# Patient Record
Sex: MALE | Race: WHITE | Employment: OTHER | URBAN - METROPOLITAN AREA
[De-identification: names, ages, dates, MRNs, and addresses within clinical notes are randomized per-mention and may not be internally consistent; named-entity substitution may affect disease eponyms.]

---

## 2020-04-05 ENCOUNTER — HOSPITAL ENCOUNTER (INPATIENT)
Age: 80
LOS: 6 days | Discharge: NURSING, REHAB OR LONG-TERM CARE | DRG: 377 | End: 2020-04-11
Attending: HOSPITALIST | Admitting: HOSPITALIST
Payer: MEDICARE

## 2020-04-05 ENCOUNTER — ANESTHESIA EVENT (OUTPATIENT)
Dept: ENDOSCOPY | Age: 80
DRG: 377 | End: 2020-04-05
Payer: MEDICARE

## 2020-04-05 ENCOUNTER — ANESTHESIA (OUTPATIENT)
Dept: ENDOSCOPY | Age: 80
DRG: 377 | End: 2020-04-05
Payer: MEDICARE

## 2020-04-05 PROBLEM — A41.9 SEPSIS (HCC): Status: ACTIVE | Noted: 2020-04-05

## 2020-04-05 PROBLEM — A41.9 SEPTIC SHOCK (HCC): Status: ACTIVE | Noted: 2020-04-05

## 2020-04-05 PROBLEM — R65.21 SEPTIC SHOCK (HCC): Status: ACTIVE | Noted: 2020-04-05

## 2020-04-05 PROBLEM — D62 ACUTE BLOOD LOSS ANEMIA: Status: ACTIVE | Noted: 2020-04-05

## 2020-04-05 PROBLEM — N39.0 UTI (URINARY TRACT INFECTION): Status: ACTIVE | Noted: 2020-04-05

## 2020-04-05 PROBLEM — K92.2 GI BLEEDING: Status: ACTIVE | Noted: 2020-04-05

## 2020-04-05 LAB
ANION GAP SERPL CALC-SCNC: 6 MMOL/L (ref 3–18)
APPEARANCE UR: ABNORMAL
APTT PPP: 25.8 SEC (ref 23–36.4)
BACTERIA URNS QL MICRO: ABNORMAL /HPF
BILIRUB UR QL: NEGATIVE
BUN SERPL-MCNC: 78 MG/DL (ref 7–18)
BUN/CREAT SERPL: 43 (ref 12–20)
C DIFF GDH STL QL: NEGATIVE
C DIFF TOX A+B STL QL IA: NEGATIVE
CALCIUM SERPL-MCNC: 7 MG/DL (ref 8.5–10.1)
CHLORIDE SERPL-SCNC: 125 MMOL/L (ref 100–111)
CK MB CFR SERPL CALC: 9.7 % (ref 0–4)
CK MB SERPL-MCNC: 6.2 NG/ML (ref 5–25)
CK SERPL-CCNC: 64 U/L (ref 39–308)
CO2 SERPL-SCNC: 20 MMOL/L (ref 21–32)
COLOR UR: ABNORMAL
CREAT SERPL-MCNC: 1.82 MG/DL (ref 0.6–1.3)
EPITH CASTS URNS QL MICRO: ABNORMAL /LPF (ref 0–5)
ERYTHROCYTE [DISTWIDTH] IN BLOOD BY AUTOMATED COUNT: 16 % (ref 11.6–14.5)
GLUCOSE BLD STRIP.AUTO-MCNC: 323 MG/DL (ref 70–110)
GLUCOSE BLD STRIP.AUTO-MCNC: 94 MG/DL (ref 70–110)
GLUCOSE SERPL-MCNC: 311 MG/DL (ref 74–99)
GLUCOSE UR STRIP.AUTO-MCNC: NEGATIVE MG/DL
HCT VFR BLD AUTO: 24.1 % (ref 36–48)
HCT VFR BLD AUTO: 24.5 % (ref 36–48)
HGB BLD-MCNC: 7.7 G/DL (ref 13–16)
HGB BLD-MCNC: 7.9 G/DL (ref 13–16)
HGB UR QL STRIP: NEGATIVE
INR PPP: 1.4 (ref 0.8–1.2)
INTERPRETATION: NORMAL
KETONES UR QL STRIP.AUTO: NEGATIVE MG/DL
LACTATE SERPL-SCNC: 1.3 MMOL/L (ref 0.4–2)
LEUKOCYTE ESTERASE UR QL STRIP.AUTO: ABNORMAL
MCH RBC QN AUTO: 28.1 PG (ref 24–34)
MCHC RBC AUTO-ENTMCNC: 32 G/DL (ref 31–37)
MCV RBC AUTO: 88 FL (ref 74–97)
NITRITE UR QL STRIP.AUTO: NEGATIVE
PH UR STRIP: 5 [PH] (ref 5–8)
PLATELET # BLD AUTO: 355 K/UL (ref 135–420)
PMV BLD AUTO: 11.7 FL (ref 9.2–11.8)
POTASSIUM SERPL-SCNC: 4.8 MMOL/L (ref 3.5–5.5)
PROT UR STRIP-MCNC: ABNORMAL MG/DL
PROTHROMBIN TIME: 16.5 SEC (ref 11.5–15.2)
RBC # BLD AUTO: 2.74 M/UL (ref 4.7–5.5)
RBC #/AREA URNS HPF: ABNORMAL /HPF (ref 0–5)
SODIUM SERPL-SCNC: 151 MMOL/L (ref 136–145)
SP GR UR REFRACTOMETRY: 1.02 (ref 1–1.03)
TROPONIN I SERPL-MCNC: 1 NG/ML (ref 0–0.04)
UROBILINOGEN UR QL STRIP.AUTO: 1 EU/DL (ref 0.2–1)
WBC # BLD AUTO: 13.2 K/UL (ref 4.6–13.2)
WBC URNS QL MICRO: ABNORMAL /HPF (ref 0–4)

## 2020-04-05 PROCEDURE — 74011636637 HC RX REV CODE- 636/637: Performed by: INTERNAL MEDICINE

## 2020-04-05 PROCEDURE — 85018 HEMOGLOBIN: CPT

## 2020-04-05 PROCEDURE — P9045 ALBUMIN (HUMAN), 5%, 250 ML: HCPCS | Performed by: INTERNAL MEDICINE

## 2020-04-05 PROCEDURE — 74011000250 HC RX REV CODE- 250: Performed by: NURSE ANESTHETIST, CERTIFIED REGISTERED

## 2020-04-05 PROCEDURE — 74011250636 HC RX REV CODE- 250/636: Performed by: HOSPITALIST

## 2020-04-05 PROCEDURE — 86900 BLOOD TYPING SEROLOGIC ABO: CPT

## 2020-04-05 PROCEDURE — 0107U C DIFF TOX AG DETCJ IA STOOL: CPT

## 2020-04-05 PROCEDURE — 74011250636 HC RX REV CODE- 250/636: Performed by: INTERNAL MEDICINE

## 2020-04-05 PROCEDURE — 77010033678 HC OXYGEN DAILY

## 2020-04-05 PROCEDURE — 77030037186 HC VLV ENDOSC STRL DEFENDO DISP MVAT -A: Performed by: INTERNAL MEDICINE

## 2020-04-05 PROCEDURE — 83605 ASSAY OF LACTIC ACID: CPT

## 2020-04-05 PROCEDURE — 76060000031 HC ANESTHESIA FIRST 0.5 HR: Performed by: INTERNAL MEDICINE

## 2020-04-05 PROCEDURE — 82550 ASSAY OF CK (CPK): CPT

## 2020-04-05 PROCEDURE — 85730 THROMBOPLASTIN TIME PARTIAL: CPT

## 2020-04-05 PROCEDURE — 82962 GLUCOSE BLOOD TEST: CPT

## 2020-04-05 PROCEDURE — 86920 COMPATIBILITY TEST SPIN: CPT

## 2020-04-05 PROCEDURE — 83036 HEMOGLOBIN GLYCOSYLATED A1C: CPT

## 2020-04-05 PROCEDURE — 36430 TRANSFUSION BLD/BLD COMPNT: CPT

## 2020-04-05 PROCEDURE — 74011000250 HC RX REV CODE- 250: Performed by: INTERNAL MEDICINE

## 2020-04-05 PROCEDURE — 76040000019: Performed by: INTERNAL MEDICINE

## 2020-04-05 PROCEDURE — 77030037878 HC DRSG MEPILEX >48IN BORD MOLN -B

## 2020-04-05 PROCEDURE — 74011000250 HC RX REV CODE- 250: Performed by: HOSPITALIST

## 2020-04-05 PROCEDURE — 30233N1 TRANSFUSION OF NONAUTOLOGOUS RED BLOOD CELLS INTO PERIPHERAL VEIN, PERCUTANEOUS APPROACH: ICD-10-PCS | Performed by: INTERNAL MEDICINE

## 2020-04-05 PROCEDURE — 80048 BASIC METABOLIC PNL TOTAL CA: CPT

## 2020-04-05 PROCEDURE — 74011000258 HC RX REV CODE- 258: Performed by: INTERNAL MEDICINE

## 2020-04-05 PROCEDURE — 88342 IMHCHEM/IMCYTCHM 1ST ANTB: CPT

## 2020-04-05 PROCEDURE — 88305 TISSUE EXAM BY PATHOLOGIST: CPT

## 2020-04-05 PROCEDURE — 85027 COMPLETE CBC AUTOMATED: CPT

## 2020-04-05 PROCEDURE — 0DB68ZX EXCISION OF STOMACH, VIA NATURAL OR ARTIFICIAL OPENING ENDOSCOPIC, DIAGNOSTIC: ICD-10-PCS | Performed by: INTERNAL MEDICINE

## 2020-04-05 PROCEDURE — 81001 URINALYSIS AUTO W/SCOPE: CPT

## 2020-04-05 PROCEDURE — 65660000001 HC RM ICU INTERMED STEPDOWN

## 2020-04-05 PROCEDURE — P9016 RBC LEUKOCYTES REDUCED: HCPCS

## 2020-04-05 PROCEDURE — C9113 INJ PANTOPRAZOLE SODIUM, VIA: HCPCS | Performed by: HOSPITALIST

## 2020-04-05 PROCEDURE — 85610 PROTHROMBIN TIME: CPT

## 2020-04-05 RX ORDER — NOREPINEPHRINE BIT/0.9 % NACL 8 MG/250ML
.5-3 INFUSION BOTTLE (ML) INTRAVENOUS
Status: DISCONTINUED | OUTPATIENT
Start: 2020-04-05 | End: 2020-04-05

## 2020-04-05 RX ORDER — CHLORHEXIDINE GLUCONATE 1.2 MG/ML
15 RINSE ORAL ONCE
Status: COMPLETED | OUTPATIENT
Start: 2020-04-05 | End: 2020-04-05

## 2020-04-05 RX ORDER — ALBUMIN HUMAN 50 G/1000ML
25 SOLUTION INTRAVENOUS ONCE
Status: COMPLETED | OUTPATIENT
Start: 2020-04-05 | End: 2020-04-05

## 2020-04-05 RX ORDER — SODIUM CHLORIDE 0.9 % (FLUSH) 0.9 %
5-40 SYRINGE (ML) INJECTION AS NEEDED
Status: DISCONTINUED | OUTPATIENT
Start: 2020-04-05 | End: 2020-04-11 | Stop reason: HOSPADM

## 2020-04-05 RX ORDER — SODIUM CHLORIDE 9 MG/ML
250 INJECTION, SOLUTION INTRAVENOUS AS NEEDED
Status: DISCONTINUED | OUTPATIENT
Start: 2020-04-05 | End: 2020-04-07 | Stop reason: SDUPTHER

## 2020-04-05 RX ORDER — MAGNESIUM SULFATE 100 %
4 CRYSTALS MISCELLANEOUS AS NEEDED
Status: DISCONTINUED | OUTPATIENT
Start: 2020-04-05 | End: 2020-04-11 | Stop reason: HOSPADM

## 2020-04-05 RX ORDER — DEXTROSE, SODIUM CHLORIDE, AND POTASSIUM CHLORIDE 5; .45; .15 G/100ML; G/100ML; G/100ML
100 INJECTION INTRAVENOUS CONTINUOUS
Status: DISCONTINUED | OUTPATIENT
Start: 2020-04-05 | End: 2020-04-05

## 2020-04-05 RX ORDER — NOREPINEPHRINE BIT/0.9 % NACL 8 MG/250ML
.5-3 INFUSION BOTTLE (ML) INTRAVENOUS
Status: DISCONTINUED | OUTPATIENT
Start: 2020-04-05 | End: 2020-04-06

## 2020-04-05 RX ORDER — INSULIN LISPRO 100 [IU]/ML
INJECTION, SOLUTION INTRAVENOUS; SUBCUTANEOUS EVERY 6 HOURS
Status: DISCONTINUED | OUTPATIENT
Start: 2020-04-05 | End: 2020-04-06

## 2020-04-05 RX ORDER — SODIUM CHLORIDE 9 MG/ML
250 INJECTION, SOLUTION INTRAVENOUS AS NEEDED
Status: DISCONTINUED | OUTPATIENT
Start: 2020-04-05 | End: 2020-04-11 | Stop reason: HOSPADM

## 2020-04-05 RX ORDER — SODIUM CHLORIDE 450 MG/100ML
100 INJECTION, SOLUTION INTRAVENOUS CONTINUOUS
Status: DISCONTINUED | OUTPATIENT
Start: 2020-04-05 | End: 2020-04-06

## 2020-04-05 RX ORDER — SODIUM CHLORIDE 0.9 % (FLUSH) 0.9 %
5-40 SYRINGE (ML) INJECTION EVERY 8 HOURS
Status: DISCONTINUED | OUTPATIENT
Start: 2020-04-05 | End: 2020-04-11 | Stop reason: HOSPADM

## 2020-04-05 RX ORDER — DEXTROSE MONOHYDRATE 100 MG/ML
125-250 INJECTION, SOLUTION INTRAVENOUS AS NEEDED
Status: DISCONTINUED | OUTPATIENT
Start: 2020-04-05 | End: 2020-04-11 | Stop reason: HOSPADM

## 2020-04-05 RX ADMIN — SODIUM CHLORIDE 100 ML/HR: 450 INJECTION, SOLUTION INTRAVENOUS at 17:37

## 2020-04-05 RX ADMIN — SODIUM CHLORIDE 40 MG: 9 INJECTION, SOLUTION INTRAMUSCULAR; INTRAVENOUS; SUBCUTANEOUS at 14:21

## 2020-04-05 RX ADMIN — DEXTROSE MONOHYDRATE, SODIUM CHLORIDE, AND POTASSIUM CHLORIDE 100 ML/HR: 50; 4.5; 1.49 INJECTION, SOLUTION INTRAVENOUS at 13:49

## 2020-04-05 RX ADMIN — ALBUMIN (HUMAN) 25 G: 12.5 INJECTION, SOLUTION INTRAVENOUS at 15:24

## 2020-04-05 RX ADMIN — SODIUM CHLORIDE 40 MG: 9 INJECTION, SOLUTION INTRAMUSCULAR; INTRAVENOUS; SUBCUTANEOUS at 21:00

## 2020-04-05 RX ADMIN — CHLORHEXIDINE GLUCONATE 15 ML: 1.2 RINSE ORAL at 17:52

## 2020-04-05 RX ADMIN — Medication 10 ML: at 15:28

## 2020-04-05 RX ADMIN — INSULIN LISPRO 8 UNITS: 100 INJECTION, SOLUTION INTRAVENOUS; SUBCUTANEOUS at 17:42

## 2020-04-05 RX ADMIN — Medication 10 ML: at 21:42

## 2020-04-05 RX ADMIN — NOREPINEPHRINE BITARTRATE 20 MCG/MIN: 1 INJECTION INTRAVENOUS at 15:00

## 2020-04-05 NOTE — PROGRESS NOTES
spoke with Pt\"spouse to provide pastoral care and inquire about Code Status. See  Advance Care Plan note below for Pt's Medical POA. Pt's ACP original  is on file at THE [facility], [place], [place]. (now owned by [facility]). Pt is on [employer]'s prayer chain and a former [employer]. Spouse deeply appreciated the call.   M

## 2020-04-05 NOTE — PROGRESS NOTES
1240: Pt arrived to room 2604. Pt on 6 L NC,  On 18 of levophed. Pt is AOX4. Pt following commands. Pt is incontinent of copious amount of stool, loose liquid tarry in coloration. Pt denies any pain at this time. 1345: pt oxygen titrated down to 3 L NC. Levophed at 16. Pt incontinent of dark tarry loose stool. Incontinent care performed. 1815:  EGD being done at the bedside. 1845: Pt had 300ml nayeli urine output from dill since arrival to unit at 1245.     1900: Bedside and Verbal shift change report given to Madonna Bolivar RN (oncoming nurse) by Jarett Dean   (offgoing nurse). Report included the following information SBAR, Kardex, Intake/Output, MAR and Cardiac Rhythm NSR.

## 2020-04-05 NOTE — ACP (ADVANCE CARE PLANNING)
Spoke with Patient's spouse this afternoon. Pt's AMD is on file at THE [facility] (now owned by [address] [place] [address] [place]   Agent is [id number] [address], . ([phone]), who is the Pt's Brother in law and who resides in [place], [place].

## 2020-04-05 NOTE — ANESTHESIA PREPROCEDURE EVALUATION
Relevant Problems   No relevant active problems       Anesthetic History   No history of anesthetic complications            Review of Systems / Medical History  Patient summary reviewed and pertinent labs reviewed    Pulmonary  Within defined limits                 Neuro/Psych         Dementia     Cardiovascular    Hypertension                   GI/Hepatic/Renal         Renal disease: CRI      Comments: UGI bleed Endo/Other    Diabetes: type 2    Anemia     Other Findings              Physical Exam    Airway  Mallampati: III  TM Distance: 4 - 6 cm  Neck ROM: decreased range of motion   Mouth opening: Diminished (comment)     Cardiovascular    Rhythm: regular  Rate: normal         Dental    Dentition: Poor dentition     Pulmonary  Breath sounds clear to auscultation               Abdominal  GI exam deferred       Other Findings            Anesthetic Plan    ASA: 3  Anesthesia type: MAC            Anesthetic plan and risks discussed with: Patient

## 2020-04-05 NOTE — CONSULTS
[facility]  Pulmonary, Critical Care, and Sleep Medicine    Name: Ashton Essex MRN: [id number]  : 1940 Hospital: [address] [place] [address]  Date: 2020       [facility] Initial Patient Consult                                              Consult requesting physician: Gordon Mendez MD    Reason for Consult: GI bleed    I have reviewed the flowsheet and notes. Events, vitals, medications and notes from last 24 hours reviewed. 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    Post-void dribbling N39.43    Nocturia R35.1    Benign prostatic hyperplasia with urinary obstruction N40.1, N13.8    Urge incontinence N39.41    Atopic rhinitis J30.9    Hearing loss H91.90    Hypertension I10    Hyperlipidemia E78.5    Pseudophakia Z96.1    Urinary tract infection N39.0    Diabetes ([facility] [address] E11.9    Testicular cancer ([facility]) C62.90    Hypersomnia G47.10    Dementia ([facility]) F03.90    OAB (overactive bladder) N32.81    Urinary incontinence, urge N39.41    Acute blood loss anemia D62    GI bleeding K92.2    UTI (urinary tract infection) N39.0    Sepsis ([facility]) A41.9     Acute anemia secondary to blood loss -per report patient's hemoglobin at the other hospital was 5.5 and patient was transfused packed RBC. Patient's repeat hemoglobin here is 7.7. I have ordered further PRBC transfusion as the patient is a still in shock. Shock-patient has received IV fluid resuscitation at the outside hospital.  He is currently on Levophed drip. Titrate to keep systolic blood pressure more than 90 mmHg. I will also bolus him with 1 dose of albumin. PRBC transfusion should also help with his blood pressure  Acute upper GI bleed -GI has been consulted and I have talked to them. They are planning to do an endoscopy today.   Patient has been started on Protonix twice daily. Patient is also on aspirin and Plavix as an outpatient which has been held off at this time. Hyponatremia-this is likely secondary to dehydration. Hydrate with IV fluids and monitor  Acute kidney injury-this is again secondary to dehydration. Hydrate with IV fluids and monitor  History of diabetes mellitus with hyperglycemia I will start the patient on insulin sliding scale  History of hypertension-patient's home medications are on hold secondary to his hypotension  DVT and GI prophylaxis-patient is on SCD and Protonix    Code status: Patient is full code per wife    OTHER:  Glycemic Control. Glucose stabilizer per ICU protocol when on insulin drip. Maintain blood glucose 140-180. Replace electrolytes per ICU electrolyte replacement protocol  Vasopressor when appropriate with MAP goal >65 mmHg. Central Line bundle followed, remove when not needed. Large bore IV line or CVP when appropriate. Skin & Wound care. PT/OT eval and treat. OOB/IS when appropriate. Further recommendations will be based on the patient's response to recommended treatment and results of the investigation ordered. Quality Care: Stress ulcer prophylaxis, DVT prophylaxis, HOB elevated, Infection control all reviewed and addressed. Events and notes from last 24 hours reviewed. Care plan discussed with nursing. D/w patient and family: above medical problems and answered all questions to their satisfaction. See my orders for detail. CC TIME: >45 min   Further management depending on test results and work up as outlined above. History of Present Illness:    Meghann Harry has been seen and evaluated in ICU/ER. Patient is a 78 y.o. male with PMHx significant for diabetes mellitus, hypertension, early dementia who has been transferred to our hospital from [facility] due to shock and GI bleed.   On my assessment patient reported he has been having dark-colored stools for the past week to week and a half. He does not complain of any chest pains or dyspnea. Does not complain of any headaches dizziness or lightheadedness at this time. Does not report any history of fever. He does take a daily aspirin and Plavix and is also takes Motrin for pain. Review of Systems:   HEENT: No epistaxis, no nasal drainage  Respiratory: as above  Cardiovascular: no chest pain, no palpitations  Gastrointestinal: as above  Genitourinary: No urinary symptoms  Musculoskeletal: Joint pain  Neurological: No focal weakness, no seizures, no headaches  Constitutional: No fever, no chills  Skin: no rash      Medication Reviewed      Allergies   Allergen Reactions    Iodinated Contrast Media Other (comments)     Hot flushing       Past Medical History:   Diagnosis Date    Benign prostatic hyperplasia with urinary obstruction     Dementia     early onset    Diabetes ([address]     Hypersomnia     Hypertension     Nocturia     OAB (overactive bladder)     Testicular cancer (HCC)     Urge incontinence     Urinary incontinence, urge       Past Surgical History:   Procedure Laterality Date    HX CYST REMOVAL  -    cyst removed from right breast    HX OTHER SURGICAL      reemoval of testcle    HX OTHER SURGICAL  2018    Surgery: [facility]age Lower bowels, [facility]    HX OTHER SURGICAL  2019    Heart Blockage: 30% [facility]: Dr. Camila Adamson      Social History     Tobacco Use    Smoking status: Former Smoker     Types: Cigarettes     Last attempt to quit: 1976     Years since quittin.2    Smokeless tobacco: Never Used   Substance Use Topics    Alcohol use: No     Alcohol/week: 0.0 standard drinks      No family history on file. Prior to Admission medications    Medication Sig Start Date End Date Taking? Authorizing Provider   mirabegron ER (MYRBETRIQ) 25 mg ER tablet Take 1 Tab by mouth daily.  18   Shaw Matute MD   clopidogrel (PLAVIX) 75 mg tab  12/15/17 Provider, Historical   glimepiride (AMARYL) 1 mg tablet  12/15/17   Provider, Historical   aspirin-calcium carbonate 81 mg-300 mg calcium(777 mg) tab 81 mg.    Provider, Historical   trospium (SANCTURA XL) 60 mg capsule Take 1 Cap by mouth Daily (before breakfast). 1/17/18   Matthew Thomas MD   sildenafil, antihypertensive, (REVATIO) 20 mg tablet Take up to 5 tablets one hour prior to intercourse on an empty stomach. 1/17/18   Matthew Thomas MD   cyanocobalamin 1,000 mcg tablet 1,000 mcg. 5/21/17   Provider, Historical   omeprazole (PRILOSEC) 20 mg capsule 20 mg.    Provider, Historical   cinnamon bark (CINNAMON) 500 mg cap Take 1,000 mg by mouth. Provider, Historical   testosterone cypionate (DEPOTESTOTERONE CYPIONATE) 200 mg/mL injection 200 mg by IntraMUSCular route. 3/15/15   Provider, Historical   metoprolol (LOPRESSOR) 25 mg tablet  7/31/15   Provider, Historical   benazepril (LOTENSIN) 10 mg tablet  7/31/15   Provider, Historical   tamsulosin (FLOMAX) 0.4 mg capsule  7/18/15   Provider, Historical   pravastatin (PRAVACHOL) 20 mg tablet Take 20 mg by mouth nightly.     Provider, Historical     Current Facility-Administered Medications   Medication Dose Route Frequency    dextrose 5% - 0.45% NaCl with KCl 20 mEq/L infusion  100 mL/hr IntraVENous CONTINUOUS    pantoprazole (PROTONIX) 40 mg in 0.9% sodium chloride 10 mL injection  40 mg IntraVENous Q12H    sodium chloride (NS) flush 5-40 mL  5-40 mL IntraVENous Q8H    vasopressin (VASOSTRICT) 20 Units in 0.9% sodium chloride 100 mL infusion  0-0.03 Units/min IntraVENous TITRATE    NOREPINephrine (LEVOPHED) 8 mg in 0.9% NS 250ml infusion  0.5-30 mcg/min IntraVENous TITRATE        Central Venous Catheter:  Triple Lumen CVL right subclavian  04/05/20 Right Subclavian (Active)     Objective:  Vital Signs:    Visit Vitals  /45   Pulse 98   Temp 97.4 °F (36.3 °C)   Resp 24   Ht 6' (1.829 m)   Wt 79.4 kg (175 lb)   SpO2 98%   BMI 23.73 kg/m²      O2 Device: Nasal cannula  O2 Flow Rate (L/min): 4 l/min  Temp (24hrs), Av.9 °F (36.1 °C), Min:96.4 °F (35.8 °C), Max:97.4 °F (36.3 °C)    Physical Exam:   General/Neurology: Alert, Awake,   Head:   Normocephalic, without obvious abnormality  Eye:   PERRL, EOM intact, no scleral icterus, no pallor  Oral:   Mucus membranes moist  Neck:   Supple  Lung:   B/l air entry is fair  Heart:   Regular rate & rhythm. S1 S2 present. Abdomen: Soft, non tender, BS+nt  Extremities:  No pedal edema  Skin:   Dry, intact    Data:    High complexity decision making was performed during the evaluation of this patient at high risk for decompensation with multiple organ involvement     Above mentioned total time spent on reviewing the case/medical record/data/notes/EMR/patient examination/documentation/coordinating care with nurse/consultants, exclusive of procedures with complex decision making performed and > 50% time spent in face to face evaluation.     This note has been dictated using the dragon dictation system    Remi Azul MD  2020

## 2020-04-05 NOTE — ANESTHESIA POSTPROCEDURE EVALUATION
Post-Anesthesia Evaluation & Assessment    Visit Vitals  BP (!) 115/39   Pulse (!) 105   Temp 36.3 °C (97.3 °F)   Resp 21   Ht 6' (1.829 m)   Wt 79.4 kg (175 lb)   SpO2 99%   BMI 23.73 kg/m²       Nausea/Vomiting: no nausea    Pain score (VAS): 0    Post-operative hydration adequate. Mental status & Level of consciousness: orientation per pre-anesthetic level    Neurological status: moves all extremities, sensation grossly intact    Pulmonary status: airway patent, supplemental oxygen required via NC and humidifier    Complications related to anesthesia: none    Additional comments:        Gabriela Cooper CRNA  April 5, 2020  Procedure(s):  ESOPHAGOGASTRODUODENOSCOPY (EGD). MAC    <BSHSIANPOST>    Vitals Value Taken Time   /39 4/5/2020  6:45 PM   Temp     Pulse 103 4/5/2020  6:54 PM   Resp 26 4/5/2020  6:54 PM   SpO2 100 % 4/5/2020  6:54 PM   Vitals shown include unvalidated device data.

## 2020-04-05 NOTE — CONSULTS
Gastroenterology Consult    Patient: Jody Martin MRN: [id number]  SSN: [id number]    YOB: 1940  Age: 78 y.o. Sex: male        Assessment:   1) Hypotension, likely secondary to hypovolemia and anemia related to GI bleeding  2) Melena; suspected upper GI bleeding with DDx including PUD, AVM, neoplasia, esophagitis/gastritis. Lower GI source is a consideration  3) Anemia; secondary to GI bleeding; Hb 5.9->7.7 without recent baseline  4) Chronic dual antiplatelet therapy (?indication) and NSAID use  5) Colonic thickening on CT; possibly related to colitis (?ischemic) or infectious process although also potentially an incidental or clinically insignificant finding  6) KARLI, creat 1.8    Mr Lexie Espino has anemia and hypotension with apparent melena suspicious for UGI bleeding. Limited notes from OSH are available and unclear whether there was a specific concern for infection or whether ABX were started for broad treatment of hypotension but seems that GI bleeding is at least a major contributor to the hypotension. Significant concern for PUD in the setting of chronic aspirin and motrin use. No evidence of liver disease. Plan for ongoing resuscitation (would given him at least 1 more unit of blood right now) and plan for EGD once he is a bit more stabilized, likely later this afternoon. Continue protonix 40mg bid. I discussed EGD with its risks and potential benefits with the patient and his wife Adal Abler) in detail and both consent to the procedure and all questions were answered    Plan:   1) NPO for EGD over the next 12-24 hours  2) 1 unit PRBC transfusion  3) Check coags  4) Hold aspirin and plavix  5) Check CDiff  6) Further considerations after EGD. Subjective:      Jody Martin is a 78 y.o. male who is being seen for concern for GI bleeding.  He has a history notable for DM, HTN, early dementia, remote testicular cancer, and recurrent small bowel obstructions s/p laparoscopy with small laparotomy with YISSEL in Aug2018. It appears he had SBO in Dec2019 managed conservatively and limited notes indicate admission 25March for diarrhea/vomiting and acute renal failure. He was taken to [facility] last night for unresponsiveness. He was hypotensive and was given IVF and empiric antibiotics. Labwork there showed WBC 9.7, Hb 5.9, normal platelets, creat 1.8, Troponin 0.02, normal liver enzymes, lactate 2.8. CT Abd/Pelvis showed possible colonic wall thickening of the descending colon near the splenic flexure as well as small bilateral pleural effusion. He was started on levophed. Some notes indicate concern for septic shock although I don't see indication that specific infection was suspected or found on the limited available records I have. He was treated with ABX and given protonix IV and given 1 unit of PRBC's and he was transferred to [facility] this afternoon for ongoing care. Here, his BP and HR are normalized on levophed drip. He is afebrile. He has had a couple loose black colored stools per nursing staff. His mental status has improved and he reports no complaints to me other than dry mouth. He has no abd pain I spoke with his wife as well to confirm history. They report he has had on and off loose stools ever since his laparoscopy/laparotomy in Aug2018, sometimes black in color like now. No red blood in the stool. He believes he had a prior EGD some time ago but details are unclear. He has no known history of GI bleeding or ulcers. He does take daily aspirin and is on plavix for unclear reasons (he states it is for a murmur). He admits to daily motrin use for chronic arthritis. He has had no vomiting. He has had no fever. He has no dyspnea. Limited labs area available here at [facility] with WBC 13.2, Hb 7.7, plt 355.    His last colonoscopy was Sept2017 (Dr Savannah Paula) with internal hemorrhoids and sigmoid diverticulosis noted    Past Medical History  Past Medical History:   Diagnosis Date    Benign prostatic hyperplasia with urinary obstruction     Dementia     early onset    Diabetes ([facility] [address]     Hypersomnia     Hypertension     Nocturia     OAB (overactive bladder)     Testicular cancer (HCC)     Urge incontinence     Urinary incontinence, urge        Past Surgical History  Past Surgical History:   Procedure Laterality Date    HX CYST REMOVAL  -    cyst removed from right breast    HX OTHER SURGICAL      reemoval of testcle    HX OTHER SURGICAL  2018    [facility]: [facility] bowels[facility]    HX OTHER SURGICAL  2019    Heart Blockage: 30% [facility]: Dr. Rosa Berry       Family History  No family history on file.       Social History  Social History     Socioeconomic History    Marital status:      Spouse name: Not on file    Number of children: Not on file    Years of education: Not on file    Highest education level: Not on file   Occupational History    Not on file   Social Needs    Financial resource strain: Not on file    Food insecurity     Worry: Not on file     Inability: Not on file    Transportation needs     Medical: Not on file     Non-medical: Not on file   Tobacco Use    Smoking status: Former Smoker     Types: Cigarettes     Last attempt to quit: 1976     Years since quittin.2    Smokeless tobacco: Never Used   Substance and Sexual Activity    Alcohol use: No     Alcohol/week: 0.0 standard drinks    Drug use: No    Sexual activity: Never     Partners: Female   Lifestyle    Physical activity     Days per week: Not on file     Minutes per session: Not on file    Stress: Not on file   Relationships    Social connections     Talks on phone: Not on file     Gets together: Not on file     Attends Christianity service: Not on file     Active member of club or organization: Not on file     Attends meetings of clubs or organizations: Not on file     Relationship status: Not on file    Intimate partner violence     Fear of current or ex partner: Not on file     Emotionally abused: Not on file     Physically abused: Not on file     Forced sexual activity: Not on file   Other Topics Concern    Not on file   Social History Narrative    Not on file         Medications  Current Facility-Administered Medications   Medication Dose Route Frequency    dextrose 5% - 0.45% NaCl with KCl 20 mEq/L infusion  100 mL/hr IntraVENous CONTINUOUS    pantoprazole (PROTONIX) 40 mg in 0.9% sodium chloride 10 mL injection  40 mg IntraVENous Q12H    0.9% sodium chloride infusion 250 mL  250 mL IntraVENous PRN    sodium chloride (NS) flush 5-40 mL  5-40 mL IntraVENous Q8H    sodium chloride (NS) flush 5-40 mL  5-40 mL IntraVENous PRN    0.9% sodium chloride infusion 250 mL  250 mL IntraVENous PRN    vasopressin (VASOSTRICT) 20 Units in 0.9% sodium chloride 100 mL infusion  0-0.03 Units/min IntraVENous TITRATE    NOREPINephrine (LEVOPHED) 8 mg in 0.9% NS 250ml infusion  0.5-30 mcg/min IntraVENous TITRATE    albumin human 5% (BUMINATE) solution 25 g  25 g IntraVENous ONCE        Hospital Problems  Date Reviewed: 2/5/2019          Codes Class Noted POA    Acute blood loss anemia ICD-10-CM: D62  ICD-9-CM: 285.1  4/5/2020 Unknown        GI bleeding ICD-10-CM: K92.2  ICD-9-CM: 578.9  4/5/2020 Unknown        UTI (urinary tract infection) ICD-10-CM: N39.0  ICD-9-CM: 599.0  4/5/2020 Unknown        Sepsis ([address] ICD-10-CM: A41.9  ICD-9-CM: 038.9, 995.91  4/5/2020 Unknown            Allergies   Allergen Reactions    Iodinated Contrast Media Other (comments)     Hot flushing        Review of Systems:  Pertinent items are noted in the History of Present Illness.     Objective:     Physical Exam:  Visit Vitals  BP (!) 123/34   Pulse 84   Temp 97.4 °F (36.3 °C)   Resp 20   Ht 6' (1.829 m)   Wt 79.4 kg (175 lb)   SpO2 99%   BMI 23.73 kg/m²     General appearance: alert, cooperative, elderly M in no distress  Head: Normocephalic, without obvious abnormality, atraumatic  Eyes: negative for pallor/icterus  Throat: dry lips and oral mucosa; multiple missing teeth with dental caries noted; no loose teeth  Neck: supple, symmetrical, trachea midline, no adenopathy, thyroid: not enlarged, symmetric, no tenderness/mass/nodules   Lungs: clear to auscultation bilaterally  Heart: regular rate and rhythm, S1, S2 normal, 3/6 systolic murmur at the base  Abdomen: soft, non-tender. Bowel sounds normal. No masses,  no organomegaly  Extremities: extremities normal, atraumatic, no significant edema  Skin: Skin color, texture, turgor normal. No rashes or lesions  Neurologic: Oriented to person, place, year, president.  Doesn't know date    Recent Results (from the past 24 hour(s))   CBC W/O DIFF    Collection Time: 04/05/20  2:37 PM   Result Value Ref Range    WBC 13.2 4.6 - 13.2 K/uL    RBC 2.74 (L) 4.70 - 5.50 M/uL    HGB 7.7 (L) 13.0 - 16.0 g/dL    HCT 24.1 (L) 36.0 - 48.0 %    MCV 88.0 74.0 - 97.0 FL    MCH 28.1 24.0 - 34.0 PG    MCHC 32.0 31.0 - 37.0 g/dL    RDW 16.0 (H) 11.6 - 14.5 %    PLATELET 005 550 - 889 K/uL    MPV 11.7 9.2 - 11.8 FL   TYPE + CROSSMATCH    Collection Time: 04/05/20  2:45 PM   Result Value Ref Range    Crossmatch Expiration 04/08/2020     ABO/Rh(D) PENDING     Antibody screen PENDING          Signed By: Andreina Morgan MD     April 5, 2020 Skin normal color for race, warm, dry and intact. No evidence of rash.

## 2020-04-05 NOTE — PROCEDURES
Endoscopy Procedure Note    Patient: Jess Geller MRN: [id number]  SSN: [id number]    YOB: 1940  Age: 78 y.o. Sex: male      Date/Time:  4/5/2020 6:36 PM    Esophagogastroduodenoscopy (EGD) Procedure Note    Procedure: Esophagogastroduodenoscopy with biopsy    IMPRESSION:   1. Gastritis and duodenitis with areas of focal oozing  2. Multiple gastric and duodenal ulcers without bleeding or high risk features  3. Gastric biopsies taken for Hpylori    RECOMMENDATIONS:  1. NPO for now, but has levophed requirement decreases then OK for clears from GI standpoint   2. Continue protonix 40mg twice daily  3. Monitor H/H and transfuse to goal Hb 7-8  4. Await biopsies  5. Hold aspirin and plavix for now    Indication: melena and anemia with hypotension  :  Syl Magana MD  Assistants: * No surgical staff found *    Referring Provider:   Donna Weinberg MD  History: The history and physical exam were reviewed and updated. Endoscope: Olympus GIF-190 diagnostic endoscope  Extent of Exam: third portion of the duodenum  ASA: Per Anesthesia  Anethesia/Sedation:  MAC anesthesia    Description of the procedure: The procedure was discussed with the patient including risks, benefits, alternatives including risks of iv sedation, bleeding, perforation and aspiration. A safety timeout was performed. The patient was placed in the left lateral decubitus position. A bite block was placed. The patient was given incremental doses of intravenous sedation until moderate sedation was achieved. The patients vital signs were monitored at all times including heart rate/rhythm, blood pressure and oxygen saturation. The endoscope was then passed under direct visualization to the third portion of the duodenum. The endoscope was then slowly withdrawn while visualizing the mucosa. In the stomach a retroflexion was performed and gastric fundus and cardia visualized.   The endoscope was then slowly withdrawn. The patient was then transferred to recovery in stable condition. Findings:    Esophagus: The esophageal mucosa was normal with no ulceration, mass or stricture. There was no evidence of Chavez's esophagus or reflux esophagitis. Stomach: There is yellow fluid in the stomach without blood or bleeding. There are multiple 2-4mm ulcers in the antrum and body with pigmented eschar. There is a larger 8-9mm ulcer along the proximal gastric body along the posterior wall with adherent exudate. None of these had visible vessel or bleeding. There is patchy erythema and mucosal edema consistent with gastritis. Biopsies were taken from the antrum and body for Hpylori. Duodenum: There is diffuse duodenitis in the bulb and at the sweep with mucosal edema, erythema, and friability. There are areas of focal oozing from the duodenitis without underlying treatable lesion. There are several 5-8mm ulcers in the bulb and sweep with adherent exudate and flat pigmented material without bleeding or visible vessel. The more distal duodenal mucosa was normal.     Therapies:  None    Specimens: * No specimens in log *            Complications:   None; patient tolerated the procedure well.     EBL:Minimal    Discharge disposition:  Remain in the ICU    Telma Min MD  April 5, 2020  6:36 PM

## 2020-04-06 ENCOUNTER — APPOINTMENT (OUTPATIENT)
Dept: GENERAL RADIOLOGY | Age: 80
DRG: 377 | End: 2020-04-06
Attending: INTERNAL MEDICINE
Payer: MEDICARE

## 2020-04-06 PROBLEM — K50.90 CROHN'S DISEASE (HCC): Status: ACTIVE | Noted: 2020-04-06

## 2020-04-06 LAB
ALBUMIN SERPL-MCNC: 1.9 G/DL (ref 3.4–5)
ALBUMIN/GLOB SERPL: 0.9 {RATIO} (ref 0.8–1.7)
ALP SERPL-CCNC: 58 U/L (ref 45–117)
ALT SERPL-CCNC: 13 U/L (ref 16–61)
ANION GAP SERPL CALC-SCNC: 6 MMOL/L (ref 3–18)
AST SERPL-CCNC: 13 U/L (ref 10–38)
BASOPHILS # BLD: 0 K/UL (ref 0–0.1)
BASOPHILS NFR BLD: 0 % (ref 0–2)
BILIRUB SERPL-MCNC: 0.8 MG/DL (ref 0.2–1)
BUN SERPL-MCNC: 64 MG/DL (ref 7–18)
BUN/CREAT SERPL: 39 (ref 12–20)
CALCIUM SERPL-MCNC: 6.9 MG/DL (ref 8.5–10.1)
CHLORIDE SERPL-SCNC: 127 MMOL/L (ref 100–111)
CK MB CFR SERPL CALC: 9.6 % (ref 0–4)
CK MB SERPL-MCNC: 5 NG/ML (ref 5–25)
CK SERPL-CCNC: 52 U/L (ref 39–308)
CO2 SERPL-SCNC: 21 MMOL/L (ref 21–32)
CREAT SERPL-MCNC: 1.64 MG/DL (ref 0.6–1.3)
DIFFERENTIAL METHOD BLD: ABNORMAL
EOSINOPHIL # BLD: 0.1 K/UL (ref 0–0.4)
EOSINOPHIL NFR BLD: 2 % (ref 0–5)
ERYTHROCYTE [DISTWIDTH] IN BLOOD BY AUTOMATED COUNT: 15.9 % (ref 11.6–14.5)
GLOBULIN SER CALC-MCNC: 2.1 G/DL (ref 2–4)
GLUCOSE BLD STRIP.AUTO-MCNC: 110 MG/DL (ref 70–110)
GLUCOSE BLD STRIP.AUTO-MCNC: 112 MG/DL (ref 70–110)
GLUCOSE BLD STRIP.AUTO-MCNC: 63 MG/DL (ref 70–110)
GLUCOSE BLD STRIP.AUTO-MCNC: 66 MG/DL (ref 70–110)
GLUCOSE BLD STRIP.AUTO-MCNC: 71 MG/DL (ref 70–110)
GLUCOSE BLD STRIP.AUTO-MCNC: 71 MG/DL (ref 70–110)
GLUCOSE BLD STRIP.AUTO-MCNC: 75 MG/DL (ref 70–110)
GLUCOSE SERPL-MCNC: 57 MG/DL (ref 74–99)
HBA1C MFR BLD: 6.1 % (ref 4.2–5.6)
HCT VFR BLD AUTO: 23.3 % (ref 36–48)
HCT VFR BLD AUTO: 23.7 % (ref 36–48)
HGB BLD-MCNC: 7.4 G/DL (ref 13–16)
HGB BLD-MCNC: 7.5 G/DL (ref 13–16)
LYMPHOCYTES # BLD: 1 K/UL (ref 0.9–3.6)
LYMPHOCYTES NFR BLD: 13 % (ref 21–52)
MCH RBC QN AUTO: 28 PG (ref 24–34)
MCHC RBC AUTO-ENTMCNC: 31.8 G/DL (ref 31–37)
MCV RBC AUTO: 88.3 FL (ref 74–97)
MONOCYTES # BLD: 0.7 K/UL (ref 0.05–1.2)
MONOCYTES NFR BLD: 9 % (ref 3–10)
NEUTS SEG # BLD: 5.5 K/UL (ref 1.8–8)
NEUTS SEG NFR BLD: 76 % (ref 40–73)
PLATELET # BLD AUTO: 270 K/UL (ref 135–420)
PMV BLD AUTO: 11.5 FL (ref 9.2–11.8)
POTASSIUM SERPL-SCNC: 4 MMOL/L (ref 3.5–5.5)
PROT SERPL-MCNC: 4 G/DL (ref 6.4–8.2)
RBC # BLD AUTO: 2.64 M/UL (ref 4.7–5.5)
SODIUM SERPL-SCNC: 154 MMOL/L (ref 136–145)
TROPONIN I SERPL-MCNC: 1.06 NG/ML (ref 0–0.04)
WBC # BLD AUTO: 7.3 K/UL (ref 4.6–13.2)

## 2020-04-06 PROCEDURE — 82962 GLUCOSE BLOOD TEST: CPT

## 2020-04-06 PROCEDURE — 77010033678 HC OXYGEN DAILY

## 2020-04-06 PROCEDURE — 74011250636 HC RX REV CODE- 250/636: Performed by: HOSPITALIST

## 2020-04-06 PROCEDURE — 74011000258 HC RX REV CODE- 258: Performed by: INTERNAL MEDICINE

## 2020-04-06 PROCEDURE — 80053 COMPREHEN METABOLIC PANEL: CPT

## 2020-04-06 PROCEDURE — 82550 ASSAY OF CK (CPK): CPT

## 2020-04-06 PROCEDURE — 65660000001 HC RM ICU INTERMED STEPDOWN

## 2020-04-06 PROCEDURE — 74011250637 HC RX REV CODE- 250/637: Performed by: INTERNAL MEDICINE

## 2020-04-06 PROCEDURE — 74011000250 HC RX REV CODE- 250: Performed by: HOSPITALIST

## 2020-04-06 PROCEDURE — 85018 HEMOGLOBIN: CPT

## 2020-04-06 PROCEDURE — 74011000250 HC RX REV CODE- 250

## 2020-04-06 PROCEDURE — 85025 COMPLETE CBC W/AUTO DIFF WBC: CPT

## 2020-04-06 PROCEDURE — 71045 X-RAY EXAM CHEST 1 VIEW: CPT

## 2020-04-06 PROCEDURE — C9113 INJ PANTOPRAZOLE SODIUM, VIA: HCPCS | Performed by: HOSPITALIST

## 2020-04-06 RX ORDER — PRAVASTATIN SODIUM 20 MG/1
20 TABLET ORAL
Status: DISCONTINUED | OUTPATIENT
Start: 2020-04-06 | End: 2020-04-11 | Stop reason: HOSPADM

## 2020-04-06 RX ORDER — DEXTROSE 50 % IN WATER (D50W) INTRAVENOUS SYRINGE
Status: COMPLETED
Start: 2020-04-06 | End: 2020-04-06

## 2020-04-06 RX ORDER — DEXTROSE MONOHYDRATE 100 MG/ML
125-250 INJECTION, SOLUTION INTRAVENOUS AS NEEDED
Status: DISCONTINUED | OUTPATIENT
Start: 2020-04-06 | End: 2020-04-07 | Stop reason: SDUPTHER

## 2020-04-06 RX ORDER — DEXTROSE MONOHYDRATE AND SODIUM CHLORIDE 5; .45 G/100ML; G/100ML
75 INJECTION, SOLUTION INTRAVENOUS CONTINUOUS
Status: DISCONTINUED | OUTPATIENT
Start: 2020-04-06 | End: 2020-04-08

## 2020-04-06 RX ORDER — DEXTROSE 50 % IN WATER (D50W) INTRAVENOUS SYRINGE
25 ONCE
Status: COMPLETED | OUTPATIENT
Start: 2020-04-06 | End: 2020-04-06

## 2020-04-06 RX ORDER — OXYBUTYNIN CHLORIDE 5 MG/1
TABLET, EXTENDED RELEASE ORAL
Status: DISCONTINUED | OUTPATIENT
Start: 2020-04-07 | End: 2020-04-06

## 2020-04-06 RX ORDER — OXYBUTYNIN CHLORIDE 10 MG/1
10 TABLET, EXTENDED RELEASE ORAL DAILY
Status: DISCONTINUED | OUTPATIENT
Start: 2020-04-07 | End: 2020-04-11 | Stop reason: HOSPADM

## 2020-04-06 RX ORDER — INSULIN LISPRO 100 [IU]/ML
INJECTION, SOLUTION INTRAVENOUS; SUBCUTANEOUS
Status: DISCONTINUED | OUTPATIENT
Start: 2020-04-06 | End: 2020-04-10

## 2020-04-06 RX ORDER — MAGNESIUM SULFATE 100 %
4 CRYSTALS MISCELLANEOUS AS NEEDED
Status: DISCONTINUED | OUTPATIENT
Start: 2020-04-06 | End: 2020-04-07 | Stop reason: SDUPTHER

## 2020-04-06 RX ADMIN — DEXTROSE MONOHYDRATE AND SODIUM CHLORIDE 75 ML/HR: 5; .45 INJECTION, SOLUTION INTRAVENOUS at 14:44

## 2020-04-06 RX ADMIN — SODIUM CHLORIDE 100 ML/HR: 450 INJECTION, SOLUTION INTRAVENOUS at 13:39

## 2020-04-06 RX ADMIN — DEXTROSE MONOHYDRATE 12.5 G: 25 INJECTION, SOLUTION INTRAVENOUS at 06:00

## 2020-04-06 RX ADMIN — Medication 10 ML: at 13:09

## 2020-04-06 RX ADMIN — DEXTROSE 50 % IN WATER (D50W) INTRAVENOUS SYRINGE 12.5 G: at 06:00

## 2020-04-06 RX ADMIN — SODIUM CHLORIDE 40 MG: 9 INJECTION, SOLUTION INTRAMUSCULAR; INTRAVENOUS; SUBCUTANEOUS at 09:28

## 2020-04-06 RX ADMIN — Medication 10 ML: at 22:00

## 2020-04-06 RX ADMIN — Medication 10 ML: at 06:14

## 2020-04-06 RX ADMIN — SODIUM CHLORIDE 40 MG: 9 INJECTION, SOLUTION INTRAMUSCULAR; INTRAVENOUS; SUBCUTANEOUS at 21:57

## 2020-04-06 RX ADMIN — PRAVASTATIN SODIUM 20 MG: 20 TABLET ORAL at 21:57

## 2020-04-06 RX ADMIN — SODIUM CHLORIDE 100 ML/HR: 450 INJECTION, SOLUTION INTRAVENOUS at 03:18

## 2020-04-06 NOTE — PROGRESS NOTES
Problem: Falls - Risk of  Goal: *Absence of Falls  Description: Document Starleen Freeze Fall Risk and appropriate interventions in the flowsheet. Outcome: Progressing Towards Goal  Note: Fall Risk Interventions:                 Elimination Interventions: Bed/chair exit alarm, Call light in reach              Problem: Patient Education: Go to Patient Education Activity  Goal: Patient/Family Education  Outcome: Progressing Towards Goal     Problem: Pressure Injury - Risk of  Goal: *Prevention of pressure injury  Description: Document Rustam Scale and appropriate interventions in the flowsheet. Outcome: Progressing Towards Goal  Note: Pressure Injury Interventions:  Sensory Interventions: Float heels, Keep linens dry and wrinkle-free, Turn and reposition approx. every two hours (pillows and wedges if needed)    Moisture Interventions: Apply protective barrier, creams and emollients, Internal/External urinary devices    Activity Interventions: Assess need for specialty bed    Mobility Interventions: HOB 30 degrees or less, Pressure redistribution bed/mattress (bed type), Turn and reposition approx. every two hours(pillow and wedges)    Nutrition Interventions: Document food/fluid/supplement intake    Friction and Shear Interventions: Apply protective barrier, creams and emollients, Foam dressings/transparent film/skin sealants                Problem: Patient Education: Go to Patient Education Activity  Goal: Patient/Family Education  Outcome: Progressing Towards Goal     Problem: Risk for Spread of Infection  Goal: Prevent transmission of infectious organism to others  Description: Prevent the transmission of infectious organisms to other patients, staff members, and visitors.   Outcome: Progressing Towards Goal     Problem: Patient Education:  Go to Education Activity  Goal: Patient/Family Education  Outcome: Progressing Towards Goal

## 2020-04-06 NOTE — PROGRESS NOTES
Problem: Discharge Planning  Goal: *Discharge to safe environment  Outcome: Progressing Towards Goal  Plan is tbd

## 2020-04-06 NOTE — PROGRESS NOTES
Gastrointestinal Progress Note    Patient Name: Andrei WOODS Date: 4/6/2020    Admit Date: 4/5/2020      Assessment:   1. Acute UGIB due to gastroduodenal ulcer and gastroduodenitis probably ASA induced. 2.   Acute hypovolemic shock -- improved. 3.   Acute blood loss anemia due to GI bleeding. 4.   KARLI, improving. 5.   Descending colon wall thickening per CT. Hx of sigmoid diverticulosis per colonoscopy in 2017 by Dr. Karena Acevedo. Nontender abdomen on exam.  Either artifactual or subclinical colitis. Recommendation:   1. Can advance diet. 2.   Can switch to oral PPI tomorrow. 3.   Need clarify his need for Plavix and/or ASA and permanently discontinue if truly not indicated. Otherwise, resume in 6 days. 4.   Await results of gastric biopsies. Subjective:   Patient is not having any abdominal or chest pain. No nausea or vomiting. Hgb 7.5 and stable.     Current Facility-Administered Medications   Medication Dose Route Frequency    influenza vaccine 2019-20 (6 mos+)(PF) (FLUARIX/FLULAVAL/FLUZONE QUAD) injection 0.5 mL  0.5 mL IntraMUSCular PRIOR TO DISCHARGE    pravastatin (PRAVACHOL) tablet 20 mg  20 mg Oral QHS    insulin lispro (HUMALOG) injection   SubCUTAneous AC&HS    glucose chewable tablet 16 g  4 Tab Oral PRN    glucagon (GLUCAGEN) injection 1 mg  1 mg IntraMUSCular PRN    dextrose 10% infusion 125-250 mL  125-250 mL IntraVENous PRN    dextrose 5 % - 0.45% NaCl infusion  75 mL/hr IntraVENous CONTINUOUS    [START ON 4/7/2020] oxybutynin chloride XL (DITROPAN XL) tablet 10 mg  10 mg Oral DAILY    pantoprazole (PROTONIX) 40 mg in 0.9% sodium chloride 10 mL injection  40 mg IntraVENous Q12H    0.9% sodium chloride infusion 250 mL  250 mL IntraVENous PRN    sodium chloride (NS) flush 5-40 mL  5-40 mL IntraVENous Q8H    sodium chloride (NS) flush 5-40 mL  5-40 mL IntraVENous PRN    0.9% sodium chloride infusion 250 mL  250 mL IntraVENous PRN    insulin lispro (HUMALOG) injection   SubCUTAneous Q6H    glucose chewable tablet 16 g  4 Tab Oral PRN    glucagon (GLUCAGEN) injection 1 mg  1 mg IntraMUSCular PRN    dextrose 10% infusion 125-250 mL  125-250 mL IntraVENous PRN          Objective:     Visit Vitals  /59   Pulse 99   Temp 98.1 °F (36.7 °C)   Resp 23   Ht 6' (1.829 m)   Wt 79.4 kg (175 lb 0.7 oz)   SpO2 100%   BMI 23.74 kg/m²           Intake/Output Summary (Last 24 hours) at 4/6/2020 1559  Last data filed at 4/6/2020 1500  Gross per 24 hour   Intake 3444.59 ml   Output 1490 ml   Net 1954.59 ml         Examination:  Awake, oriented, coherent. Abdomen soft, nontender, no mass or ascites, no organomegaly. Warm extremities,  2+ pulses, no edema. Data Review:    Labs: Results:   Chemistry Recent Labs     04/06/20 0343 04/05/20  1437   GLU 57* 311*   * 151*   K 4.0 4.8   * 125*   CO2 21 20*   BUN 64* 78*   CREA 1.64* 1.82*   CA 6.9* 7.0*   AGAP 6 6   BUCR 39* 43*   AP 58  --    TP 4.0*  --    ALB 1.9*  --    GLOB 2.1  --    AGRAT 0.9  --     Estimated Creatinine Clearance: 38.1 mL/min (A) (based on SCr of 1.64 mg/dL (H)). CBC w/Diff Recent Labs     04/06/20  1324 04/06/20  0343 04/05/20  1940 04/05/20  1437   WBC  --  7.3  --  13.2   RBC  --  2.64*  --  2.74*   HGB 7.5* 7.4* 7.9* 7.7*   HCT 23.7* 23.3* 24.5* 24.1*   PLT  --  270  --  355   GRANS  --  76*  --   --    LYMPH  --  13*  --   --    EOS  --  2  --   --       Coagulation Recent Labs     04/05/20  1500   PTP 16.5*   INR 1.4*   APTT 25.8       Hepatitis Panel No results found for: HAMAT, HAAB, HABT, HAAT, HBSAG, HBSB, HBSAT, HBABN, HBCM, HBCAB, HBCAT, XBCABS, HBEAB, HBEAG, XHEPCS, [id number], HBEGLT, HBCMLT, HBCLT, HBEBLT, [id number], [id number], HAVMLT, [id number], HBCMLT, [id number], HCGAT   Amylase Lipase . Liver Enzymes Recent Labs     04/06/20  0343   TP 4.0*   ALB 1.9*   AP 58   SGOT 13   ALT 13*      Thyroid Studies No results for input(s): T4, T3U, TSH, TSHEXT in the last 72 hours.     No lab exists for component: T3     Pathology pathology       Nate Brandon MD, [place]  Pager: [phone]  April 6, 2020

## 2020-04-06 NOTE — PROGRESS NOTES
1930- Bedside shift change report given to Susy Stout RN (oncoming nurse) by Aurelio Lazaro RN (offgoing nurse). Report included the following information SBAR, Kardex, Intake/Output, MAR, Accordion, Recent Results and Cardiac Rhythm NSR. Received patient resting in bed. Patient denies pain at this time. VSS on monitor. All drips and orders verified. RN redraw h&h sent to lab. Will continue to monitor. 2130- Dr. Janette Arellano at bedside. 0000- Reassessment completed. No changes noted. Patient had episode of black melena loose stool. Patient tolerated cleaning very well. VSS. Patient denies pain at this time. Patient BP tolerating titration down on pressors. Will continue to monitor. 0230- Patient resting in bed. VSS at this time. Will continue to monitor. Incontinence care completed. Patient tolerated well. Patient denies pain at this time. Will continue to monitor. 0330- Radiology at bedside. 0400- Reassessment completed. No changes noted. VSS on monitor. Patient resting in bed with eyes closed. Will continue to monitor. 0451- Levophed stopped. Patient SBP maintains >90.     0545- Patient POC reading 66.   12.5mg  D50 to be administered per hypoglycemic protocol. 0700- Bedside shift change report given to Shoshana Silva RN (oncoming nurse) by Susy Stout RN (offgoing nurse). Report included the following information SBAR, Kardex, ED Summary, Intake/Output, MAR, Accordion, Recent Results and Cardiac Rhythm NSR.     6271- Attempted to call patients wife, Tashi Otero, no answer, unable to leave voicemail.

## 2020-04-06 NOTE — DIABETES MGMT
NUTRITIONAL ASSESSMENT GLYCEMIC CONTROL/ PLAN OF CARE     Madison Hays           78 y.o.           4/5/2020               No diagnosis found. INTERVENTIONS/PLAN:   Monitor diet advancement and tolerance, labs and weights. Could consider IV fluid with  D5 should hypoglycemia continue. ASSESSMENT:   Nutritional Status: Pt is 98% ideal weight;   BMI (calculated): 23.7 kg/m2 (normal weight classification). Per chart review, pt with reddness and excoriation on sacral areas. Noted GFR - 40;  Pt with frequent hypoglycemia today. Nutrition Diagnoses: Altered nutrition related labs due to T2DM as evidenced by A1C of 6.1%; use of glimepiride at home; recurrent hypoglycemic episodes (blood glucose 4/6:  63, 66, 110 mg/dL). Inadequate oral food and beverage intake due to GIB as evidenced by NPO orders. Diabetes Management:   Pt with hypoglycemia this AM, may be related to NPO status. Recent blood glucose:     4/6:  63, 66, 110 (low BG treated)  4/5:  323, 94  Within target range (non-ICU: <140; ICU<180): [] Yes   [x]  No    Current Insulin regimen: corrective lispro, normal insulin sensitivity q 6 hours  Home medication/insulin regimen: per chart - glimepiride, 1 mg/d;  cinnamon 1000 mg/d  HbA1c: 6.1% - ave BG ~ 123 mg/dL over past 3 months  Adequate glycemic control PTA:  [x] Yes  [] No     SUBJECTIVE/OBJECTIVE:   Information obtained from: chart review, RN (pt was sleepy soundly at time of rounds - did not disturb)  Pt with history of T2DM, early onset dementia, HTN, testicular CA admitted with acute upper GI bleed s/p endoscopy 4/5/20., sever sepsis from UTI. Diet: NPO    No data found.     Medications: [x]                Reviewed   IVF:  1/2 NS at 100 ml/hr    Most Recent POC Glucose:   Recent Labs     04/06/20  0343 04/05/20  1437   GLU 57* 311*         Labs:   Lab Results   Component Value Date/Time    Hemoglobin A1c 6.1 (H) 04/05/2020 07:40 PM     Lab Results   Component Value Date/Time    Sodium 154 (H) 04/06/2020 03:43 AM    Potassium 4.0 04/06/2020 03:43 AM    Chloride 127 (H) 04/06/2020 03:43 AM    CO2 21 04/06/2020 03:43 AM    Anion gap 6 04/06/2020 03:43 AM    Glucose 57 (L) 04/06/2020 03:43 AM    BUN 64 (H) 04/06/2020 03:43 AM    Creatinine 1.64 (H) 04/06/2020 03:43 AM    Calcium 6.9 (L) 04/06/2020 03:43 AM    Albumin 1.9 (L) 04/06/2020 03:43 AM       Anthropometrics: IBW : 80.7 kg (178 lb), % IBW (Calculated): 98.31 %, BMI (calculated): 23.7  Wt Readings from Last 1 Encounters:   04/06/20 79.4 kg (175 lb 0.7 oz)      Ht Readings from Last 1 Encounters:   04/05/20 6' (1.829 m)     Estimated Nutrition Needs:  1993 Kcals/day, Protein (g): 95 g Fluid (ml): 2000 ml  Based on:   [x]          Actual BW    []          ABW   []            Adjusted BW         Nutrition Interventions:  None at this time - NPO  Goal:   Blood glucose will be within target range of  mg/dL by 4/9/20. Pt will consume > 75% meals by 4/11/20.      Nutrition Monitoring and Evaluation      []     Monitor po intake on meal rounds  [x]     Continue inpatient monitoring and intervention  []     Other:      Nutrition Risk:  [x]   High     []  Moderate    []  Minimal/Uncompromised    Boston Mckeon RD, CDE   Office:  [address] [place] [address] Pager:  [phone]

## 2020-04-06 NOTE — PROGRESS NOTES
Problem: Discharge Planning  Goal: *Discharge to safe environment  Outcome: Progressing Towards Goal  Plan is tbd    Reason for Admission: Shock, GI Bleed                    RUR Score: 17%                 PCP: First and Last name:  Dr Champ Segovia   Name of Practice:    Are you a current patient: Yes/No:    Approximate date of last visit: recently- he is seen every  4 months but has seen him recently    Do you (patient/family) have any concerns for transition/discharge?  worried about Covid 19. Plan for utilizing home health:       Current Advanced Directive/Advance Care Plan:  There is one at [facility]  ( use to be [facility]) or at [facility] [place]. Vaughn Marx is Brother in law Acacia Geoff Sr [phone]  [facility]. Mr Milton Lopez says his sister and pt can make their own dicisions, he is only the back up if his sister in unavailable and pt is unable to make decisions. I can not get copy of AD right now. It may be located in [facility]. There is a message that if we want info about AD, to please call [phone]- which is [address] . I did not leave message. Transition of Care Plan: Chart reviewed and I spoke to Pt's wife Mayra Hansen  [phone] and Brother in law  Acacia Tellez  [phone]. Patient lives with his wife and had been independent with ADL. He uses no DME. Pt has been in and out of hospital per wife. They had used Home health in the past after being in hospital, but wife does not want to use it now due to Covid 19 pan endemic. Wife states pt does not want to go to  Rehab due to the same reasons, that when \"this is over\", [facility]. Plan is probably home.      Wife Mayra Hansen - [phone]  Brother in law Benedicto Sommer --[phone] who states he is just back up for sister Cricket Dalton, that pt or his sister will make decisions first.     Care Management Interventions  PCP Verified by CM: Yes(seen recently)  Palliative Care Criteria Met (RRAT>21 & CHF Dx)?: No  Mode of Transport at Discharge: Other (see comment)(wife to drive him home)  Transition of Care Consult (CM Consult): Discharge Planning  Current Support Network: Lives with Spouse  Confirm Follow Up Transport: Family  The Plan for Transition of Care is Related to the Following Treatment Goals : Patient's is  hemodynamically stable  [employer] Information Provided?: No     LUCÍA Shields  [facility]  [phone], Pager [phone]  [email or web address]

## 2020-04-06 NOTE — PROGRESS NOTES
Internal Medicine Progress Note        NAME: Leonidas Alfonso   :  1940  MRM:  [id number]    Date/Time: 2020        ASSESSMENT/PLAN:      #  Hypotension , Shock likely hypovolemic. Iv pressors as needed. Maintain h/h. mantain BP.   -  Hypotension, likely secondary to hypovolemia and anemia related to GI bleeding.  - hold home BP medication    #  Melena; suspected upper GI bleeding with DDx including PUD, AVM, neoplasia, esophagitis/gastritis. Lower GI source is a consideration. GI consulted. Monitor h/h. PPI  Patient is also on aspirin and Plavix as an outpatient which has been held off at this time. Endoscopy with GI demonstrate multiple areas of oozing bleeding. # Acute blood loss  Anemia. secondary to GI bleeding. As above. Blood Tx as needed. - ho  Chronic dual antiplatelet therapy (?indication) and NSAID use    #  Colonic thickening on CT; possibly related to colitis (?ischemic) or infectious process although also potentially an incidental or clinically insignificant finding. C diff negative. Dc contact isolation     # KARLI. Improving s.cr. IVF   Monitor Renal function and other labs as indicated. Avoid nephrotoxins , iv Contrast, NSAID. Renally dosing medications. Monitor urine out put. # Hypernatremia. Change IVF. Avoid NS in diluent. Serial level monitoring. # uncontrolled DM with hyper and hypoglycemia. Start DM protocol. Provide SSI, hypoglycemia protocol and frequent Accu checks. Provide SSI, hypoglycemia protocol and frequent Accu checks. Feeding when ok with GI  Monitor BSLclosely        DVT and GI prophylaxis-patient is on SCD and Protonix     Code status: Patient is full code per wife      PT/OT eval and treat. OOB/IS when appropriate. For CDMP . Patient admitted with  Sepsis, UTI. As DW PCCM this is unlikely , reject this diagnosis. Observe off Abx for now.      Lab Review:     Recent Labs     20  0343 20  1940 20  1437   WBC 7.3  -- 13. 2   HGB 7.4* 7.9* 7.7*   HCT 23.3* 24.5* 24.1*     --  355     Recent Labs     04/06/20  0343 04/05/20  1500 04/05/20  1437   *  --  151*   K 4.0  --  4.8   *  --  125*   CO2 21  --  20*   GLU 57*  --  311*   BUN 64*  --  78*   CREA 1.64*  --  1.82*   CA 6.9*  --  7.0*   ALB 1.9*  --   --    TBILI 0.8  --   --    SGOT 13  --   --    ALT 13*  --   --    INR  --  1.4*  --      Lab Results   Component Value Date/Time    Glucose (POC) 71 04/06/2020 01:06 PM    Glucose (POC) 110 04/06/2020 06:12 AM    Glucose (POC) 66 (L) 04/06/2020 05:31 AM    Glucose (POC) 63 (L) 04/06/2020 05:28 AM    Glucose (POC) 94 04/05/2020 11:33 PM     No results for input(s): PH, PCO2, PO2, HCO3, FIO2 in the last 72 hours. Recent Labs     04/05/20  1500   INR 1.4*       No results found for: SDES  No results found for: CULT    All Cardiac Markers in the last 24 hours:   Lab Results   Component Value Date/Time    CPK 64 04/05/2020 02:37 PM    CKMB 6.2 (H) 04/05/2020 02:37 PM    CKND1 9.7 (H) 04/05/2020 02:37 PM    TROIQ 1.00 (H) 04/05/2020 02:37 PM           Intervals noted      Subjective:     Chief Complaint:      Feel better, want to eat     ROS:  (bold if positive,otherwise negative)    Fever/chills ,  Dysuria   Cough , Sputum , SOB/BLUM , Chest Pain     Diarrhea ,Nausea/Vomit , Abd Pain , Constipation           Objective:     Vitals:  Last 24hrs VS reviewed since prior progress note.  Most recent are:    Visit Vitals  /57   Pulse 88   Temp 97.9 °F (36.6 °C)   Resp 22   Ht 6' (1.829 m)   Wt 79.4 kg (175 lb 0.7 oz)   SpO2 100%   BMI 23.74 kg/m²     SpO2 Readings from Last 6 Encounters:   04/06/20 100%    O2 Flow Rate (L/min): 3 l/min       Intake/Output Summary (Last 24 hours) at 4/6/2020 1327  Last data filed at 4/6/2020 1100  Gross per 24 hour   Intake 3554.59 ml   Output 1090 ml   Net 2464.59 ml          Physical Exam:   Gen: Well-developed,  in no acute distress  HEENT: Head atraumatic, normocephalic , PALE conjunctivae,  hearing intact to voice   Neck:  No apparent JVD, Supple  Resp: No accessory muscle use, Bilateral BS present,clear breath sounds without wheezes rales or rhonchi  Card:  POSITIVE RANDAL  murmur, normal S1, S2 without Gallop . MILD  lower leg peripheral edema. Abd:  Soft, non-tender, non-distended, bowel sounds are present .   Musc: No cyanosis or clubbing  Skin: No rashes or ulcers, skin turgor is good   Neuro:   no clear area of focal motor weakness, follows commands appropriately   alert and coherent        Telemetry reviewed:   normal sinus rhythm    Medications Reviewed: (see below)    Lab Data Reviewed: (see below)    ______________________________________________________________________    Medications:     Current Facility-Administered Medications   Medication Dose Route Frequency    influenza vaccine 2019-20 (6 mos+)(PF) (FLUARIX/FLULAVAL/FLUZONE QUAD) injection 0.5 mL  0.5 mL IntraMUSCular PRIOR TO DISCHARGE    pantoprazole (PROTONIX) 40 mg in 0.9% sodium chloride 10 mL injection  40 mg IntraVENous Q12H    0.9% sodium chloride infusion 250 mL  250 mL IntraVENous PRN    sodium chloride (NS) flush 5-40 mL  5-40 mL IntraVENous Q8H    sodium chloride (NS) flush 5-40 mL  5-40 mL IntraVENous PRN    0.9% sodium chloride infusion 250 mL  250 mL IntraVENous PRN    0.45% sodium chloride infusion  100 mL/hr IntraVENous CONTINUOUS    insulin lispro (HUMALOG) injection   SubCUTAneous Q6H    glucose chewable tablet 16 g  4 Tab Oral PRN    glucagon (GLUCAGEN) injection 1 mg  1 mg IntraMUSCular PRN    dextrose 10% infusion 125-250 mL  125-250 mL IntraVENous PRN          Total time spent with patient: 35 minutes                  Care Plan discussed with: Patient, Nursing Staff, Consultant/Specialist and >50% of time spent in counseling and coordination of care  DW PCCM    Discussed:  Care Plan    Prophylaxis:  SCD's and H2B/PPI    Disposition:  Home w/Family           This document in whole or part of it has been produced using voice recognition software. Unrecognized errors in transcription may be present.     Attending Physician: Radha Crane MD

## 2020-04-06 NOTE — PROGRESS NOTES
Pt admitted with altered mental status so unable to interview. Attempted to reach pt wife Archie Lennox ([phone]) for initial interview. Left message asking for return call. 1500 - Again attempted to reach pt spouse.   Left message

## 2020-04-06 NOTE — PROGRESS NOTES
conducted an initial consultation and Spiritual Assessment for [facility], who is a 78 y. o.,male. Patients Primary Language is: [place]. According to the patients EMR Confucianism Affiliation is: Gee Biggs. The reason the Patient came to the hospital is:   Patient Active Problem List    Diagnosis Date Noted    Benign prostatic hyperplasia with urinary obstruction 07/20/2015     Priority: 1 - One    Septic shock ([address] 04/05/2020     Priority: 2 - Two    Post-void dribbling 07/20/2015     Priority: 2 - Two    Nocturia 07/20/2015     Priority: 2 - Two    Urge incontinence 07/20/2015     Priority: 2 - Two    Malignant neoplasm of testis ([address] 07/20/2015     Priority: 3 - Three    Acquired absence of genital organ 07/20/2015     Priority: 3 - Three    Crohn's disease ([address] 04/06/2020    Acute blood loss anemia 04/05/2020    GI bleeding 04/05/2020    UTI (urinary tract infection) 04/05/2020    Sepsis ([address] 04/05/2020    OAB (overactive bladder)     Urinary incontinence, urge     Diabetes ([address]     Testicular cancer ([address]     Hypersomnia     Dementia ([address]     Hearing loss 10/27/2016    Pseudophakia 10/14/2013    Urinary tract infection 09/26/2013    Atopic rhinitis 07/22/2013    Hypertension 07/22/2013    Hyperlipidemia 07/22/2013        The  provided the following Interventions:   attempted to Initiate a relationship of care and support with patient in room 2604 today but found the patient resting peacefully at this time. Patient is under contact isolation [facility] due to urineary infection related to prostate issues. Provided information about Spiritual Care Services. Offered prayer and assurance of continued prayers on patients behalf. Assessment:  Patient does not have any Roman Catholic/cultural needs that will affect patients preferences in health care.   There are no further spiritual or Roman Catholic issues which require Spiritual Care Services interventions at this time. Plan:  Chaplains will continue to follow and will provide pastoral care on an as needed/requested basis    . Matteo Mueller   Spiritual Care   [phone]

## 2020-04-06 NOTE — PROGRESS NOTES
Problem: Falls - Risk of  Goal: *Absence of Falls  Description: Document Luann Poole Fall Risk and appropriate interventions in the flowsheet. Outcome: Progressing Towards Goal  Note: Fall Risk Interventions:  Mobility Interventions: Bed/chair exit alarm, Strengthening exercises (ROM-active/passive)              Elimination Interventions: Bed/chair exit alarm, Call light in reach, Toileting schedule/hourly rounds    History of Falls Interventions: Bed/chair exit alarm, Door open when patient unattended, Room close to nurse's station         Problem: Patient Education: Go to Patient Education Activity  Goal: Patient/Family Education  Outcome: Progressing Towards Goal     Problem: Pressure Injury - Risk of  Goal: *Prevention of pressure injury  Description: Document Rustam Scale and appropriate interventions in the flowsheet. Outcome: Progressing Towards Goal  Note: Pressure Injury Interventions:  Sensory Interventions: Assess changes in LOC, Float heels, Keep linens dry and wrinkle-free, Minimize linen layers, Turn and reposition approx. every two hours (pillows and wedges if needed), Pressure redistribution bed/mattress (bed type)    Moisture Interventions: Absorbent underpads, Apply protective barrier, creams and emollients, Check for incontinence Q2 hours and as needed, Internal/External urinary devices, Minimize layers    Activity Interventions: Pressure redistribution bed/mattress(bed type)    Mobility Interventions: Pressure redistribution bed/mattress (bed type), Turn and reposition approx.  every two hours(pillow and wedges)    Nutrition Interventions: Document food/fluid/supplement intake    Friction and Shear Interventions: Apply protective barrier, creams and emollients, Foam dressings/transparent film/skin sealants, Lift sheet, Minimize layers                Problem: Patient Education: Go to Patient Education Activity  Goal: Patient/Family Education  Outcome: Progressing Towards Goal     Problem: Risk for Spread of Infection  Goal: Prevent transmission of infectious organism to others  Description: Prevent the transmission of infectious organisms to other patients, staff members, and visitors.   Outcome: Progressing Towards Goal     Problem: Patient Education:  Go to Education Activity  Goal: Patient/Family Education  Outcome: Progressing Towards Goal     Problem: Infection - Risk of, Urinary Catheter-Associated Urinary Tract Infection  Goal: *Absence of infection signs and symptoms  Outcome: Progressing Towards Goal     Problem: Patient Education: Go to Patient Education Activity  Goal: Patient/Family Education  Outcome: Progressing Towards Goal     Problem: Infection - Risk of, Central Venous Catheter-Associated Bloodstream Infection  Goal: *Absence of infection signs and symptoms  Outcome: Progressing Towards Goal     Problem: Patient Education: Go to Patient Education Activity  Goal: Patient/Family Education  Outcome: Progressing Towards Goal     Problem: Pain  Goal: *Control of Pain  Outcome: Progressing Towards Goal     Problem: Patient Education: Go to Patient Education Activity  Goal: Patient/Family Education  Outcome: Progressing Towards Goal

## 2020-04-06 NOTE — PROGRESS NOTES
[facility]                                              I have reviewed the flowsheet and previous days notes. Events, vitals, medications and notes from last 24 hours reviewed. Care plan discussed with staff and on multidisciplinary rounds. Subjective:  4/6/2020   Patient reports he is doing okay. Does not complain of any abdominal pain or nausea. No complaints of chest pain or dyspnea. RN reports he still had dark-colored stools overnight    Impression and Plan  Patient Active Problem List   Diagnosis Code    Malignant neoplasm of testis ([facility] C62.90    Acquired absence of genital organ Z90.79    Post-void dribbling N39.43    Nocturia R35.1    Benign prostatic hyperplasia with urinary obstruction N40.1, N13.8    Urge incontinence N39.41    Atopic rhinitis J30.9    Hearing loss H91.90    Hypertension I10    Hyperlipidemia E78.5    Pseudophakia Z96.1    Urinary tract infection N39.0    Diabetes ([facility] E11.9    Testicular cancer ([facility] C62.90    Hypersomnia G47.10    Dementia ([facility] F03.90    OAB (overactive bladder) N32.81    Urinary incontinence, urge N39.41    Acute blood loss anemia D62    GI bleeding K92.2    UTI (urinary tract infection) N39.0    Sepsis (HCC) A41.9    Septic shock (HCC) A41.9, R65.21    Crohn's disease ([facility] K50.90     Acute upper GI bleed-patient is a status post endoscopy on 4/5/2020. Endoscopy showed multiple gastric and duodenal ulcers without bleeding, gastritis and duodenitis. Patient is currently on Protonix twice daily. Continue with it and defer management to GI  Acute anemia secondary to blood loss-patient's hemoglobin has improved after his PRBC transfusion, however it is slowly drifting down. Most recent hemoglobin is 7.4. Continue to monitor H&H  Shock-this is likely secondary to the GI bleed and hypovolemia. This has improved and patient is off pressors at this time.   Acute kidney injury -patient's BUN and creatinine are slightly lower today than yesterday. Continue with hydration and monitor  Hypernatremia -this is secondary to dehydration. Hydrate with IV fluids and monitor  Mildly elevated troponin I -this was likely secondary demand ischemia secondary to the GI bleed and significant anemia. I will repeat troponin I today  Diabetes mellitus -patient is currently on insulin sliding scale  Hypertension -patient's home medications had been held off secondary to his shock yesterday. We will resume them slowly  DVT and GI prophylaxis-patient is on SCD and Protonix    OTHER:  Glycemic Control. Glucose stabilizer per ICU protocol when on insulin drip. Maintain blood glucose 140-180. Replace electrolytes per ICU electrolyte replacement protocol    Quality Care: Stress ulcer prophylaxis, DVT prophylaxis, HOB elevated, Infection control all reviewed and addressed. Events and notes from last 24 hours reviewed. Care plan discussed with nursing. D/w patient above medical problems and answered all questions to his satisfaction. CC TIME: >35 min     Medication Reviewed:     Allergies   Allergen Reactions    Iodinated Contrast Media Other (comments)     Hot flushing       Past Medical History:   Diagnosis Date    Benign prostatic hyperplasia with urinary obstruction     Dementia ([facility]     early onset    Diabetes ([facility]     Hypersomnia     Hypertension     Nocturia     OAB (overactive bladder)     Testicular cancer (HCC)     Urge incontinence     Urinary incontinence, urge       Past Surgical History:   Procedure Laterality Date    HX CYST REMOVAL  1960-1970s    cyst removed from right breast    HX OTHER SURGICAL      reemoval of testcle    HX OTHER SURGICAL  08/2018    Surgery: Blockage Lower bowels, [facility]    HX OTHER SURGICAL  01/2019    Heart Blockage: 30% [facility]: Dr. Tray Curtis      Social History     Tobacco Use    Smoking status: Former Smoker     Types: Cigarettes     Last attempt to quit: 1/1/1976 Years since quittin.2    Smokeless tobacco: Never Used   Substance Use Topics    Alcohol use: No     Alcohol/week: 0.0 standard drinks      History reviewed. No pertinent family history. Prior to Admission medications    Medication Sig Start Date End Date Taking? Authorizing Provider   mirabegron ER (MYRBETRIQ) 25 mg ER tablet Take 1 Tab by mouth daily. 18  Yes Keila Ramirez MD   clopidogrel (PLAVIX) 75 mg tab  12/15/17  Yes Provider, Historical   glimepiride (AMARYL) 1 mg tablet  12/15/17  Yes Provider, Historical   aspirin-calcium carbonate 81 mg-300 mg calcium(777 mg) tab 81 mg. Yes Provider, Historical   trospium (SANCTURA XL) 60 mg capsule Take 1 Cap by mouth Daily (before breakfast). 18  Yes Keila Ramirez MD   sildenafil, antihypertensive, (REVATIO) 20 mg tablet Take up to 5 tablets one hour prior to intercourse on an empty stomach. 18  Yes Keila Ramirez MD   cyanocobalamin 1,000 mcg tablet 1,000 mcg. 17  Yes Provider, Historical   omeprazole (PRILOSEC) 20 mg capsule 20 mg. Yes Provider, Historical   cinnamon bark (CINNAMON) 500 mg cap Take 1,000 mg by mouth. Yes Provider, Historical   testosterone cypionate (DEPOTESTOTERONE CYPIONATE) 200 mg/mL injection 200 mg by IntraMUSCular route. 3/15/15  Yes Provider, Historical   metoprolol (LOPRESSOR) 25 mg tablet  7/31/15  Yes Provider, Historical   benazepril (LOTENSIN) 10 mg tablet  7/31/15  Yes Provider, Historical   tamsulosin (FLOMAX) 0.4 mg capsule  7/18/15  Yes Provider, Historical   pravastatin (PRAVACHOL) 20 mg tablet Take 20 mg by mouth nightly.    Yes Provider, Historical     Current Facility-Administered Medications   Medication Dose Route Frequency    influenza vaccine - (6 mos+)(PF) (FLUARIX/FLULAVAL/FLUZONE QUAD) injection 0.5 mL  0.5 mL IntraMUSCular PRIOR TO DISCHARGE    pantoprazole (PROTONIX) 40 mg in 0.9% sodium chloride 10 mL injection  40 mg IntraVENous Q12H    sodium chloride (NS) flush 5-40 mL  5-40 mL IntraVENous Q8H    vasopressin (VASOSTRICT) 20 Units in 0.9% sodium chloride 100 mL infusion  0-0.03 Units/min IntraVENous TITRATE    NOREPINephrine (LEVOPHED) 8 mg in 0.9% NS 250ml infusion  0.5-30 mcg/min IntraVENous TITRATE    0.45% sodium chloride infusion  100 mL/hr IntraVENous CONTINUOUS    insulin lispro (HUMALOG) injection   SubCUTAneous Q6H       Lines: All central lines examined by me. No signs of erythema, induration, discharge. Central Venous Catheter:  Triple Lumen CVL right subclavian  20 Right Subclavian (Active)   Central Line Being Utilized Yes 2020  8:00 AM   Criteria for Appropriate Use Hemodynamically unstable, requiring monitoring lines, vasopressors, or volume resuscitation 2020  8:00 AM   Site Assessment Clean, dry, & intact 2020  8:00 AM   Infiltration Assessment 0 2020  8:00 AM   Affected Extremity/Extremities Color distal to insertion site pink (or appropriate for race) 2020  8:00 AM   Date of Last Dressing Change 20  8:00 AM   Dressing Status Clean, dry, & intact 2020  8:00 AM   Dressing Type Transparent;Disk with Chlorhexadine gluconate (CHG) 2020  8:00 AM   Action Taken Open ports on tubing capped 2020  8:00 AM   Proximal Hub Color/Line Status Brown;Flushed;Cap end changed 2020  8:00 AM   Positive Blood Return (Medial Site) Yes 2020  8:00 AM   Medial Hub Color/Line Status White; Infusing 2020  8:00 AM   Positive Blood Return (Lateral Site) Yes 2020  8:00 AM   Distal Hub Color/Line Status Blue;Flushed;Patent;Cap end changed 2020  8:00 AM   Positive Blood Return (Site #3) Yes 2020  8:00 AM   Alcohol Cap Used Yes 2020  8:00 AM     Objective:  Vital Signs:    Visit Vitals  /71   Pulse 93   Temp 97.9 °F (36.6 °C)   Resp 24   Ht 6' (1.829 m)   Wt 79.4 kg (175 lb 0.7 oz)   SpO2 100%   BMI 23.74 kg/m²      O2 Device: Nasal cannula  O2 Flow Rate (L/min): 3 l/min  Temp (24hrs), Av.3 °F (36.3 °C), Min:96.4 °F (35.8 °C), Max:97.9 °F (36.6 °C)      Intake/Output:   Last shift:      04/06 0701 - 04/06 1900  In: 100 [I.V.:100]  Out: 50 [Urine:50]    Last 3 shifts: 04/04 1901 - 04/06 0700  In: 3154.6 [I.V.:2793.8]  Out: 7662 [Urine:1040]      Intake/Output Summary (Last 24 hours) at 4/6/2020 1039  Last data filed at 4/6/2020 0800  Gross per 24 hour   Intake 3254.59 ml   Output 1090 ml   Net 2164.59 ml       Last 3 Recorded Weights in this Encounter    04/05/20 1246 04/06/20 0452   Weight: 79.4 kg (175 lb) 79.4 kg (175 lb 0.7 oz)     Physical Exam:     General/Neurology: Alert, Awake,   Head:   Normocephalic, without obvious abnormality  Eye:   PERRL, EOM intact, no scleral icterus, no pallor  Oral:   Mucus membranes moist  Neck:   Supple  Lung:   B/l air entry is fair  Heart:   Regular rate & rhythm. S1 S2 present.    Abdomen/: Soft, non tender, BS +nt  Extremities:  No pedal edema  Skin:   Dry, intact    Data:      Recent Results (from the past 24 hour(s))   CBC W/O DIFF    Collection Time: 04/05/20  2:37 PM   Result Value Ref Range    WBC 13.2 4.6 - 13.2 K/uL    RBC 2.74 (L) 4.70 - 5.50 M/uL    HGB 7.7 (L) 13.0 - 16.0 g/dL    HCT 24.1 (L) 36.0 - 48.0 %    MCV 88.0 74.0 - 97.0 FL    MCH 28.1 24.0 - 34.0 PG    MCHC 32.0 31.0 - 37.0 g/dL    RDW 16.0 (H) 11.6 - 14.5 %    PLATELET 265 430 - 621 K/uL    MPV 11.7 9.2 - 11.8 FL   LACTIC ACID    Collection Time: 04/05/20  2:37 PM   Result Value Ref Range    Lactic acid 1.3 0.4 - 2.0 MMOL/L   CARDIAC PANEL,(CK, CKMB & TROPONIN)    Collection Time: 04/05/20  2:37 PM   Result Value Ref Range    CK 64 39 - 308 U/L    CK - MB 6.2 (H) <3.6 ng/ml    CK-MB Index 9.7 (H) 0.0 - 4.0 %    Troponin-I, QT 1.00 (H) 0.0 - 9.167 NG/ML   METABOLIC PANEL, BASIC    Collection Time: 04/05/20  2:37 PM   Result Value Ref Range    Sodium 151 (H) 136 - 145 mmol/L    Potassium 4.8 3.5 - 5.5 mmol/L    Chloride 125 (H) 100 - 111 mmol/L    CO2 20 (L) 21 - 32 mmol/L    Anion gap 6 3.0 - 18 mmol/L    Glucose 311 (H) 74 - 99 mg/dL    BUN 78 (H) 7.0 - 18 MG/DL    Creatinine 1.82 (H) 0.6 - 1.3 MG/DL    BUN/Creatinine ratio 43 (H) 12 - 20      GFR est AA 43 (L) >60 ml/min/1.73m2    GFR est non-AA 36 (L) >60 ml/min/1.73m2    Calcium 7.0 (L) 8.5 - 10.1 MG/DL   TYPE + CROSSMATCH    Collection Time: 04/05/20  2:47 PM   Result Value Ref Range    Crossmatch Expiration 04/08/2020     ABO/Rh(D) B POSITIVE     Antibody screen NEG     CALLED TO:       [address], [address], AT 1539 ON 04/05/2020 BY [facility].     Unit number [id number]     Blood component type RC LR     Unit division 00     Status of unit TRANSFUSED     Crossmatch result Compatible    PROTHROMBIN TIME + INR    Collection Time: 04/05/20  3:00 PM   Result Value Ref Range    Prothrombin time 16.5 (H) 11.5 - 15.2 sec    INR 1.4 (H) 0.8 - 1.2     PTT    Collection Time: 04/05/20  3:00 PM   Result Value Ref Range    aPTT 25.8 23.0 - 36.4 SEC   URINALYSIS W/ RFLX MICROSCOPIC    Collection Time: 04/05/20  4:30 PM   Result Value Ref Range    Color DARK YELLOW      Appearance CLOUDY      Specific gravity 1.022 1.005 - 1.030      pH (UA) 5.0 5.0 - 8.0      Protein TRACE (A) NEG mg/dL    Glucose NEGATIVE  NEG mg/dL    Ketone NEGATIVE  NEG mg/dL    Bilirubin NEGATIVE  NEG      Blood NEGATIVE  NEG      Urobilinogen 1.0 0.2 - 1.0 EU/dL    Nitrites NEGATIVE  NEG      Leukocyte Esterase MODERATE (A) NEG     URINE MICROSCOPIC ONLY    Collection Time: 04/05/20  4:30 PM   Result Value Ref Range    WBC 4 to 10 0 - 4 /hpf    RBC 0 to 1 0 - 5 /hpf    Epithelial cells 2+ 0 - 5 /lpf    Bacteria 2+ (A) NEG /hpf   C. DIFFICILE AG & TOXIN A/B    Collection Time: 04/05/20  5:00 PM   Result Value Ref Range    GDH ANTIGEN NEGATIVE  NEG      C. difficile toxin NEGATIVE  NEG      INTERPRETATION NEGATIVE FOR TOXIGENIC C. DIFFICILE NTXCD     GLUCOSE, POC    Collection Time: 04/05/20  5:39 PM   Result Value Ref Range    Glucose (POC) 323 (H) 70 - 110 mg/dL   HGB & HCT    Collection Time: 04/05/20  7:40 PM   Result Value Ref Range    HGB 7.9 (L) 13.0 - 16.0 g/dL    HCT 24.5 (L) 36.0 - 48.0 %   GLUCOSE, POC    Collection Time: 04/05/20 11:33 PM   Result Value Ref Range    Glucose (POC) 94 70 - 110 mg/dL   CBC WITH AUTOMATED DIFF    Collection Time: 04/06/20  3:43 AM   Result Value Ref Range    WBC 7.3 4.6 - 13.2 K/uL    RBC 2.64 (L) 4.70 - 5.50 M/uL    HGB 7.4 (L) 13.0 - 16.0 g/dL    HCT 23.3 (L) 36.0 - 48.0 %    MCV 88.3 74.0 - 97.0 FL    MCH 28.0 24.0 - 34.0 PG    MCHC 31.8 31.0 - 37.0 g/dL    RDW 15.9 (H) 11.6 - 14.5 %    PLATELET 425 257 - 436 K/uL    MPV 11.5 9.2 - 11.8 FL    NEUTROPHILS 76 (H) 40 - 73 %    LYMPHOCYTES 13 (L) 21 - 52 %    MONOCYTES 9 3 - 10 %    EOSINOPHILS 2 0 - 5 %    BASOPHILS 0 0 - 2 %    ABS. NEUTROPHILS 5.5 1.8 - 8.0 K/UL    ABS. LYMPHOCYTES 1.0 0.9 - 3.6 K/UL    ABS. MONOCYTES 0.7 0.05 - 1.2 K/UL    ABS. EOSINOPHILS 0.1 0.0 - 0.4 K/UL    ABS. BASOPHILS 0.0 0.0 - 0.1 K/UL    DF AUTOMATED     METABOLIC PANEL, COMPREHENSIVE    Collection Time: 04/06/20  3:43 AM   Result Value Ref Range    Sodium 154 (H) 136 - 145 mmol/L    Potassium 4.0 3.5 - 5.5 mmol/L    Chloride 127 (H) 100 - 111 mmol/L    CO2 21 21 - 32 mmol/L    Anion gap 6 3.0 - 18 mmol/L    Glucose 57 (L) 74 - 99 mg/dL    BUN 64 (H) 7.0 - 18 MG/DL    Creatinine 1.64 (H) 0.6 - 1.3 MG/DL    BUN/Creatinine ratio 39 (H) 12 - 20      GFR est AA 49 (L) >60 ml/min/1.73m2    GFR est non-AA 40 (L) >60 ml/min/1.73m2    Calcium 6.9 (L) 8.5 - 10.1 MG/DL    Bilirubin, total 0.8 0.2 - 1.0 MG/DL    ALT (SGPT) 13 (L) 16 - 61 U/L    AST (SGOT) 13 10 - 38 U/L    Alk.  phosphatase 58 45 - 117 U/L    Protein, total 4.0 (L) 6.4 - 8.2 g/dL    Albumin 1.9 (L) 3.4 - 5.0 g/dL    Globulin 2.1 2.0 - 4.0 g/dL    A-G Ratio 0.9 0.8 - 1.7     GLUCOSE, POC    Collection Time: 04/06/20  5:28 AM   Result Value Ref Range    Glucose (POC) 63 (L) 70 - 110 mg/dL   GLUCOSE, POC    Collection Time: 04/06/20  5:31 AM   Result Value Ref Range    Glucose (POC) 66 (L) 70 - 110 mg/dL   GLUCOSE, POC    Collection Time: 04/06/20  6:12 AM   Result Value Ref Range    Glucose (POC) 110 70 - 110 mg/dL         Chemistry   Recent Labs     04/06/20  0343 04/05/20  1437   GLU 57* 311*   * 151*   K 4.0 4.8   * 125*   CO2 21 20*   BUN 64* 78*   CREA 1.64* 1.82*   CA 6.9* 7.0*   AGAP 6 6   BUCR 39* 43*   AP 58  --    TP 4.0*  --    ALB 1.9*  --    GLOB 2.1  --    AGRAT 0.9  --        CBC w/Diff   Recent Labs     04/06/20  0343 04/05/20  1940 04/05/20  1437   WBC 7.3  --  13.2   RBC 2.64*  --  2.74*   HGB 7.4* 7.9* 7.7*   HCT 23.3* 24.5* 24.1*     --  355   GRANS 76*  --   --    LYMPH 13*  --   --    EOS 2  --   --      XR (Most Recent). CXR reviewed by me and compared with previous CXR   Results from Hospital Encounter encounter on 04/05/20   XR CHEST PORT    Narrative EXAM: XR CHEST PORT    CLINICAL INDICATION/HISTORY: R/o pneumonia  -Additional: None    COMPARISON: None    TECHNIQUE: Portable frontal view of the chest    _______________    FINDINGS:    SUPPORT DEVICES: Left subclavian central venous catheter tip at the cavoatrial  junction. Em  HEART AND MEDIASTINUM: Cardiomediastinal silhouette within normal limits. LUNGS AND PLEURAL SPACES: Patient rotated to the left. Left basilar atelectasis. No dense consolidation, large effusion or pneumothorax.    _______________      Impression IMPRESSION:    No acute cardiopulmonary abnormality. Patient rotated to the left. Left basilar  atelectasis. High complexity decision making was performed during the evaluation of this patient at high risk for decompensation with multiple organ involvement     Above mentioned total time spent on reviewing the case/medical record/data/notes/EMR/patient examination/documentation/coordinating care with nurse/consultants, exclusive of procedures with complex decision making performed and > 50% time spent in face to face evaluation.     This note has been dictated using the dragon dictation system    Ariel White MD  4/6/2020

## 2020-04-06 NOTE — PROGRESS NOTES
0715 received bedside verbal report from Elissa Pichardo, [facility]. Pt resting quietly in bed; VSS on 3 L/min NC, hemodynamically stable without pressors. 0800 pt had small bowel movement, loose and black in color; changed pt gown and absorbant pad; pt tolerated well; VSS  0945 pt had small bowel movement, loose and black in color; changed pt gown, linens, and absorbant pad; pt tolerated well; VSS  1045 RN updated Dr. Lam George of pt status; pt still passing bloody, loose stool; hemodynamically stable off pressors  1114 spoke to pt wife on the phone; updated family on pt status  1325 Dr. Marisa Tee at pt bedside; RN updated provider on pt status; new orders received  1515 pt had medium bowel movement, loose and black in color; changed pt and absorbant pad; pt tolerated well; VSS  1720 pt sitting up in bed eating dinner, tolerating well; VSS, no signs of distress  1756 called and spoke to Robby Saunders, pt's spouse; provided update on pt status  1945 Bedside and Verbal shift change report given to Cassie Serra RN (oncoming nurse) by Neil Chong RN and Stephania Granger RN (offgoing nurse). Report included the following information SBAR, Kardex, Intake/Output, MAR, Recent Results and Cardiac Rhythm NSR/PVCs.

## 2020-04-06 NOTE — H&P
History and Physical    Patient: Hannah Holder               Sex: male          DOA: 4/5/2020       YOB: 1940      Age:  78 y.o.        LOS:  LOS: 1 day        HPI:     Hannah Holder is a 78 y.o. male who presented to the ER at [facility] with changed mental status. In the ER he was found to have Severe sepsis from UTI. He was also found to have Melanotic stool with acute blood loss anemia. He was begun on Vasopressor to maintain his BP. He was transfused with PRBC's. And he was begun on antibiotics. His mental status change appears to have begun a day ago. He does not have chest pain or SOB. He was transferred to [facility] ICU for ongoing care. He is not aware of dysuria or hematuria. He does not have fever or cough    Past Medical History:   Diagnosis Date    Benign prostatic hyperplasia with urinary obstruction     Dementia (HCC)     early onset    Diabetes ([address]     Hypersomnia     Hypertension     Nocturia     OAB (overactive bladder)     [facility]     Urge incontinence     Urinary incontinence, urge        Social History:   Tobacco use:  Patient does not smoke   Alcohol use:  Patient does not use alcohol   Patient lives with his wife. Family History: Mother had a stroke   Father had CAD    Review of Systems    Constitutional:  No fever or weight loss, altered mental status as above  HEENT:  No headache or visual changes  Cardiovascular:  No chest pain or diaphoresis  Respiratory:  No coughing, wheezing, or shortness of breath. GI:  No nausea or vomitting.   No diarrhea  :  No hematuria or dysuria  Skin:  No rashes or moles  Neuro:  Confusion as above, no seizures or syncope  Hematological:  No bruising or bleeding  Endocrine:  Patient has known diabetes, no thyroid disease    Physical Exam:      Visit Vitals  /55   Pulse 97   Temp 97.4 °F (36.3 °C)   Resp 23   Ht 6' (1.829 m)   Wt 79.4 kg (175 lb)   SpO2 100%   BMI 23.73 kg/m² Physical Exam:    Gen:  No distress, alert  HEENT:  Normal cephalic atraumatic, extra-occular movements are intact. Neck:  Supple, No JVD  Lungs:  Grade 2 RANDAL, Regular rate and rhythm  Heart:  Regular Rate and Rhythm, normal S1 and S2, no edema  Abdomen:  Soft, non tender, normal bowel sounds, no guarding. Extremities:  Well perfused, no cyanosis or edema  Neurological:  Awake and alert, CN's are intact, normal strength throughout extremities  Skin:  No rashes or moles  Mental Status:  Normal thought process, does not appear anxious    Laboratory Studies:    BMP:   Lab Results   Component Value Date/Time     (H) 04/05/2020 02:37 PM    K 4.8 04/05/2020 02:37 PM     (H) 04/05/2020 02:37 PM    CO2 20 (L) 04/05/2020 02:37 PM    AGAP 6 04/05/2020 02:37 PM     (H) 04/05/2020 02:37 PM    BUN 78 (H) 04/05/2020 02:37 PM    CREA 1.82 (H) 04/05/2020 02:37 PM    GFRAA 43 (L) 04/05/2020 02:37 PM    GFRNA 36 (L) 04/05/2020 02:37 PM     CBC:   Lab Results   Component Value Date/Time    WBC 13.2 04/05/2020 02:37 PM    HGB 7.9 (L) 04/05/2020 07:40 PM    HCT 24.5 (L) 04/05/2020 07:40 PM     04/05/2020 02:37 PM       Assessment/Plan     Principal Problem:    Sepsis ([address] (4/5/2020)    Active Problems:    Benign prostatic hyperplasia with urinary obstruction (7/20/2015)      UTI (urinary tract infection) (4/5/2020)      Septic shock ([address] (4/5/2020)      GI bleeding (4/5/2020)      Hypertension (7/22/2013)      Acute blood loss anemia (4/5/2020)      Hyperlipidemia (7/22/2013)      Diabetes ([address] ()      Testicular cancer ([address] ()      Crohn's disease ([address] (4/6/2020)      Overview: S/P partial colon resection. Dementia ([address] ()      Overview: early onset        PLAN:    Continue with broad spectrum antibiotics  Vasopressor support for BP  Endoscopy with GI demonstrate multiple areas of oozing bleeding.   Treated  Continue PPI  BS control  Follow renal function  Follow mental status  No blood thinner medication.

## 2020-04-07 LAB
ANION GAP SERPL CALC-SCNC: 4 MMOL/L (ref 3–18)
BASOPHILS # BLD: 0 K/UL (ref 0–0.1)
BASOPHILS NFR BLD: 0 % (ref 0–2)
BUN SERPL-MCNC: 40 MG/DL (ref 7–18)
BUN/CREAT SERPL: 29 (ref 12–20)
CALCIUM SERPL-MCNC: 7 MG/DL (ref 8.5–10.1)
CHLORIDE SERPL-SCNC: 125 MMOL/L (ref 100–111)
CO2 SERPL-SCNC: 23 MMOL/L (ref 21–32)
CREAT SERPL-MCNC: 1.37 MG/DL (ref 0.6–1.3)
DIFFERENTIAL METHOD BLD: ABNORMAL
EOSINOPHIL # BLD: 0.2 K/UL (ref 0–0.4)
EOSINOPHIL NFR BLD: 3 % (ref 0–5)
ERYTHROCYTE [DISTWIDTH] IN BLOOD BY AUTOMATED COUNT: 15.9 % (ref 11.6–14.5)
GLUCOSE BLD STRIP.AUTO-MCNC: 151 MG/DL (ref 70–110)
GLUCOSE BLD STRIP.AUTO-MCNC: 163 MG/DL (ref 70–110)
GLUCOSE BLD STRIP.AUTO-MCNC: 177 MG/DL (ref 70–110)
GLUCOSE BLD STRIP.AUTO-MCNC: 307 MG/DL (ref 70–110)
GLUCOSE SERPL-MCNC: 110 MG/DL (ref 74–99)
HCT VFR BLD AUTO: 23.7 % (ref 36–48)
HCT VFR BLD AUTO: 28.6 % (ref 36–48)
HGB BLD-MCNC: 7.5 G/DL (ref 13–16)
HGB BLD-MCNC: 9.1 G/DL (ref 13–16)
LYMPHOCYTES # BLD: 0.6 K/UL (ref 0.9–3.6)
LYMPHOCYTES NFR BLD: 11 % (ref 21–52)
MCH RBC QN AUTO: 28.4 PG (ref 24–34)
MCHC RBC AUTO-ENTMCNC: 31.6 G/DL (ref 31–37)
MCV RBC AUTO: 89.8 FL (ref 74–97)
MONOCYTES # BLD: 1 K/UL (ref 0.05–1.2)
MONOCYTES NFR BLD: 18 % (ref 3–10)
NEUTS SEG # BLD: 3.5 K/UL (ref 1.8–8)
NEUTS SEG NFR BLD: 68 % (ref 40–73)
PLATELET # BLD AUTO: 289 K/UL (ref 135–420)
PMV BLD AUTO: 11.2 FL (ref 9.2–11.8)
POTASSIUM SERPL-SCNC: 4 MMOL/L (ref 3.5–5.5)
RBC # BLD AUTO: 2.64 M/UL (ref 4.7–5.5)
SODIUM SERPL-SCNC: 152 MMOL/L (ref 136–145)
WBC # BLD AUTO: 5.3 K/UL (ref 4.6–13.2)

## 2020-04-07 PROCEDURE — 36415 COLL VENOUS BLD VENIPUNCTURE: CPT

## 2020-04-07 PROCEDURE — 77030021352 HC CBL LD SYS DISP COVD -B

## 2020-04-07 PROCEDURE — 82962 GLUCOSE BLOOD TEST: CPT

## 2020-04-07 PROCEDURE — 74011636637 HC RX REV CODE- 636/637: Performed by: INTERNAL MEDICINE

## 2020-04-07 PROCEDURE — 80048 BASIC METABOLIC PNL TOTAL CA: CPT

## 2020-04-07 PROCEDURE — C9113 INJ PANTOPRAZOLE SODIUM, VIA: HCPCS | Performed by: HOSPITALIST

## 2020-04-07 PROCEDURE — 74011000250 HC RX REV CODE- 250: Performed by: HOSPITALIST

## 2020-04-07 PROCEDURE — 74011250637 HC RX REV CODE- 250/637: Performed by: INTERNAL MEDICINE

## 2020-04-07 PROCEDURE — 77010033678 HC OXYGEN DAILY

## 2020-04-07 PROCEDURE — 74011000258 HC RX REV CODE- 258: Performed by: INTERNAL MEDICINE

## 2020-04-07 PROCEDURE — 65660000000 HC RM CCU STEPDOWN

## 2020-04-07 PROCEDURE — 85025 COMPLETE CBC W/AUTO DIFF WBC: CPT

## 2020-04-07 PROCEDURE — 85018 HEMOGLOBIN: CPT

## 2020-04-07 PROCEDURE — 77030037878 HC DRSG MEPILEX >48IN BORD MOLN -B

## 2020-04-07 PROCEDURE — 74011250636 HC RX REV CODE- 250/636: Performed by: HOSPITALIST

## 2020-04-07 RX ORDER — PANTOPRAZOLE SODIUM 40 MG/1
40 TABLET, DELAYED RELEASE ORAL
Status: DISCONTINUED | OUTPATIENT
Start: 2020-04-07 | End: 2020-04-11 | Stop reason: HOSPADM

## 2020-04-07 RX ADMIN — DEXTROSE MONOHYDRATE AND SODIUM CHLORIDE 75 ML/HR: 5; .45 INJECTION, SOLUTION INTRAVENOUS at 06:27

## 2020-04-07 RX ADMIN — Medication 10 ML: at 09:11

## 2020-04-07 RX ADMIN — INSULIN LISPRO 2 UNITS: 100 INJECTION, SOLUTION INTRAVENOUS; SUBCUTANEOUS at 17:25

## 2020-04-07 RX ADMIN — Medication 10 ML: at 13:11

## 2020-04-07 RX ADMIN — Medication 10 ML: at 22:42

## 2020-04-07 RX ADMIN — PRAVASTATIN SODIUM 20 MG: 20 TABLET ORAL at 22:40

## 2020-04-07 RX ADMIN — PANTOPRAZOLE SODIUM 40 MG: 40 TABLET, DELAYED RELEASE ORAL at 17:25

## 2020-04-07 RX ADMIN — SODIUM CHLORIDE 40 MG: 9 INJECTION, SOLUTION INTRAMUSCULAR; INTRAVENOUS; SUBCUTANEOUS at 09:10

## 2020-04-07 RX ADMIN — INSULIN LISPRO 2 UNITS: 100 INJECTION, SOLUTION INTRAVENOUS; SUBCUTANEOUS at 09:10

## 2020-04-07 RX ADMIN — OXYBUTYNIN CHLORIDE 10 MG: 10 TABLET, EXTENDED RELEASE ORAL at 09:10

## 2020-04-07 RX ADMIN — INSULIN LISPRO 2 UNITS: 100 INJECTION, SOLUTION INTRAVENOUS; SUBCUTANEOUS at 22:39

## 2020-04-07 NOTE — PROGRESS NOTES
Problem: Falls - Risk of  Goal: *Absence of Falls  Description: Document Jane George Fall Risk and appropriate interventions in the flowsheet. Outcome: Progressing Towards Goal  Note: Fall Risk Interventions:  Mobility Interventions: Bed/chair exit alarm         Medication Interventions: Evaluate medications/consider consulting pharmacy    Elimination Interventions: Call light in reach    History of Falls Interventions: Evaluate medications/consider consulting pharmacy         Problem: Patient Education: Go to Patient Education Activity  Goal: Patient/Family Education  Outcome: Progressing Towards Goal     Problem: Pressure Injury - Risk of  Goal: *Prevention of pressure injury  Description: Document Rustam Scale and appropriate interventions in the flowsheet. Outcome: Progressing Towards Goal  Note: Pressure Injury Interventions:  Sensory Interventions: Assess changes in LOC    Moisture Interventions: Absorbent underpads    Activity Interventions: Pressure redistribution bed/mattress(bed type)    Mobility Interventions: HOB 30 degrees or less    Nutrition Interventions: Document food/fluid/supplement intake    Friction and Shear Interventions: HOB 30 degrees or less                Problem: Patient Education: Go to Patient Education Activity  Goal: Patient/Family Education  Outcome: Progressing Towards Goal     Problem: Risk for Spread of Infection  Goal: Prevent transmission of infectious organism to others  Description: Prevent the transmission of infectious organisms to other patients, staff members, and visitors.   Outcome: Progressing Towards Goal     Problem: Patient Education:  Go to Education Activity  Goal: Patient/Family Education  Outcome: Progressing Towards Goal     Problem: Infection - Risk of, Urinary Catheter-Associated Urinary Tract Infection  Goal: *Absence of infection signs and symptoms  Outcome: Progressing Towards Goal     Problem: Patient Education: Go to Patient Education Activity  Goal: Patient/Family Education  Outcome: Progressing Towards Goal     Problem: Infection - Risk of, Central Venous Catheter-Associated Bloodstream Infection  Goal: *Absence of infection signs and symptoms  Outcome: Progressing Towards Goal     Problem: Patient Education: Go to Patient Education Activity  Goal: Patient/Family Education  Outcome: Progressing Towards Goal     Problem: Pain  Goal: *Control of Pain  Outcome: Progressing Towards Goal     Problem: Patient Education: Go to Patient Education Activity  Goal: Patient/Family Education  Outcome: Progressing Towards Goal     Problem: Diabetes Maintenance:Admission  Goal: *Blood glucose 80 to 180 mg/dl  Outcome: Progressing Towards Goal  Goal: *Adequate nutrition  Outcome: Progressing Towards Goal     Problem: Discharge Planning  Goal: *Discharge to safe environment  Outcome: Progressing Towards Goal

## 2020-04-07 NOTE — PROGRESS NOTES
Physical Exam  Skin:     General: Skin is warm and dry. Capillary Refill: Capillary refill takes less than 2 seconds. Primary Nurse Bradley Araujo RN and PAT Goodman performed a dual skin assessment on this patient Impairment noted- see wound doc flow sheet Rustam score is 17.

## 2020-04-07 NOTE — ROUTINE PROCESS
1303 -- Transfer report received from [facility], ICU RN given to Zana Kamara RN.    1340 -- Patient on unit, vitals taken, no pain noted, call bell and personal belongings in reach. 1725 -- Meds given, well tolerated. Bedside shift change report given to Dalton Rios RN (oncoming nurse) by Zana Kamara RN (offgoing nurse). Report included the following information SBAR, Kardex, Intake/Output, MAR and Recent Results.

## 2020-04-07 NOTE — CONSULTS
[facility] - Consult Note    Consultation request by Dr. Shalini Munoz for advice/opinion related to evaluating elevated troponin    Date of  Admission: 4/5/2020 12:30 PM   Primary Care Physician:  Favian Mascorro MD  Patient seen and examined independently. Reviewed past pertinent records and present laboratory data. As noted below, it appears that the aspirin and Plavix were most likely started  after the patient had a TIA. In light of his present situation, would think it was prudent to discontinue DAPT and NSAIDs. Plan to order echocardiogram to update left ventricular function. Agree with assessment and plan as noted below. Bethany Gross MD   Assessment:     -Anemia, Hgb 7.7 on presentation, associated with melena  -S/p EGD 4/5/2020 with findings as follows:   · Gastritis and duodenitis with areas of focal oozing  · Multiple gastric and duodenal ulcers without bleeding or high risk features  -Elevated troponin, mild, flat, 1.00 --> 1.06 (approx. 24 hours later)  -Cardiac catheterization 11/2018 with no significant disease in large epicardial coronary arteries, findings as follows:  · Left main: Free of significant atherosclerotic occlusion  · LAD: Large vessel, serial luminal irregularities, with proximal 30% occlusion. There is a small D1 branch with an ostial 70% occlusion, less than 1 mm vessel, too small for intervention. There is a large second diagonal branch  · LCx: Large vessel, with serial luminal irregularities, but no focal occlusions. Large OM1 branch. Small OM2 branch. This vessel is diffusely diseased with multiple occlusions but the vessel is too small for intervention at < 1 mm in size. · RCA: Dominant vessel. Large vessel, with serial luminal irregularities, but no significant occlusion noted. Posterior descending and posterior-lateral branch systems are present and come off distally.   The vessel and its branches demonstrate serial luminal irregularities but no focal occlusions. · LVgram: EF 70%, normal myocardial thickening and no wall motion abnormalities. No mitral regurgitation noted. -Echo 07/2017: EF 76%, mild diastolic dysfunction, mild concentric LVH, normal PAP of 16 mmHg  -Mild aortic stenosis with calculated NICOLAS 1.2 cm2 and peak velocity 266 cm/s, mean gradient 1 mmHg with mild aortic regurgitation by Echo 07/2017  -KARLI, improving, Cr 1.82 on presentation  -Hypernatremia, up to 154 on 4/6/2020  -Hx HTN  -Hx DM, A1c 6.1 this admission  -Hx early dementia    Primary cardiologist is Dr. Heaton Porter:     -Agree with holding ASA and Plavix. Pt with cardiac catheterization in 11/2018 with no significant disease to large epicardial coronary arteries. Suspect he is on ASA/Plavix due to history of TIA/cerebrovascular disease, has seen Dr. Walter Richey in the past.  -Avoid use of NSAIDS (pt admits to taking Aleve). -Will check Echocardiogram for completeness. No ischemia evaluation planned. History of Present Illness: This is a 78 y.o. male admitted for GI bleeding [K92.2]  Acute blood loss anemia [D62]  Sepsis ([facility] [A41.9]  UTI (urinary tract infection) [N39.0]. Patient complains of:  Diarrhea, black stools    Daryl Phipps is a 78 y.o. male with PMHx as described above, who presented to the hospital due to diarrhea that started last month. He also endorses black stools. Pt states that his wife was unable to wake him up and thus presented to the hospital for evaluation. Pt was found to be anemic and septic from UTI. He has been given PRBC transfusion. No chest pain/shortness of breath. Pt reports his primary cardiologist is Dr. Troy Nath. He denies any recent c/o chest pain, shortness of breath.     Cardiac risk factors: diabetes mellitus, male gender, hypertension      Review of Symptoms:  Except as stated above include:  Constitutional:  negative  Respiratory:  negative  Cardiovascular:  negative  Gastrointestinal: As per HPI  Genitourinary: negative  Musculoskeletal:  Negative  Neurological:  Negative  Dermatological:  Negative  Endocrinological: Negative  Psychological:  Negative       Past Medical History:     Past Medical History:   Diagnosis Date    Benign prostatic hyperplasia with urinary obstruction     D[facility] ([facility]     early onset    Diabetes ([facility]     Hypersomnia     Hypertension     Nocturia     OAB (overactive bladder)     Testicular cancer (HCC)     Urge incontinence     Urinary incontinence, urge          Social History:     Social History     Socioeconomic History    Marital status:      Spouse name: Not on file    Number of children: Not on file    Years of education: Not on file    Highest education level: Not on file   Tobacco Use    Smoking status: Former Smoker     Types: Cigarettes     Last attempt to quit: 1976     Years since quittin.2    Smokeless tobacco: Never Used   Substance and Sexual Activity    Alcohol use: No     Alcohol/week: 0.0 standard drinks    Drug use: No    Sexual activity: Never     Partners: Female        Family History:   History reviewed. No pertinent family history. Medications:      Allergies   Allergen Reactions    Iodinated Contrast Media Other (comments)     Hot flushing         Current Facility-Administered Medications   Medication Dose Route Frequency    pantoprazole (PROTONIX) tablet 40 mg  40 mg Oral ACB&D    influenza vaccine - (6 mos+)(PF) (FLUARIX/FLULAVAL/FLUZONE QUAD) injection 0.5 mL  0.5 mL IntraMUSCular PRIOR TO DISCHARGE    pravastatin (PRAVACHOL) tablet 20 mg  20 mg Oral QHS    insulin lispro (HUMALOG) injection   SubCUTAneous AC&HS    dextrose 5 % - 0.45% NaCl infusion  75 mL/hr IntraVENous CONTINUOUS    oxybutynin chloride XL (DITROPAN XL) tablet 10 mg  10 mg Oral DAILY    sodium chloride (NS) flush 5-40 mL  5-40 mL IntraVENous Q8H    sodium chloride (NS) flush 5-40 mL  5-40 mL IntraVENous PRN    0.9% sodium chloride infusion 250 mL 250 mL IntraVENous PRN    glucose chewable tablet 16 g  4 Tab Oral PRN    glucagon (GLUCAGEN) injection 1 mg  1 mg IntraMUSCular PRN    dextrose 10% infusion 125-250 mL  125-250 mL IntraVENous PRN         Physical Exam:     Visit Vitals  /72 (BP 1 Location: Left arm, BP Patient Position: At rest)   Pulse 83   Temp 97.2 °F (36.2 °C)   Resp 20   Ht 6' (1.829 m)   Wt 175 lb 0.7 oz (79.4 kg)   SpO2 100%   BMI 23.74 kg/m²     BP Readings from Last 3 Encounters:   04/07/20 151/72   02/05/19 122/70   06/21/17 120/60     Pulse Readings from Last 3 Encounters:   04/07/20 83     Wt Readings from Last 3 Encounters:   04/06/20 175 lb 0.7 oz (79.4 kg)   02/05/19 155 lb (70.3 kg)   01/17/18 165 lb (74.8 kg)       General:  alert, cooperative, no distress, appears stated age  Neck:  supple  Lungs:  clear to auscultation bilaterally  Heart:  Regular rate and rhythm, II-III/VI systolic murmur  Abdomen:  abdomen is soft without significant tenderness, masses, organomegaly or guarding  Extremities:  Atraumatic, no edema  Skin: Warm and dry. Neuro: alert, answering questions appropriately, no involuntary movements  Psych: non focal     Data Review:     Recent Labs     04/07/20  0226 04/06/20  1324 04/06/20  0343  04/05/20  1437   WBC 5.3  --  7.3  --  13.2   HGB 7.5* 7.5* 7.4*   < > 7.7*   HCT 23.7* 23.7* 23.3*   < > 24.1*     --  270  --  355    < > = values in this interval not displayed.      Recent Labs     04/07/20  0226 04/06/20  0343 04/05/20  1500 04/05/20  1437   * 154*  --  151*   K 4.0 4.0  --  4.8   * 127*  --  125*   CO2 23 21  --  20*   * 57*  --  311*   BUN 40* 64*  --  78*   CREA 1.37* 1.64*  --  1.82*   CA 7.0* 6.9*  --  7.0*   ALB  --  1.9*  --   --    SGOT  --  13  --   --    ALT  --  13*  --   --    INR  --   --  1.4*  --            Signed By: Alexandr Moralez PA-C     April 7, 2020

## 2020-04-07 NOTE — PROGRESS NOTES
[facility] Progress Note                                              I have reviewed the flowsheet and previous days notes. Events, vitals, medications and notes from last 24 hours reviewed. Care plan discussed with staff and on multidisciplinary rounds. Subjective:  4/7/2020   Patient reports he is feeling okay. Does not complain of any abdominal pain, chest pain or dyspnea    Impression and Plan  Patient Active Problem List   Diagnosis Code    Malignant neoplasm of testis (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 [address] E11.9    Testicular cancer ([facility] [address] C62.90    Hypersomnia G47.10    Dementia ([facility]) F03.90    OAB (overactive bladder) N32.81    Urinary incontinence, urge N39.41    Acute blood loss anemia D62    GI bleeding K92.2    UTI (urinary tract infection) N39.0    Sepsis ([facility]) A41.9    Septic shock ([facility]) A41.9, R65.21    Crohn's disease ([facility] [address] K50.90     Acute upper GI bleed-patient is a status post endoscopy on 4/5/2020. Endoscopy showed multiple gastric and duodenal ulcers without bleeding, gastritis and duodenitis. Patient is currently on Protonix twice daily. Continue with it and defer management to GI  Acute anemia secondary to blood loss- patient received PRBC transfusion at the time of his admission. His hemoglobin is a stable and the same as yesterday  Acute kidney injury -patient's BUN and creatinine are improving with hydration. Continue to monitor  Hypernatremia -this is secondary to dehydration. Hydrate with IV fluids and monitor  Mildly elevated troponin I -this was likely secondary demand ischemia secondary to the GI bleed and significant anemia. Diabetes mellitus -patient is currently on insulin sliding scale  Hypertension -patient's home medications had been held off secondary to his shock yesterday. We will resume them slowly  DVT and GI prophylaxis-patient is on SCD and Protonix    OTHER:  Glycemic Control. Glucose stabilizer per ICU protocol when on insulin drip. Maintain blood glucose 140-180. Replace electrolytes per ICU electrolyte replacement protocol    Quality Care: Stress ulcer prophylaxis, DVT prophylaxis, HOB elevated, Infection control all reviewed and addressed. Events and notes from last 24 hours reviewed. Care plan discussed with nursing. D/w patient above medical problems and answered all questions to his satisfaction. CC TIME: >35 min     Medication Reviewed: Allergies   Allergen Reactions    Iodinated Contrast Media Other (comments)     Hot flushing       Past Medical History:   Diagnosis Date    Benign prostatic hyperplasia with urinary obstruction     Dementia ([address]     early onset    Diabetes ([address]     Hypersomnia     Hypertension     Nocturia     OAB (overactive bladder)     Testicular cancer (HCC)     Urge incontinence     Urinary incontinence, urge       Past Surgical History:   Procedure Laterality Date    HX CYST REMOVAL  -    cyst removed from right breast    HX OTHER SURGICAL      reemoval of testcle    HX OTHER SURGICAL  2018    Surgery: [facility]age Lower bowels, [facility]    HX OTHER SURGICAL  2019    Heart Blockage: 30% [facility]: Dr. Stephanie Mohamud      Social History     Tobacco Use    Smoking status: Former Smoker     Types: Cigarettes     Last attempt to quit: 1976     Years since quittin.2    Smokeless tobacco: Never Used   Substance Use Topics    Alcohol use: No     Alcohol/week: 0.0 standard drinks      History reviewed. No pertinent family history. Prior to Admission medications    Medication Sig Start Date End Date Taking?  Authorizing Provider mirabegron ER (MYRBETRIQ) 25 mg ER tablet Take 1 Tab by mouth daily. 7/6/18  Yes Kareem Joe MD   clopidogrel (PLAVIX) 75 mg tab  12/15/17  Yes Provider, Historical   glimepiride (AMARYL) 1 mg tablet  12/15/17  Yes Provider, Historical   aspirin-calcium carbonate 81 mg-300 mg calcium(777 mg) tab 81 mg. Yes Provider, Historical   trospium (SANCTURA XL) 60 mg capsule Take 1 Cap by mouth Daily (before breakfast). 1/17/18  Yes Kareem Joe MD   sildenafil, antihypertensive, (REVATIO) 20 mg tablet Take up to 5 tablets one hour prior to intercourse on an empty stomach. 1/17/18  Yes Kareem Joe MD   cyanocobalamin 1,000 mcg tablet 1,000 mcg. 5/21/17  Yes Provider, Historical   omeprazole (PRILOSEC) 20 mg capsule 20 mg. Yes Provider, Historical   cinnamon bark (CINNAMON) 500 mg cap Take 1,000 mg by mouth. Yes Provider, Historical   testosterone cypionate (DEPOTESTOTERONE CYPIONATE) 200 mg/mL injection 200 mg by IntraMUSCular route. 3/15/15  Yes Provider, Historical   metoprolol (LOPRESSOR) 25 mg tablet  7/31/15  Yes Provider, Historical   benazepril (LOTENSIN) 10 mg tablet  7/31/15  Yes Provider, Historical   tamsulosin (FLOMAX) 0.4 mg capsule  7/18/15  Yes Provider, Historical   pravastatin (PRAVACHOL) 20 mg tablet Take 20 mg by mouth nightly.    Yes Provider, Historical     Current Facility-Administered Medications   Medication Dose Route Frequency    influenza vaccine 2019-20 (6 mos+)(PF) (FLUARIX/FLULAVAL/FLUZONE QUAD) injection 0.5 mL  0.5 mL IntraMUSCular PRIOR TO DISCHARGE    pravastatin (PRAVACHOL) tablet 20 mg  20 mg Oral QHS    insulin lispro (HUMALOG) injection   SubCUTAneous AC&HS    dextrose 5 % - 0.45% NaCl infusion  75 mL/hr IntraVENous CONTINUOUS    oxybutynin chloride XL (DITROPAN XL) tablet 10 mg  10 mg Oral DAILY    pantoprazole (PROTONIX) 40 mg in 0.9% sodium chloride 10 mL injection  40 mg IntraVENous Q12H    sodium chloride (NS) flush 5-40 mL  5-40 mL IntraVENous Q8H Lines: All central lines examined by me. No signs of erythema, induration, discharge. Central Venous Catheter:  Triple Lumen CVL right subclavian  20 Right Subclavian (Active)   Central Line Being Utilized Yes 2020  8:00 AM   Criteria for Appropriate Use Hemodynamically unstable, requiring monitoring lines, vasopressors, or volume resuscitation 2020  8:00 AM   Site Assessment Clean, dry, & intact 2020  8:00 AM   Infiltration Assessment 0 2020  8:00 AM   Affected Extremity/Extremities Color distal to insertion site pink (or appropriate for race) 2020  8:00 AM   Date of Last Dressing Change 20  8:00 AM   Dressing Status Clean, dry, & intact 2020  8:00 AM   Dressing Type Transparent;Disk with Chlorhexadine gluconate (CHG) 2020  8:00 AM   Action Taken Open ports on tubing capped 2020  8:00 AM   Proximal Hub Color/Line Status Brown;Flushed;Cap end changed 2020  8:00 AM   Positive Blood Return (Medial Site) Yes 2020  8:00 AM   Medial Hub Color/Line Status White; Infusing 2020  8:00 AM   Positive Blood Return (Lateral Site) Yes 2020  8:00 AM   Distal Hub Color/Line Status Blue;Flushed;Patent;Cap end changed 2020  8:00 AM   Positive Blood Return (Site #3) Yes 2020  8:00 AM   Alcohol Cap Used Yes 2020  8:00 AM     Objective:  Vital Signs:    Visit Vitals  /58   Pulse 95   Temp 98.1 °F (36.7 °C)   Resp 27   Ht 6' (1.829 m)   Wt 79.4 kg (175 lb 0.7 oz)   SpO2 98%   BMI 23.74 kg/m²      O2 Device: Nasal cannula  O2 Flow Rate (L/min): 3 l/min  Temp (24hrs), Av.1 °F (36.7 °C), Min:97.5 °F (36.4 °C), Max:98.4 °F (36.9 °C)      Intake/Output:   Last shift:       07 -  1900  In: 225 [I.V.:225]  Out: 500 [Urine:500]    Last 3 shifts:  1901 -  0700  In: 4147.6 [I.V.:3786.8]  Out: 7121 [Urine:2515]      Intake/Output Summary (Last 24 hours) at 2020 1143  Last data filed at 2020 1000  Gross per 24 hour   Intake 1818.23 ml   Output 2225 ml   Net -406.77 ml       Last 3 Recorded Weights in this Encounter    04/05/20 1246 04/06/20 0452   Weight: 79.4 kg (175 lb) 79.4 kg (175 lb 0.7 oz)     Physical Exam:     General/Neurology: Alert, Awake,   Head:   Normocephalic, without obvious abnormality  Eye:   PERRL, EOM intact, no scleral icterus, no pallor  Oral:   Mucus membranes moist  Neck:   Supple  Lung:   B/l air entry is fair  Heart:   S1 S2 present. Abdomen/: Soft, non tender, BS +nt  Extremities:  No pedal edema  Skin:   Dry, intact    Data:      Recent Results (from the past 24 hour(s))   GLUCOSE, POC    Collection Time: 04/06/20  1:06 PM   Result Value Ref Range    Glucose (POC) 71 70 - 110 mg/dL   CARDIAC PANEL,(CK, CKMB & TROPONIN)    Collection Time: 04/06/20  1:24 PM   Result Value Ref Range    CK 52 39 - 308 U/L    CK - MB 5.0 (H) <3.6 ng/ml    CK-MB Index 9.6 (H) 0.0 - 4.0 %    Troponin-I, QT 1.06 (H) 0.0 - 0.045 NG/ML   HGB & HCT    Collection Time: 04/06/20  1:24 PM   Result Value Ref Range    HGB 7.5 (L) 13.0 - 16.0 g/dL    HCT 23.7 (L) 36.0 - 48.0 %   GLUCOSE, POC    Collection Time: 04/06/20  1:43 PM   Result Value Ref Range    Glucose (POC) 71 70 - 110 mg/dL   GLUCOSE, POC    Collection Time: 04/06/20  5:18 PM   Result Value Ref Range    Glucose (POC) 75 70 - 110 mg/dL   GLUCOSE, POC    Collection Time: 04/06/20 10:08 PM   Result Value Ref Range    Glucose (POC) 112 (H) 70 - 110 mg/dL   CBC WITH AUTOMATED DIFF    Collection Time: 04/07/20  2:26 AM   Result Value Ref Range    WBC 5.3 4.6 - 13.2 K/uL    RBC 2.64 (L) 4.70 - 5.50 M/uL    HGB 7.5 (L) 13.0 - 16.0 g/dL    HCT 23.7 (L) 36.0 - 48.0 %    MCV 89.8 74.0 - 97.0 FL    MCH 28.4 24.0 - 34.0 PG    MCHC 31.6 31.0 - 37.0 g/dL    RDW 15.9 (H) 11.6 - 14.5 %    PLATELET 652 815 - 715 K/uL    MPV 11.2 9.2 - 11.8 FL    NEUTROPHILS 68 40 - 73 %    LYMPHOCYTES 11 (L) 21 - 52 %    MONOCYTES 18 (H) 3 - 10 %    EOSINOPHILS 3 0 - 5 %    BASOPHILS 0 0 - 2 %    ABS. NEUTROPHILS 3.5 1.8 - 8.0 K/UL    ABS. LYMPHOCYTES 0.6 (L) 0.9 - 3.6 K/UL    ABS. MONOCYTES 1.0 0.05 - 1.2 K/UL    ABS. EOSINOPHILS 0.2 0.0 - 0.4 K/UL    ABS. BASOPHILS 0.0 0.0 - 0.1 K/UL    DF AUTOMATED     METABOLIC PANEL, BASIC    Collection Time: 04/07/20  2:26 AM   Result Value Ref Range    Sodium 152 (H) 136 - 145 mmol/L    Potassium 4.0 3.5 - 5.5 mmol/L    Chloride 125 (H) 100 - 111 mmol/L    CO2 23 21 - 32 mmol/L    Anion gap 4 3.0 - 18 mmol/L    Glucose 110 (H) 74 - 99 mg/dL    BUN 40 (H) 7.0 - 18 MG/DL    Creatinine 1.37 (H) 0.6 - 1.3 MG/DL    BUN/Creatinine ratio 29 (H) 12 - 20      GFR est AA >60 >60 ml/min/1.73m2    GFR est non-AA 50 (L) >60 ml/min/1.73m2    Calcium 7.0 (L) 8.5 - 10.1 MG/DL   GLUCOSE, POC    Collection Time: 04/07/20  9:10 AM   Result Value Ref Range    Glucose (POC) 151 (H) 70 - 110 mg/dL         Chemistry   Recent Labs     04/07/20 0226 04/06/20 0343 04/05/20  1437   * 57* 311*   * 154* 151*   K 4.0 4.0 4.8   * 127* 125*   CO2 23 21 20*   BUN 40* 64* 78*   CREA 1.37* 1.64* 1.82*   CA 7.0* 6.9* 7.0*   AGAP 4 6 6   BUCR 29* 39* 43*   AP  --  58  --    TP  --  4.0*  --    ALB  --  1.9*  --    GLOB  --  2.1  --    AGRAT  --  0.9  --        CBC w/Diff   Recent Labs     04/07/20 0226 04/06/20  1324 04/06/20  0343  04/05/20  1437   WBC 5.3  --  7.3  --  13.2   RBC 2.64*  --  2.64*  --  2.74*   HGB 7.5* 7.5* 7.4*   < > 7.7*   HCT 23.7* 23.7* 23.3*   < > 24.1*     --  270  --  355   GRANS 68  --  76*  --   --    LYMPH 11*  --  13*  --   --    EOS 3  --  2  --   --     < > = values in this interval not displayed. XR (Most Recent).  CXR reviewed by me and compared with previous CXR   Results from Hospital Encounter encounter on 04/05/20   XR CHEST PORT    Narrative EXAM: XR CHEST PORT    CLINICAL INDICATION/HISTORY: R/o pneumonia  -Additional: None    COMPARISON: None    TECHNIQUE: Portable frontal view of the chest    _______________    FINDINGS:    SUPPORT DEVICES: Left subclavian central venous catheter tip at the cavoatrial  junction. Rajeev Hilt [facility]: Cardiomediastinal silhouette within normal limits. LUNGS AND PLEURAL SPACES: Patient rotated to the left. Left basilar atelectasis. No dense consolidation, large effusion or pneumothorax.    _______________      Impression IMPRESSION:    No acute cardiopulmonary abnormality. Patient rotated to the left. Left basilar  atelectasis. High complexity decision making was performed during the evaluation of this patient at high risk for decompensation with multiple organ involvement     Above mentioned total time spent on reviewing the case/medical record/data/notes/EMR/patient examination/documentation/coordinating care with nurse/consultants, exclusive of procedures with complex decision making performed and > 50% time spent in face to face evaluation.     This note has been dictated using the dragon dictation system    Segundo Hoskins MD  4/7/2020

## 2020-04-07 NOTE — PROGRESS NOTES
Internal Medicine Progress Note        NAME: Hannah Holder   :  1940  MRM:  [id number]    Date/Time: 2020        ASSESSMENT/PLAN:      #  Hypotension , Shock likely hypovolemic. Iv pressors as needed. Maintain h/h. mantain BP. Currently SVS and off pressors. -  Hypotension, likely secondary to hypovolemia and anemia related to GI bleeding.  - hold home BP medication    #  Melena; suspected upper GI bleeding with DDx including PUD, AVM, neoplasia, esophagitis/gastritis. Lower GI source is a consideration. GI consulted. Monitor h/h. PPI  Patient is also on aspirin and Plavix as an outpatient which has been held off at this time. Endoscopy with GI demonstrate multiple areas of oozing bleeding. PPI changed to PO    # Acute blood loss  Anemia. secondary to GI bleeding. As above. Blood Tx as needed. - ho  Chronic dual antiplatelet therapy (?indication) and NSAID use    # positive trop I level 1.02. Possible demand miss match. Cardiology consulted. Need clarify his need for Plavix and/or ASA     #  Colonic thickening on CT; possibly related to colitis (?ischemic) or infectious process although also potentially an incidental or clinically insignificant finding. C diff negative. Dc contact isolation     # KARLI. Improving s.cr. IVF   Monitor Renal function and other labs as indicated. Avoid nephrotoxins , iv Contrast, NSAID. Renally dosing medications. Monitor urine out put. # Hypernatremia. Change IVF. Avoid NS in diluent. Serial level monitoring. # uncontrolled DM with hyper and hypoglycemia. Start DM protocol. Provide SSI, hypoglycemia protocol and frequent Accu checks. Provide SSI, hypoglycemia protocol and frequent Accu checks. Feeding when ok with GI  Monitor BSLclosely        DVT and GI prophylaxis-patient is on SCD and Protonix     Code status: Patient is full code per wife      PT/OT eval and treat. OOB/IS when appropriate. For CDMP .  Patient admitted with  Sepsis, UTI.  As DW PCCM this is unlikely , reject this diagnosis. Observe off Abx for now. Lab Review:     Recent Labs     04/07/20  0226 04/06/20  1324 04/06/20  0343  04/05/20  1437   WBC 5.3  --  7.3  --  13.2   HGB 7.5* 7.5* 7.4*   < > 7.7*   HCT 23.7* 23.7* 23.3*   < > 24.1*     --  270  --  355    < > = values in this interval not displayed. Recent Labs     04/07/20  0226 04/06/20  0343 04/05/20  1500 04/05/20  1437   * 154*  --  151*   K 4.0 4.0  --  4.8   * 127*  --  125*   CO2 23 21  --  20*   * 57*  --  311*   BUN 40* 64*  --  78*   CREA 1.37* 1.64*  --  1.82*   CA 7.0* 6.9*  --  7.0*   ALB  --  1.9*  --   --    TBILI  --  0.8  --   --    SGOT  --  13  --   --    ALT  --  13*  --   --    INR  --   --  1.4*  --      Lab Results   Component Value Date/Time    Glucose (POC) 151 (H) 04/07/2020 09:10 AM    Glucose (POC) 112 (H) 04/06/2020 10:08 PM    Glucose (POC) 75 04/06/2020 05:18 PM    Glucose (POC) 71 04/06/2020 01:43 PM    Glucose (POC) 71 04/06/2020 01:06 PM     No results for input(s): PH, PCO2, PO2, HCO3, FIO2 in the last 72 hours. Recent Labs     04/05/20  1500   INR 1.4*       No results found for: SDES  No results found for: CULT    All Cardiac Markers in the last 24 hours:   Lab Results   Component Value Date/Time    CPK 52 04/06/2020 01:24 PM    CKMB 5.0 (H) 04/06/2020 01:24 PM    CKND1 9.6 (H) 04/06/2020 01:24 PM    TROIQ 1.06 (H) 04/06/2020 01:24 PM           Intervals noted      Subjective:     Chief Complaint:      Passing liquid black stool  hh stable. ROS:  (bold if positive,otherwise negative)    Fever/chills ,  Dysuria   Cough , Sputum , SOB/BLUM , Chest Pain     Diarrhea ,Nausea/Vomit , Abd Pain , Constipation           Objective:     Vitals:  Last 24hrs VS reviewed since prior progress note.  Most recent are:    Visit Vitals  /58   Pulse 95   Temp 98.1 °F (36.7 °C)   Resp 27   Ht 6' (1.829 m)   Wt 79.4 kg (175 lb 0.7 oz)   SpO2 98%   BMI 23.74 kg/m² SpO2 Readings from Last 6 Encounters:   04/07/20 98%    O2 Flow Rate (L/min): 3 l/min       Intake/Output Summary (Last 24 hours) at 4/7/2020 1244  Last data filed at 4/7/2020 1000  Gross per 24 hour   Intake 1718.23 ml   Output 1825 ml   Net -106.77 ml          Physical Exam:   Gen: Well-developed,  in no acute distress  HEENT: Head atraumatic, normocephalic , PALE conjunctivae,  hearing intact to voice   Neck:  No apparent JVD, Supple  Resp: No accessory muscle use, Bilateral BS present,clear breath sounds without wheezes rales or rhonchi  Card:  POSITIVE RANDAL  murmur, normal S1, S2 without Gallop . MILD  lower leg peripheral edema. Abd:  Soft, non-tender, non-distended, bowel sounds are present .   Musc: No cyanosis or clubbing  Skin: No rashes or ulcers, skin turgor is good   Neuro:   no clear area of focal motor weakness, follows commands appropriately   alert and coherent        Telemetry reviewed:   normal sinus rhythm    Medications Reviewed: (see below)    Lab Data Reviewed: (see below)    ______________________________________________________________________    Medications:     Current Facility-Administered Medications   Medication Dose Route Frequency    influenza vaccine 2019-20 (6 mos+)(PF) (FLUARIX/FLULAVAL/FLUZONE QUAD) injection 0.5 mL  0.5 mL IntraMUSCular PRIOR TO DISCHARGE    pravastatin (PRAVACHOL) tablet 20 mg  20 mg Oral QHS    insulin lispro (HUMALOG) injection   SubCUTAneous AC&HS    dextrose 5 % - 0.45% NaCl infusion  75 mL/hr IntraVENous CONTINUOUS    oxybutynin chloride XL (DITROPAN XL) tablet 10 mg  10 mg Oral DAILY    pantoprazole (PROTONIX) 40 mg in 0.9% sodium chloride 10 mL injection  40 mg IntraVENous Q12H    sodium chloride (NS) flush 5-40 mL  5-40 mL IntraVENous Q8H    sodium chloride (NS) flush 5-40 mL  5-40 mL IntraVENous PRN    0.9% sodium chloride infusion 250 mL  250 mL IntraVENous PRN    glucose chewable tablet 16 g  4 Tab Oral PRN    glucagon (GLUCAGEN) injection 1 mg  1 mg IntraMUSCular PRN    dextrose 10% infusion 125-250 mL  125-250 mL IntraVENous PRN          Total time spent with patient: 35 minutes                  Care Plan discussed with: Patient, Nursing Staff, Consultant/Specialist and >50% of time spent in counseling and coordination of care  DW PCCM    Discussed:  Care Plan    Prophylaxis:  SCD's and H2B/PPI    Disposition:  Home w/Family           This document in whole or part of it has been produced using voice recognition software. Unrecognized errors in transcription may be present.     Attending Physician: Jesus Puckett MD

## 2020-04-07 NOTE — PROGRESS NOTES
Problem: Falls - Risk of  Goal: *Absence of Falls  Description: Document Humble Sofia Fall Risk and appropriate interventions in the flowsheet. Outcome: Progressing Towards Goal  Note: Fall Risk Interventions:  Mobility Interventions: Bed/chair exit alarm         Medication Interventions: Bed/chair exit alarm, Evaluate medications/consider consulting pharmacy    Elimination Interventions: Bed/chair exit alarm, Call light in reach    History of Falls Interventions: Bed/chair exit alarm, Door open when patient unattended         Problem: Patient Education: Go to Patient Education Activity  Goal: Patient/Family Education  Outcome: Progressing Towards Goal     Problem: Pressure Injury - Risk of  Goal: *Prevention of pressure injury  Description: Document Rustam Scale and appropriate interventions in the flowsheet. Outcome: Progressing Towards Goal  Note: Pressure Injury Interventions:  Sensory Interventions: Assess changes in LOC    Moisture Interventions: Absorbent underpads, Apply protective barrier, creams and emollients    Activity Interventions: Pressure redistribution bed/mattress(bed type)    Mobility Interventions: HOB 30 degrees or less, Pressure redistribution bed/mattress (bed type)    Nutrition Interventions: Document food/fluid/supplement intake    Friction and Shear Interventions: HOB 30 degrees or less                Problem: Patient Education: Go to Patient Education Activity  Goal: Patient/Family Education  Outcome: Progressing Towards Goal     Problem: Risk for Spread of Infection  Goal: Prevent transmission of infectious organism to others  Description: Prevent the transmission of infectious organisms to other patients, staff members, and visitors.   Outcome: Progressing Towards Goal     Problem: Patient Education:  Go to Education Activity  Goal: Patient/Family Education  Outcome: Progressing Towards Goal     Problem: Infection - Risk of, Urinary Catheter-Associated Urinary Tract Infection  Goal: *Absence of infection signs and symptoms  Outcome: Progressing Towards Goal     Problem: Patient Education: Go to Patient Education Activity  Goal: Patient/Family Education  Outcome: Progressing Towards Goal     Problem: Infection - Risk of, Central Venous Catheter-Associated Bloodstream Infection  Goal: *Absence of infection signs and symptoms  Outcome: Progressing Towards Goal     Problem: Patient Education: Go to Patient Education Activity  Goal: Patient/Family Education  Outcome: Progressing Towards Goal     Problem: Pain  Goal: *Control of Pain  Outcome: Progressing Towards Goal     Problem: Patient Education: Go to Patient Education Activity  Goal: Patient/Family Education  Outcome: Progressing Towards Goal     Problem: Diabetes Maintenance:Admission  Goal: *Blood glucose 80 to 180 mg/dl  Outcome: Progressing Towards Goal  Goal: *Adequate nutrition  Outcome: Progressing Towards Goal     Problem: Discharge Planning  Goal: *Discharge to safe environment  Outcome: Progressing Towards Goal

## 2020-04-07 NOTE — PROGRESS NOTES
Gastrointestinal Progress Note    Patient Name: Hannah Holder    XOCTV'E Date: 4/7/2020    Admit Date: 4/5/2020      Assessment:   1. Acute UGIB due to gastroduodenal ulcer and gastroduodenitis probably ASA induced. 2.   Acute hypovolemic shock , resolved. 3.   Acute blood loss anemia due to GI bleeding. Stable. Hgb 7.5.  4.   KARLI, improving. 5.   Descending colon wall thickening per CT. Hx of sigmoid diverticulosis per colonoscopy in 2017 by Dr. Ingrid Myers. Nontender abdomen on exam.  Either artifactual or subclinical colitis. Recommendation:   1. Advance diet as tolerated. 2.   Continue oral PPI indefinitely. 3.   Await gastric biopsy results. 4.   Hold Plavix and ASA and resume in 5 days if Cardiology deems this necessary. 5.   Recommend surveillance EGD in 8-12 weeks as outpatient c/o Dr. Sondra Wesley. Subjective:   Patient is feeling well and tolerating soft diet. Had a dark BM today. BP stable. Hgb 7.5. Protonix switched to oral formulation today. No chest pain or SOB.     Current Facility-Administered Medications   Medication Dose Route Frequency    pantoprazole (PROTONIX) tablet 40 mg  40 mg Oral ACB&D    influenza vaccine 2019-20 (6 mos+)(PF) (FLUARIX/FLULAVAL/FLUZONE QUAD) injection 0.5 mL  0.5 mL IntraMUSCular PRIOR TO DISCHARGE    pravastatin (PRAVACHOL) tablet 20 mg  20 mg Oral QHS    insulin lispro (HUMALOG) injection   SubCUTAneous AC&HS    dextrose 5 % - 0.45% NaCl infusion  75 mL/hr IntraVENous CONTINUOUS    oxybutynin chloride XL (DITROPAN XL) tablet 10 mg  10 mg Oral DAILY    sodium chloride (NS) flush 5-40 mL  5-40 mL IntraVENous Q8H    sodium chloride (NS) flush 5-40 mL  5-40 mL IntraVENous PRN    0.9% sodium chloride infusion 250 mL  250 mL IntraVENous PRN    glucose chewable tablet 16 g  4 Tab Oral PRN    glucagon (GLUCAGEN) injection 1 mg  1 mg IntraMUSCular PRN    dextrose 10% infusion 125-250 mL  125-250 mL IntraVENous PRN          Objective: Visit Vitals  /72 (BP 1 Location: Left arm, BP Patient Position: At rest)   Pulse 83   Temp 97.2 °F (36.2 °C)   Resp 20   Ht 6' (1.829 m)   Wt 79.4 kg (175 lb 0.7 oz)   SpO2 100%   BMI 23.74 kg/m²           Intake/Output Summary (Last 24 hours) at 4/7/2020 1507  Last data filed at 4/7/2020 1300  Gross per 24 hour   Intake 1653.23 ml   Output 2075 ml   Net -421.77 ml       Examination:  Awake, alert, oriented. Abdomen soft, nontender, no rebound or guarding. Warm extremities,  2+ pulses, no edema. Data Review:    Labs: Results:   Chemistry Recent Labs     04/07/20 0226 04/06/20 0343 04/05/20  1437   * 57* 311*   * 154* 151*   K 4.0 4.0 4.8   * 127* 125*   CO2 23 21 20*   BUN 40* 64* 78*   CREA 1.37* 1.64* 1.82*   CA 7.0* 6.9* 7.0*   AGAP 4 6 6   BUCR 29* 39* 43*   AP  --  58  --    TP  --  4.0*  --    ALB  --  1.9*  --    GLOB  --  2.1  --    AGRAT  --  0.9  --     Estimated Creatinine Clearance: 45.6 mL/min (A) (based on SCr of 1.37 mg/dL (H)). CBC w/Diff Recent Labs     04/07/20 0226 04/06/20  1324 04/06/20  0343  04/05/20  1437   WBC 5.3  --  7.3  --  13.2   RBC 2.64*  --  2.64*  --  2.74*   HGB 7.5* 7.5* 7.4*   < > 7.7*   HCT 23.7* 23.7* 23.3*   < > 24.1*     --  270  --  355   GRANS 68  --  76*  --   --    LYMPH 11*  --  13*  --   --    EOS 3  --  2  --   --     < > = values in this interval not displayed. Coagulation Recent Labs     04/05/20  1500   PTP 16.5*   INR 1.4*   APTT 25.8       Hepatitis Panel No results found for: HAMAT, HAAB, HABT, HAAT, HBSAG, HBSB, HBSAT, HBABN, HBCM, HBCAB, HBCAT, XBCABS, HBEAB, HBEAG, XHEPCS, [id number], HBEGLT, HBCMLT, HBCLT, HBEBLT, [id number], [id number], HAVMLT, [id number], HBCMLT, [id number], HCGAT   Amylase Lipase . Liver Enzymes Recent Labs     04/06/20  0343   TP 4.0*   ALB 1.9*   AP 58   SGOT 13   ALT 13*      Thyroid Studies No results for input(s): T4, T3U, TSH, TSHEXT in the last 72 hours.     No lab exists for component: T3RU Pathology pathology       [facility].  Renee Crouch MD, [address]  Pager: [phone]  April 7, 2020

## 2020-04-07 NOTE — PROGRESS NOTES
(1774) [facility] Assumed care of patient. Whiteboard updated. VSS. Will continue to monitor.     (8737) TRANSFER - OUT REPORT:    Verbal report given to PAT March (name) on [facility]  being transferred to  (unit) for routine progression of care       Report consisted of patients Situation, Background, Assessment and   Recommendations(SBAR). Information from the following report(s) SBAR, Intake/Output, MAR, Recent Results, Cardiac Rhythm NSR with PVCs and Alarm Parameters  was reviewed with the receiving nurse. Lines:   Triple Lumen CVL right subclavian  04/05/20 Right Subclavian (Active)   Central Line Being Utilized Yes 4/7/2020 12:00 PM   Criteria for Appropriate Use Limited/no vessel suitable for conventional peripheral access 4/7/2020 12:00 PM   Site Assessment Clean, dry, & intact 4/7/2020  1:00 PM   Infiltration Assessment 0 4/7/2020  1:00 PM   Affected Extremity/Extremities Color distal to insertion site pink (or appropriate for race); Pulses palpable;Range of motion performed 4/7/2020 12:00 PM   Date of Last Dressing Change 04/05/20 4/7/2020 12:00 PM   Dressing Status Clean, dry, & intact 4/7/2020 12:00 PM   Dressing Type Disk with Chlorhexadine gluconate (CHG) 4/7/2020 12:00 PM   Action Taken Open ports on tubing capped 4/7/2020 12:00 PM   Proximal Hub Color/Line Status Red;Flushed;Patent;Capped 4/7/2020 12:00 PM   Positive Blood Return (Medial Site) Yes 4/7/2020 12:00 PM   Medial Hub Color/Line Status White;Flushed;Patent;Capped 4/7/2020 12:00 PM   Positive Blood Return (Lateral Site) Yes 4/7/2020 12:00 PM   Distal Hub Color/Line Status Blue; Infusing;Patent 4/7/2020 12:00 PM   Positive Blood Return (Site #3) Yes 4/7/2020 12:00 PM   External Catheter Length (cm) 0 centimeters 4/7/2020 12:00 PM   Alcohol Cap Used Yes 4/7/2020 12:00 PM        Opportunity for questions and clarification was provided.       Patient transported with:   Registered Nurse    Voicemail left for wife.

## 2020-04-07 NOTE — PROGRESS NOTES
Received patient from [facility]. Pt awake and in bed watching television. Denies pain. No distress noted. Bed locked in lowest position. Frequently used items and call light within reach. 2240: night meds adminsitered. Pt resting quietly in bed.    2330: incontinent care performed. Pt has black tarry moderate bowel movement. Cleaned up and resting quietly in bed.    8337: pt had another incidence of bowel incontinence. Moderate loose and dark. incotinence care performed. Uneventful night for pt. Pt rested and was treated per eMAR. No further complaints and no distress noted. 2312: Bedside shift change report given to Anca RN (oncoming nurse) by Zbigniew Dela Cruz RN (offgoing nurse). Report included the following information SBAR, Intake/Output, MAR and Quality Measures. Opportunity for questions and clarification provided.

## 2020-04-08 ENCOUNTER — APPOINTMENT (OUTPATIENT)
Dept: NON INVASIVE DIAGNOSTICS | Age: 80
DRG: 377 | End: 2020-04-08
Attending: PHYSICIAN ASSISTANT
Payer: MEDICARE

## 2020-04-08 LAB
ABO + RH BLD: NORMAL
ANION GAP SERPL CALC-SCNC: 4 MMOL/L (ref 3–18)
AV PEAK GRADIENT: 80.86 MMHG
AV VELOCITY RATIO: 0.28
AV VTI RATIO: 0.3
BLD PROD TYP BPU: NORMAL
BLD PROD TYP BPU: NORMAL
BLOOD GROUP ANTIBODIES SERPL: NORMAL
BPU ID: NORMAL
BPU ID: NORMAL
BUN SERPL-MCNC: 23 MG/DL (ref 7–18)
BUN/CREAT SERPL: 19 (ref 12–20)
CALCIUM SERPL-MCNC: 7.2 MG/DL (ref 8.5–10.1)
CALLED TO:,BCALL1: NORMAL
CHLORIDE SERPL-SCNC: 120 MMOL/L (ref 100–111)
CO2 SERPL-SCNC: 23 MMOL/L (ref 21–32)
CREAT SERPL-MCNC: 1.18 MG/DL (ref 0.6–1.3)
CROSSMATCH RESULT,%XM: NORMAL
CROSSMATCH RESULT,%XM: NORMAL
ECHO AO ASC DIAM: 3.48 CM
ECHO AV AREA PEAK VELOCITY: 0 CM2
ECHO AV AREA VTI: 0 CM2
ECHO AV AREA/BSA PEAK VELOCITY: 0 CM2/M2
ECHO AV AREA/BSA VTI: 0 CM2/M2
ECHO AV MEAN GRADIENT: 37.8 MMHG
ECHO AV MEAN VELOCITY: 2.92 M/S
ECHO AV PEAK GRADIENT: 56.9 MMHG
ECHO AV PEAK VELOCITY: 377.3 CM/S
ECHO AV REGURGITANT PHT: 354.8 CM
ECHO AV VTI: 79.38 CM
ECHO IVC SNIFF: 1.5 CM
ECHO LV EDV A2C: 110.1 ML
ECHO LV EDV A4C: 121.8 ML
ECHO LV EDV BP: 115.2 ML (ref 67–155)
ECHO LV EDV INDEX A4C: 60.5 ML/M2
ECHO LV EDV INDEX BP: 57.2 ML/M2
ECHO LV EDV NDEX A2C: 54.7 ML/M2
ECHO LV EJECTION FRACTION A2C: 57 %
ECHO LV EJECTION FRACTION A4C: 60 %
ECHO LV EJECTION FRACTION BIPLANE: 56.2 % (ref 55–100)
ECHO LV ESV A2C: 47.5 ML
ECHO LV ESV A4C: 49.3 ML
ECHO LV ESV BP: 50.4 ML (ref 22–58)
ECHO LV ESV INDEX A2C: 23.6 ML/M2
ECHO LV ESV INDEX A4C: 24.5 ML/M2
ECHO LV ESV INDEX BP: 25 ML/M2
ECHO LVOT CARDIAC OUTPUT: 0 L/MIN
ECHO LVOT DIAM: 0 CM
ECHO LVOT PEAK GRADIENT: 4.6 MMHG
ECHO LVOT PEAK VELOCITY: 107.4 CM/S
ECHO LVOT SV: 0 ML
ECHO LVOT VTI: 24.08 CM
ECHO MV A VELOCITY: 113.22 CM/S
ECHO MV AREA PHT: 3.5 CM2
ECHO MV E DECELERATION TIME (DT): 228.6 MS
ECHO MV E VELOCITY: 71.73 CM/S
ECHO MV E/A RATIO: 0.63
ECHO MV PRESSURE HALF TIME (PHT): 63.4 MS
ECHO RA MINOR AXIS: 3.85 CM
GLUCOSE BLD STRIP.AUTO-MCNC: 126 MG/DL (ref 70–110)
GLUCOSE BLD STRIP.AUTO-MCNC: 139 MG/DL (ref 70–110)
GLUCOSE BLD STRIP.AUTO-MCNC: 152 MG/DL (ref 70–110)
GLUCOSE BLD STRIP.AUTO-MCNC: 184 MG/DL (ref 70–110)
GLUCOSE SERPL-MCNC: 114 MG/DL (ref 74–99)
HCT VFR BLD AUTO: 25.9 % (ref 36–48)
HCT VFR BLD AUTO: 27.7 % (ref 36–48)
HGB BLD-MCNC: 8.2 G/DL (ref 13–16)
HGB BLD-MCNC: 8.7 G/DL (ref 13–16)
LVOT MG: 3.2 MMHG
LVOT MV: 0.87 CM/S
LVSV (MOD BI): 32.23 ML
LVSV (MOD SINGLE 4C): 36.07 ML
LVSV (MOD SINGLE): 31.18 ML
MAGNESIUM SERPL-MCNC: 1.8 MG/DL (ref 1.6–2.6)
MV DEC SLOPE: 3.14
PHOSPHATE SERPL-MCNC: 2.8 MG/DL (ref 2.5–4.9)
PISA AR MAX VEL: 449.62 CM/S
POTASSIUM SERPL-SCNC: 3.8 MMOL/L (ref 3.5–5.5)
SODIUM SERPL-SCNC: 147 MMOL/L (ref 136–145)
SPECIMEN EXP DATE BLD: NORMAL
STATUS OF UNIT,%ST: NORMAL
STATUS OF UNIT,%ST: NORMAL
UNIT DIVISION, %UDIV: 0
UNIT DIVISION, %UDIV: 0

## 2020-04-08 PROCEDURE — 84100 ASSAY OF PHOSPHORUS: CPT

## 2020-04-08 PROCEDURE — 36415 COLL VENOUS BLD VENIPUNCTURE: CPT

## 2020-04-08 PROCEDURE — 74011636637 HC RX REV CODE- 636/637: Performed by: INTERNAL MEDICINE

## 2020-04-08 PROCEDURE — 65660000000 HC RM CCU STEPDOWN

## 2020-04-08 PROCEDURE — 74011250637 HC RX REV CODE- 250/637: Performed by: INTERNAL MEDICINE

## 2020-04-08 PROCEDURE — 77030037878 HC DRSG MEPILEX >48IN BORD MOLN -B

## 2020-04-08 PROCEDURE — 80048 BASIC METABOLIC PNL TOTAL CA: CPT

## 2020-04-08 PROCEDURE — 85018 HEMOGLOBIN: CPT

## 2020-04-08 PROCEDURE — 74011000258 HC RX REV CODE- 258: Performed by: INTERNAL MEDICINE

## 2020-04-08 PROCEDURE — 83735 ASSAY OF MAGNESIUM: CPT

## 2020-04-08 PROCEDURE — 77010033678 HC OXYGEN DAILY

## 2020-04-08 PROCEDURE — 93306 TTE W/DOPPLER COMPLETE: CPT

## 2020-04-08 PROCEDURE — 82962 GLUCOSE BLOOD TEST: CPT

## 2020-04-08 RX ORDER — METOPROLOL TARTRATE 25 MG/1
25 TABLET, FILM COATED ORAL 2 TIMES DAILY
Status: DISCONTINUED | OUTPATIENT
Start: 2020-04-08 | End: 2020-04-11 | Stop reason: HOSPADM

## 2020-04-08 RX ORDER — LISINOPRIL 5 MG/1
10 TABLET ORAL DAILY
Status: DISCONTINUED | OUTPATIENT
Start: 2020-04-08 | End: 2020-04-11 | Stop reason: HOSPADM

## 2020-04-08 RX ADMIN — OXYBUTYNIN CHLORIDE 10 MG: 10 TABLET, EXTENDED RELEASE ORAL at 09:27

## 2020-04-08 RX ADMIN — METOPROLOL TARTRATE 25 MG: 25 TABLET, FILM COATED ORAL at 17:36

## 2020-04-08 RX ADMIN — PRAVASTATIN SODIUM 20 MG: 20 TABLET ORAL at 22:33

## 2020-04-08 RX ADMIN — PANTOPRAZOLE SODIUM 40 MG: 40 TABLET, DELAYED RELEASE ORAL at 09:27

## 2020-04-08 RX ADMIN — Medication 10 ML: at 06:26

## 2020-04-08 RX ADMIN — PANTOPRAZOLE SODIUM 40 MG: 40 TABLET, DELAYED RELEASE ORAL at 17:36

## 2020-04-08 RX ADMIN — Medication 10 ML: at 22:33

## 2020-04-08 RX ADMIN — METOPROLOL TARTRATE 25 MG: 25 TABLET, FILM COATED ORAL at 12:19

## 2020-04-08 RX ADMIN — Medication 10 ML: at 17:37

## 2020-04-08 RX ADMIN — LISINOPRIL 10 MG: 5 TABLET ORAL at 12:19

## 2020-04-08 RX ADMIN — INSULIN LISPRO 2 UNITS: 100 INJECTION, SOLUTION INTRAVENOUS; SUBCUTANEOUS at 17:36

## 2020-04-08 RX ADMIN — DEXTROSE MONOHYDRATE AND SODIUM CHLORIDE 75 ML/HR: 5; .45 INJECTION, SOLUTION INTRAVENOUS at 01:40

## 2020-04-08 RX ADMIN — INSULIN LISPRO 2 UNITS: 100 INJECTION, SOLUTION INTRAVENOUS; SUBCUTANEOUS at 12:20

## 2020-04-08 NOTE — PROGRESS NOTES
[facility]  -  Progress Note      Patient: Dakota Omer MRN: [id number]  SSN: [id number]    YOB: 1940  Age: 78 y.o. Sex: male      Admit Date: 4/5/2020      I saw, evaluated, interviewed and examined the patient personally. I agree with the findings and plan of care as documented below with PA-C note  Patient with anemia with hemoglobin as low as 7.5  Status post EGD. Elevated troponin likely in the setting of GI bleed and physiologic stress  Echo with normal ejection fraction and moderate aortic stenosis. Images reviewed personally  From cardiac standpoint, he does not have any percutaneous coronary intervention in the past.  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 When okay and safe from GI standpoint, he should be on single antiplatelet agent either aspirin or Plavix. From cardiac standpoint, he does not need dual antiplatelet agent. Continue rest of the medication    Venkat Vo MD       Assessment:     -Anemia, Hgb 7.7 on presentation, associated with melena  -S/p EGD 4/5/2020 with findings as follows:   · Gastritis and duodenitis with areas of focal oozing  · Multiple gastric and duodenal ulcers without bleeding or high risk features  -Elevated troponin, mild, flat, 1.00 --> 1.06 (approx. 24 hours later)  -Cardiac catheterization 11/2018 with no significant disease in large epicardial coronary arteries, findings as follows:  · Left main: Free of significant atherosclerotic occlusion  · LAD: Large vessel, serial luminal irregularities, with proximal 30% occlusion. There is a small D1 branch with an ostial 70% occlusion, less than 1 mm vessel, too small for intervention. There is a large second diagonal branch  · LCx: Large vessel, with serial luminal irregularities, but no focal occlusions. Large OM1 branch. Small OM2 branch.   This vessel is diffusely diseased with multiple occlusions but the vessel is too small for intervention at < 1 mm in size.  · RCA: Dominant vessel. Large vessel, with serial luminal irregularities, but no significant occlusion noted. Posterior descending and posterior-lateral branch systems are present and come off distally. The vessel and its branches demonstrate serial luminal irregularities but no focal occlusions. · LVgram: EF 70%, normal myocardial thickening and no wall motion abnormalities. No mitral regurgitation noted. -Echo 07/2017: EF 45%, mild diastolic dysfunction, mild concentric LVH, normal PAP of 16 mmHg  -Mild aortic stenosis with calculated NICOLAS 1.2 cm2 and peak velocity 266 cm/s, mean gradient 1 mmHg with mild aortic regurgitation by Echo 07/2017  -KARLI, improving, Cr 1.82 on presentation  -Hypernatremia, up to 154 on 4/6/2020  -Hx HTN  -Hx DM, A1c 6.1 this admission  -Hx early dementia     Primary cardiologist is Dr. Arriola Stamp:     -ASA/Plavix on hold. Would resume ASA 81 mg if/when okay from GI standpoint.  -Echocardiogram pending. Subjective:     No new complaints.       Objective:      Patient Vitals for the past 8 hrs:   Temp Pulse Resp BP SpO2   04/08/20 0935 -- -- -- 149/68 --   04/08/20 0729 97.6 °F (36.4 °C) 89 17 163/70 99 %   04/08/20 0438 97.5 °F (36.4 °C) 86 17 149/68 93 %         Patient Vitals for the past 96 hrs:   Weight   04/08/20 0935 175 lb (79.4 kg)   04/06/20 0452 175 lb 0.7 oz (79.4 kg)   04/05/20 1246 175 lb (79.4 kg)         Intake/Output Summary (Last 24 hours) at 4/8/2020 1034  Last data filed at 4/8/2020 1025  Gross per 24 hour   Intake 1575 ml   Output 1650 ml   Net -75 ml       Physical Exam:  General:  alert, cooperative, no distress, appears stated age  Neck:  supple  Lungs:  clear to auscultation bilaterally  Heart:  Regular rate and rhythm, II-III/VI systolic murmur  Abdomen:  abdomen is soft without significant tenderness, masses, organomegaly or guarding  Extremities:  Atraumatic, no edema     Data Review:     Labs: Results:       Chemistry Recent Labs 04/08/20 0400 04/07/20 0226 04/06/20 0343   * 110* 57*   * 152* 154*   K 3.8 4.0 4.0   * 125* 127*   CO2 23 23 21   BUN 23* 40* 64*   CREA 1.18 1.37* 1.64*   CA 7.2* 7.0* 6.9*   MG 1.8  --   --    PHOS 2.8  --   --    AGAP 4 4 6   BUCR 19 29* 39*   AP  --   --  58   TP  --   --  4.0*   ALB  --   --  1.9*   GLOB  --   --  2.1   AGRAT  --   --  0.9      CBC w/Diff Recent Labs     04/08/20 0415 04/07/20  1655 04/07/20 0226 04/06/20 0343 04/05/20  1437   WBC  --   --  5.3  --  7.3  --  13.2   RBC  --   --  2.64*  --  2.64*  --  2.74*   HGB 8.2* 9.1* 7.5*   < > 7.4*   < > 7.7*   HCT 25.9* 28.6* 23.7*   < > 23.3*   < > 24.1*   PLT  --   --  289  --  270  --  355   GRANS  --   --  68  --  76*  --   --    LYMPH  --   --  11*  --  13*  --   --    EOS  --   --  3  --  2  --   --     < > = values in this interval not displayed.       Coagulation Recent Labs     04/05/20  1500   PTP 16.5*   INR 1.4*   APTT 25.8       Liver Enzymes Recent Labs     04/06/20 0343   TP 4.0*   ALB 1.9*   AP 58   SGOT 13

## 2020-04-08 NOTE — PROGRESS NOTES
Problem: Falls - Risk of  Goal: *Absence of Falls  Description: Document Clydene Bone Fall Risk and appropriate interventions in the flowsheet. Outcome: Progressing Towards Goal  Note: Fall Risk Interventions:  Mobility Interventions: Patient to call before getting OOB, PT Consult for mobility concerns         Medication Interventions: Evaluate medications/consider consulting pharmacy, Teach patient to arise slowly, Patient to call before getting OOB    Elimination Interventions: Call light in reach, Toileting schedule/hourly rounds    History of Falls Interventions: Room close to nurse's station, Door open when patient unattended         Problem: Pressure Injury - Risk of  Goal: *Prevention of pressure injury  Description: Document Rustam Scale and appropriate interventions in the flowsheet. Outcome: Progressing Towards Goal  Note: Pressure Injury Interventions:  Sensory Interventions: Minimize linen layers, Turn and reposition approx.  every two hours (pillows and wedges if needed), Keep linens dry and wrinkle-free, Maintain/enhance activity level, Avoid rigorous massage over bony prominences    Moisture Interventions: Check for incontinence Q2 hours and as needed, Internal/External urinary devices, Limit adult briefs, Maintain skin hydration (lotion/cream), Minimize layers, Moisture barrier, Offer toileting Q_hr, Apply protective barrier, creams and emollients, Absorbent underpads    Activity Interventions: Pressure redistribution bed/mattress(bed type), PT/OT evaluation    Mobility Interventions: Pressure redistribution bed/mattress (bed type), HOB 30 degrees or less, PT/OT evaluation    Nutrition Interventions: Offer support with meals,snacks and hydration, Document food/fluid/supplement intake    Friction and Shear Interventions: Apply protective barrier, creams and emollients, Minimize layers, Lift sheet, HOB 30 degrees or less, Foam dressings/transparent film/skin sealants                Problem: Patient Education: Go to Patient Education Activity  Goal: Patient/Family Education  Outcome: Progressing Towards Goal     Problem: Risk for Spread of Infection  Goal: Prevent transmission of infectious organism to others  Description: Prevent the transmission of infectious organisms to other patients, staff members, and visitors.   Outcome: Resolved/Met     Problem: Patient Education:  Go to Education Activity  Goal: Patient/Family Education  Outcome: Resolved/Met     Problem: Infection - Risk of, Urinary Catheter-Associated Urinary Tract Infection  Goal: *Absence of infection signs and symptoms  Outcome: Progressing Towards Goal     Problem: Infection - Risk of, Central Venous Catheter-Associated Bloodstream Infection  Goal: *Absence of infection signs and symptoms  Outcome: Progressing Towards Goal     Problem: Patient Education: Go to Patient Education Activity  Goal: Patient/Family Education  Outcome: Progressing Towards Goal     Problem: Pain  Goal: *Control of Pain  Outcome: Progressing Towards Goal     Problem: Patient Education: Go to Patient Education Activity  Goal: Patient/Family Education  Outcome: Progressing Towards Goal     Problem: Diabetes Maintenance:Admission  Goal: *Blood glucose 80 to 180 mg/dl  Outcome: Progressing Towards Goal  Goal: *Adequate nutrition  Outcome: Progressing Towards Goal

## 2020-04-08 NOTE — PROGRESS NOTES
NUTRITION FOLLOW-UP/PLAN OF CARE    RECOMMENDATIONS:   1. Full liquid advancing to Dental Soft Solids now  2. Monitor labs, weight and PO intake  3. Tray set up and encourage intake    GOALS:   1. Progressing/Ongoing: PO intake meets >75% of protein/calorie needs by 4/11      ASSESSMENT:   Wt status is classified as normal per Body mass index is 23.73 kg/m². Currently on full liquid diet and tolerating but advancing to soft solids. Labs noted. BG range (139-184) over the past 24 hours; A1c (6.1%). May need to adjust diet when PO intake increases. Nutrition recommendations listed. RD to follow. SUBJECTIVE/OBJECTIVE:   (4/8): Noted per GI on (4/7) diet can be advanced as tolerated and RN stated Pt tolerating full liquid diet today. Pt seen in room this afternoon with RN, Anca, assisting. Reports he is tolerating a full liquid diet and denies any N/V/ABD pain during visit. Noted Pt still with loose stools today. Pt agreeable to try soft solids so placed in orders. Noted large variability in weight per Hx recorded and Pt stated UBW ~160 lb. Used bed scale during visit 178.2 lb? Continue to encourage increased intake as tolerated and will monitor. Below per previous RD notes:   (4/6): Pt with history of T2DM, early onset dementia, HTN, testicular CA admitted with acute upper GI bleed s/p endoscopy 4/5/20., sever sepsis from UTI. Per chart review, pt with reddness and excoriation on sacral areas.   Noted GFR - 40;  Pt with frequent hypoglycemia today    Information Obtained From:   [x] Chart Review  [x] Patient  [] Family/Caregiver  [x] Nurse/Physician   [] Patient Rounds/Interdisciplinary Meeting    Diet: Full Liquid  Patient Vitals for the past 100 hrs:   % Diet Eaten   04/07/20 1818 100 %     Medications: [x] Reviewed   Humalog,  Protonix, Pravachol  Patient Active Problem List   Diagnosis Code    Malignant neoplasm of testis ([facility] [address] C62.90    Acquired absence of genital organ Z90.79    Post-void dribbling N39.43    Nocturia R35.1    Benign prostatic hyperplasia with urinary obstruction N40.1, N13.8    Urge incontinence N39.41    Atopic rhinitis J30.9    Hearing loss H91.90    Hypertension I10    Hyperlipidemia E78.5    Pseudophakia Z96.1    Urinary tract infection N39.0    Diabetes ([facility] [address] E11.9    Testicular cancer ([facility]) C62.90    Hypersomnia G47.10    Dementia ([facility]) F03.90    OAB (overactive bladder) N32.81    Urinary incontinence, urge N39.41    Acute blood loss anemia D62    GI bleeding K92.2    UTI (urinary tract infection) N39.0    Sepsis ([facility]) A41.9    Septic shock ([facility]) A41.9, R65.21    Crohn's disease ([facility] [address] K50.90       Past Medical History:   Diagnosis Date    Benign prostatic hyperplasia with urinary obstruction     Dementia ([facility] [address]     early onset    [facility]     Hypersomnia     Hypertension     Nocturia     OAB (overactive bladder)     Testicular cancer ([facility])     Urge incontinence     Urinary incontinence, urge      Labs:    Lab Results   Component Value Date/Time    Sodium 147 (H) 04/08/2020 04:00 AM    Potassium 3.8 04/08/2020 04:00 AM    Chloride 120 (H) 04/08/2020 04:00 AM    CO2 23 04/08/2020 04:00 AM    Anion gap 4 04/08/2020 04:00 AM    Glucose 114 (H) 04/08/2020 04:00 AM    BUN 23 (H) 04/08/2020 04:00 AM    Creatinine 1.18 04/08/2020 04:00 AM    Calcium 7.2 (L) 04/08/2020 04:00 AM    Magnesium 1.8 04/08/2020 04:00 AM    Phosphorus 2.8 04/08/2020 04:00 AM    Albumin 1.9 (L) 04/06/2020 03:43 AM     Anthropometrics: BMI (calculated): 23.7   Last 3 Recorded Weights in this Encounter    04/05/20 1246 04/06/20 0452 04/08/20 0935   Weight: 79.4 kg (175 lb) 79.4 kg (175 lb 0.7 oz) 79.4 kg (175 lb)      Ht Readings from Last 1 Encounters:   04/08/20 6' (1.829 m)       Documented Weight History:  Weight Metrics 4/8/2020 2/5/2019 1/17/2018 6/21/2017 12/20/2016 10/27/2016 8/11/2015   Weight 175 lb 155 lb 165 lb 174 lb 172 lb 172 lb 172 lb   BMI 23.73 kg/m2 23.57 kg/m2 25.09 kg/m2 23.6 kg/m2 23.33 kg/m2 23.33 kg/m2 23.32 kg/m2     Per Care Everywhere:       []  Weight Loss  [x]  Weight Gain?   []  Weight Stable   []  New wt n/a on record     Estimated Nutrition Needs:   1993 Kcals/day  Protein (g): 95 g    Nutrition Problems Identified:   [x] Suboptimal PO intake (improving)  [] Food Allergies  [x] Difficulty chewing/swallowing/poor dentition  [x] Constipation/Diarrhea (Last BM on 4/8; loose)  [] Nausea/Vomiting   [] None  [] Other:     Plan:   [x] Therapeutic Diet  []  Obtained/adjusted food preferences/tolerances and/or snacks options   []  Supplements added   [] Occupational therapy following for feeding techniques  []  HS snack added   []  Modify diet texture   []  Modify diet for food allergies   []  Educate patient   []  Assist with menu selection   [x]  Monitor PO intake on meal rounds   [x]  Continue inpatient monitoring and intervention   [x]  Participated in discharge planning/Interdisciplinary rounds/Team meetings   []  Other:     Education Needs:   [] Not appropriate for teaching at this time due to:   [x] Identified and addressed    Nutrition Monitoring and Evaluation:   [x] Continue inpatient monitoring and interventions    [] Other:     Sirena Marker

## 2020-04-08 NOTE — PROGRESS NOTES
Chart reviewed. Plan remains home @ this time. Will cont to assess for needs. Ute Denis RN,ext [phone].

## 2020-04-08 NOTE — PROGRESS NOTES
Internal Medicine Progress Note        NAME: Lanre Jacobson   :  1940  MRM:  [id number]    Date/Time: 2020        ASSESSMENT/PLAN: advance diet as tolerated. Bp going up and bp medicine resumed. #  Hypotension , Shock likely hypovolemic. Iv pressors as needed. Maintain h/h. mantain BP. Currently SVS and off pressors. -  Hypotension, likely secondary to hypovolemia and anemia related to GI bleeding. Held his BP medicine. # Melena; suspected upper GI bleeding with DDx including PUD, AVM, neoplasia, esophagitis/gastritis. Lower GI source is a consideration. GI consulted. Monitor h/h. PPI  Patient is also on aspirin and Plavix as an outpatient which has been held off at this time. Endoscopy with GI demonstrate multiple areas of oozing bleeding. PPI changed to PO    # Acute blood loss  Anemia. secondary to GI bleeding. As above. Blood Tx as needed. - ho  Chronic dual antiplatelet therapy (?indication) and NSAID use    # positive trop I level 1.02. Possible demand miss match. Cardiology consulted. Need clarify his need for Plavix and/or ASA     #  Colonic thickening on CT; possibly related to colitis (?ischemic) or infectious process although also potentially an incidental or clinically insignificant finding. C diff negative. Dc contact isolation     # KARLI. Improving s.cr. IVF. Monitor Renal function and other labs as indicated. Avoid nephrotoxins , iv Contrast, NSAID. Renally dosing medications. Monitor urine out put. # Hypernatremia. Change IVF. Avoid NS in diluent. Serial level monitoring. # uncontrolled DM with hyper and hypoglycemia. Start DM protocol. Provide SSI, hypoglycemia protocol and frequent Accu checks. Provide SSI, hypoglycemia protocol and frequent Accu checks. Feeding when ok with GI  Monitor BSLclosely        DVT and GI prophylaxis-patient is on SCD and Protonix     Code status: Patient is full code per wife      PT/OT eval and treat. For CDMP . Patient admitted with  Sepsis, UTI. As [facility] this is unlikely , reject this diagnosis. Observe off Abx for now. Lab Review:     Recent Labs     04/08/20  0415 04/07/20  1655 04/07/20 0226 04/06/20 0343 04/05/20  1437   WBC  --   --  5.3  --  7.3  --  13.2   HGB 8.2* 9.1* 7.5*   < > 7.4*   < > 7.7*   HCT 25.9* 28.6* 23.7*   < > 23.3*   < > 24.1*   PLT  --   --  289  --  270  --  355    < > = values in this interval not displayed. Recent Labs     04/08/20  0400 04/07/20 0226 04/06/20 0343 04/05/20  1500   * 152* 154*  --    K 3.8 4.0 4.0  --    * 125* 127*  --    CO2 23 23 21  --    * 110* 57*  --    BUN 23* 40* 64*  --    CREA 1.18 1.37* 1.64*  --    CA 7.2* 7.0* 6.9*  --    MG 1.8  --   --   --    PHOS 2.8  --   --   --    ALB  --   --  1.9*  --    TBILI  --   --  0.8  --    SGOT  --   --  13  --    ALT  --   --  13*  --    INR  --   --   --  1.4*     Lab Results   Component Value Date/Time    Glucose (POC) 139 (H) 04/08/2020 07:29 AM    Glucose (POC) 163 (H) 04/07/2020 09:15 PM    Glucose (POC) 177 (H) 04/07/2020 05:07 PM    Glucose (POC) 151 (H) 04/07/2020 09:10 AM    Glucose (POC) 112 (H) 04/06/2020 10:08 PM     No results for input(s): PH, PCO2, PO2, HCO3, FIO2 in the last 72 hours. Recent Labs     04/05/20  1500   INR 1.4*       No results found for: SDES  No results found for: CULT    All Cardiac Markers in the last 24 hours:   No results found for: CPK, CK, CKMMB, CKMB, RCK3, CKMBT, CKNDX, CKND1, MEHNAZ, TROPT, TROIQ, CALEB, TROPT, TNIPOC, BNP, BNPP       Intervals noted    Subjective:     Chief Complaint:      Feeling good no complain  willing to eat     ROS:  (bold if positive,otherwise negative)  Fever/chills ,  Dysuria   Cough , Sputum , SOB/BLUM , Chest Pain. Diarrhea ,Nausea/Vomit , Abd Pain , Constipation. Objective:     Vitals:  Last 24hrs VS reviewed since prior progress note.  Most recent are:    Visit Vitals  /74 (BP 1 Location: Left arm, BP Patient Position: At rest)   Pulse 83   Temp 97.5 °F (36.4 °C)   Resp 17   Ht 6' (1.829 m)   Wt 79.4 kg (175 lb)   SpO2 99%   BMI 23.73 kg/m²     SpO2 Readings from Last 6 Encounters:   04/08/20 99%    O2 Flow Rate (L/min): 3 l/min       Intake/Output Summary (Last 24 hours) at 4/8/2020 1155  Last data filed at 4/8/2020 1113  Gross per 24 hour   Intake 1500 ml   Output 2050 ml   Net -550 ml          Physical Exam:   Gen: Well-developed,  in no acute distress  HEENT: Head atraumatic, normocephalic , PALE conjunctivae,  hearing intact to voice   Neck:  No apparent JVD, Supple  Resp: No accessory muscle use, Bilateral BS present,clear breath sounds without wheezes rales or rhonchi  Card:  POSITIVE RANDAL  murmur, normal S1, S2 without Gallop . MILD  lower leg peripheral edema. Abd:  Soft, non-tender, non-distended, bowel sounds are present .   Musc: No cyanosis or clubbing  Skin: No rashes or ulcers, skin turgor is good   Neuro:   no clear area of focal motor weakness, follows commands appropriately   alert and coherent        Telemetry reviewed:   normal sinus rhythm    Medications Reviewed: (see below)    Lab Data Reviewed: (see below)    ______________________________________________________________________    Medications:     Current Facility-Administered Medications   Medication Dose Route Frequency    lisinopriL (PRINIVIL, ZESTRIL) tablet 10 mg  10 mg Oral DAILY    metoprolol tartrate (LOPRESSOR) tablet 25 mg  25 mg Oral BID    pantoprazole (PROTONIX) tablet 40 mg  40 mg Oral ACB&D    influenza vaccine 2019-20 (6 mos+)(PF) (FLUARIX/FLULAVAL/FLUZONE QUAD) injection 0.5 mL  0.5 mL IntraMUSCular PRIOR TO DISCHARGE    pravastatin (PRAVACHOL) tablet 20 mg  20 mg Oral QHS    insulin lispro (HUMALOG) injection   SubCUTAneous AC&HS    oxybutynin chloride XL (DITROPAN XL) tablet 10 mg  10 mg Oral DAILY    sodium chloride (NS) flush 5-40 mL  5-40 mL IntraVENous Q8H    sodium chloride (NS) flush 5-40 mL  5-40 mL IntraVENous PRN    0.9% sodium chloride infusion 250 mL  250 mL IntraVENous PRN    glucose chewable tablet 16 g  4 Tab Oral PRN    glucagon (GLUCAGEN) injection 1 mg  1 mg IntraMUSCular PRN    dextrose 10% infusion 125-250 mL  125-250 mL IntraVENous PRN          Total time spent with patient: 25 minutes                  Care Plan discussed with: Patient, Nursing Staff, Consultant/Specialist and >50% of time spent in counseling and coordination of care  [facility]    Discussed:  Care Plan    Prophylaxis:  SCD's and H2B/PPI    Disposition:  Home w/Family           This document in whole or part of it has been produced using voice recognition software. Unrecognized errors in transcription may be present.     Attending Physician: Zaida Mayo MD

## 2020-04-08 NOTE — PROGRESS NOTES
Gastrointestinal Progress Note    Patient Name: Jasmin CARDONA'S Date: 4/8/2020    Admit Date: 4/5/2020      Assessment:   1.   Acute UGIB due to gastroduodenal ulcer and gastroduodenitis probably ASA induced. Gastric biopsy negative for H pylori. 2.   Acute hypovolemic shock , resolved. 3.   Acute blood loss anemia due to GI bleeding. Stable. Hgb 8.2 today. 4.   KARLI, resolved. Crea 1.18 today. 5.   Descending colon wall thickening per CT.  Hx of sigmoid diverticulosis per colonoscopy in 2017 by Dr. Nathan Yepez.  Nontender abdomen on exam. Floyde Shells artifactual or subclinical colitis. Recommendation:   1. He can be discharged home today if there are no other acute medical issues. 2.   Recommend oral PPI indefinitely. 3.   Recommend resuming Plavix and ASA in 5 days if deemed necessary by his Cardiologist.  4.   Recommend outpatient surveillance EGD and colonoscopy to assess the CT anomaly in 8 weeks as outpatient c/o Dr. Kyle Garibay. 5.   I have no other inpatient recommendations. I will sign off and be available if needed. Subjective:   Patient has no new complaints. Tolerating diet. Hgb 8.2 today. Renal function normalized. No abdominal pain or diarrhea.     Current Facility-Administered Medications   Medication Dose Route Frequency    lisinopriL (PRINIVIL, ZESTRIL) tablet 10 mg  10 mg Oral DAILY    metoprolol tartrate (LOPRESSOR) tablet 25 mg  25 mg Oral BID    pantoprazole (PROTONIX) tablet 40 mg  40 mg Oral ACB&D    influenza vaccine 2019-20 (6 mos+)(PF) (FLUARIX/FLULAVAL/FLUZONE QUAD) injection 0.5 mL  0.5 mL IntraMUSCular PRIOR TO DISCHARGE    pravastatin (PRAVACHOL) tablet 20 mg  20 mg Oral QHS    insulin lispro (HUMALOG) injection   SubCUTAneous AC&HS    oxybutynin chloride XL (DITROPAN XL) tablet 10 mg  10 mg Oral DAILY    sodium chloride (NS) flush 5-40 mL  5-40 mL IntraVENous Q8H    sodium chloride (NS) flush 5-40 mL  5-40 mL IntraVENous PRN    0.9% sodium chloride infusion 250 mL  250 mL IntraVENous PRN    glucose chewable tablet 16 g  4 Tab Oral PRN    glucagon (GLUCAGEN) injection 1 mg  1 mg IntraMUSCular PRN    dextrose 10% infusion 125-250 mL  125-250 mL IntraVENous PRN          Objective:     Visit Vitals  /74 (BP 1 Location: Left arm, BP Patient Position: At rest)   Pulse 69   Temp 98.9 °F (37.2 °C)   Resp 17   Ht 6' (1.829 m)   Wt 79.4 kg (175 lb)   SpO2 98%   BMI 23.73 kg/m²           Intake/Output Summary (Last 24 hours) at 4/8/2020 1640  Last data filed at 4/8/2020 1113  Gross per 24 hour   Intake 1350 ml   Output 1800 ml   Net -450 ml       Examination:  Awake, alert, oriented. Abdomen soft, nontender, no rebound or guarding. Warm extremities,  2+ pulses, no edema.       Data Review:    Labs: Results:   Chemistry Recent Labs     04/08/20  0400 04/07/20 0226 04/06/20  0343   * 110* 57*   * 152* 154*   K 3.8 4.0 4.0   * 125* 127*   CO2 23 23 21   BUN 23* 40* 64*   CREA 1.18 1.37* 1.64*   CA 7.2* 7.0* 6.9*   AGAP 4 4 6   BUCR 19 29* 39*   AP  --   --  58   TP  --   --  4.0*   ALB  --   --  1.9*   GLOB  --   --  2.1   AGRAT  --   --  0.9    Estimated Creatinine Clearance: 53 mL/min (based on SCr of 1.18 mg/dL). CBC w/Diff Recent Labs     04/08/20  0415 04/07/20  1655 04/07/20 0226 04/06/20  0343   WBC  --   --  5.3  --  7.3   RBC  --   --  2.64*  --  2.64*   HGB 8.2* 9.1* 7.5*   < > 7.4*   HCT 25.9* 28.6* 23.7*   < > 23.3*   PLT  --   --  289  --  270   GRANS  --   --  68  --  76*   LYMPH  --   --  11*  --  13*   EOS  --   --  3  --  2    < > = values in this interval not displayed. Coagulation No results for input(s): PTP, INR, APTT, INREXT in the last 72 hours.     Hepatitis Panel No results found for: HAMAT, HAAB, HABT, HAAT, HBSAG, HBSB, HBSAT, HBABN, HBCM, HBCAB, HBCAT, XBCABS, HBEAB, HBEAG, XHEPCS, [id number], HBEGLT, HBCMLT, HBCLT, HBEBLT, [id number], [id number], HAVMLT, [id number], HBCMLT, [id number], HCGAT   Amylase Lipase .   Liver Enzymes Recent Labs     04/06/20  0343   TP 4.0*   ALB 1.9*   AP 58   SGOT 13   ALT 13*      Thyroid Studies No results for input(s): T4, T3U, TSH, TSHEXT in the last 72 hours. No lab exists for component: T3RU     Pathology pathology       Nate Conrad MD, [address]  Pager: [phone]  April 8, 2020

## 2020-04-08 NOTE — PROGRESS NOTES
0725 -- Bedside shift change report given to Michelle Madrigal RN (oncoming nurse) by Fabián Ramon RN (offgoing nurse). Report included the following information SBAR, Kardex, Intake/Output, MAR and Recent Results. 1349 -- AM meds given, well tolerated, will continue to monitor. 1005 -- IV fluids stopped. 1106 -- Roth D/C'd per order. 1107 -- Reassessment completed, no change in patient condition, will continue to monitor. 1544 --  Reassessment completed, no change in patient condition, will continue to monitor. Bedside shift change report given to Michelle Madrigal RN (oncoming nurse) by Fabián Ramon RN (offgoing nurse). Report included the following information SBAR, Kardex, Intake/Output, MAR and Recent Results.

## 2020-04-08 NOTE — PROGRESS NOTES
Problem: Falls - Risk of  Goal: *Absence of Falls  Description: Document Chrissie Miller Fall Risk and appropriate interventions in the flowsheet. Outcome: Progressing Towards Goal  Note: Fall Risk Interventions:  Mobility Interventions: Patient to call before getting OOB         Medication Interventions: Evaluate medications/consider consulting pharmacy    Elimination Interventions: Call light in reach    History of Falls Interventions: Room close to nurse's station, Door open when patient unattended         Problem: Patient Education: Go to Patient Education Activity  Goal: Patient/Family Education  Outcome: Progressing Towards Goal     Problem: Pressure Injury - Risk of  Goal: *Prevention of pressure injury  Description: Document Rustam Scale and appropriate interventions in the flowsheet.   Outcome: Progressing Towards Goal  Note: Pressure Injury Interventions:  Sensory Interventions: Minimize linen layers    Moisture Interventions: Check for incontinence Q2 hours and as needed    Activity Interventions: Pressure redistribution bed/mattress(bed type)    Mobility Interventions: Pressure redistribution bed/mattress (bed type)    Nutrition Interventions: Document food/fluid/supplement intake    Friction and Shear Interventions: Apply protective barrier, creams and emollients                Problem: Patient Education: Go to Patient Education Activity  Goal: Patient/Family Education  Outcome: Progressing Towards Goal     Problem: Infection - Risk of, Urinary Catheter-Associated Urinary Tract Infection  Goal: *Absence of infection signs and symptoms  Outcome: Progressing Towards Goal     Problem: Patient Education: Go to Patient Education Activity  Goal: Patient/Family Education  Outcome: Progressing Towards Goal     Problem: Infection - Risk of, Central Venous Catheter-Associated Bloodstream Infection  Goal: *Absence of infection signs and symptoms  Outcome: Progressing Towards Goal     Problem: Patient Education: Go to Patient Education Activity  Goal: Patient/Family Education  Outcome: Progressing Towards Goal     Problem: Pain  Goal: *Control of Pain  Outcome: Progressing Towards Goal     Problem: Patient Education: Go to Patient Education Activity  Goal: Patient/Family Education  Outcome: Progressing Towards Goal     Problem: Diabetes Maintenance:Admission  Goal: *Blood glucose 80 to 180 mg/dl  Outcome: Progressing Towards Goal  Goal: *Adequate nutrition  Outcome: Progressing Towards Goal     Problem: Discharge Planning  Goal: *Discharge to safe environment  Outcome: Progressing Towards Goal

## 2020-04-09 LAB
ANION GAP SERPL CALC-SCNC: 5 MMOL/L (ref 3–18)
BUN SERPL-MCNC: 17 MG/DL (ref 7–18)
BUN/CREAT SERPL: 14 (ref 12–20)
CALCIUM SERPL-MCNC: 7.2 MG/DL (ref 8.5–10.1)
CHLORIDE SERPL-SCNC: 117 MMOL/L (ref 100–111)
CO2 SERPL-SCNC: 24 MMOL/L (ref 21–32)
CREAT SERPL-MCNC: 1.2 MG/DL (ref 0.6–1.3)
GLUCOSE BLD STRIP.AUTO-MCNC: 128 MG/DL (ref 70–110)
GLUCOSE BLD STRIP.AUTO-MCNC: 136 MG/DL (ref 70–110)
GLUCOSE BLD STRIP.AUTO-MCNC: 160 MG/DL (ref 70–110)
GLUCOSE BLD STRIP.AUTO-MCNC: 210 MG/DL (ref 70–110)
GLUCOSE SERPL-MCNC: 114 MG/DL (ref 74–99)
HCT VFR BLD AUTO: 24.8 % (ref 36–48)
HGB BLD-MCNC: 7.9 G/DL (ref 13–16)
MAGNESIUM SERPL-MCNC: 1.8 MG/DL (ref 1.6–2.6)
PHOSPHATE SERPL-MCNC: 2.5 MG/DL (ref 2.5–4.9)
POTASSIUM SERPL-SCNC: 4 MMOL/L (ref 3.5–5.5)
SODIUM SERPL-SCNC: 146 MMOL/L (ref 136–145)

## 2020-04-09 PROCEDURE — 97166 OT EVAL MOD COMPLEX 45 MIN: CPT

## 2020-04-09 PROCEDURE — 74011250637 HC RX REV CODE- 250/637: Performed by: INTERNAL MEDICINE

## 2020-04-09 PROCEDURE — 36415 COLL VENOUS BLD VENIPUNCTURE: CPT

## 2020-04-09 PROCEDURE — 65660000000 HC RM CCU STEPDOWN

## 2020-04-09 PROCEDURE — 84100 ASSAY OF PHOSPHORUS: CPT

## 2020-04-09 PROCEDURE — 83735 ASSAY OF MAGNESIUM: CPT

## 2020-04-09 PROCEDURE — 97535 SELF CARE MNGMENT TRAINING: CPT

## 2020-04-09 PROCEDURE — 74011250636 HC RX REV CODE- 250/636: Performed by: INTERNAL MEDICINE

## 2020-04-09 PROCEDURE — 90471 IMMUNIZATION ADMIN: CPT

## 2020-04-09 PROCEDURE — 85018 HEMOGLOBIN: CPT

## 2020-04-09 PROCEDURE — 80048 BASIC METABOLIC PNL TOTAL CA: CPT

## 2020-04-09 PROCEDURE — 97530 THERAPEUTIC ACTIVITIES: CPT

## 2020-04-09 PROCEDURE — 74011636637 HC RX REV CODE- 636/637: Performed by: INTERNAL MEDICINE

## 2020-04-09 PROCEDURE — 82962 GLUCOSE BLOOD TEST: CPT

## 2020-04-09 PROCEDURE — 90686 IIV4 VACC NO PRSV 0.5 ML IM: CPT | Performed by: INTERNAL MEDICINE

## 2020-04-09 RX ORDER — LANOLIN ALCOHOL/MO/W.PET/CERES
400 CREAM (GRAM) TOPICAL 2 TIMES DAILY
Status: DISCONTINUED | OUTPATIENT
Start: 2020-04-09 | End: 2020-04-11

## 2020-04-09 RX ORDER — LANOLIN ALCOHOL/MO/W.PET/CERES
400 CREAM (GRAM) TOPICAL DAILY
Qty: 14 TAB | Refills: 0 | Status: SHIPPED | OUTPATIENT
Start: 2020-04-09 | End: 2020-04-11

## 2020-04-09 RX ORDER — PANTOPRAZOLE SODIUM 40 MG/1
40 TABLET, DELAYED RELEASE ORAL
Qty: 60 TAB | Refills: 0 | Status: SHIPPED | OUTPATIENT
Start: 2020-04-09 | End: 2020-05-09

## 2020-04-09 RX ADMIN — LISINOPRIL 10 MG: 5 TABLET ORAL at 08:57

## 2020-04-09 RX ADMIN — Medication 400 MG: at 18:01

## 2020-04-09 RX ADMIN — PRAVASTATIN SODIUM 20 MG: 20 TABLET ORAL at 22:18

## 2020-04-09 RX ADMIN — INSULIN LISPRO 2 UNITS: 100 INJECTION, SOLUTION INTRAVENOUS; SUBCUTANEOUS at 22:19

## 2020-04-09 RX ADMIN — INSULIN LISPRO 4 UNITS: 100 INJECTION, SOLUTION INTRAVENOUS; SUBCUTANEOUS at 12:47

## 2020-04-09 RX ADMIN — PANTOPRAZOLE SODIUM 40 MG: 40 TABLET, DELAYED RELEASE ORAL at 18:01

## 2020-04-09 RX ADMIN — Medication 400 MG: at 13:11

## 2020-04-09 RX ADMIN — METOPROLOL TARTRATE 25 MG: 25 TABLET, FILM COATED ORAL at 18:01

## 2020-04-09 RX ADMIN — Medication 10 ML: at 13:11

## 2020-04-09 RX ADMIN — Medication 10 ML: at 06:00

## 2020-04-09 RX ADMIN — METOPROLOL TARTRATE 25 MG: 25 TABLET, FILM COATED ORAL at 08:57

## 2020-04-09 RX ADMIN — PANTOPRAZOLE SODIUM 40 MG: 40 TABLET, DELAYED RELEASE ORAL at 08:57

## 2020-04-09 RX ADMIN — Medication 10 ML: at 22:19

## 2020-04-09 RX ADMIN — OXYBUTYNIN CHLORIDE 10 MG: 10 TABLET, EXTENDED RELEASE ORAL at 08:57

## 2020-04-09 NOTE — PROGRESS NOTES
Received patient from [facility]. Pt awake and in bed. Denies pain. Bed locked in lowest position. Frequently used items and call light within reach. 2200: Incontinent care performed. Moderate watery stool, dark in color noted. 2233: Night meds administered. Pt resting quietly in bed.    2220: Spoke to pt's wife via telephone. She expressed concern about pt being discharged tomorrow April 9th. States she needs pt to be able to get up out of bed and walk around before discharge. 0115: attempt to administer flu vaccine. Pt states he had one in October. Will D/c per protocol. 8723: informed by tele tech Donvaan Winter. Pt in sustained ventricular bigeminy since 0323. Assessed pt, pt easily aroused from, alert and oriented x4. Vitals at this time:    Visit Vitals  /62   Pulse 80   Temp 97.8 °F (36.6 °C)   Resp 18   Ht 6' (1.829 m)   Wt 79.4 kg (175 lb)   SpO2 97%   BMI 23.73 kg/m²     0400 pt awake. Rhythm is NSR  with frequent PVCs.    0600: Incontinent care performed. Pt had large brown colored stool. Pt resting quietly in bed.    0723: pt rhythm frequent PVCs with bigeminy. Paged Dr Chente Mccabe to inform    8493: Bedside shift change report given to PAT Prado (oncoming nurse) by Sascha Hayden RN (offgoing nurse). Report included the following information SBAR, Intake/Output, MAR and Quality Measures. Opportunity for questions and clarification provided.

## 2020-04-09 NOTE — PROGRESS NOTES
[facility]  -  Progress Note      Patient: Andrei Cancer MRN: [id number]  SSN: [id number]    YOB: 1940  Age: 78 y.o. Sex: male      Admit Date: 4/5/2020  Patient seen and examined independently. Lying flat in bed without respiratory distress. Patient has no specific complaints today. He  can follow-up with Dr. Claire Pino. Agree with assessment and plan as noted below. Will sign off. Meme Shen MD  Assessment:     -Anemia, Hgb 7.7 on presentation, associated with melena  -S/p EGD 4/5/2020 with findings as follows:   · Gastritis and duodenitis with areas of focal oozing  · Multiple gastric and duodenal ulcers without bleeding or high risk features  -Elevated troponin, mild, flat, 1.00 --> 1.06 (approx. 24 hours later)  -Echo 4/8/2020 with normal LV cavity size and systolic function with EF 55-60%, no RWMA, grade 1 diastolic dysfunction, mildly dilated LA  -Cardiac catheterization 11/2018 with no significant disease in large epicardial coronary arteries, findings as follows:  · Left main: Free of significant atherosclerotic occlusion  · LAD: Large vessel, serial luminal irregularities, with proximal 30% occlusion.  There is a small D1 branch with an ostial 70% occlusion, less than 1 mm vessel, too small for intervention.  There is a large second diagonal branch  · LCx: Large vessel, with serial luminal irregularities, but no focal occlusions.  Large OM1 branch.  Small OM2 branch.  This vessel is diffusely diseased with multiple occlusions but the vessel is too small for intervention at < 1 mm in size. · RCA: Dominant vessel.  Large vessel, with serial luminal irregularities, but no significant occlusion noted.  Posterior descending and posterior-lateral branch systems are present and come off distally.  The vessel and its branches demonstrate serial luminal irregularities but no focal occlusions.   · LVgram: EF 70%, normal myocardial thickening and no wall motion abnormalities.  No mitral regurgitation noted. -Moderate aortic stenosis with NICOLAS 0.8 cm2 by Echo 4/8/2020  -KARLI, improving, Cr 1.82 on presentation  -Hypernatremia, up to 154 on 4/6/2020  -Hx HTN  -Hx DM, A1c 6.1 this admission  -Hx early dementia     Primary cardiologist is Dr. Awilda Gardner:     -No further cardiac workup planned, Echo yesterday with normal EF with no RWMA. -Would resume single antiplatelet agent such as 81 mg ASA. As previously stated, pt did not appear to be on dual antiplatelet therapy due to cardiac reasons, more likely due to Hx TIA as pt previously Rx Aggrenox by neurologist.  -Starting scheduled PO Magnesium supplementation per primary team.  -Continued on Lopressor, Pravachol, Lisinopril.  -Dispo planning per primary team.  -No further recommendations from cardiac standpoint at this time. Will be available as needed if additional concerns arise. Pt should follow-up with his primary cardiologist, Dr. Lee Cee, within next 3-4 weeks. Subjective:     No new complaints.       Objective:      Patient Vitals for the past 8 hrs:   Temp Pulse Resp BP SpO2   04/09/20 0713 98 °F (36.7 °C) 69 20 134/66 98 %   04/09/20 0400 97.8 °F (36.6 °C) 80 18 139/62 97 %         Patient Vitals for the past 96 hrs:   Weight   04/08/20 0935 175 lb (79.4 kg)   04/06/20 0452 175 lb 0.7 oz (79.4 kg)   04/05/20 1246 175 lb (79.4 kg)         Intake/Output Summary (Last 24 hours) at 4/9/2020 0800  Last data filed at 4/8/2020 1113  Gross per 24 hour   Intake --   Output 600 ml   Net -600 ml       Physical Exam:  General:  alert, cooperative, no distress, appears stated age  Neck:  supple  Lungs:  clear to auscultation bilaterally  Heart:  Regular rate and rhythm  Abdomen:  abdomen is soft without significant tenderness, masses, organomegaly or guarding  Extremities:  Atraumatic, trace edema     Data Review:     Labs: Results:       Chemistry Recent Labs     04/09/20  0410 04/08/20  0400 04/07/20  0226   * 114* 110*   * 147* 152*   K 4.0 3.8 4.0   * 120* 125*   CO2 24 23 23   BUN 17 23* 40*   CREA 1.20 1.18 1.37*   CA 7.2* 7.2* 7.0*   MG 1.8 1.8  --    PHOS 2.5 2.8  --    AGAP 5 4 4   BUCR 14 19 29*      CBC w/Diff Recent Labs     04/09/20 0410 04/08/20  1708 04/08/20 0415 04/07/20 0226   WBC  --   --   --   --  5.3   RBC  --   --   --   --  2.64*   HGB 7.9* 8.7* 8.2*   < > 7.5*   HCT 24.8* 27.7* 25.9*   < > 23.7*   PLT  --   --   --   --  289   GRANS  --   --   --   --  68   LYMPH  --   --   --   --  11*   EOS  --   --   --   --  3    < > = values in this interval not displayed.

## 2020-04-09 NOTE — PROGRESS NOTES
Noted orders for discharge. Called pt's wife & made her aware of discharge. States she is concerned as pt has not been out of bed. States she hasn't spoken with MD & would like to. Asked her about SNF or home health , she refused, doesn't want to to chance getting covid. Asked Dr. aJime Butler to speak with pt's wife. After MD spoke with pt's wife, informed me that pt will stay today for therapy & go home tomorrow. Ute Denis RN,ext [phone].

## 2020-04-09 NOTE — DISCHARGE SUMMARY
Discharge Summary      [facility] service    Patient ID:  Florence Moses, 78 y.o., male  : 1940    Admit Date: 2020  Discharge Date:  2020  Length of stay: 4 day(s)    PCP:  Addison Regan MD    No chief complaint on file. Discharge Diagnosis:     Hospital Problems  Date Reviewed: 2020          Codes Class Noted POA    Benign prostatic hyperplasia with urinary obstruction ICD-10-CM: N40.1, N13.8  ICD-9-CM: 600.01, 599.69  2015 Yes        Septic shock ([facility] ICD-10-CM: A41.9, R65.21  ICD-9-CM: 038.9, 785.52, 995.92  2020 Yes        Crohn's disease ([facility] ICD-10-CM: K50.90  ICD-9-CM: 555.9  2020 Yes    Overview Signed 2020  1:11 AM by Joe Angel MD     S/P partial colon resection. Acute blood loss anemia ICD-10-CM: D62  ICD-9-CM: 285.1  2020 Yes        GI bleeding ICD-10-CM: K92.2  ICD-9-CM: 578.9  2020 Yes        UTI (urinary tract infection) ICD-10-CM: N39.0  ICD-9-CM: 599.0  2020 Yes        * (Principal) Sepsis ([facility] ICD-10-CM: A41.9  ICD-9-CM: 038.9, 995.91  2020 Yes        Diabetes ([facility] ICD-10-CM: E11.9  ICD-9-CM: 250.00  Unknown Yes        Testicular cancer ([facility] ICD-10-CM: C62.90  ICD-9-CM: 186.9  Unknown Yes        Dementia ([facility] ICD-10-CM: F03.90  ICD-9-CM: 294.20  Unknown Yes    Overview Signed 2017  1:40 PM by Ivy Reed, ALINA     early onset             Hypertension ICD-10-CM: I10  ICD-9-CM: 401.9  2013 Yes        Hyperlipidemia ICD-10-CM: E78.5  ICD-9-CM: 272.4  2013 Yes              Discharge instructions:    #  Discharge Diet:            Diet: Cardiac Diet, Diabetic Diet ( dental soft)   # Discharge activity and restrictions: Activity as tolerated and PT/OT Eval and Treat    # Follow-up appointments:      Follow-up Information     Follow up With Specialties Details Why Contact Info    Addison Regan MD Internal Medicine In 1 week  [address] [address] [phone]              HPI on Admission (per admitting physician):  Oral Jacobs is a 78 y.o. male who presented to the ER at [facility] with changed mental status. In the ER he was found to have Severe sepsis from UTI. He was also found to have Melanotic stool with acute blood loss anemia. He was begun on Vasopressor to maintain his BP. He was transfused with PRBC's. And he was begun on antibiotics. His mental status change appears to have begun a day ago. He does not have chest pain or SOB. He was transferred to [facility] ICU for ongoing care. He is not aware of dysuria or hematuria. He does not have fever or cough. For further details and initial management please refer to H/P. Hospital Course:      Can start ASA in 5 days . Patient deny complain and willing to go home. Passed BM and I didn't see blood in it this am. Deny pains, N/V or other symptoms. PT will see prior D/C. Addendum: I talked to his wife, updating her and dw dc plan , she does not want him to go to rehab and no HH because of the virus. She want him to come back home tomoroow as not ready for him today. By Problems :     #  Hypotension , Shock likely hypovolemic.plan for  Iv pressors as needed. Maintain h/h. mantain BP. Currently SVS and off pressors. -  Hypotension, likely secondary to hypovolemia and anemia related to GI bleeding. Held his BP medicine. his BP now back up and actually on high side, resume regimen on D/C.         # Upper GIB due to gastroduodenal ulcer and gastroduodenitis probably ASA induced. Gastric biopsy negative for H pylori. . GI consulted. Monitor h/h. PPI. Patient is also on aspirin and Plavix as an outpatient which has been held off at this time. Endoscopy with GI demonstrate multiple areas of oozing bleeding.  PPI changed to PO , continue for life. Resume ASA in 5 days for cardiac reason. DC  Plavix  For now.     # Acute blood loss  Anemia. secondary to GI bleeding. As above. Blood Tx as needed.   ho  Chronic dual antiplatelet therapy and NSAID use     # positive trop I level 1.02. Possible demand miss match. Cardiology consulted. No further cardiac work up for now , refer to cardiology notes. Resume ASA    #  Colonic thickening on CT; possibly related to colitis (?ischemic) or infectious process although also potentially an incidental or clinically insignificant finding. C diff negative. Dc contact isolation      # KARLI. Improving s.cr. IVF. Monitor Renal function and other labs as indicated. Avoid nephrotoxins , iv Contrast, NSAID. Renally dosing medications. Monitor urine out put.         # Hypernatremia. Change IVF. Avoid NS in diluent. Serial level monitoring.      # uncontrolled DM with hyper and hypoglycemia. Start DM protocol. Provide SSI, hypoglycemia protocol and frequent Accu checks. Provide SSI, hypoglycemia protocol and frequent Accu checks. Monitor BSLclosely      Discharge condition:  improved and stable    Disposition:  Home    Procedures:   Procedure(s):  ESOPHAGOGASTRODUODENOSCOPY (EGD) with bx      Consultants: Cardiology and GI  IP CONSULT TO CARDIOLOGY    Physical Exam on Discharge:  Visit Vitals  /66 (BP 1 Location: Left arm, BP Patient Position: At rest)   Pulse 69   Temp 98 °F (36.7 °C)   Resp 20   Ht 6' (1.829 m)   Wt 79.4 kg (175 lb)   SpO2 98%   BMI 23.73 kg/m²   Gen:    Well-developed,  in no acute distress  HEENT: Head atraumatic, normocephalic , PALE conjunctivae,  hearing intact to voice   Neck:   No apparent JVD, Supple  Resp:  No accessory muscle use, Bilateral BS present,clear breath sounds without wheezes rales or rhonchi  Card:  POSITIVE RANDAL  murmur, normal S1, S2 without Gallop . MILD  lower leg peripheral edema. Abd:  Soft, non-tender, non-distended, bowel sounds are present .   Musc:  No cyanosis or clubbing  Skin:   No rashes or ulcers, skin turgor is good   Neuro:   no clear area of focal motor weakness, follows commands appropriately   alert and coherent Hospitalization and discharge instructions d/c in details with : patient ,GI , Nursing/CM     Discharge medications :  Current Discharge Medication List      START taking these medications    Details   magnesium oxide (MAG-OX) 400 mg tablet Take 1 Tab by mouth daily for 14 days. Qty: 14 Tab, Refills: 0      pantoprazole (PROTONIX) 40 mg tablet Take 1 Tab by mouth Before breakfast and dinner for 30 days. Qty: 60 Tab, Refills: 0         CONTINUE these medications which have NOT CHANGED    Details   mirabegron ER (MYRBETRIQ) 25 mg ER tablet Take 1 Tab by mouth daily. Qty: 90 Tab, Refills: 3      glimepiride (AMARYL) 1 mg tablet       aspirin-calcium carbonate 81 mg-300 mg calcium(777 mg) tab 81 mg.      trospium (SANCTURA XL) 60 mg capsule Take 1 Cap by mouth Daily (before breakfast). Qty: 90 Cap, Refills: 3      cyanocobalamin 1,000 mcg tablet 1,000 mcg. cinnamon bark (CINNAMON) 500 mg cap Take 1,000 mg by mouth. testosterone cypionate (DEPOTESTOTERONE CYPIONATE) 200 mg/mL injection 200 mg by IntraMUSCular route. metoprolol (LOPRESSOR) 25 mg tablet       benazepril (LOTENSIN) 10 mg tablet       tamsulosin (FLOMAX) 0.4 mg capsule       pravastatin (PRAVACHOL) 20 mg tablet Take 20 mg by mouth nightly. STOP taking these medications       clopidogrel (PLAVIX) 75 mg tab Comments:   Reason for Stopping:         sildenafil, antihypertensive, (REVATIO) 20 mg tablet Comments:   Reason for Stopping:         omeprazole (PRILOSEC) 20 mg capsule Comments:   Reason for Stopping:                   Most Recent Labs:       Recent Labs     04/09/20 0410 04/08/20  1708 04/08/20  0415  04/07/20  0226   WBC  --   --   --   --  5.3   HGB 7.9* 8.7* 8.2*   < > 7.5*   HCT 24.8* 27.7* 25.9*   < > 23.7*   PLT  --   --   --   --  289    < > = values in this interval not displayed.      Recent Labs     04/09/20  0410 04/08/20  0400 04/07/20  0226   * 147* 152*   K 4.0 3.8 4.0   * 120* 125*   CO2 24 23 23 * 114* 110*   BUN 17 23* 40*   CREA 1.20 1.18 1.37*   CA 7.2* 7.2* 7.0*   MG 1.8 1.8  --    PHOS 2.5 2.8  --      Lab Results   Component Value Date/Time    Glucose (POC) 136 (H) 04/09/2020 08:00 AM    Glucose (POC) 126 (H) 04/08/2020 09:17 PM    Glucose (POC) 152 (H) 04/08/2020 03:32 PM    Glucose (POC) 184 (H) 04/08/2020 12:01 PM    Glucose (POC) 139 (H) 04/08/2020 07:29 AM     No results for input(s): PH, PCO2, PO2, HCO3, FIO2 in the last 72 hours. No results for input(s): INR, INREXT in the last 72 hours. No results found for: SDES  No results found for: CULT    All Cardiac Markers in the last 24 hours: No results found for: CPK, CK, CKMMB, CKMB, RCK3, CKMBT, CKNDX, CKND1, MEHNAZ, TROPT, TROIQ, CALEB, TROPT, TNIPOC, BNP, BNPP    XR Results:  Results from Hospital Encounter encounter on 04/05/20   XR CHEST PORT    Narrative EXAM: XR CHEST PORT    CLINICAL INDICATION/HISTORY: R/o pneumonia  -Additional: None    COMPARISON: None    TECHNIQUE: Portable frontal view of the chest    _______________    FINDINGS:    SUPPORT DEVICES: Left subclavian central venous catheter tip at the cavoatrial  junction. Ad Rathke HEART AND MEDIASTINUM: Cardiomediastinal silhouette within normal limits. LUNGS AND PLEURAL SPACES: Patient rotated to the left. Left basilar atelectasis. No dense consolidation, large effusion or pneumothorax.    _______________      Impression IMPRESSION:    No acute cardiopulmonary abnormality. Patient rotated to the left. Left basilar  atelectasis. Total discharge time 35 minutes of which more than 50 % spent on counseling and coordination of care. This document in whole or part of it has been produced using voice recognition software. Unrecognized errors in transcription may be present. Venus De Luna MD  Hospitalist  [address]. Hospitalist Division.   April 9, 2020  9:39 AM

## 2020-04-09 NOTE — PROGRESS NOTES
Problem: Self Care Deficits Care Plan (Adult)  Goal: *Acute Goals and Plan of Care (Insert Text)  Description: Occupational Therapy Goals  Initiated 4/9/2020 within 7 day(s). 1.  Patient will perform grooming with modified independence in stance with Good balance. 2.  Patient will perform bathing with modified independence. 3.  Patient will perform upper body dressing and lower body dressing with modified independence. 4.  Patient will perform toilet transfers with modified independence using RW. 5.  Patient will perform all aspects of toileting with modified independence. 6.  Patient will participate in upper extremity therapeutic exercise/activities with modified independence for 10 minutes. 7.  Patient will utilize energy conservation techniques during functional activities with min verbal cues. Prior Level of Function: (I) w/ ADLs and functional mobility w/o AD, still driving   Outcome: Progressing Towards Goal   OCCUPATIONAL THERAPY EVALUATION    Patient: Loli Davis (30 y.o. male)  Date: 4/9/2020  Primary Diagnosis: GI bleeding [K92.2]  Acute blood loss anemia [D62]  Sepsis (HCC) [A41.9]  UTI (urinary tract infection) [N39.0]  Procedure(s) (LRB):  ESOPHAGOGASTRODUODENOSCOPY (EGD) with bx (N/A) 4 Days Post-Op   Precautions:   Fall  PLOF: see above    ASSESSMENT :  Nursing/Shadesia cleared for pt to participate in OT evaluation and tx session. Based on the objective data described below, the patient presents with BUE AROM WNL and MMT 4/5, poor functional activity tolerance , Balance: sitting static fair, sitting dynamic fair-, standing static fair-, standing dynamic fair-, poor safety awareness and incontinence of B & B, which is inhibiting ability to perform ADLs and functional transfers independently.  Based upon clinical judgement, patient requires assist with functional mobility e.g. Mod A with bed mobility for supine to sit and Min A sit to supine, Mod A sit to stand and side step towards right w/ Min A using RW in prep for optimal bed positioning, unable to assess bedside commode transfer d/t pt c/o dizziness requiring seated rest break. Nursing reports HR remained WNL during functional mobility. UB and LB bathing with Max A, UB dress with Mod A, LB dress with dep, toileting with dep, grooming with Min A and self feeding with set up. Patient educated on role of OT and POC; patient verbalized understanding. Pt. Instructed on safety awareness with importance to utilize call bell to request assist with functional transfers to prevent falls, patient verbalized understanding and provided accurate demonstration. Nursing notified of pt's incontinence of B & B w/ recommendation for use of bed pan. Patient will benefit from skilled intervention to address the above impairments. Patient's rehabilitation potential is considered to be Good  Factors which may influence rehabilitation potential include:   [x]             None noted  []             Mental ability/status  []             Medical condition  []             Home/family situation and support systems  []             Safety awareness  []             Pain tolerance/management  []             Other:      PLAN :  Recommendations and Planned Interventions:   [x]               Self Care Training                  [x]      Therapeutic Activities  [x]               Functional Mobility Training   []      Cognitive Retraining  [x]               Therapeutic Exercises           [x]      Endurance Activities  [x]               Balance Training                    []      Neuromuscular Re-Education  []               Visual/Perceptual Training     [x]      Home Safety Training  [x]               Patient Education                   [x]      Family Training/Education  []               Other (comment):    Frequency/Duration: Patient will be followed by occupational therapy 1-2 times per day/3-5 days per week to address goals.   Discharge Recommendations: Skilled Nursing Facility  Further Equipment Recommendations for Discharge: bedside commode, rolling walker, and wheelchair      SUBJECTIVE:   Patient stated I need a home health aide, my wife has a torn rotator cuff and cannot help me.     OBJECTIVE DATA SUMMARY:     Past Medical History:   Diagnosis Date    Benign prostatic hyperplasia with urinary obstruction     Dementia ([facility]     early onset    Diabetes ([facility]     Hypersomnia     Hypertension     Nocturia     OAB (overactive bladder)     Testicular cancer (HCC)     Urge incontinence     Urinary incontinence, urge      Past Surgical History:   Procedure Laterality Date    HX CYST REMOVAL  1960-1970s    cyst removed from right breast    HX OTHER SURGICAL      reemoval of testcle    HX OTHER SURGICAL  08/2018    Surgery: [facility]age Lower bowels, [facility]    HX OTHER SURGICAL  01/2019    Heart Blockage: 30% [place] [facility]: Dr. Gladis Lagos     Barriers to Learning/Limitations: None  Compensate with: visual, verbal, tactile, kinesthetic cues/model    Home Situation:   Home Situation  Home Environment: Private residence  # Steps to Enter: 3  Rails to Enter: Yes  Hand Rails : Left  One/Two Story Residence: One story  Living Alone: No  Support Systems: Spouse/Significant Other/Partner  Patient Expects to be Discharged to[de-identified] Private residence  Current DME Used/Available at Home: Cane, quad, Walker, rolling  Tub or Shower Type: Tub/Shower combination(and walk in shower)  []  Right hand dominant   [x]  Left hand dominant    Cognitive/Behavioral Status:  Neurologic State: Appropriate for age  Orientation Level: Oriented X4  Cognition: Follows commands  Safety/Judgement: Fall prevention    Skin: appears intact    Edema: none noted      Vision/Perceptual:   appears intact    Coordination: BUE  Coordination: Within functional limits(BUEs)  Fine Motor Skills-Upper: Left Intact; Right Intact    Gross Motor Skills-Upper: Left Intact; Right Intact    Balance:  Sitting: Impaired  Sitting - Static: Fair (occasional)  Sitting - Dynamic: Fair (occasional)(-)  Standing: Impaired  Standing - Static: Fair(-)  Standing - Dynamic : Fair(-)    Strength: BUE    Strength: Within functional limits(BUEs 4+/5)    Tone & Sensation: BUE    Tone: Normal(BUEs)  Sensation: Intact(BUEs)    Range of Motion: BUE    AROM: Within functional limits(BUEs)    Functional Mobility and Transfers for ADLs:  Bed Mobility:     Supine to Sit: Moderate assistance  Sit to Supine: Contact guard assistance  Scooting: Minimum assistance  Transfers:  Sit to Stand: Moderate assistance  Stand to Sit: Contact guard assistance    ADL Assessment:   Feeding: Setup    Oral Facial Hygiene/Grooming: Minimum assistance    Bathing: Maximum assistance    Upper Body Dressing: Moderate assistance    Lower Body Dressing: Total assistance    Toileting: Total assistance      Cognitive Retraining  Safety/Judgement: Fall prevention    Pain:  Pain level pre-treatment: 0/10   Pain level post-treatment: 0/10   Pain Intervention(s): Medication (see MAR); Rest, Ice, Repositioning   Response to intervention: Nurse notified, See doc flow    Activity Tolerance:   poor  Please refer to the flowsheet for vital signs taken during this treatment. After treatment:   [] Patient left in no apparent distress sitting up in chair  [x] Patient left in no apparent distress in bed  [x] Call bell left within reach  [x] Nursing notified  [] Caregiver present  [] Bed alarm activated    COMMUNICATION/EDUCATION:   [x] Role of Occupational Therapy in the acute care setting  [x] Home safety education was provided and the patient/caregiver indicated understanding. [x] Patient/family have participated as able in goal setting and plan of care. [x] Patient/family agree to work toward stated goals and plan of care. [] Patient understands intent and goals of therapy, but is neutral about his/her participation.   [] Patient is unable to participate in goal setting and plan of care. Thank you for this referral.  Kb How  Time Calculation: 31 mins    Eval Complexity: History: MEDIUM Complexity : Expanded review of history including physical, cognitive and psychosocial  history ; Examination: MEDIUM Complexity : 3-5 performance deficits relating to physical, cognitive , or psychosocial skils that result in activity limitations and / or participation restrictions; Decision Making:MEDIUM Complexity : Patient may present with comorbidities that affect occupational performnce.  Miniml to moderate modification of tasks or assistance (eg, physical or verbal ) with assesment(s) is necessary to enable patient to complete evaluation

## 2020-04-09 NOTE — PROGRESS NOTES
NUTRITION  Patient/Family Education Record    FACTORS THAT MAY INFLUENCE PATIENTS ABILITY TO LEARN:   []   Language barrier    []   Cultural needs   []   Motivation    [x]   Cognitive limitation    []   Physical   []   Education   []   Physiological factors   []   Hearing/vision/speaking impairment   []   Temple beliefs    []   Financial limitations    []  Other:   []   No barriers limiting ability to learn     Person Instructed:   [x]   Patient   []   Family   []  Other     Preference for Learning:   [x]   Verbal   [x]   Written   []  Demonstration     Patient educated on:   [] Cardiac/heart healthy diet  [] 2gm Sodium diet  [] Vitamin K regulated diet (coumadin)  [] Weight loss/portion control  [] High protein  [x] Other: MNT Peptic Ulcer and low/high fiber         Outcome:   []  Patient verbalized understanding of education and willing to comply with recommendations.   []  Patient declined education  []  Patient needs follow up education; scheduled date for follow up:  [x]  Written information provided to bring home   [x]  RD contact information provided    Judi Garcia

## 2020-04-09 NOTE — PROGRESS NOTES
conducted a Follow up consultation and Spiritual Assessment for Jody Martin, who is a 78 y. o.,male. The  provided the following Interventions:  Continued the relationship of care and support. Listened empathically. Offered prayer and assurance of continued prayer on patients behalf. Chart reviewed. The following outcomes were achieved:  Patient expressed gratitude for pastoral care visit. Assessment:  There are no further spiritual or Voodoo issues which require Spiritual Care Services interventions at this time. Plan:  Chaplains will continue to follow and will provide pastoral care on an as needed/requested basis.  recommends bedside caregivers page  on duty if patient shows signs of acute spiritual or emotional distress.      [facility]   Staff [address] [facility]   [phone]

## 2020-04-09 NOTE — PROGRESS NOTES
Informed by Jon Garrett in care management that pt's wife left VM for me to call her. Called pt's wife, states she can't take care of him & wants him to stay here in hospital for rehab. Made her aware that we don't have rehab here & that her only option will be a SNF or home with her & some home health. She did agree to let me send out to SNF's near here. Posted in [facility]. Ute Denis RN,ext [phone].

## 2020-04-09 NOTE — PROGRESS NOTES
Diet advanced to soft solids yesterday. Pt seen in room this afternoon at lunch. Reports he is doing well and tolertating meals but still mostly consuming liquids. Denies any N/V but stated throat is still sore. Noted had large brown BM this morning and Pt stated loose still. Encouraged to increase PO intake as tolerated and to continue with soft solids. Discussed gradual increase in fiber as GI symptoms improve and foods to avoid d/t ulcer; handouts provided to bring home to patients wife. Will continue to monitor.

## 2020-04-09 NOTE — PROGRESS NOTES
Problem: Mobility Impaired (Adult and Pediatric)  Goal: *Acute Goals and Plan of Care (Insert Text)  Description: Physical Therapy Goals  Initiated 4/9/2020 and to be accomplished within 7 day(s)  1. Patient will move from supine to sit and sit to supine in bed with supervision/set-up in order to facilitate eating meals in sitting. 2.  Patient will transfer from bed to chair and chair to bed with minimal assistance/contact guard assist using the least restrictive device in order to facilitate participation in sitting ADLs. 3.  Patient will perform sit to stand with minimal assistance/contact guard assist in order to prepare for standing ADLs. 4.  Patient will ambulate with minimal assistance/contact guard assist for 20 feet with the least restrictive device in order to facilitate improved ease with ambulation to the restroom. 5.  Patient will ascend/descend 3 stairs with handrail(s) with minimal assistance/contact guard assist in order to prepare patient to safely enter residence. Outcome: Progressing Towards Goal   PHYSICAL THERAPY EVALUATION    Patient: Kym Fuentes (85 y.o. male)  Date: 4/9/2020  Primary Diagnosis: GI bleeding [K92.2]  Acute blood loss anemia [D62]  Sepsis (HCC) [A41.9]  UTI (urinary tract infection) [N39.0]  Procedure(s) (LRB):  ESOPHAGOGASTRODUODENOSCOPY (EGD) with bx (N/A) 4 Days Post-Op   Precautions: Fall      PLOF: Mod I with walking stick    ASSESSMENT :  Patient is a 78 y.o. male who presents to Physical Therapy with diagnosis of GI bleeding [K92.2]  Acute blood loss anemia [D62]  Sepsis ([facility] [address] [A41.9]  UTI (urinary tract infection) [N39.0]. Patient is supine in bed and agrees to participate in PT services. A and O x4. HR prior to treatment 100 bpm. Upon objective evaluation, patient demonstrated full JEREL LE AROM, impaired EJREL LE strength in hip flexion and knee extension (</=3+/5), intact light touch sensation, and decreased sitting static and dynamic balance. Static/dynamic sitting balance is fair to poor and requires frequent Min/CGA  to maintain upright position. Required cuing in order to facilitate use of UEs on bed rail to maintain upright position while sitting EOB. While pain was sitting EOB during MMT HR increased to 180 bpm. Mallory overseeing RN aware. Further functional mobility was deferred secondary to HR in non therapeutic range. Patient requires between minimal assistance/contact guard assist and moderate assistance for bed mobility, transfers and ambulation. Patient required Mod A for sit to supine and Mod A x2 for repositioning in bed. Following supine resting in bed HR decreased to 109 bpm. Patient can benefit from skilled PT interventions to decrease r/o falls and improve JEREL LE strength, increase walking/standing tolerance, and improve body mechanics mechanics with bed mobility and transfers to facilitate ADL's & overall functional status/quality of life. Patient will benefit from skilled intervention to address the above impairments.   Patient's rehabilitation potential is considered to be Fair  Factors which may influence rehabilitation potential include:   []         None noted  []         Mental ability/status  [x]         Medical condition  []         Home/family situation and support systems  []         Safety awareness  []         Pain tolerance/management  []         Other:      PLAN :  Recommendations and Planned Interventions:   [x]           Bed Mobility Training             [x]    Neuromuscular Re-Education  [x]           Transfer Training                   []    Orthotic/Prosthetic Training  [x]           Gait Training                          []    Modalities  [x]           Therapeutic Exercises           []    Edema Management/Control  [x]           Therapeutic Activities            [x]    Family Training/Education  [x]           Patient Education  []           Other (comment):    Frequency/Duration: Patient will be followed by physical therapy 3-5x per week to address goals. Discharge Recommendations: Awais Boyle  Further Equipment Recommendations for Discharge: rolling walker (has) and bed side commode      SUBJECTIVE:   Patient stated I cant run around the block. 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    HX OTHER SURGICAL  01/2019    Heart Blockage: 30% [facility]: Dr. Cliff Penny     Barriers to Learning/Limitations: None  Compensate with: N/A  Home Situation:  Home Situation  Home Environment: Private residence  # Steps to Enter: 3  Hand Rails : Right  One/Two Story Residence: One story  Living Alone: No  Support Systems: Spouse/Significant Other/Partner  Patient Expects to be Discharged to[de-identified] Patient room  Current DME Used/Available at Home: [address] [facility] [place], [facility], Chloe Serzeeshan, rollator  Critical Behavior:  Neurologic State: Appropriate for age  Orientation Level: Oriented X4  Cognition: Follows commands  Safety/Judgement: Fall prevention  Psychosocial  Purposeful Interaction: Yes    Strength:    Strength: Generally decreased, functional(R/L Hip Flexion 2+/3+, knee ext 2+/2+)    Tone & Sensation:   Tone: Normal    Sensation: Intact    Range Of Motion:  AROM: Generally decreased, functional    Functional Mobility:  Bed Mobility:     Supine to Sit: Moderate assistance  Sit to Supine:  Moderate assistance  Scooting: Minimum assistance  Transfers:    Balance:   Sitting: Impaired  Sitting - Static: Fair (occasional)  Sitting - Dynamic: Poor (constant support)  Ambulation/Gait Training:         Pain:  Pain level pre-treatment: 0/10   Pain level post-treatment: 0/10   Pain Intervention(s) : Medication (see MAR); Rest   Response to intervention: Nurse notified, See doc flow    Activity Tolerance: Fair to Poor secondary to variability in HR with functional mobility  Please refer to the flowsheet for vital signs taken during this treatment. After treatment:   []         Patient left in no apparent distress sitting up in chair  [x]         Patient left in no apparent distress in bed  [x]         Call bell left within reach  [x]         Nursing notified - [facility]  []         Caregiver present  []         Bed alarm activated  []         SCDs applied    COMMUNICATION/EDUCATION:   [x]         Role of Physical Therapy in the acute care setting. [x]         Fall prevention education was provided and the patient/caregiver indicated understanding. [x]         Patient/family have participated as able in goal setting and plan of care. [x]         Patient/family agree to work toward stated goals and plan of care. [x]         Patient understands intent and goals of therapy, but is neutral about his/her participation. []         Patient is unable to participate in goal setting/plan of care: ongoing with therapy staff.  []         Other:     Thank you for this referral.  Mima Monge   Time Calculation: 19 mins

## 2020-04-10 LAB
ANION GAP SERPL CALC-SCNC: 6 MMOL/L (ref 3–18)
BASOPHILS # BLD: 0 K/UL (ref 0–0.1)
BASOPHILS NFR BLD: 0 % (ref 0–2)
BUN SERPL-MCNC: 19 MG/DL (ref 7–18)
BUN/CREAT SERPL: 14 (ref 12–20)
CALCIUM SERPL-MCNC: 7.3 MG/DL (ref 8.5–10.1)
CHLORIDE SERPL-SCNC: 116 MMOL/L (ref 100–111)
CO2 SERPL-SCNC: 24 MMOL/L (ref 21–32)
CREAT SERPL-MCNC: 1.36 MG/DL (ref 0.6–1.3)
DIFFERENTIAL METHOD BLD: ABNORMAL
EOSINOPHIL # BLD: 0 K/UL (ref 0–0.4)
EOSINOPHIL NFR BLD: 1 % (ref 0–5)
ERYTHROCYTE [DISTWIDTH] IN BLOOD BY AUTOMATED COUNT: 14.9 % (ref 11.6–14.5)
GLUCOSE BLD STRIP.AUTO-MCNC: 124 MG/DL (ref 70–110)
GLUCOSE BLD STRIP.AUTO-MCNC: 149 MG/DL (ref 70–110)
GLUCOSE BLD STRIP.AUTO-MCNC: 152 MG/DL (ref 70–110)
GLUCOSE BLD STRIP.AUTO-MCNC: 158 MG/DL (ref 70–110)
GLUCOSE SERPL-MCNC: 119 MG/DL (ref 74–99)
HCT VFR BLD AUTO: 25.7 % (ref 36–48)
HGB BLD-MCNC: 8.1 G/DL (ref 13–16)
LYMPHOCYTES # BLD: 0.8 K/UL (ref 0.9–3.6)
LYMPHOCYTES NFR BLD: 16 % (ref 21–52)
MAGNESIUM SERPL-MCNC: 1.9 MG/DL (ref 1.6–2.6)
MCH RBC QN AUTO: 28.3 PG (ref 24–34)
MCHC RBC AUTO-ENTMCNC: 31.5 G/DL (ref 31–37)
MCV RBC AUTO: 89.9 FL (ref 74–97)
MONOCYTES # BLD: 0.8 K/UL (ref 0.05–1.2)
MONOCYTES NFR BLD: 14 % (ref 3–10)
NEUTS SEG # BLD: 3.6 K/UL (ref 1.8–8)
NEUTS SEG NFR BLD: 69 % (ref 40–73)
PHOSPHATE SERPL-MCNC: 2.7 MG/DL (ref 2.5–4.9)
PLATELET # BLD AUTO: 336 K/UL (ref 135–420)
PMV BLD AUTO: 11.3 FL (ref 9.2–11.8)
POTASSIUM SERPL-SCNC: 3.9 MMOL/L (ref 3.5–5.5)
RBC # BLD AUTO: 2.86 M/UL (ref 4.7–5.5)
SODIUM SERPL-SCNC: 146 MMOL/L (ref 136–145)
WBC # BLD AUTO: 5.2 K/UL (ref 4.6–13.2)

## 2020-04-10 PROCEDURE — 80048 BASIC METABOLIC PNL TOTAL CA: CPT

## 2020-04-10 PROCEDURE — 97535 SELF CARE MNGMENT TRAINING: CPT

## 2020-04-10 PROCEDURE — 97110 THERAPEUTIC EXERCISES: CPT

## 2020-04-10 PROCEDURE — 65660000000 HC RM CCU STEPDOWN

## 2020-04-10 PROCEDURE — 74011636637 HC RX REV CODE- 636/637: Performed by: INTERNAL MEDICINE

## 2020-04-10 PROCEDURE — 84100 ASSAY OF PHOSPHORUS: CPT

## 2020-04-10 PROCEDURE — 82962 GLUCOSE BLOOD TEST: CPT

## 2020-04-10 PROCEDURE — 74011250637 HC RX REV CODE- 250/637: Performed by: INTERNAL MEDICINE

## 2020-04-10 PROCEDURE — 85025 COMPLETE CBC W/AUTO DIFF WBC: CPT

## 2020-04-10 PROCEDURE — 36415 COLL VENOUS BLD VENIPUNCTURE: CPT

## 2020-04-10 PROCEDURE — 83735 ASSAY OF MAGNESIUM: CPT

## 2020-04-10 RX ORDER — INSULIN LISPRO 100 [IU]/ML
INJECTION, SOLUTION INTRAVENOUS; SUBCUTANEOUS 4 TIMES DAILY
Status: DISCONTINUED | OUTPATIENT
Start: 2020-04-10 | End: 2020-04-11 | Stop reason: HOSPADM

## 2020-04-10 RX ADMIN — Medication 400 MG: at 17:12

## 2020-04-10 RX ADMIN — INSULIN LISPRO 2 UNITS: 100 INJECTION, SOLUTION INTRAVENOUS; SUBCUTANEOUS at 17:12

## 2020-04-10 RX ADMIN — METOPROLOL TARTRATE 25 MG: 25 TABLET, FILM COATED ORAL at 17:12

## 2020-04-10 RX ADMIN — PANTOPRAZOLE SODIUM 40 MG: 40 TABLET, DELAYED RELEASE ORAL at 17:12

## 2020-04-10 RX ADMIN — METOPROLOL TARTRATE 25 MG: 25 TABLET, FILM COATED ORAL at 09:36

## 2020-04-10 RX ADMIN — Medication 10 ML: at 17:13

## 2020-04-10 RX ADMIN — Medication 10 ML: at 21:51

## 2020-04-10 RX ADMIN — OXYBUTYNIN CHLORIDE 10 MG: 10 TABLET, EXTENDED RELEASE ORAL at 09:35

## 2020-04-10 RX ADMIN — INSULIN LISPRO 2 UNITS: 100 INJECTION, SOLUTION INTRAVENOUS; SUBCUTANEOUS at 09:34

## 2020-04-10 RX ADMIN — PANTOPRAZOLE SODIUM 40 MG: 40 TABLET, DELAYED RELEASE ORAL at 09:35

## 2020-04-10 RX ADMIN — LISINOPRIL 10 MG: 5 TABLET ORAL at 09:36

## 2020-04-10 RX ADMIN — Medication 400 MG: at 09:35

## 2020-04-10 RX ADMIN — PRAVASTATIN SODIUM 20 MG: 20 TABLET ORAL at 21:50

## 2020-04-10 RX ADMIN — Medication 10 ML: at 09:35

## 2020-04-10 NOTE — PROGRESS NOTES
Internal Medicine Progress Note        NAME: Gerald Velez   :  1940  MRM:  [id number]    Date/Time: 4/10/2020        ASSESSMENT/PLAN:  Wilhemenia Law diet , report drinking good water though Na still slightly up, encouraged to improve po eating and water drinking. Offer no complain. Will dc today. I called his wife and both declined SNF or home health.          #  Hypotension , Shock likely hypovolemic.plan for  Iv pressors as needed. Maintain h/h. mantain BP. Currently SVS and off pressors. -  Hypotension, likely secondary to hypovolemia and anemia related to GI bleeding. Held his BP medicine. his BP now back up and actually on high side, resume regimen on D/C.         # Upper GIB due to gastroduodenal ulcer and gastroduodenitis probably ASA induced.  Gastric biopsy negative for H pylori. . GI consulted. Monitor h/h. PPI. Patient is also on aspirin and Plavix as an outpatient which has been held off at this time. Endoscopy with GI demonstrate multiple areas of oozing bleeding.  PPI changed to PO , continue for life. Resume ASA in 5 days for cardiac reason. DC  Plavix  For now.     # Acute blood loss [place] Likens to GI bleeding. As above. [address] [facility] Tx as needed. ho  Chronic dual antiplatelet therapy and NSAID use     # positive trop I level 1.02. Possible demand miss match. Cardiology consulted. No further cardiac work up for now , refer to cardiology notes. Resume ASA     #  Colonic thickening on CT; possibly related to colitis (?ischemic) or infectious process although also potentially an incidental or clinically insignificant finding. C diff negative. Dc contact isolation      # KARLI. Improving s.cr. IVF.  Monitor Renal function and other labs as indicated. Avoid nephrotoxins , iv Contrast, NSAID. Renally dosing medications. Monitor urine out put.         # Hypernatremia. Change IVF. Avoid NS in diluent. Serial level monitoring.      # uncontrolled DM with hyper and hypoglycemia.  Start DM protocol. Provide SSI, hypoglycemia protocol and frequent Accu checks.   Provide SSI, hypoglycemia protocol and frequent Accu checks.   Monitor BSLclosely     Code status: Patient is full code per wife      PT/OT eval and treat. Lab Review:     Recent Labs     04/10/20  0400 04/09/20  0410 04/08/20  1708   WBC 5.2  --   --    HGB 8.1* 7.9* 8.7*   HCT 25.7* 24.8* 27.7*     --   --      Recent Labs     04/10/20  0400 04/09/20  0410 04/08/20  0400   * 146* 147*   K 3.9 4.0 3.8   * 117* 120*   CO2 24 24 23   * 114* 114*   BUN 19* 17 23*   CREA 1.36* 1.20 1.18   CA 7.3* 7.2* 7.2*   MG 1.9 1.8 1.8   PHOS 2.7 2.5 2.8     Lab Results   Component Value Date/Time    Glucose (POC) 152 (H) 04/10/2020 07:38 AM    Glucose (POC) 160 (H) 04/09/2020 10:02 PM    Glucose (POC) 128 (H) 04/09/2020 04:18 PM    Glucose (POC) 210 (H) 04/09/2020 11:09 AM    Glucose (POC) 136 (H) 04/09/2020 08:00 AM     No results for input(s): PH, PCO2, PO2, HCO3, FIO2 in the last 72 hours. No results for input(s): INR, INREXT, INREXT in the last 72 hours. No results found for: SDES  No results found for: CULT    All Cardiac Markers in the last 24 hours:   No results found for: CPK, CK, CKMMB, CKMB, RCK3, CKMBT, CKNDX, CKND1, MEHNAZ, TROPT, TROIQ, CALEB, TROPT, TNIPOC, BNP, BNPP       Intervals noted    Subjective:     Chief Complaint:      Offer no complain  Feeling good good po intake per pt. ROS:  (bold if positive,otherwise negative)  Fever/chills ,  Dysuria   Cough , Sputum , SOB/BLUM , Chest Pain. Diarrhea ,Nausea/Vomit , Abd Pain , Constipation. Objective:     Vitals:  Last 24hrs VS reviewed since prior progress note.  Most recent are:    Visit Vitals  /75   Pulse 97   Temp 98.4 °F (36.9 °C)   Resp 19   Ht 6' (1.829 m)   Wt 79.4 kg (175 lb)   SpO2 98%   BMI 23.73 kg/m²     SpO2 Readings from Last 6 Encounters:   04/10/20 98%    O2 Flow Rate (L/min): 3 l/min       Intake/Output Summary (Last 24 hours) at 4/10/2020 0959  Last data filed at 4/10/2020 0332  Gross per 24 hour   Intake 480 ml   Output 100 ml   Net 380 ml          Physical Exam:   Gen: Well-developed,  in no acute distress  HEENT: Head atraumatic, normocephalic , PALE conjunctivae,  hearing intact to voice   Neck:  No apparent JVD, Supple  Resp: No accessory muscle use, Bilateral BS present,clear breath sounds without wheezes rales or rhonchi  Card:  POSITIVE RANDAL  murmur, normal S1, S2 without Gallop . MILD  lower leg peripheral edema. Abd:  Soft, non-tender, non-distended, bowel sounds are present .   Musc: No cyanosis or clubbing  Skin: No rashes or ulcers, skin turgor is good   Neuro:   no clear area of focal motor weakness, follows commands appropriately   alert and coherent      Medications Reviewed: (see below)    Lab Data Reviewed: (see below)    ______________________________________________________________________    Medications:     Current Facility-Administered Medications   Medication Dose Route Frequency    insulin lispro (HUMALOG) injection   SubCUTAneous QID    magnesium oxide (MAG-OX) tablet 400 mg  400 mg Oral BID    lisinopriL (PRINIVIL, ZESTRIL) tablet 10 mg  10 mg Oral DAILY    metoprolol tartrate (LOPRESSOR) tablet 25 mg  25 mg Oral BID    pantoprazole (PROTONIX) tablet 40 mg  40 mg Oral ACB&D    pravastatin (PRAVACHOL) tablet 20 mg  20 mg Oral QHS    oxybutynin chloride XL (DITROPAN XL) tablet 10 mg  10 mg Oral DAILY    sodium chloride (NS) flush 5-40 mL  5-40 mL IntraVENous Q8H    sodium chloride (NS) flush 5-40 mL  5-40 mL IntraVENous PRN    0.9% sodium chloride infusion 250 mL  250 mL IntraVENous PRN    glucose chewable tablet 16 g  4 Tab Oral PRN    glucagon (GLUCAGEN) injection 1 mg  1 mg IntraMUSCular PRN    dextrose 10% infusion 125-250 mL  125-250 mL IntraVENous PRN          Total time spent with patient: 25 minutes                  Care Plan discussed with: Patient, Family, Care Manager, Nursing Staff and >50% of time spent in counseling and coordination of care       Discussed:  Care Plan    Prophylaxis:  SCD's and H2B/PPI    Disposition:  Home w/Family           This document in whole or part of it has been produced using voice recognition software. Unrecognized errors in transcription may be present.     Attending Physician: Jason Card MD

## 2020-04-10 NOTE — PROGRESS NOTES
7305: Bedside shift change report given to Rajinder STEWART  (oncoming nurse) by Syl Valdez RN  (offgoing nurse). Report included the following information SBAR, Kardex, Procedure Summary, Intake/Output, MAR and Cardiac Rhythm SR. Patient resting in bed, Call light in reach, Bed locked and on lowest position and bed alarm on.     0936: Assessment and Medications completed. Patient resting in bed, Call light in reach, Bed locked and on lowest position and bed alarm on. MD at bedside. 1030: Spoke with MD about patient being on oxygen, but not having Home Oxygen. Walk Test ordered. 1040: Walk Test Performed by PT. Patient stated that he has family who are going to help him get into the house. Patient resting in bed, Call light in reach, Bed locked and on lowest position and bed alarm on.     1200: Reassessment completed. Patient sat up in bed for lunch, Call light in reach, Bed locked and on lowest position and bed alarm on.    1302: Wife of family here to  patient.  had been attempting to get ahold of wife about family possibly waiting for placement of SNF.     1335: Spoke with  they stated that they patient would be staying, awaiting placement. 1400: Patient assisted back into hospital gown and reconnected to telemetry. Patient resting in bed, Call light in reach, Bed locked and on lowest position and bed alarm on.    1545: Hourly Rounding: Patient Sleeping in bed, Call light in reach, bed locked and in lowest position and bed alarm on.     1634: Reassessment Completed. Incontinence Care Provided. Patient resting in bed, Call light in reach, Bed locked and on lowest position and bed alarm on.    1712: Medications given. Patient sitting up in bed for dinner. 1845: Hourly Rounding: Patient Sleeping in bed, Call light in reach, bed locked and in lowest position and bed alarm on. Check for incontinence care.      1905: Bedside shift change report given to Sister FÉLIX STEWART  (oncoming nurse) by Roberto Barron RN  (offgoing nurse).  Report included the following information SBAR, Kardex, Procedure Summary, Intake/Output, MAR and Cardiac Rhythm SR.

## 2020-04-10 NOTE — PROGRESS NOTES
Problem: Self Care Deficits Care Plan (Adult)  Goal: *Acute Goals and Plan of Care (Insert Text)  Description: Occupational Therapy Goals  Initiated 4/9/2020 within 7 day(s). 1.  Patient will perform grooming with modified independence in stance with Good balance. 2.  Patient will perform bathing with modified independence. 3.  Patient will perform upper body dressing and lower body dressing with modified independence. 4.  Patient will perform toilet transfers with modified independence using RW. 5.  Patient will perform all aspects of toileting with modified independence. 6.  Patient will participate in upper extremity therapeutic exercise/activities with modified independence for 10 minutes. 7.  Patient will utilize energy conservation techniques during functional activities with min verbal cues. Prior Level of Function: (I) w/ ADLs and functional mobility w/o AD, still driving   Outcome: Progressing Towards Goal   OCCUPATIONAL THERAPY TREATMENT    Patient: Oral Jacobs (14 y.o. male)  Date: 4/10/2020  Diagnosis: GI bleeding [K92.2]  Acute blood loss anemia [D62]  Sepsis (HCC) [A41.9]  UTI (urinary tract infection) [N39.0]   Sepsis ([address]  Procedure(s) (LRB):  ESOPHAGOGASTRODUODENOSCOPY (EGD) with bx (N/A) 5 Days Post-Op  Precautions: Fall  PLOF: see above    Chart, occupational therapy assessment, plan of care, and goals were reviewed. ASSESSMENT:  Nursing/Rajinder cleared for pt to participate in OT tx session. Patient presents lying in bed, hospital gown soiled from lunch meal. Patient agreeable to change into personal clothes. Bed mobility with extra time, supine <->  sit with Mod A. Patient demonstrates F- static sitting balance and Poor dynamic sitting balance during UB ADLs for bathing and dressing e.g. LOB laterally & posteriorly requiring assist for correcting to sitting upright. Verbal cues to scoot towards edge of bed for improved balance with b/l feet stabilized on floor, pt compliant. Patient performed sit to stand from bed surface with Min A, dep posterior perihygeine with additional incontinence of bowel occurring. CGA with RW for bed side commode transfer. LB bathe: Dep posterior perihygiene, LB dress: Max A - Dep. Functional transfer: CGA sit to stand w/ vc's for correct hand placement to push up from bedside commode arm rests, functional mobility w/ CGA from bed side commode -> edge of bed using RW. Patient provided vc's for bridge technique to scoot up towards head of bed with fair carryover, pt c/o fatigue. call bell within reach & pt verbalized understanding to utilize for assist e.g. functional transfers in order to prevent falls. Progression toward goals:  []          Improving appropriately and progressing toward goals  [x]          Improving slowly and progressing toward goals  []          Not making progress toward goals and plan of care will be adjusted     PLAN:  Patient continues to benefit from skilled intervention to address the above impairments. Continue treatment per established plan of care. Discharge Recommendations:  Awais Boyle  Further Equipment Recommendations for Discharge:  bedside commode, rolling walker, and wheelchair      SUBJECTIVE:   Patient stated My wife Leila Kilgore does the cooking.     OBJECTIVE DATA SUMMARY:   Cognitive/Behavioral Status:  Neurologic State: Alert  Orientation Level: Oriented to person, Oriented to place  Cognition: Follows commands  Safety/Judgement: Fall prevention    Functional Mobility and Transfers for ADLs:   Bed Mobility:     Supine to Sit: Moderate assistance  Sit to Supine: Moderate assistance  Scooting:  Moderate assistance   Transfers:  Sit to Stand: Minimum assistance  Stand to Sit: Contact guard assistance     Toilet Transfer : Contact guard assistance(bedside commode )     Balance:  Sitting: Impaired  Sitting - Static: Fair (occasional)(-)  Sitting - Dynamic: Poor (constant support)  Standing: Impaired  Standing - Static: Fair  Standing - Dynamic : Fair(-)    Pain:  Pain level pre-treatment: 0/10   Pain level post-treatment: 0/10  Pain Intervention(s): Medication (see MAR); Rest, Ice, Repositioning   Response to intervention: Nurse notified, See doc flow    Activity Tolerance:    poor  Please refer to the flowsheet for vital signs taken during this treatment. After treatment:   []  Patient left in no apparent distress sitting up in chair  [x]  Patient left in no apparent distress in bed  [x]  Call bell left within reach  [x]  Nursing notified  []  Caregiver present  []  Bed alarm activated    COMMUNICATION/EDUCATION:   [x] Role of Occupational Therapy in the acute care setting  [x] Home safety education was provided and the patient/caregiver indicated understanding. [x] Patient/family have participated as able in working towards goals and plan of care. [x] Patient/family agree to work toward stated goals and plan of care. [] Patient understands intent and goals of therapy, but is neutral about his/her participation. [] Patient is unable to participate in goal setting and plan of care.       Thank you for this referral.  Patti Rivera  Time Calculation: 29 mins

## 2020-04-10 NOTE — PROGRESS NOTES
Pt accepted at [facility] in [place] for tomorrow, sat. 4/11/20. [facility] Pcs completed. Envelope with dc summary, given to edward campa to bring to unit tomorrow. Notified pt's spouse and Dr Kvng Bassett. Notified Rajinder,pts nurse. Need addendum to dc summary stating no changes, notified dr Barrett Lloyd. Spoke with spouse she states they could not afford 7000.00/mo for LTc. Does not plan to apply for [facility]. Has aarp supplemental ins, uploaded card into edc. Notified shantell at [employer]. Will need uai, notified Laya Bartlett. [facility] # [phone]. Plan snf at [facility] sat.

## 2020-04-10 NOTE — PROGRESS NOTES
Left second message for spouse to call me. Spouse's cell # [phone]    Spoke with spouse. She states she is not able to care for pt but thought he could come home. Explained ambulated just from bed to chair, needs help bathing, dressing. not sure can go up steps to enter home. She agrees to snf. Had pt talk to wife on phone, he agrees to snf also. Called snfs in [place], pt was posted out to, [facility] [place] accepted pt but does not have bed availability today. Not likely until Monday. [facility] will review but won't be able to review today. The other 2 places [facility] and [facility], left messages. Added gary reeder [facility] in [facility], called and spoke with aaron, she will let me know. Spoke with Dr Marya Le, he was unaware pt and spouse changed their minds about snf. Notified him no accepting bed. Trying to get bed for Saturday. Notified pt's nurse, Rajinder.

## 2020-04-10 NOTE — PROGRESS NOTES
Problem: Falls - Risk of  Goal: *Absence of Falls  Description: Document Neymar Gambino Fall Risk and appropriate interventions in the flowsheet. Outcome: Progressing Towards Goal  Note: Fall Risk Interventions:  Mobility Interventions: Patient to call before getting OOB, Bed/chair exit alarm         Medication Interventions: Patient to call before getting OOB    Elimination Interventions: Call light in reach    History of Falls Interventions: Door open when patient unattended, Bed/chair exit alarm         Problem: Patient Education: Go to Patient Education Activity  Goal: Patient/Family Education  Outcome: Progressing Towards Goal     Problem: Pressure Injury - Risk of  Goal: *Prevention of pressure injury  Description: Document Rustam Scale and appropriate interventions in the flowsheet.   Outcome: Progressing Towards Goal  Note: Pressure Injury Interventions:  Sensory Interventions: Assess changes in LOC    Moisture Interventions: Absorbent underpads    Activity Interventions: Pressure redistribution bed/mattress(bed type)    Mobility Interventions: Pressure redistribution bed/mattress (bed type)    Nutrition Interventions: Document food/fluid/supplement intake    Friction and Shear Interventions: Foam dressings/transparent film/skin sealants, HOB 30 degrees or less                Problem: Patient Education: Go to Patient Education Activity  Goal: Patient/Family Education  Outcome: Progressing Towards Goal     Problem: Patient Education: Go to Patient Education Activity  Goal: Patient/Family Education  Outcome: Progressing Towards Goal     Problem: Patient Education: Go to Patient Education Activity  Goal: Patient/Family Education  Outcome: Progressing Towards Goal     Problem: Pain  Goal: *Control of Pain  Outcome: Progressing Towards Goal     Problem: Patient Education: Go to Patient Education Activity  Goal: Patient/Family Education  Outcome: Progressing Towards Goal     Problem: Diabetes Maintenance:Admission  Goal: *Blood glucose 80 to 180 mg/dl  Outcome: Progressing Towards Goal  Goal: *Adequate nutrition  Outcome: Progressing Towards Goal     Problem: Discharge Planning  Goal: *Discharge to safe environment  Outcome: Progressing Towards Goal     Problem: Patient Education: Go to Patient Education Activity  Goal: Patient/Family Education  Outcome: Progressing Towards Goal     Problem: Patient Education: Go to Patient Education Activity  Goal: Patient/Family Education  Outcome: Progressing Towards Goal     Problem: Patient Education: Go to Patient Education Activity  Goal: Patient/Family Education  Outcome: Progressing Towards Goal

## 2020-04-10 NOTE — PROGRESS NOTES
Spouse agreed to snf  When spoke with juani cerrato yesterday afternoon. Per md note today they refuse snf and hh. Called spouse to clarify. Had to leave message. Uploaded thherapy notes in edc. No accepting snf beds yet.

## 2020-04-10 NOTE — PROGRESS NOTES
1905- Bedside report given by PAT Calvillo. Pt in bed sleeping easily awaken. Pt alert and oriented to self and place reoriented to time and situation. Pt denies pain. HOB elevated, bed low and in locked position call light and urinal within reach. Bed alarm on for safety. Will continue with care. 2200- Pt awake resting in bed. 0015- Pt incontinent of stool ( mucoid ) care done pt given bed bath bed linen and gown changed pt made comfortable in bed no concerns voiced. 0206- Pt sleeping- breathing normally. 0400- Pt sleeping.  0625- Pt incontinent care done pt made comfortable in bed. No concerns voiced. 0725-Bedside and Verbal shift change report given to Frannie Pickett RN (oncoming nurse) by Nhan White RN (offgoing nurse). Report included the following information SBAR, Kardex, Intake/Output, MAR and Recent Results. Pt for discharge to rehab today.

## 2020-04-10 NOTE — PROGRESS NOTES
Transportation at Discharge:  4/11/2020  Expected DC    Transport Company/Representative:  [employer]  Transportation Phone number: [phone]  Method of Transport: Stretcher/ BLS    Estimated pick-up time:  11:00a  Destination:  [address]:  Medicare   Authorization: No auth required     Requesting Outcomes Manager: Kristy Stevenson, Care- ext [phone]

## 2020-04-10 NOTE — PROGRESS NOTES
Problem: Falls - Risk of  Goal: *Absence of Falls  Description: Document Bryan Cease Fall Risk and appropriate interventions in the flowsheet. Outcome: Progressing Towards Goal  Note: Fall Risk Interventions:  Mobility Interventions: Patient to call before getting OOB         Medication Interventions: Patient to call before getting OOB, Teach patient to arise slowly    Elimination Interventions: Call light in reach, Stay With Me (per policy), Toilet paper/wipes in reach, Toileting schedule/hourly rounds    History of Falls Interventions: Door open when patient unattended         Problem: Patient Education: Go to Patient Education Activity  Goal: Patient/Family Education  Outcome: Progressing Towards Goal     Problem: Pressure Injury - Risk of  Goal: *Prevention of pressure injury  Description: Document Rustam Scale and appropriate interventions in the flowsheet. Outcome: Progressing Towards Goal  Note: Pressure Injury Interventions:  Sensory Interventions: Float heels, Keep linens dry and wrinkle-free, Maintain/enhance activity level, Pad between skin to skin, Pressure redistribution bed/mattress (bed type), Turn and reposition approx.  every two hours (pillows and wedges if needed)    Moisture Interventions: Check for incontinence Q2 hours and as needed    Activity Interventions: Pressure redistribution bed/mattress(bed type)    Mobility Interventions: HOB 30 degrees or less, Pressure redistribution bed/mattress (bed type)    Nutrition Interventions: Document food/fluid/supplement intake, Offer support with meals,snacks and hydration    Friction and Shear Interventions: HOB 30 degrees or less                Problem: Patient Education: Go to Patient Education Activity  Goal: Patient/Family Education  Outcome: Progressing Towards Goal     Problem: Patient Education: Go to Patient Education Activity  Goal: Patient/Family Education  Outcome: Progressing Towards Goal     Problem: Patient Education: Go to Patient Education Activity  Goal: Patient/Family Education  Outcome: Progressing Towards Goal     Problem: Pain  Goal: *Control of Pain  Outcome: Progressing Towards Goal     Problem: Patient Education: Go to Patient Education Activity  Goal: Patient/Family Education  Outcome: Progressing Towards Goal     Problem: Diabetes Maintenance:Admission  Goal: *Blood glucose 80 to 180 mg/dl  Outcome: Progressing Towards Goal  Goal: *Adequate nutrition  Outcome: Progressing Towards Goal     Problem: Discharge Planning  Goal: *Discharge to safe environment  Outcome: Progressing Towards Goal     Problem: Patient Education: Go to Patient Education Activity  Goal: Patient/Family Education  Outcome: Progressing Towards Goal     Problem: Patient Education: Go to Patient Education Activity  Goal: Patient/Family Education  Outcome: Progressing Towards Goal     Problem: Patient Education: Go to Patient Education Activity  Goal: Patient/Family Education  Outcome: Progressing Towards Goal

## 2020-04-10 NOTE — PROGRESS NOTES
Problem: Mobility Impaired (Adult and Pediatric)  Goal: *Acute Goals and Plan of Care (Insert Text)  Description: Physical Therapy Goals  Initiated 4/9/2020 and to be accomplished within 7 day(s)  Updated 4/10/2020 - 1. Patient will perform sit to stand with supervision in order to prepare for standing ADLs. 1.  Patient will move from supine to sit and sit to supine in bed with supervision/set-up in order to facilitate eating meals in sitting. 2.  Patient will transfer from bed to chair and chair to bed with minimal assistance/contact guard assist using the least restrictive device in order to facilitate participation in sitting ADLs. 3.  Patient will perform sit to stand with minimal assistance/contact guard assist in order to prepare for standing ADLs. - Goal met; updated above  4. Patient will ambulate with minimal assistance/contact guard assist for 20 feet with the least restrictive device in order to facilitate improved ease with ambulation to the restroom. 5.  Patient will ascend/descend 3 stairs with handrail(s) with minimal assistance/contact guard assist in order to prepare patient to safely enter residence. Outcome: Progressing Towards Goal     PHYSICAL THERAPY TREATMENT    Patient: Dakota Omer (44 y.o. male)  Date: 4/10/2020  Diagnosis: GI bleeding [K92.2]  Acute blood loss anemia [D62]  Sepsis (HCC) [A41.9]  UTI (urinary tract infection) [N39.0]   Sepsis (HCC)  Procedure(s) (LRB):  ESOPHAGOGASTRODUODENOSCOPY (EGD) with bx (N/A) 5 Days Post-Op  Precautions: Fall  PLOF: Mod I   ASSESSMENT:  Agreeable to EOB. Mod A for supine to sit. Seated EOB with supervision for static balance; Mod A for dynamic balance with BLE AROM. Completed seated BLE exercises. Min A for sit to stand. Standing at ww; incontinent of bowels. Min A for side steps x2ft with ww. Seated EOB with min A. Mod A for sit to supine. Educated on need for RN assistance with mobility; verbalized understanding.  Call bell in reach.     Progression toward goals:   []      Improving appropriately and progressing toward goals  [x]      Improving slowly and progressing toward goals  []      Not making progress toward goals and plan of care will be adjusted     PLAN:  Patient continues to benefit from skilled intervention to address the above impairments. Continue treatment per established plan of care. Discharge Recommendations:  Skilled Nursing Facility  Further Equipment Recommendations for Discharge:  rolling walker     SUBJECTIVE:   Patient stated I want some tomato soup.     OBJECTIVE DATA SUMMARY:   Critical Behavior:  Neurologic State: Alert  Orientation Level: Oriented to person;Oriented to place  Cognition: Follows commands     Psychosocial  Patient Behaviors: Cooperative  Functional Mobility:  Bed Mobility:  Supine to Sit: Moderate assistance  Sit to Supine: Moderate assistance  Scooting: Moderate assistance  Transfers:  Sit to Stand: Minimum assistance  Stand to Sit: Minimum assistance  Balance:   Sitting: Impaired  Sitting - Static: Fair (occasional)  Sitting - Dynamic: Poor (constant support)  Standing: Impaired  Standing - Static: Fair  Standing - Dynamic : Fair  Ambulation/Gait Training:  Distance (ft): 2 Feet (ft)(side steps)   Assistive Device: Walker, rolling  Ambulation - Level of Assistance: Minimal assistance  Neuro Re-Education:  Seated EOB 5 minutes  Therapeutic Exercises:   Sit to stand  Side steps x2ft  BLE AROM knee extensions, hip flexion    Pain:  Pain level pre-treatment: 0/10  Pain level post-treatment: 0/10     Activity Tolerance:   Fair    After treatment:   [] Patient left in no apparent distress sitting up in chair  [x] Patient left in no apparent distress in bed  [x] Call bell left within reach  [x] Nursing notified  [] Caregiver present  [] Bed alarm activated  [] SCDs applied      COMMUNICATION/EDUCATION:   [x]         Role of physical therapy in the acute care setting.   [x]         Fall prevention education was provided and the patient/caregiver indicated understanding. [x]         Patient/family have participated as able in working toward goals and plan of care. [x]         Patient/family agree to work toward stated goals and plan of care. []         Patient understands intent and goals of therapy, but is neutral about his/her participation. []         Patient is unable to participate in stated goals/plan of care: ongoing with therapy staff.       Rylee Chen, PT   Time Calculation: 15 mins

## 2020-04-10 NOTE — PROGRESS NOTES
2010- Pt in bed resting alert and oriented x3 denies pain at the moment. Assessement completed plan of care for the shift explained pt verbalized understanding. Pt incontinent of mucoid loose stool, care done pt made comfortable in bed. HOB elevated, bed low and in locked position. Call light and frequently used items within reach. Pt incontinent of stool- mucoid diarrhea care done pt given a bed bath made comfortable in bed.     2200=  Pt resting in bed. Used urinal 200 ml of yellow urine. 0000- Pt sleeping. 0230- Pt incontinent of stool  Care done pt made comfortable in bed  0300- Pt asked mouth wash- mouth care done wanted to put on his dentures- was able to put the lower but upper could not stay because he needs the adhesive to hold it in place. Pt appreciative. Pt also c/o pain on the left heel, checked heels intact. Heel boot ( mepilex applied )   0331- Pt sleeping. Rajeev Hilt 0725-Bedside and Verbal shift change report given to PAT Calvillo (oncoming nurse) by Lady Sears RN (offgoing nurse). Report included the following information SBAR, Kardex, Intake/Output, MAR and Recent Results.

## 2020-04-11 VITALS
WEIGHT: 175 LBS | BODY MASS INDEX: 23.7 KG/M2 | DIASTOLIC BLOOD PRESSURE: 83 MMHG | HEIGHT: 72 IN | OXYGEN SATURATION: 91 % | HEART RATE: 104 BPM | TEMPERATURE: 98.5 F | SYSTOLIC BLOOD PRESSURE: 159 MMHG | RESPIRATION RATE: 16 BRPM

## 2020-04-11 LAB — GLUCOSE BLD STRIP.AUTO-MCNC: 160 MG/DL (ref 70–110)

## 2020-04-11 PROCEDURE — 74011636637 HC RX REV CODE- 636/637: Performed by: INTERNAL MEDICINE

## 2020-04-11 PROCEDURE — 74011250637 HC RX REV CODE- 250/637: Performed by: INTERNAL MEDICINE

## 2020-04-11 PROCEDURE — 82962 GLUCOSE BLOOD TEST: CPT

## 2020-04-11 RX ADMIN — PANTOPRAZOLE SODIUM 40 MG: 40 TABLET, DELAYED RELEASE ORAL at 08:57

## 2020-04-11 RX ADMIN — LISINOPRIL 10 MG: 5 TABLET ORAL at 08:57

## 2020-04-11 RX ADMIN — OXYBUTYNIN CHLORIDE 10 MG: 10 TABLET, EXTENDED RELEASE ORAL at 08:57

## 2020-04-11 RX ADMIN — METOPROLOL TARTRATE 25 MG: 25 TABLET, FILM COATED ORAL at 08:57

## 2020-04-11 RX ADMIN — Medication 400 MG: at 08:57

## 2020-04-11 RX ADMIN — INSULIN LISPRO 2 UNITS: 100 INJECTION, SOLUTION INTRAVENOUS; SUBCUTANEOUS at 08:57

## 2020-04-11 NOTE — PROGRESS NOTES
04/11/2020 I was informed by Rayray Israel RN that pt still has his Subclavian CVL in. It looks like it had been placed prior to coming here and Dr Marvin Muse had noted on 4/5/2020  it was to be dc'd when no longer needed. No order to dc CVL noted. I called Ernestine Sabillon at [facility] to let her know this needs to be removed prior to dc and that time would be probably changed. Ernestine Sabillon says that as long as discharge summery was amended, patient can be sent later to them. I tried to call Tommy Balbuena on 3rd floor but no answer to her unit cell phone. I discussed with Rayray Israel RN . I let her know I have not called the MD.    1592 I called [facility] to put pt on \"WILL CALL\". instead of 1100 transport. Discharge Summerlaith for today is faxed to [facility] at [phone] per [facility] Request She is having difficulty getting into [facility]. 1100 I called Mrs Areli Tompkins to let her know of delay for her . She is ok with this change. Anibal Biswas.  Francisco Barraza, JOHNN  [facility]  [phone], Pager [phone]  [email or web address]

## 2020-04-11 NOTE — DISCHARGE SUMMARY
Discharge Summary      [facility] service    Patient ID:  Madison Hays, 78 y.o., male  : 1940    Admit Date: 2020  Discharge Date:  2020  Length of stay: 6 day(s)    PCP:  Lana Rajput MD    No chief complaint on file. Discharge Diagnosis:     Hospital Problems  Date Reviewed: 2020          Codes Class Noted POA    Benign prostatic hyperplasia with urinary obstruction ICD-10-CM: N40.1, N13.8  ICD-9-CM: 600.01, 599.69  2015 Yes        Septic shock ([facility] ICD-10-CM: A41.9, R65.21  ICD-9-CM: 038.9, 785.52, 995.92  2020 Yes        Crohn's disease ([facility] ICD-10-CM: K50.90  ICD-9-CM: 555.9  2020 Yes    Overview Signed 2020  1:11 AM by Trell Arriaga MD     S/P partial colon resection. Acute blood loss anemia ICD-10-CM: D62  ICD-9-CM: 285.1  2020 Yes        GI bleeding ICD-10-CM: K92.2  ICD-9-CM: 578.9  2020 Yes        UTI (urinary tract infection) ICD-10-CM: N39.0  ICD-9-CM: 599.0  2020 Yes        * (Principal) Sepsis ([facility] ICD-10-CM: A41.9  ICD-9-CM: 038.9, 995.91  2020 Yes        Diabetes ([facility] ICD-10-CM: E11.9  ICD-9-CM: 250.00  Unknown Yes        Testicular cancer ([facility] ICD-10-CM: C62.90  ICD-9-CM: 186.9  Unknown Yes        Dementia ([facility] ICD-10-CM: F03.90  ICD-9-CM: 294.20  Unknown Yes    Overview Signed 2017  1:40 PM by Louvella Castleman., CMA     early onset             Hypertension ICD-10-CM: I10  ICD-9-CM: 401.9  2013 Yes        Hyperlipidemia ICD-10-CM: E78.5  ICD-9-CM: 272.4  2013 Yes              Discharge instructions:    #  Discharge Diet:            Diet: Cardiac Diet, Diabetic Diet ( dental soft)   # Discharge activity and restrictions: Activity as tolerated and PT/OT Eval and Treat    # Follow-up appointments:      Follow-up Information     Follow up With Specialties Details Why Contact Info    Lana Rajput MD Internal Medicine Schedule an appointment as soon as possible for a visit in 1 week Follow up to discuss your recent hospitalization. [address] [facility]  [phone]      Tommy Faustin MD Internal Medicine Schedule an appointment as soon as possible for a visit arrange follow up for 3-4 weeks [facility]. 97.  [address] [address]      Delmar Madrigal MD Gastroenterology Schedule an appointment as soon as possible for a visit Schedule a colonoscopy for 8 weeks from now [address] [place] [address]  [phone]              HPI on Admission (per admitting physician):  Daryl Phipps is a 78 y.o. male who presented to the ER at [facility] with changed mental status. In the ER he was found to have Severe sepsis from UTI. He was also found to have Melanotic stool with acute blood loss anemia. He was begun on Vasopressor to maintain his BP. He was transfused with PRBC's. And he was begun on antibiotics. His mental status change appears to have begun a day ago. He does not have chest pain or SOB. He was transferred to [facility] ICU for ongoing care. He is not aware of dysuria or hematuria. He does not have fever or cough. For further details and initial management please refer to H/P. Hospital Course:      Can start ASA in 4 days . Patient deny complain and willing to go home. Passed BM and I didn't see blood in it this am. Deny pains, N/V or other symptoms. PT will see prior D/C. Addendum: I talked to his wife, updating her and nimesh burdick plan , she does not want him to go to rehab and no HH because of the virus. She want him to come back home tomoroow as not ready for him today. Addendum 4/11 : hid dc held yesterday as wife called and changed her mind late afternoon asking he is to go to SNF. Nimesh CM, he has accepting facility today. Nimesh his nurse today, patient has loose stool yesterday and today , he ruled out for c diff few days back.  He is not on laxative I can see now , observe for now, no fever, no pains or other GI symptom . Co sore throat, no sign of infection by inspection but significantly dry , encouraged on po fluids and keep it wet. I gave him milk and his swallowing was normal at bed side. DC CV line on R chest prior DC. DC mag oxide as diarrhea. Check fasting CBC, BMP in 3 days with PCP at [facility]. By Problems :     #  Hypotension , Shock likely hypovolemic.plan for  Iv pressors as needed. Maintain h/h. mantain BP. Currently SVS and off pressors. -  Hypotension, likely secondary to hypovolemia and anemia related to GI bleeding. Held his BP medicine. his BP now back up and actually on high side, resume regimen on D/C.         # Upper GIB due to gastroduodenal ulcer and gastroduodenitis probably ASA induced. Gastric biopsy negative for H pylori. . GI consulted. Monitor h/h. PPI. Patient is also on aspirin and Plavix as an outpatient which has been held off at this time. Endoscopy with GI demonstrate multiple areas of oozing bleeding.  PPI changed to PO , continue for life. Resume ASA in 5 days for cardiac reason. DC  Plavix  For now.     # Acute blood loss  Anemia. secondary to GI bleeding. As above. Blood Tx as needed. ho  Chronic dual antiplatelet therapy and NSAID use     # positive trop I level 1.02. Possible demand miss match. Cardiology consulted. No further cardiac work up for now , refer to cardiology notes. Resume ASA  As above    #  Colonic thickening on CT; possibly related to colitis (?ischemic) or infectious process although also potentially an incidental or clinically insignificant finding. C diff negative. Dc contact isolation      # KARLI. Improving s.cr. IVF. Monitor Renal function and other labs as indicated. Avoid nephrotoxins , iv Contrast, NSAID. Renally dosing medications. Monitor urine out put.         # Hypernatremia. Change IVF. Avoid NS in diluent. Serial level monitoring.      # uncontrolled DM with hyper and hypoglycemia. Start DM protocol.  Provide SSI, hypoglycemia protocol and frequent Accu checks. Provide SSI, hypoglycemia protocol and frequent Accu checks. Monitor BSLclosely      Discharge condition:  improved and stable    Disposition:  Home    Procedures:   Procedure(s):  ESOPHAGOGASTRODUODENOSCOPY (EGD) with bx      Consultants: Cardiology and GI  IP CONSULT TO CARDIOLOGY    Physical Exam on Discharge:  Visit Vitals  /83   Pulse (!) 104   Temp 98.5 °F (36.9 °C)   Resp 16   Ht 6' (1.829 m)   Wt 79.4 kg (175 lb)   SpO2 91%   BMI 23.73 kg/m²   Gen:    Well-developed,  in no acute distress  HEENT: dry throat,  Head atraumatic, normocephalic , PALE conjunctivae    Neck:   No apparent JVD, Supple  Resp:  No accessory muscle use, Bilateral BS present,clear breath sounds without wheezes rales or rhonchi  Card:  POSITIVE RANDAL  murmur, normal S1, S2 without Gallop . MILD  lower leg peripheral edema. Abd:  Soft, non-tender, non-distended, bowel sounds are present . Musc:  No cyanosis or clubbing  Skin:   No rashes or ulcers, skin turgor is good   Neuro:   no clear area of focal motor weakness, follows commands appropriately   alert and coherent     Hospitalization and discharge instructions d/c in details with : patient ,GI , Nursing/CM     Discharge medications :  Current Discharge Medication List      START taking these medications    Details   pantoprazole (PROTONIX) 40 mg tablet Take 1 Tab by mouth Before breakfast and dinner for 30 days. Qty: 60 Tab, Refills: 0         CONTINUE these medications which have NOT CHANGED    Details   mirabegron ER (MYRBETRIQ) 25 mg ER tablet Take 1 Tab by mouth daily. Qty: 90 Tab, Refills: 3      glimepiride (AMARYL) 1 mg tablet       aspirin-calcium carbonate 81 mg-300 mg calcium(777 mg) tab 81 mg.      trospium (SANCTURA XL) 60 mg capsule Take 1 Cap by mouth Daily (before breakfast). Qty: 90 Cap, Refills: 3      cyanocobalamin 1,000 mcg tablet 1,000 mcg. cinnamon bark (CINNAMON) 500 mg cap Take 1,000 mg by mouth.       testosterone cypionate (DEPOTESTOTERONE CYPIONATE) 200 mg/mL injection 200 mg by IntraMUSCular route. metoprolol (LOPRESSOR) 25 mg tablet       benazepril (LOTENSIN) 10 mg tablet       tamsulosin (FLOMAX) 0.4 mg capsule       pravastatin (PRAVACHOL) 20 mg tablet Take 20 mg by mouth nightly. STOP taking these medications       clopidogrel (PLAVIX) 75 mg tab Comments:   Reason for Stopping:         sildenafil, antihypertensive, (REVATIO) 20 mg tablet Comments:   Reason for Stopping:         omeprazole (PRILOSEC) 20 mg capsule Comments:   Reason for Stopping:                   Most Recent Labs:       Recent Labs     04/10/20  0400 04/09/20  0410 04/08/20  1708   WBC 5.2  --   --    HGB 8.1* 7.9* 8.7*   HCT 25.7* 24.8* 27.7*     --   --      Recent Labs     04/10/20  0400 04/09/20  0410   * 146*   K 3.9 4.0   * 117*   CO2 24 24   * 114*   BUN 19* 17   CREA 1.36* 1.20   CA 7.3* 7.2*   MG 1.9 1.8   PHOS 2.7 2.5     Lab Results   Component Value Date/Time    Glucose (POC) 160 (H) 04/11/2020 07:07 AM    Glucose (POC) 149 (H) 04/10/2020 09:05 PM    Glucose (POC) 158 (H) 04/10/2020 04:07 PM    Glucose (POC) 124 (H) 04/10/2020 11:39 AM    Glucose (POC) 152 (H) 04/10/2020 07:38 AM     No results for input(s): PH, PCO2, PO2, HCO3, FIO2 in the last 72 hours. No results for input(s): INR, INREXT, INREXT in the last 72 hours.     No results found for: SDES  No results found for: CULT    All Cardiac Markers in the last 24 hours: No results found for: CPK, CK, CKMMB, CKMB, RCK3, CKMBT, CKNDX, CKND1, MEHNAZ, TROPT, TROIQ, CALEB, TROPT, TNIPOC, BNP, BNPP    XR Results:  Results from Hospital Encounter encounter on 04/05/20   XR CHEST PORT    Narrative EXAM: XR CHEST PORT    CLINICAL INDICATION/HISTORY: R/o pneumonia  -Additional: None    COMPARISON: None    TECHNIQUE: Portable frontal view of the chest    _______________    FINDINGS:    SUPPORT DEVICES: Left subclavian central venous catheter tip at the cavoatrial  junction. Alfonso Ra HEART AND MEDIASTINUM: Cardiomediastinal silhouette within normal limits. LUNGS AND PLEURAL SPACES: Patient rotated to the left. Left basilar atelectasis. No dense consolidation, large effusion or pneumothorax.    _______________      Impression IMPRESSION:    No acute cardiopulmonary abnormality. Patient rotated to the left. Left basilar  atelectasis. Total discharge time 35 minutes of which more than 50 % spent on counseling and coordination of care. This document in whole or part of it has been produced using voice recognition software. Unrecognized errors in transcription may be present. Meliza Love MD  Hospitalist  [facility], [facility]. medical group. Hospitalist Division.   April 11, 2020  9:39 AM

## 2020-04-11 NOTE — PROGRESS NOTES
Problem: Falls - Risk of  Goal: *Absence of Falls  Description: Document Rick Brock Fall Risk and appropriate interventions in the flowsheet. Outcome: Progressing Towards Goal  Note: Fall Risk Interventions:  Mobility Interventions: Bed/chair exit alarm, Patient to call before getting OOB, PT Consult for mobility concerns         Medication Interventions: Bed/chair exit alarm, Patient to call before getting OOB    Elimination Interventions: Patient to call for help with toileting needs, Call light in reach, Bed/chair exit alarm    History of Falls Interventions: Door open when patient unattended, Bed/chair exit alarm         Problem: Patient Education: Go to Patient Education Activity  Goal: Patient/Family Education  Outcome: Progressing Towards Goal     Problem: Pressure Injury - Risk of  Goal: *Prevention of pressure injury  Description: Document Rustam Scale and appropriate interventions in the flowsheet. Outcome: Progressing Towards Goal  Note: Pressure Injury Interventions:  Sensory Interventions: Assess changes in LOC, Discuss PT/OT consult with provider, Float heels, Keep linens dry and wrinkle-free, Monitor skin under medical devices, Pressure redistribution bed/mattress (bed type)    Moisture Interventions: Absorbent underpads, Apply protective barrier, creams and emollients, Maintain skin hydration (lotion/cream)    Activity Interventions: Pressure redistribution bed/mattress(bed type), PT/OT evaluation    Mobility Interventions: HOB 30 degrees or less, Pressure redistribution bed/mattress (bed type), Turn and reposition approx.  every two hours(pillow and wedges)    Nutrition Interventions: Document food/fluid/supplement intake    Friction and Shear Interventions: Foam dressings/transparent film/skin sealants, HOB 30 degrees or less, Transferring/repositioning devices                Problem: Patient Education: Go to Patient Education Activity  Goal: Patient/Family Education  Outcome: Progressing Towards Goal     Problem: Patient Education: Go to Patient Education Activity  Goal: Patient/Family Education  Outcome: Progressing Towards Goal     Problem: Patient Education: Go to Patient Education Activity  Goal: Patient/Family Education  Outcome: Progressing Towards Goal     Problem: Pain  Goal: *Control of Pain  Outcome: Progressing Towards Goal     Problem: Patient Education: Go to Patient Education Activity  Goal: Patient/Family Education  Outcome: Progressing Towards Goal     Problem: Diabetes Maintenance:Admission  Goal: *Blood glucose 80 to 180 mg/dl  Outcome: Progressing Towards Goal  Goal: *Adequate nutrition  Outcome: Progressing Towards Goal     Problem: Discharge Planning  Goal: *Discharge to safe environment  Outcome: Progressing Towards Goal     Problem: Patient Education: Go to Patient Education Activity  Goal: Patient/Family Education  Outcome: Progressing Towards Goal     Problem: Patient Education: Go to Patient Education Activity  Goal: Patient/Family Education  Outcome: Progressing Towards Goal     Problem: Patient Education: Go to Patient Education Activity  Goal: Patient/Family Education  Outcome: Progressing Towards Goal

## 2020-04-11 NOTE — PROGRESS NOTES
Patient received in bed asleep. Patient alert and oriented to self and place, denies pain and discomfort. Patient resting quietly. Frequent use items within reach. Bed locked in low position. Call bell within reach and patient verbalized understanding of use for assistance and needs. 1115:  ICU nurse removed patient CVL line. 1216:  Writer tried to call report with number provided during shift report Cary Debi [phone]) no answer, voice mail full; no other number with discharge packet. [phone]:  Found facility number on [employer]. Called facility and gave report to [address]      1340:  Pt discharged to [facility], accompanied by [facility] transport via transport. Transport has all discharge instructions, prescriptions and belongings.

## 2020-04-11 NOTE — PROGRESS NOTES
Hospital to [facility]                                                                        78 y.o.   male    [address]   Room: [facility]/[address] [place] [address] 3S CARDIAC TELE  Unit Phone# :  [phone] [facility] 3S CARDIAC TELE  [address] [facility]  [address] [address]  Dept: Aneta Hemp: [phone]                    SITUATION     Admitted:  4/5/2020         Attending Provider:  Lakhwinder Soto MD       Consultations:  IP CONSULT TO CARDIOLOGY    PCP:  Aurelia Aparicio MD   [phone]    Treatment Team: Attending Provider: Lakhwinder Soto MD; Utilization Review: Jay Nino RN; Care Manager: Vipul Forte RN; Care Manager: Leigh Pereyra; Occupational Therapist: Carmina Carpenter Charge Nurse: Jimmie Kaur RN    Admitting Dx:  GI bleeding [K92.2]  Acute blood loss anemia [D62]  Sepsis ([address] [A41.9]  UTI (urinary tract infection) [N39.0]       Principal Problem: Sepsis ([address]    6 Days Post-Op of   Procedure(s):  ESOPHAGOGASTRODUODENOSCOPY (EGD) with bx   BY: Telma Rodriguez MD             ON: 4/5/2020                  Code Status: Full Code                Advance Directives:   Advance Care Planning 4/6/2020   Confirm Advance Directive None    (Send w/patient)   Not Received       Isolation:  There are currently no Active Isolations       MDRO: No current active infections    Pain Medications given:  None    Last dose: None at  N/A    Special Equipment needed: no  Type of equipment: N/A           BACKGROUND     Allergies:   Allergies   Allergen Reactions    Iodinated Contrast Media Other (comments)     Hot flushing        Past Medical History:   Diagnosis Date    Benign prostatic hyperplasia with urinary obstruction     Dementia ([address]     early onset    Diabetes ([address]     Hypersomnia     Hypertension     Nocturia     OAB (overactive bladder)     Testicular cancer (HCC)     Urge incontinence     Urinary incontinence, urge Past Surgical History:   Procedure Laterality Date    HX CYST REMOVAL  1960-1970s    cyst removed from right breast    HX OTHER SURGICAL      reemoval of testcle    HX OTHER SURGICAL  08/2018    Surgery: Blockage Lower bowels, [facility]    HX OTHER SURGICAL  01/2019    Heart Blockage: 30% [facility]: Dr. Carias Seen       Medications Prior to Admission   Medication Sig    mirabegron ER (MYRBETRIQ) 25 mg ER tablet Take 1 Tab by mouth daily.  clopidogrel (PLAVIX) 75 mg tab     glimepiride (AMARYL) 1 mg tablet     aspirin-calcium carbonate 81 mg-300 mg calcium(777 mg) tab 81 mg.    trospium (SANCTURA XL) 60 mg capsule Take 1 Cap by mouth Daily (before breakfast).  sildenafil, antihypertensive, (REVATIO) 20 mg tablet Take up to 5 tablets one hour prior to intercourse on an empty stomach.  cyanocobalamin 1,000 mcg tablet 1,000 mcg.  omeprazole (PRILOSEC) 20 mg capsule 20 mg.    cinnamon bark (CINNAMON) 500 mg cap Take 1,000 mg by mouth.  testosterone cypionate (DEPOTESTOTERONE CYPIONATE) 200 mg/mL injection 200 mg by IntraMUSCular route.  metoprolol (LOPRESSOR) 25 mg tablet     benazepril (LOTENSIN) 10 mg tablet     tamsulosin (FLOMAX) 0.4 mg capsule     pravastatin (PRAVACHOL) 20 mg tablet Take 20 mg by mouth nightly. Hard scripts included in transfer packet yes    Vaccinations:    Immunization History   Administered Date(s) Administered    Influenza Vaccine 10/01/2019, 10/31/2019       Readmission Risks:    Known Risks: unknown      The Charlson CoMorbitiy Index tool is an evidenced based tool that has more automatic generated information. The tool looks at many different items such as the age of the patient, how many times they were admitted in the last calendar year, current length of stay in the hospital and their diagnosis.  All of these items are pulled automatically from information documented in the chart from various places and will generate a score that predicts whether a patient is at low (less than 13), medium (13-20) or high (21 or greater) risk of being readmitted.         ASSESSMENT                Temp: 98.5 °F (36.9 °C) (04/11/20 0731) Pulse (Heart Rate): (!) 104 (04/11/20 0857)     Resp Rate: 16 (04/11/20 0731)           BP: 159/83 (04/11/20 0857)     O2 Sat (%): 91 % (04/11/20 0731)     Weight: 79.4 kg (175 lb)    Height: 6' (182.9 cm) (04/08/20 0935)       If above not within 1 hour of discharge:    BP:_____  P:____  R:____ T:_____ O2 Sat: ___%  O2: ______    Active Orders   Diet    DIET DENTAL SOFT (SOFT SOLID)         Orientation: only aware of  place and person     Active Behaviors: None                                   Active Lines/Drains:  (Peg Tube / Roth / CL or S/L?): no    Urinary Status: Voiding, Incontinent     Last BM: Last Bowel Movement Date: 04/10/20     Skin Integrity: Excoriation             Mobility: Very limited   Weight Bearing Status: WBAT (Weight Bearing as Tolerated)      Gait Training  Assistive Device: Walker, rolling  Ambulation - Level of Assistance: Minimal assistance  Distance (ft): 2 Feet (ft)(side steps)         Lab Results   Component Value Date/Time    Glucose 119 (H) 04/10/2020 04:00 AM    Hemoglobin A1c 6.1 (H) 04/05/2020 07:40 PM    INR 1.4 (H) 04/05/2020 03:00 PM    HGB 8.1 (L) 04/10/2020 04:00 AM    HGB 7.9 (L) 04/09/2020 04:10 AM        RECOMMENDATION     See After Visit Summary (AVS) for:  · Discharge instructions  · After [address]   · Special equipment needed (entered pre-discharge by Care Management)  · Medication Reconciliation    · Follow up Appointment(s)         Report given/sent by:  Kari Charles RN                    Verbal report given to: [address]  FAXED to:           Estimated discharge time:  4/11/2020 at 1330

## 2020-04-11 NOTE — PROGRESS NOTES
Problem: Discharge Planning  Goal: *Discharge to safe environment  Outcome:Met  Plan is snf      Care Management Interventions  PCP Verified by CM: Yes(seen recently)  Palliative Care Criteria Met (RRAT>21 & CHF Dx)?: No  Mode of Transport at Discharge: Other (see comment)(wife to drive him home)  Transition of Care Consult (CM Consult): Discharge Planning  Current Support Network: Lives with Spouse  Confirm Follow Up Transport: Family  The Plan for Transition of Care is Related to the Following Treatment Goals : Patient's is  hemodynamically stable  [employer] Information Provided?: No  Discharge Location  Discharge Placement: Skilled nursing facility     Bettye Segovia.  LUCÍA Vaughan  [facility]  [phone], Pager [phone]  [email or web address]

## 2020-04-11 NOTE — DISCHARGE INSTRUCTIONS
Patient Education        DISCHARGE SUMMARY from Nurse    PATIENT INSTRUCTIONS:    After general anesthesia or intravenous sedation, for 24 hours or while taking prescription Narcotics:  · Limit your activities  · Do not drive and operate hazardous machinery  · Do not make important personal or business decisions  · Do  not drink alcoholic beverages  · If you have not urinated within 8 hours after discharge, please contact your surgeon on call. Report the following to your surgeon:  · Excessive pain, swelling, redness or odor of or around the surgical area  · Temperature over 100.5  · Nausea and vomiting lasting longer than 4 hours or if unable to take medications  · Any signs of decreased circulation or nerve impairment to extremity: change in color, persistent  numbness, tingling, coldness or increase pain  · Any questions    What to do at Home:  Recommended activity: Bedrest    If you experience any of the following symptoms as listed in discharge instructions \"When to call for Help\", please follow up with your primary care physician and/or call 911. *  Please give a list of your current medications to your Primary Care Provider. *  Please update this list whenever your medications are discontinued, doses are      changed, or new medications (including over-the-counter products) are added. *  Please carry medication information at all times in case of emergency situations. These are general instructions for a healthy lifestyle:    No smoking/ No tobacco products/ Avoid exposure to second hand smoke  Surgeon General's Warning:  Quitting smoking now greatly reduces serious risk to your health.     Obesity, smoking, and sedentary lifestyle greatly increases your risk for illness    A healthy diet, regular physical exercise & weight monitoring are important for maintaining a healthy lifestyle    You may be retaining fluid if you have a history of heart failure or if you experience any of the following symptoms:  Weight gain of 3 pounds or more overnight or 5 pounds in a week, increased swelling in our hands or feet or shortness of breath while lying flat in bed. Please call your doctor as soon as you notice any of these symptoms; do not wait until your next office visit. The discharge information has been reviewed with the patient. The patient verbalized understanding. Discharge medications reviewed with the patient and appropriate educational materials and side effects teaching were provided. ___________________________________________________________________________________________________________________________________  Prediabetes: Care Instructions  Your Care Instructions    Prediabetes is a warning sign that you are at risk for getting type 2 diabetes. It means that your blood sugar is higher than it should be. The food you eat turns into sugar, which your body uses for energy. Normally, an organ called the pancreas makes insulin, which allows the sugar in your blood to get into your body's cells. But when your body can't use insulin the right way, the sugar doesn't move into cells. It stays in your blood instead. This is called insulin resistance. The buildup of sugar in the blood causes prediabetes. The good news is that lifestyle changes may help you get your blood sugar back to normal and help you avoid or delay diabetes. Follow-up care is a key part of your treatment and safety. Be sure to make and go to all appointments, and call your doctor if you are having problems. It's also a good idea to know your test results and keep a list of the medicines you take. How can you care for yourself at home? · Watch your weight. A healthy weight helps your body use insulin properly. · Limit the amount of calories, sweets, and unhealthy fat you eat. Ask your doctor if you should see a dietitian. A registered dietitian can help you create meal plans that fit your lifestyle.   · Get at least 27 minutes of exercise on most days of the week. Exercise helps control your blood sugar. It also helps you maintain a healthy weight. Walking is a good choice. You also may want to do other activities, such as running, swimming, cycling, or playing tennis or team sports. · Do not smoke. Smoking can make prediabetes worse. If you need help quitting, talk to your doctor about stop-smoking programs and medicines. These can increase your chances of quitting for good. · If your doctor prescribed medicines, take them exactly as prescribed. Call your doctor if you think you are having a problem with your medicine. You will get more details on the specific medicines your doctor prescribes. When should you call for help? Watch closely for changes in your health, and be sure to contact your doctor if:    · You have any symptoms of diabetes. These may include:  ? Being thirsty more often. ? Urinating more. ? Being hungrier. ? Losing weight. ? Being very tired. ? Having blurry vision.     · You have a wound that will not heal.     · You have an infection that will not go away.     · You have problems with your blood pressure.     · You want more information about diabetes and how you can keep from getting it. Where can you learn more? Go to http://lauren-gavin.info/  Enter I222 in the search box to learn more about \"Prediabetes: Care Instructions. \"  Current as of: December 19, 2019Content Version: 12.4  © 1928-8198 [employer]. Care instructions adapted under license by [employer] (which disclaims liability or warranty for this information). If you have questions about a medical condition or this instruction, always ask your healthcare professional. [place] [address] any warranty or liability for your use of this information.   Your A1C  was   Lab Results   Component Value Date/Time    Hemoglobin A1c 6.1 (H) 04/05/2020 07:40 PM   .      This lab test reflects that your blood sugar was slightly elevated over the past 3 months and should be evaluated by your primary care provider. This lab test reflects that your blood sugar averaged 123  over the past 3 months. It is important to follow up with your provider on a routine basis to continue to evaluate your blood sugar discuss any necessary changes in treatment. Patient armband removed and shredded    DISCHARGE SUMMARY from Nurse    PATIENT INSTRUCTIONS:    After general anesthesia or intravenous sedation, for 24 hours or while taking prescription Narcotics:  · Limit your activities  · Do not drive and operate hazardous machinery  · Do not make important personal or business decisions  · Do  not drink alcoholic beverages  · If you have not urinated within 8 hours after discharge, please contact your surgeon on call. Report the following to your surgeon:  · Excessive pain, swelling, redness or odor of or around the surgical area  · Temperature over 100.5  · Nausea and vomiting lasting longer than 4 hours or if unable to take medications  · Any signs of decreased circulation or nerve impairment to extremity: change in color, persistent  numbness, tingling, coldness or increase pain  · Any questions    What to do at Home:  Recommended activity: Activity as tolerated     If you experience any of the following symptoms fever, chills, bright red blood in stool, dizziness, worsening shortness of breath, please follow up with primary care physician/emergency room    *  Please give a list of your current medications to your Primary Care Provider. *  Please update this list whenever your medications are discontinued, doses are      changed, or new medications (including over-the-counter products) are added. *  Please carry medication information at all times in case of emergency situations.     These are general instructions for a healthy lifestyle:    No smoking/ No tobacco products/ Avoid exposure to second hand smoke  Surgeon General's Warning:  Quitting smoking now greatly reduces serious risk to your health. Obesity, smoking, and sedentary lifestyle greatly increases your risk for illness    A healthy diet, regular physical exercise & weight monitoring are important for maintaining a healthy lifestyle    You may be retaining fluid if you have a history of heart failure or if you experience any of the following symptoms:  Weight gain of 3 pounds or more overnight or 5 pounds in a week, increased swelling in our hands or feet or shortness of breath while lying flat in bed. Please call your doctor as soon as you notice any of these symptoms; do not wait until your next office visit. The discharge information has been reviewed with the patient. The patient verbalized understanding. Discharge medications reviewed with the patient and appropriate educational materials and side effects teaching were provided.   ___________________________________________________________________________________________________________________________________

## 2020-04-11 NOTE — PROGRESS NOTES
Problem: Falls - Risk of  Goal: *Absence of Falls  Description: Document Bard Berg Fall Risk and appropriate interventions in the flowsheet. Outcome: Progressing Towards Goal  Note: Fall Risk Interventions:  Mobility Interventions: Bed/chair exit alarm, PT Consult for mobility concerns         Medication Interventions: Bed/chair exit alarm, Patient to call before getting OOB    Elimination Interventions: Patient to call for help with toileting needs, Call light in reach    History of Falls Interventions: Door open when patient unattended         Problem: Patient Education: Go to Patient Education Activity  Goal: Patient/Family Education  Outcome: Progressing Towards Goal     Problem: Pressure Injury - Risk of  Goal: *Prevention of pressure injury  Description: Document Rustam Scale and appropriate interventions in the flowsheet.   Outcome: Progressing Towards Goal  Note: Pressure Injury Interventions:  Sensory Interventions: Assess changes in LOC, Float heels, Keep linens dry and wrinkle-free, Monitor skin under medical devices    Moisture Interventions: Absorbent underpads, Apply protective barrier, creams and emollients    Activity Interventions: Pressure redistribution bed/mattress(bed type)    Mobility Interventions: HOB 30 degrees or less    Nutrition Interventions: Document food/fluid/supplement intake    Friction and Shear Interventions: Foam dressings/transparent film/skin sealants                Problem: Patient Education: Go to Patient Education Activity  Goal: Patient/Family Education  Outcome: Progressing Towards Goal     Problem: Patient Education: Go to Patient Education Activity  Goal: Patient/Family Education  Outcome: Progressing Towards Goal     Problem: Patient Education: Go to Patient Education Activity  Goal: Patient/Family Education  Outcome: Progressing Towards Goal     Problem: Pain  Goal: *Control of Pain  Outcome: Progressing Towards Goal     Problem: Patient Education: Go to Patient Education Activity  Goal: Patient/Family Education  Outcome: Progressing Towards Goal     Problem: Diabetes Maintenance:Admission  Goal: *Blood glucose 80 to 180 mg/dl  Outcome: Progressing Towards Goal  Goal: *Adequate nutrition  Outcome: Progressing Towards Goal     Problem: Discharge Planning  Goal: *Discharge to safe environment  Outcome: Progressing Towards Goal     Problem: Patient Education: Go to Patient Education Activity  Goal: Patient/Family Education  Outcome: Progressing Towards Goal     Problem: Patient Education: Go to Patient Education Activity  Goal: Patient/Family Education  Outcome: Progressing Towards Goal     Problem: Patient Education: Go to Patient Education Activity  Goal: Patient/Family Education  Outcome: Progressing Towards Goal

## 2020-09-17 ENCOUNTER — OFFICE VISIT (OUTPATIENT)
Dept: SURGERY | Age: 80
End: 2020-09-17
Payer: MEDICARE

## 2020-09-17 VITALS — BODY MASS INDEX: 19.64 KG/M2 | WEIGHT: 145 LBS | HEIGHT: 72 IN | HEART RATE: 98 BPM

## 2020-09-17 DIAGNOSIS — K94.21 BLEEDING FROM GASTROSTOMY TUBE SITE (HCC): ICD-10-CM

## 2020-09-17 DIAGNOSIS — L05.92 PILONIDAL SINUS: Primary | ICD-10-CM

## 2020-09-17 PROCEDURE — 17250 CHEM CAUT OF GRANLTJ TISSUE: CPT | Performed by: SURGERY

## 2020-09-17 PROCEDURE — 99213 OFFICE O/P EST LOW 20 MIN: CPT | Performed by: SURGERY

## 2020-09-17 RX ORDER — GUAIFENESIN 100 MG/5ML
LIQUID (ML) ORAL
COMMUNITY

## 2020-09-17 RX ORDER — SILODOSIN 8 MG/1
8 CAPSULE ORAL
COMMUNITY

## 2020-09-17 RX ORDER — OMEPRAZOLE 20 MG/1
20 CAPSULE, DELAYED RELEASE ORAL DAILY
COMMUNITY

## 2020-09-17 RX ORDER — ACETAMINOPHEN 325 MG/1
650 TABLET ORAL
COMMUNITY
Start: 2020-05-02

## 2020-09-17 RX ORDER — CARBIDOPA AND LEVODOPA 25; 100 MG/1; MG/1
0.5 TABLET ORAL 2 TIMES DAILY
COMMUNITY
Start: 2020-05-09

## 2020-09-17 NOTE — PROGRESS NOTES
History of Present Illness  Destinee Mills is a 78 y.o. male presents for Cyst (Poss Pilonadal Cyst)  This is an 23-year-old male that was referred to us by dermatology for pilonidal sinus. He is present today with his wife. They thought approximately 4 months ago that he had a pressure ulcer and they have been applying cream to the area. The spot has been unchanged. He does not report any pain in this location. The area is not healing. His wife also complains of bleeding around the gastrostomy tube. Review of Systems   Constitutional: Negative for chills, diaphoresis, fever, malaise/fatigue and weight loss. Respiratory: Negative for cough, hemoptysis, sputum production and shortness of breath. Cardiovascular: Negative for chest pain, palpitations and orthopnea. Musculoskeletal: Negative for back pain, falls, joint pain and myalgias. Skin: Negative for itching and rash. Neurological: Negative for dizziness and headaches. Endo/Heme/Allergies: Does not bruise/bleed easily. Psychiatric/Behavioral: Negative for depression and suicidal ideas. Current Outpatient Medications:     acetaminophen (TYLENOL) 325 mg tablet, Take 650 mg by mouth every four (4) hours as needed. , Disp: , Rfl:     carbidopa-levodopa (SINEMET)  mg per tablet, Take 0.5 Tabs by mouth two (2) times a day., Disp: , Rfl:     aspirin 81 mg chewable tablet, aspirin 81 mg chewable tablet  Chew 1 tablet every day by oral route., Disp: , Rfl:     omeprazole (PRILOSEC) 20 mg capsule, Take 20 mg by mouth daily. , Disp: , Rfl:     silodosin (Rapaflo) 8 mg capsule, Take 8 mg by mouth daily (with breakfast). , Disp: , Rfl:     cyanocobalamin 1,000 mcg tablet, 1,000 mcg., Disp: , Rfl:     mirabegron ER (MYRBETRIQ) 25 mg ER tablet, Take 1 Tab by mouth daily. , Disp: 90 Tab, Rfl: 3    glimepiride (AMARYL) 1 mg tablet, , Disp: , Rfl:     aspirin-calcium carbonate 81 mg-300 mg calcium(777 mg) tab, 81 mg., Disp: , Rfl:    trospium (SANCTURA XL) 60 mg capsule, Take 1 Cap by mouth Daily (before breakfast). , Disp: 90 Cap, Rfl: 3    cinnamon bark (CINNAMON) 500 mg cap, Take 1,000 mg by mouth., Disp: , Rfl:     testosterone cypionate (DEPOTESTOTERONE CYPIONATE) 200 mg/mL injection, 200 mg by IntraMUSCular route., Disp: , Rfl:     metoprolol (LOPRESSOR) 25 mg tablet, , Disp: , Rfl:     benazepril (LOTENSIN) 10 mg tablet, , Disp: , Rfl:     tamsulosin (FLOMAX) 0.4 mg capsule, , Disp: , Rfl:     pravastatin (PRAVACHOL) 20 mg tablet, Take 20 mg by mouth nightly., Disp: , Rfl:     Allergies   Allergen Reactions    Iodinated Contrast Media Other (comments)     Hot flushing        Past Medical History:   Diagnosis Date    Benign prostatic hyperplasia with urinary obstruction     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    Diabetes ([facility]     Hypersomnia     Hypertension     Nocturia     OAB (overactive bladder)     Testicular cancer ([facility]     Urge incontinence     Urinary incontinence, urge        Past Surgical History:   Procedure Laterality Date    HX CYST REMOVAL  1960-1970s    cyst removed from right breast    HX OTHER SURGICAL      reemoval of testcle    HX OTHER SURGICAL  08/2018    Surgery: Blockage Lower bowels, [facility]    HX OTHER SURGICAL  01/2019    Heart Blockage: 30% [facility]: Dr. Boni Villarreal    HX UROLOGICAL      Testicles removed       Family History   Problem Relation Age of Onset    Stroke Mother     Heart Disease Mother     Stroke Father     Heart Disease Father     Cancer Brother         Testicular    Heart Disease Brother         Social History     Socioeconomic History    Marital status:      Spouse name: Not on file    Number of children: Not on file    Years of education: Not on file    Highest education level: Not on file   Occupational History    Not on file   Social Needs    Financial resource strain: Not on file    Food insecurity     Worry: Not on file Inability: Not on file    Transportation needs     Medical: Not on file     Non-medical: Not on file   Tobacco Use    Smoking status: Former Smoker     Types: Cigarettes     Last attempt to quit: 1976     Years since quittin.7    Smokeless tobacco: Never Used   Substance and Sexual Activity    Alcohol use: No     Alcohol/week: 0.0 standard drinks    Drug use: No    Sexual activity: Never     Partners: Female   Lifestyle    Physical activity     Days per week: Not on file     Minutes per session: Not on file    Stress: Not on file   Relationships    Social connections     Talks on phone: Not on file     Gets together: Not on file     Attends Mu-ism service: Not on file     Active member of club or organization: Not on file     Attends meetings of clubs or organizations: Not on file     Relationship status: Not on file    Intimate partner violence     Fear of current or ex partner: Not on file     Emotionally abused: Not on file     Physically abused: Not on file     Forced sexual activity: Not on file   Other Topics Concern    Not on file   Social History Narrative    Not on file       XR Results (maximum last 2): Results from [place] encounter on 20   XR CHEST PORT    Narrative EXAM: XR CHEST PORT    CLINICAL INDICATION/HISTORY: R/o pneumonia  -Additional: None    COMPARISON: None    TECHNIQUE: Portable frontal view of the chest    _______________    FINDINGS:    SUPPORT DEVICES: Left subclavian central venous catheter tip at the cavoatrial  junction. [facility] HEART AND MEDIASTINUM: Cardiomediastinal silhouette within normal limits. LUNGS AND PLEURAL SPACES: Patient rotated to the left. Left basilar atelectasis. No dense consolidation, large effusion or pneumothorax.    _______________      Impression IMPRESSION:    No acute cardiopulmonary abnormality. Patient rotated to the left. Left basilar  atelectasis. CT Results (maximum last 2):   No results found for this or any previous visit. MRI Results (maximum last 2): No results found for this or any previous visit. Nuclear Medicine Results (maximum last 3): No results found for this or any previous visit. US Results (maximum last 2): No results found for this or any previous visit. MAINE Results (maximum last 2): No results found for this or any previous visit. IR Results (maximum last 2): No results found for this or any previous visit. VAS/US Results (maximum last 2): No results found for this or any previous visit. PET Results (maximum last 2): No results found for this or any previous visit. Visit Vitals  Pulse 98   Ht 6' (1.829 m)   Wt 65.8 kg (145 lb)   BMI 19.67 kg/m²        Physical Exam  Constitutional:       Appearance: Normal appearance. He is normal weight. HENT:      Head: Normocephalic and atraumatic. Eyes:      Extraocular Movements: Extraocular movements intact. Conjunctiva/sclera: Conjunctivae normal.      Pupils: Pupils are equal, round, and reactive to light. Pulmonary:      Effort: Pulmonary effort is normal.   Abdominal:      General: Abdomen is flat. Bowel sounds are normal.      Palpations: Abdomen is soft. Comments: Large hypergranulation tissue surrounding gastrostomy fistula site, bleeding noted, non tender   Skin:     General: Skin is warm and dry. Comments: 4.0mm depression with keratin plug in gluteal cleft, non tender, no fluctuance noted, no drainage from skin   Neurological:      General: No focal deficit present. Mental Status: He is alert and oriented to person, place, and time. Mental status is at baseline. Psychiatric:         Mood and Affect: Mood normal.         Behavior: Behavior normal.         Thought Content: Thought content normal.         Judgment: Judgment normal.     Procedure: After verbal informed consent was obtained silver nitrate was applied to the hyper granulation tissue around the gastrostomy tube.   There is no evidence of bleeding. Patient tolerated application of the silver nitrate without any complaints. Gauze was then wrapped around the gastrostomy site. Assessment and Plan:    1. Pilonidal sinus-I think this may be a pilonidal sinus as well. I recommend they stop applying these creams as it is plugging up this area and could cause infection. The wife can continue to cleanse the area daily with regular soap and water and keep it dry. There is no need for any surgical intervention. There does not appear to be any infection. They can follow-up with us should the area get larger but that does not seem to be the case as it has been unchanged for several months. 2. Gastrostomy bleeding -he tolerated application of silver nitrate in the clinic without any complaints. I instructed his wife to change the gauze over the next few days and that he may have some drainage. Should they notice additional bleeding in the future they may need another application of silver nitrate. She agrees with this plan will call with as needed. Alfa Painter, DO    CC Providers:  Vanessa Perez MD  No ref.  provider found

## 2020-09-17 NOTE — PROGRESS NOTES
Sisi Sams presents today for   Chief Complaint   Patient presents with    Cyst     Poss Pilonadal Cyst       Is someone accompanying this pt? yes    Is the patient using any DME equipment during OV? no    Depression Screening:  3 most recent PHQ Screens 9/17/2020   Little interest or pleasure in doing things Not at all   Feeling down, depressed, irritable, or hopeless Not at all   Total Score PHQ 2 0       Learning Assessment:  No flowsheet data found. Abuse Screening:  No flowsheet data found. Fall Risk  Fall Risk Assessment, last 12 mths 9/17/2020   Able to walk? Yes   Fall in past 12 months? Yes   Fall with injury? No   Number of falls in past 12 months 3   Fall Risk Score 3       Coordination of Care:  1. Have you been to the ER, urgent care clinic since your last visit? Hospitalized since your last visit? NA  2. Have you seen or consulted any other health care providers outside of the [address] [place] [address] Jose Cruz since your last visit? Include any pap smears or colon screening.  NA

## 2020-11-18 ENCOUNTER — HOSPITAL ENCOUNTER (OUTPATIENT)
Dept: LAB | Age: 80
Discharge: HOME | End: 2020-11-18
Payer: MEDICARE

## 2020-11-18 ENCOUNTER — TRANSCRIBE ORDER (OUTPATIENT)
Dept: REGISTRATION | Age: 80
End: 2020-11-18

## 2020-11-18 DIAGNOSIS — E55.9 AVITAMINOSIS D: ICD-10-CM

## 2020-11-18 DIAGNOSIS — E11.9 DIABETES MELLITUS (HCC): ICD-10-CM

## 2020-11-18 DIAGNOSIS — D52.9 FOLATE-DEFICIENCY ANEMIA: ICD-10-CM

## 2020-11-18 DIAGNOSIS — E78.00 PURE HYPERCHOLESTEROLEMIA: ICD-10-CM

## 2020-11-18 DIAGNOSIS — D50.9 IRON DEFICIENCY ANEMIA, UNSPECIFIED: ICD-10-CM

## 2020-11-18 DIAGNOSIS — I10 ESSENTIAL HYPERTENSION, MALIGNANT: Primary | ICD-10-CM

## 2020-11-18 DIAGNOSIS — I10 ESSENTIAL HYPERTENSION, MALIGNANT: ICD-10-CM

## 2020-11-18 DIAGNOSIS — I51.9 MYXEDEMA HEART DISEASE: ICD-10-CM

## 2020-11-18 DIAGNOSIS — E53.8 BIOTIN-(PROPIONYL-COA-CARBOXYLASE) LIGASE DEFICIENCY: ICD-10-CM

## 2020-11-18 DIAGNOSIS — E03.9 MYXEDEMA HEART DISEASE: ICD-10-CM

## 2020-11-18 DIAGNOSIS — D64.9 ANEMIA, UNSPECIFIED: ICD-10-CM

## 2020-11-18 LAB
BASOPHILS # BLD: 0 K/UL (ref 0–0.1)
BASOPHILS NFR BLD: 1 % (ref 0–2)
EOSINOPHIL # BLD: 0.1 K/UL (ref 0–0.4)
EOSINOPHIL NFR BLD: 2 % (ref 0–5)
ERYTHROCYTE [DISTWIDTH] IN BLOOD BY AUTOMATED COUNT: 20.4 % (ref 11.6–14.5)
HCT VFR BLD AUTO: 34.5 % (ref 36–48)
HGB BLD-MCNC: 10.4 G/DL (ref 13–16)
IMM GRANULOCYTES # BLD AUTO: 0 K/UL
IMM GRANULOCYTES NFR BLD AUTO: 0 %
LYMPHOCYTES # BLD: 0.8 K/UL (ref 0.9–3.6)
LYMPHOCYTES NFR BLD: 20 % (ref 21–52)
MCH RBC QN AUTO: 28.2 PG (ref 24–34)
MCHC RBC AUTO-ENTMCNC: 30.1 G/DL (ref 31–37)
MCV RBC AUTO: 93.5 FL (ref 74–97)
MONOCYTES # BLD: 0.3 K/UL (ref 0.05–1.2)
MONOCYTES NFR BLD: 8 % (ref 3–10)
NEUTS SEG # BLD: 2.7 K/UL (ref 1.8–8)
NEUTS SEG NFR BLD: 69 % (ref 40–73)
PLATELET # BLD AUTO: 266 K/UL (ref 135–420)
PMV BLD AUTO: 11.3 FL (ref 9.2–11.8)
RBC # BLD AUTO: 3.69 M/UL (ref 4.7–5.5)
WBC # BLD AUTO: 3.8 K/UL (ref 4.6–13.2)

## 2020-11-18 PROCEDURE — 80061 LIPID PANEL: CPT

## 2020-11-18 PROCEDURE — 83036 HEMOGLOBIN GLYCOSYLATED A1C: CPT

## 2020-11-18 PROCEDURE — 82607 VITAMIN B-12: CPT

## 2020-11-18 PROCEDURE — 82306 VITAMIN D 25 HYDROXY: CPT

## 2020-11-18 PROCEDURE — 84439 ASSAY OF FREE THYROXINE: CPT

## 2020-11-18 PROCEDURE — 84443 ASSAY THYROID STIM HORMONE: CPT

## 2020-11-18 PROCEDURE — 85025 COMPLETE CBC W/AUTO DIFF WBC: CPT

## 2020-11-18 PROCEDURE — 84153 ASSAY OF PSA TOTAL: CPT

## 2020-11-18 PROCEDURE — 36415 COLL VENOUS BLD VENIPUNCTURE: CPT

## 2020-11-18 PROCEDURE — 82746 ASSAY OF FOLIC ACID SERUM: CPT

## 2020-11-18 PROCEDURE — 82728 ASSAY OF FERRITIN: CPT

## 2020-11-18 PROCEDURE — 80053 COMPREHEN METABOLIC PANEL: CPT

## 2020-11-19 LAB
25(OH)D3 SERPL-MCNC: 33.2 NG/ML (ref 30–100)
CHOLEST SERPL-MCNC: 152 MG/DL
EST. AVERAGE GLUCOSE BLD GHB EST-MCNC: 117 MG/DL
FERRITIN SERPL-MCNC: 74 NG/ML (ref 26–388)
FOLATE SERPL-MCNC: 22.5 NG/ML (ref 5–21)
HBA1C MFR BLD: 5.7 % (ref 4–5.6)
HDLC SERPL-MCNC: 77 MG/DL
HDLC SERPL: 2 {RATIO} (ref 0–5)
LDLC SERPL CALC-MCNC: 59.4 MG/DL (ref 0–100)
LIPID PROFILE,FLP: NORMAL
PSA SERPL-MCNC: 0.3 NG/ML (ref 0.01–4)
T4 FREE SERPL-MCNC: 1.2 NG/DL (ref 0.8–1.5)
TRIGL SERPL-MCNC: 78 MG/DL (ref ?–150)
VIT B12 SERPL-MCNC: 1348 PG/ML (ref 193–986)
VLDLC SERPL CALC-MCNC: 15.6 MG/DL

## 2021-12-11 ENCOUNTER — APPOINTMENT (OUTPATIENT)
Dept: GENERAL RADIOLOGY | Age: 81
End: 2021-12-11
Attending: EMERGENCY MEDICINE
Payer: MEDICARE

## 2021-12-11 ENCOUNTER — HOSPITAL ENCOUNTER (EMERGENCY)
Age: 81
Discharge: HOME | End: 2021-12-12
Attending: EMERGENCY MEDICINE | Admitting: EMERGENCY MEDICINE
Payer: MEDICARE

## 2021-12-11 DIAGNOSIS — R60.0 PEDAL EDEMA: ICD-10-CM

## 2021-12-11 DIAGNOSIS — Z51.89 VISIT FOR WOUND CHECK: Primary | ICD-10-CM

## 2021-12-11 DIAGNOSIS — N18.9 CHRONIC KIDNEY DISEASE, UNSPECIFIED CKD STAGE: ICD-10-CM

## 2021-12-11 PROCEDURE — 83880 ASSAY OF NATRIURETIC PEPTIDE: CPT

## 2021-12-11 PROCEDURE — 71045 X-RAY EXAM CHEST 1 VIEW: CPT

## 2021-12-11 PROCEDURE — 85025 COMPLETE CBC W/AUTO DIFF WBC: CPT

## 2021-12-11 PROCEDURE — 36415 COLL VENOUS BLD VENIPUNCTURE: CPT

## 2021-12-11 PROCEDURE — 99284 EMERGENCY DEPT VISIT MOD MDM: CPT

## 2021-12-11 PROCEDURE — 80048 BASIC METABOLIC PNL TOTAL CA: CPT

## 2021-12-11 RX ORDER — BUMETANIDE 1 MG/1
1 TABLET ORAL DAILY
COMMUNITY

## 2021-12-11 RX ORDER — SENNOSIDES 8.6 MG/1
CAPSULE, GELATIN COATED ORAL
COMMUNITY

## 2021-12-12 VITALS
OXYGEN SATURATION: 95 % | SYSTOLIC BLOOD PRESSURE: 131 MMHG | TEMPERATURE: 97.8 F | RESPIRATION RATE: 18 BRPM | BODY MASS INDEX: 19.23 KG/M2 | HEIGHT: 72 IN | WEIGHT: 142 LBS | DIASTOLIC BLOOD PRESSURE: 54 MMHG | HEART RATE: 57 BPM

## 2021-12-12 LAB
ANION GAP SERPL CALC-SCNC: 10 MMOL/L
BASOPHILS # BLD: 0.1 K/UL (ref 0–0.1)
BASOPHILS NFR BLD: 1 % (ref 0–2)
BNP SERPL-MCNC: 153 PG/ML (ref 0–100)
BUN SERPL-MCNC: 42 MG/DL (ref 9–21)
BUN/CREAT SERPL: 18
CA-I BLD-MCNC: 8.3 MG/DL (ref 8.5–10.5)
CHLORIDE SERPL-SCNC: 103 MMOL/L (ref 94–111)
CO2 SERPL-SCNC: 26 MMOL/L (ref 21–33)
CREAT SERPL-MCNC: 2.4 MG/DL (ref 0.8–1.5)
DIFFERENTIAL METHOD BLD: ABNORMAL
EOSINOPHIL # BLD: 0.1 K/UL (ref 0–0.4)
EOSINOPHIL NFR BLD: 2 % (ref 0–5)
ERYTHROCYTE [DISTWIDTH] IN BLOOD BY AUTOMATED COUNT: 12.9 % (ref 11.6–14.5)
GLUCOSE SERPL-MCNC: 252 MG/DL (ref 70–110)
HCT VFR BLD AUTO: 38.6 % (ref 36–48)
HGB BLD-MCNC: 12.6 G/DL (ref 13–16)
IMM GRANULOCYTES # BLD AUTO: 0 K/UL (ref 0–0.04)
IMM GRANULOCYTES NFR BLD AUTO: 0 % (ref 0–0.5)
LYMPHOCYTES # BLD: 1.2 K/UL (ref 0.9–3.6)
LYMPHOCYTES NFR BLD: 21 % (ref 21–52)
MCH RBC QN AUTO: 31 PG (ref 24–34)
MCHC RBC AUTO-ENTMCNC: 32.6 G/DL (ref 31–37)
MCV RBC AUTO: 95.1 FL (ref 78–100)
MONOCYTES # BLD: 0.5 K/UL (ref 0.05–1.2)
MONOCYTES NFR BLD: 10 % (ref 3–10)
NEUTS SEG # BLD: 3.8 K/UL (ref 1.8–8)
NEUTS SEG NFR BLD: 66 % (ref 40–73)
NRBC # BLD: 0 K/UL (ref 0–0.01)
NRBC BLD-RTO: 0 PER 100 WBC
PLATELET # BLD AUTO: 126 K/UL (ref 135–420)
PMV BLD AUTO: 12.8 FL (ref 9.2–11.8)
POTASSIUM SERPL-SCNC: 4 MMOL/L (ref 3.2–5.1)
RBC # BLD AUTO: 4.06 M/UL (ref 4.35–5.65)
SODIUM SERPL-SCNC: 139 MMOL/L (ref 135–145)
WBC # BLD AUTO: 5.7 K/UL (ref 4.6–13.2)

## 2021-12-12 PROCEDURE — 74011250637 HC RX REV CODE- 250/637: Performed by: EMERGENCY MEDICINE

## 2021-12-12 RX ORDER — DOXYCYCLINE HYCLATE 100 MG
100 TABLET ORAL
Status: COMPLETED | OUTPATIENT
Start: 2021-12-12 | End: 2021-12-12

## 2021-12-12 RX ORDER — DOXYCYCLINE 100 MG/1
100 CAPSULE ORAL 2 TIMES DAILY
Qty: 14 CAPSULE | Refills: 0 | Status: SHIPPED | OUTPATIENT
Start: 2021-12-12 | End: 2021-12-19

## 2021-12-12 RX ADMIN — DOXYCYCLINE HYCLATE 100 MG: 100 TABLET, COATED ORAL at 01:02

## 2021-12-12 NOTE — ED PROVIDER NOTES
EMERGENCY DEPARTMENT HISTORY AND PHYSICAL EXAM      Date: 12/11/2021  Patient Name: Sandra Acosta    History of Presenting Illness     Chief Complaint   Patient presents with    Post-Op Problem       History Provided By: Patient and Patient's Wife    HPI: Sandra Acosta, 78 y.o. male with a past medical history significant Multiple medical problems including hypertension, testicular cancer, diabetes, dementia, neck renal disease, aortic stenosis s/p TAVR presents to the ED, brought in by wife who reports is concerned about possible infection to patient's right leg wound. Wife reports patient had surgery for removal of melanoma and is supposed to have stitches removed in 2 days. She states this is noted the wound to be red and is concerned there may be infection. Wife however states that the redness has been there since surgery. There is no history of fever, chest pain or shortness of breath, or abdomen pain. Patient's wife denies any history of CHF however she states patient sees to cardiologist, Dr. Demar Agarwal and Dr. Roverto Ngo. Wife also reports that she noted patient's legs more swollen than usual today. Patient is rather poor historian with dementia however he denies any pain, has no shortness of breath, he has no fever or chills. There are no other complaints, changes, or physical findings at this time. PCP: Daja Rader MD    No current facility-administered medications on file prior to encounter. Current Outpatient Medications on File Prior to Encounter   Medication Sig Dispense Refill    bumetanide (BUMEX) 1 mg tablet Take 1 mg by mouth daily.  sennosides (Senna) 8.6 mg cap Take  by mouth.  carbidopa-levodopa (SINEMET)  mg per tablet Take 0.5 Tabs by mouth two (2) times a day.  aspirin 81 mg chewable tablet aspirin 81 mg chewable tablet   Chew 1 tablet every day by oral route.  omeprazole (PRILOSEC) 20 mg capsule Take 20 mg by mouth daily.       silodosin (Rapaflo) 8 mg capsule Take 8 mg by mouth daily (with breakfast).  mirabegron ER (MYRBETRIQ) 25 mg ER tablet Take 1 Tab by mouth daily. 90 Tab 3    glimepiride (AMARYL) 1 mg tablet       cyanocobalamin 1,000 mcg tablet 1,000 mcg.  testosterone cypionate (DEPOTESTOTERONE CYPIONATE) 200 mg/mL injection 200 mg by IntraMUSCular route.  pravastatin (PRAVACHOL) 20 mg tablet Take 20 mg by mouth nightly.  acetaminophen (TYLENOL) 325 mg tablet Take 650 mg by mouth every four (4) hours as needed.  aspirin-calcium carbonate 81 mg-300 mg calcium(777 mg) tab 81 mg.      trospium (SANCTURA XL) 60 mg capsule Take 1 Cap by mouth Daily (before breakfast). 90 Cap 3    cinnamon bark (CINNAMON) 500 mg cap Take 1,000 mg by mouth.       metoprolol (LOPRESSOR) 25 mg tablet       benazepril (LOTENSIN) 10 mg tablet       tamsulosin (FLOMAX) 0.4 mg capsule          Past History     Past Medical History:  Past Medical History:   Diagnosis Date    Benign prostatic hyperplasia with urinary obstruction     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    Diabetes ([facility]     Hypersomnia     Hypertension     Nocturia     OAB (overactive bladder)     Testicular cancer (HCC)     Urge incontinence     Urinary incontinence, urge        Past Surgical History:  Past Surgical History:   Procedure Laterality Date    HX CYST REMOVAL  1960-1970s    cyst removed from right breast    HX OTHER SURGICAL      reemoval of testcle    HX OTHER SURGICAL  08/2018    Surgery: Blockage Lower bowels, [facility]    HX OTHER SURGICAL  01/2019    Heart Blockage: 30% [place] [facility]: Dr. Gladis Lagos    HX UROLOGICAL      Testicles removed       Family History:  Family History   Problem Relation Age of Onset    Stroke Mother     Heart Disease Mother     Stroke Father     Heart Disease Father     Cancer Brother         Testicular    Heart Disease Brother        Social History:  Social History     Tobacco Use    Smoking status: Former Smoker     Types: Cigarettes     Quit date: 1976     Years since quittin.9    Smokeless tobacco: Never Used   Substance Use Topics    Alcohol use: No     Alcohol/week: 0.0 standard drinks    Drug use: No       Allergies: Allergies   Allergen Reactions    Iodinated Contrast Media Other (comments)     Hot flushing          Review of Systems     Review of Systems   Constitutional: Negative for chills and fever. HENT: Negative for congestion and sore throat. Respiratory: Negative for cough and shortness of breath. Cardiovascular: Negative for chest pain and palpitations. Gastrointestinal: Negative for abdominal pain, nausea and vomiting. Genitourinary: Negative for dysuria, flank pain and frequency. Musculoskeletal: Positive for joint swelling. Negative for arthralgias and myalgias. Skin: Positive for wound. Negative for color change. Neurological: Negative for dizziness, weakness, light-headedness and headaches. Hematological: Negative for adenopathy. Physical Exam     Physical Exam  Vitals and nursing note reviewed. Constitutional:       General: He is not in acute distress. Appearance: He is well-developed. He is not diaphoretic. Comments: Elderly male in no acute distress. HENT:      Head: Normocephalic and atraumatic. No right periorbital erythema or left periorbital erythema. Jaw: No trismus. Right Ear: External ear normal. No drainage or swelling. Tympanic membrane is not perforated, erythematous or bulging. Left Ear: External ear normal. No drainage or swelling. Tympanic membrane is not perforated, erythematous or bulging. Nose: Nose normal. No mucosal edema or rhinorrhea. Right Sinus: No maxillary sinus tenderness or frontal sinus tenderness. Left Sinus: No maxillary sinus tenderness or frontal sinus tenderness. Mouth/Throat:      Mouth: No oral lesions. Dentition: No dental abscesses. Pharynx: Uvula midline. No oropharyngeal exudate, posterior oropharyngeal erythema or uvula swelling. Tonsils: No tonsillar abscesses. Eyes:      General: No scleral icterus. Right eye: No discharge. Left eye: No discharge. Conjunctiva/sclera: Conjunctivae normal.   Cardiovascular:      Rate and Rhythm: Normal rate and regular rhythm. Heart sounds: Normal heart sounds. No murmur heard. No friction rub. No gallop. Pulmonary:      Effort: Pulmonary effort is normal. No tachypnea, accessory muscle usage or respiratory distress. Breath sounds: Normal breath sounds. No decreased breath sounds, wheezing, rhonchi or rales. Abdominal:      General: Bowel sounds are normal. There is no distension. Palpations: Abdomen is soft. Tenderness: There is no abdominal tenderness. Musculoskeletal:         General: No tenderness. Normal range of motion. Cervical back: Normal range of motion and neck supple. Right lower leg: Edema present. Left lower leg: Edema present. Comments: 2+ edema bilaterally. Karen Patee' sign is negative. Lymphadenopathy:      Cervical: No cervical adenopathy. Skin:     General: Skin is warm and dry. Comments: There is wound with intact sutures right proximal lateral leg. The sutures seem indented into the skin. There is minimal erythema along both sides of the wound. No induration, no exudates, minimal tenderness to palpation. Neurological:      Mental Status: He is alert and oriented to person, place, and time.    Psychiatric:         Judgment: Judgment normal.         Lab and Diagnostic Study Results     Labs -     Recent Results (from the past 12 hour(s))   CBC WITH AUTOMATED DIFF    Collection Time: 12/11/21 11:35 PM   Result Value Ref Range    WBC 5.7 4.6 - 13.2 K/uL    RBC 4.06 (L) 4.35 - 5.65 M/uL    HGB 12.6 (L) 13.0 - 16.0 g/dL    HCT 38.6 36.0 - 48.0 %    MCV 95.1 78.0 - 100.0 FL    MCH 31.0 24.0 - 34.0 PG    MCHC 32.6 31.0 - 37.0 g/dL    RDW 12.9 11.6 - 14.5 %    PLATELET 465 (L) 845 - 420 K/uL    MPV 12.8 (H) 9.2 - 11.8 FL    NRBC 0.0 0.0  WBC    ABSOLUTE NRBC 0.00 0.00 - 0.01 K/uL    NEUTROPHILS 66 40 - 73 %    LYMPHOCYTES 21 21 - 52 %    MONOCYTES 10 3 - 10 %    EOSINOPHILS 2 0 - 5 %    BASOPHILS 1 0 - 2 %    IMMATURE GRANULOCYTES 0 0 - 0.5 %    ABS. NEUTROPHILS 3.8 1.8 - 8.0 K/UL    ABS. LYMPHOCYTES 1.2 0.9 - 3.6 K/UL    ABS. MONOCYTES 0.5 0.05 - 1.2 K/UL    ABS. EOSINOPHILS 0.1 0.0 - 0.4 K/UL    ABS. BASOPHILS 0.1 0.0 - 0.1 K/UL    ABS. IMM. GRANS. 0.0 0.00 - 0.04 K/UL    DF AUTOMATED     METABOLIC PANEL, BASIC    Collection Time: 12/11/21 11:35 PM   Result Value Ref Range    Sodium 139 135 - 145 mmol/L    Potassium 4.0 3.2 - 5.1 mmol/L    Chloride 103 94 - 111 mmol/L    CO2 26 21 - 33 mmol/L    Anion gap 10 mmol/L    Glucose 252 (H) 70 - 110 mg/dL    BUN 42 (H) 9 - 21 mg/dL    Creatinine 2.40 (H) 0.8 - 1.50 mg/dL    BUN/Creatinine ratio 18      GFR est AA 31 ml/min/1.73m2    GFR est non-AA 26 ml/min/1.73m2    Calcium 8.3 (L) 8.5 - 10.5 mg/dL   BNP    Collection Time: 12/11/21 11:35 PM   Result Value Ref Range     (H) 0 - 100 pg/mL       Radiologic Studies -   @lastxrresult@  CT Results  (Last 48 hours)    None        CXR Results  (Last 48 hours)               12/12/21 0001  XR CHEST PORT Final result    Impression:      No acute process. Narrative:  CLINICAL: Chest pain. COMPARISON: April 6, 2020. A portable view of the chest from 2353 hours:       No acute airspace disease. Pleural spaces are clear. Mediastinal silhouette   stable. Status post TAVR                   Medical Decision Making   - I am the first provider for this patient. - I reviewed the vital signs, available nursing notes, past medical history, past surgical history, family history and social history. - Initial assessment performed.  The patients presenting problems have been discussed, and they are in agreement with the care plan formulated and outlined with them. I have encouraged them to ask questions as they arise throughout their visit. Vital Signs-Reviewed the patient's vital signs. Patient Vitals for the past 12 hrs:   Temp Pulse Resp BP SpO2   12/11/21 2300 97.8 °F (36.6 °C) 69 18 (!) 149/69 96 %       Records Reviewed: Nursing Notes, Old Medical Records, Previous Radiology Studies and Previous Laboratory Studies    The patient presents with with wife, for wound check, and wife is noted increased swelling both legs. ED Course:     Wound appears to be healing well-sutures intact however some of the sutures indurated into the skin. There is supposed to be removed in 2 days. Wife is not quite clear about the signs of infection, she however prefers patient be on antibiotics. There is some erythema along the the wound, not clear if this is new  Wife reports swelling of both legs has worsened. Reviewed patient's last echo which was normal but his grade 1 diastolic dysfunction. Patient is also on Bumex. CBC and BMP unremarkable except for slight increase in creatinine 2.4. Not clear patient's baseline but seems around 1.8-2.0. Will place patient on doxycycline, he is to follow-up with his surgeon in 2 days anyhow for suture removal.  Will also schedule patient for duplex scan to rule out DVT. He is at increased risk of this. Advised the wife to have patient follow-up with PCP after duplex scan. Provider Notes (Medical Decision Making):           Procedures   Medical Decision Makingedical Decision Making      Disposition   Disposition: Condition stable  DC- Adult Discharges: All of the diagnostic tests were reviewed and questions answered. Diagnosis, care plan and treatment options were discussed. The patient understands the instructions and will follow up as directed. The patients results have been reviewed with them. They have been counseled regarding their diagnosis.   The patient and spouse/SO verbally convey understanding and agreement of the signs, symptoms, diagnosis, treatment and prognosis and additionally agrees to follow up as recommended with their PCP in 24 - 48 hours. They also agree with the care-plan and convey that all of their questions have been answered. I have also put together some discharge instructions for them that include: 1) educational information regarding their diagnosis, 2) how to care for their diagnosis at home, as well a 3) list of reasons why they would want to return to the ED prior to their follow-up appointment, should their condition change. Diagnosis:   1. Visit for wound check    2. Pedal edema    3. Chronic kidney disease, unspecified CKD stage          Disposition:     Follow-up Information     Follow up With Specialties Details Why Contact Info    Arnold Pappas MD Internal Medicine  If symptoms worsen [address] [facility]  [phone]      [facility] EMERGENCY DEPT Emergency Medicine   [place] [id number]  [phone]          Patient's Medications   Start Taking    DOXYCYCLINE (MONODOX) 100 MG CAPSULE    Take 1 Capsule by mouth two (2) times a day for 7 days. Continue Taking    ACETAMINOPHEN (TYLENOL) 325 MG TABLET    Take 650 mg by mouth every four (4) hours as needed. ASPIRIN 81 MG CHEWABLE TABLET    aspirin 81 mg chewable tablet   Chew 1 tablet every day by oral route. ASPIRIN-CALCIUM CARBONATE 81 MG-300 MG CALCIUM(777 MG) TAB    81 mg. BENAZEPRIL (LOTENSIN) 10 MG TABLET        BUMETANIDE (BUMEX) 1 MG TABLET    Take 1 mg by mouth daily. CARBIDOPA-LEVODOPA (SINEMET)  MG PER TABLET    Take 0.5 Tabs by mouth two (2) times a day. CINNAMON BARK (CINNAMON) 500 MG CAP    Take 1,000 mg by mouth. CYANOCOBALAMIN 1,000 MCG TABLET    1,000 mcg. GLIMEPIRIDE (AMARYL) 1 MG TABLET        METOPROLOL (LOPRESSOR) 25 MG TABLET        MIRABEGRON ER (MYRBETRIQ) 25 MG ER TABLET    Take 1 Tab by mouth daily. OMEPRAZOLE (PRILOSEC) 20 MG CAPSULE    Take 20 mg by mouth daily. PRAVASTATIN (PRAVACHOL) 20 MG TABLET    Take 20 mg by mouth nightly. SENNOSIDES (SENNA) 8.6 MG CAP    Take  by mouth. SILODOSIN (RAPAFLO) 8 MG CAPSULE    Take 8 mg by mouth daily (with breakfast). TAMSULOSIN (FLOMAX) 0.4 MG CAPSULE        TESTOSTERONE CYPIONATE (DEPOTESTOTERONE CYPIONATE) 200 MG/ML INJECTION    200 mg by IntraMUSCular route. TROSPIUM (SANCTURA XL) 60 MG CAPSULE    Take 1 Cap by mouth Daily (before breakfast). These Medications have changed    No medications on file   Stop Taking    No medications on file       DISCHARGE PLAN:  1. Current Discharge Medication List      CONTINUE these medications which have NOT CHANGED    Details   bumetanide (BUMEX) 1 mg tablet Take 1 mg by mouth daily. sennosides (Senna) 8.6 mg cap Take  by mouth.      carbidopa-levodopa (SINEMET)  mg per tablet Take 0.5 Tabs by mouth two (2) times a day. aspirin 81 mg chewable tablet aspirin 81 mg chewable tablet   Chew 1 tablet every day by oral route. omeprazole (PRILOSEC) 20 mg capsule Take 20 mg by mouth daily. silodosin (Rapaflo) 8 mg capsule Take 8 mg by mouth daily (with breakfast). mirabegron ER (MYRBETRIQ) 25 mg ER tablet Take 1 Tab by mouth daily. Qty: 90 Tab, Refills: 3      glimepiride (AMARYL) 1 mg tablet       cyanocobalamin 1,000 mcg tablet 1,000 mcg. testosterone cypionate (DEPOTESTOTERONE CYPIONATE) 200 mg/mL injection 200 mg by IntraMUSCular route. pravastatin (PRAVACHOL) 20 mg tablet Take 20 mg by mouth nightly. acetaminophen (TYLENOL) 325 mg tablet Take 650 mg by mouth every four (4) hours as needed. aspirin-calcium carbonate 81 mg-300 mg calcium(777 mg) tab 81 mg.      trospium (SANCTURA XL) 60 mg capsule Take 1 Cap by mouth Daily (before breakfast).   Qty: 90 Cap, Refills: 3      cinnamon bark (CINNAMON) 500 mg cap Take 1,000 mg by mouth.      metoprolol (LOPRESSOR) 25 mg tablet       benazepril (LOTENSIN) 10 mg tablet       tamsulosin (FLOMAX) 0.4 mg capsule            2.   Follow-up Information     Follow up With Specialties Details Why Contact Info    Lana Rajput MD Internal Medicine  If symptoms worsen [address] [facility]  [place] [address]  [phone]      [facility] EMERGENCY DEPT Emergency Medicine   [facility] [address] [id number]  [phone]        3. Return to ED if worse   4. Current Discharge Medication List      START taking these medications    Details   doxycycline (MONODOX) 100 mg capsule Take 1 Capsule by mouth two (2) times a day for 7 days. Qty: 14 Capsule, Refills: 0  Start date: 12/12/2021, End date: 12/19/2021               Diagnosis     Clinical Impression:   1. Visit for wound check    2. Pedal edema    3. Chronic kidney disease, unspecified CKD stage        Attestations:    Agnes Lima MD    Please note that this dictation was completed with [employer], the computer voice recognition software. Quite often unanticipated grammatical, syntax, homophones, and other interpretive errors are inadvertently transcribed by the computer software. Please disregard these errors. Please excuse any errors that have escaped final proofreading. Thank you.

## 2021-12-12 NOTE — ED TRIAGE NOTES
Skin cancer removal aprox 2 weeks ago, concern for infection by family. Redness noted surrounding sutures, per pt and wife, not new, not hot to touch.

## 2021-12-12 NOTE — ED NOTES
I have reviewed discharge instructions with the patient and spouse. The patient and spouse verbalized understanding. Patient escorted to waiting room with steady gait and no distress noted.

## 2021-12-13 ENCOUNTER — TRANSCRIBE ORDER (OUTPATIENT)
Dept: REGISTRATION | Age: 81
End: 2021-12-13

## 2021-12-13 ENCOUNTER — HOSPITAL ENCOUNTER (OUTPATIENT)
Dept: LAB | Age: 81
Discharge: HOME | End: 2021-12-13
Payer: MEDICARE

## 2021-12-13 DIAGNOSIS — Z12.5 SPECIAL SCREENING FOR MALIGNANT NEOPLASM OF PROSTATE: ICD-10-CM

## 2021-12-13 DIAGNOSIS — I51.9 MYXEDEMA HEART DISEASE: ICD-10-CM

## 2021-12-13 DIAGNOSIS — M79.661 PAIN IN BOTH LOWER LEGS: ICD-10-CM

## 2021-12-13 DIAGNOSIS — E11.9 DIABETES MELLITUS (HCC): ICD-10-CM

## 2021-12-13 DIAGNOSIS — M79.661 PAIN IN BOTH LOWER LEGS: Primary | ICD-10-CM

## 2021-12-13 DIAGNOSIS — E53.8 BIOTIN-(PROPIONYL-COA-CARBOXYLASE) LIGASE DEFICIENCY: ICD-10-CM

## 2021-12-13 DIAGNOSIS — D52.9 FOLATE-DEFICIENCY ANEMIA: ICD-10-CM

## 2021-12-13 DIAGNOSIS — E03.9 MYXEDEMA HEART DISEASE: ICD-10-CM

## 2021-12-13 DIAGNOSIS — M79.662 PAIN IN BOTH LOWER LEGS: ICD-10-CM

## 2021-12-13 DIAGNOSIS — I10 ESSENTIAL HYPERTENSION, MALIGNANT: ICD-10-CM

## 2021-12-13 DIAGNOSIS — M79.662 PAIN IN BOTH LOWER LEGS: Primary | ICD-10-CM

## 2021-12-13 DIAGNOSIS — D50.9 IRON DEFICIENCY ANEMIA, UNSPECIFIED: ICD-10-CM

## 2021-12-13 DIAGNOSIS — I10 ESSENTIAL HYPERTENSION, MALIGNANT: Primary | ICD-10-CM

## 2021-12-13 DIAGNOSIS — E55.9 AVITAMINOSIS D: ICD-10-CM

## 2021-12-13 DIAGNOSIS — E78.00 PURE HYPERCHOLESTEROLEMIA: ICD-10-CM

## 2021-12-13 DIAGNOSIS — D64.9 ANEMIA, UNSPECIFIED: ICD-10-CM

## 2021-12-13 LAB
25(OH)D3 SERPL-MCNC: 34.5 NG/ML (ref 30–100)
BASOPHILS # BLD: 0 K/UL (ref 0–0.1)
BASOPHILS NFR BLD: 0 % (ref 0–2)
CHOLEST SERPL-MCNC: 122 MG/DL
D DIMER PPP FEU-MCNC: 0.68 UG/ML(FEU)
DIFFERENTIAL METHOD BLD: ABNORMAL
EOSINOPHIL # BLD: 0.1 K/UL (ref 0–0.4)
EOSINOPHIL NFR BLD: 2 % (ref 0–5)
ERYTHROCYTE [DISTWIDTH] IN BLOOD BY AUTOMATED COUNT: 12.7 % (ref 11.6–14.5)
EST. AVERAGE GLUCOSE BLD GHB EST-MCNC: 143 MG/DL
FERRITIN SERPL-MCNC: 63 NG/ML (ref 8–388)
HBA1C MFR BLD: 6.6 % (ref 4.2–5.6)
HCT VFR BLD AUTO: 42 % (ref 36–48)
HDLC SERPL-MCNC: 66 MG/DL (ref 40–60)
HDLC SERPL: 1.8 {RATIO} (ref 0–5)
HGB BLD-MCNC: 13.5 G/DL (ref 13–16)
IMM GRANULOCYTES # BLD AUTO: 0 K/UL (ref 0–0.04)
IMM GRANULOCYTES NFR BLD AUTO: 0 % (ref 0–0.5)
LDLC SERPL CALC-MCNC: 47.2 MG/DL (ref 0–100)
LIPID PROFILE,FLP: ABNORMAL
LYMPHOCYTES # BLD: 1.3 K/UL (ref 0.9–3.6)
LYMPHOCYTES NFR BLD: 25 % (ref 21–52)
MCH RBC QN AUTO: 30.8 PG (ref 24–34)
MCHC RBC AUTO-ENTMCNC: 32.1 G/DL (ref 31–37)
MCV RBC AUTO: 95.9 FL (ref 78–100)
MONOCYTES # BLD: 0.4 K/UL (ref 0.05–1.2)
MONOCYTES NFR BLD: 8 % (ref 3–10)
NEUTS SEG # BLD: 3.3 K/UL (ref 1.8–8)
NEUTS SEG NFR BLD: 65 % (ref 40–73)
NRBC # BLD: 0 K/UL (ref 0–0.01)
NRBC BLD-RTO: 0 PER 100 WBC
PLATELET # BLD AUTO: 133 K/UL (ref 135–420)
PMV BLD AUTO: 12.5 FL (ref 9.2–11.8)
PSA SERPL-MCNC: 0.8 NG/ML (ref 0–4)
RBC # BLD AUTO: 4.38 M/UL (ref 4.35–5.65)
T4 FREE SERPL-MCNC: 1 NG/DL (ref 0.7–1.5)
TRIGL SERPL-MCNC: 44 MG/DL (ref ?–150)
TSH SERPL DL<=0.05 MIU/L-ACNC: 7.5 UIU/ML (ref 0.35–6.2)
VIT B12 SERPL-MCNC: 416 PG/ML (ref 211–911)
VLDLC SERPL CALC-MCNC: 8.8 MG/DL
WBC # BLD AUTO: 5.2 K/UL (ref 4.6–13.2)

## 2021-12-13 PROCEDURE — 80061 LIPID PANEL: CPT

## 2021-12-13 PROCEDURE — 82607 VITAMIN B-12: CPT

## 2021-12-13 PROCEDURE — 85379 FIBRIN DEGRADATION QUANT: CPT

## 2021-12-13 PROCEDURE — 82746 ASSAY OF FOLIC ACID SERUM: CPT

## 2021-12-13 PROCEDURE — 84439 ASSAY OF FREE THYROXINE: CPT

## 2021-12-13 PROCEDURE — 36415 COLL VENOUS BLD VENIPUNCTURE: CPT

## 2021-12-13 PROCEDURE — 83036 HEMOGLOBIN GLYCOSYLATED A1C: CPT

## 2021-12-13 PROCEDURE — 84443 ASSAY THYROID STIM HORMONE: CPT

## 2021-12-13 PROCEDURE — 82306 VITAMIN D 25 HYDROXY: CPT

## 2021-12-13 PROCEDURE — 84153 ASSAY OF PSA TOTAL: CPT

## 2021-12-13 PROCEDURE — 82728 ASSAY OF FERRITIN: CPT

## 2021-12-13 PROCEDURE — 85025 COMPLETE CBC W/AUTO DIFF WBC: CPT

## 2022-01-31 NOTE — PROGRESS NOTES
CHIEF COMPLAINT:  Chronic neck pain.      HISTORY OF PRESENT ILLNESS:  The patient has significant neck pain. The patient is recommended to have cervical interventional pain procedures. [facility]    PAST MEDICAL HISTORY: reviewed and documented in chart.   SOCIAL HISTORY: reviewed and documented in chart.   ALLERGIES: reviewed and documented in chart.   CURRENT MEDICATIONS: reviewed and documented in chart.   FAMILY HISTORY: reviewed and documented in chart.   REVIEW OF SYSTEMS: A 14-system review was negative except for problem mentioned above.     PHYSICAL EXAM:  VITALS: See nursing note; I have personally reviewed patient's OR holding area vital signs.   GENERAL: Patient is alert and oriented and in no obvious distress  PSYCH: Patient’s mood is appropriate with normal affect.   HEEN: eyes, nose and ears are without discharge.   LUNGS: non labored breathing, good chest expansion.  HEART: extremities warm and well perfused  ABDOMEN: soft and non-distended.   CERVICAL SPINE: Tenderness to palpation along cervical paraspinals.      ASSESSMENT:  cervical facet arthropathy.    PLAN:  1. Bilateral C2,3,4 medial branch block for diagnostic purpose.  2. Risks and benefits explained to patient. Consent obtained.   3. Re-evaluation in two weeks in clinic or call in 2-3 days.   Problem: Falls - Risk of  Goal: *Absence of Falls  Description: Document Clydene Bone Fall Risk and appropriate interventions in the flowsheet. Outcome: Progressing Towards Goal  Note: Fall Risk Interventions:  Mobility Interventions: Patient to call before getting OOB, PT Consult for mobility concerns         Medication Interventions: Patient to call before getting OOB, Teach patient to arise slowly    Elimination Interventions: Call light in reach, Patient to call for help with toileting needs, Toileting schedule/hourly rounds, Urinal in reach    History of Falls Interventions: Door open when patient unattended, Room close to nurse's station         Problem: Patient Education: Go to Patient Education Activity  Goal: Patient/Family Education  Outcome: Progressing Towards Goal     Problem: Pressure Injury - Risk of  Goal: *Prevention of pressure injury  Description: Document Rustam Scale and appropriate interventions in the flowsheet. Outcome: Progressing Towards Goal  Note: Pressure Injury Interventions:  Sensory Interventions: Float heels, Keep linens dry and wrinkle-free, Maintain/enhance activity level, Minimize linen layers, Pad between skin to skin, Avoid rigorous massage over bony prominences, Assess need for specialty bed, Check visual cues for pain, Turn and reposition approx.  every two hours (pillows and wedges if needed)    Moisture Interventions: Check for incontinence Q2 hours and as needed, Limit adult briefs, Maintain skin hydration (lotion/cream), Minimize layers, Moisture barrier, Offer toileting Q_hr    Activity Interventions: Pressure redistribution bed/mattress(bed type), PT/OT evaluation    Mobility Interventions: HOB 30 degrees or less, Pressure redistribution bed/mattress (bed type)    Nutrition Interventions: Offer support with meals,snacks and hydration, Document food/fluid/supplement intake    Friction and Shear Interventions: HOB 30 degrees or less, Lift sheet, Lift team/patient mobility team, Minimize layers                Problem: Patient Education: Go to Patient Education Activity  Goal: Patient/Family Education  Outcome: Progressing Towards Goal     Problem: Infection - Risk of, Urinary Catheter-Associated Urinary Tract Infection  Goal: *Absence of infection signs and symptoms  Outcome: Progressing Towards Goal     Problem: Infection - Risk of, Central Venous Catheter-Associated Bloodstream Infection  Goal: *Absence of infection signs and symptoms  Outcome: Progressing Towards Goal     Problem: Patient Education: Go to Patient Education Activity  Goal: Patient/Family Education  Outcome: Progressing Towards Goal     Problem: Discharge Planning  Goal: *Discharge to safe environment  Outcome: Progressing Towards Goal     Problem: Patient Education: Go to Patient Education Activity  Goal: Patient/Family Education  Outcome: Progressing Towards Goal     Problem: Diabetes Maintenance:Admission  Goal: *Blood glucose 80 to 180 mg/dl  Outcome: Progressing Towards Goal  Goal: *Adequate nutrition  Outcome: Progressing Towards Goal

## 2022-03-18 PROBLEM — K92.2 GI BLEEDING: Status: ACTIVE | Noted: 2020-04-05

## 2022-03-19 PROBLEM — R65.21 SEPTIC SHOCK (HCC): Status: ACTIVE | Noted: 2020-04-05

## 2022-03-19 PROBLEM — N39.0 UTI (URINARY TRACT INFECTION): Status: ACTIVE | Noted: 2020-04-05

## 2022-03-19 PROBLEM — A41.9 SEPTIC SHOCK (HCC): Status: ACTIVE | Noted: 2020-04-05

## 2022-03-19 PROBLEM — D62 ACUTE BLOOD LOSS ANEMIA: Status: ACTIVE | Noted: 2020-04-05

## 2022-03-19 PROBLEM — K50.90 CROHN'S DISEASE (HCC): Status: ACTIVE | Noted: 2020-04-06

## 2022-03-20 PROBLEM — A41.9 SEPSIS (HCC): Status: ACTIVE | Noted: 2020-04-05

## 2022-05-03 ENCOUNTER — TRANSCRIBE ORDER (OUTPATIENT)
Dept: REGISTRATION | Age: 82
End: 2022-05-03

## 2022-05-03 ENCOUNTER — HOSPITAL ENCOUNTER (OUTPATIENT)
Dept: LAB | Age: 82
Discharge: HOME | End: 2022-05-03
Payer: MEDICARE

## 2022-05-03 DIAGNOSIS — I10 ESSENTIAL HYPERTENSION: ICD-10-CM

## 2022-05-03 DIAGNOSIS — D64.9 ANEMIA: ICD-10-CM

## 2022-05-03 DIAGNOSIS — E11.9 DIABETES MELLITUS (HCC): ICD-10-CM

## 2022-05-03 DIAGNOSIS — E03.9 MYXEDEMA HEART DISEASE: ICD-10-CM

## 2022-05-03 DIAGNOSIS — E78.00 HYPERCHOLESTEREMIA: ICD-10-CM

## 2022-05-03 DIAGNOSIS — I51.9 MYXEDEMA HEART DISEASE: ICD-10-CM

## 2022-05-03 DIAGNOSIS — E11.9 DIABETES MELLITUS (HCC): Primary | ICD-10-CM

## 2022-05-03 LAB
ALBUMIN SERPL-MCNC: 3.9 G/DL (ref 3.5–4.7)
ALBUMIN/GLOB SERPL: 1.2 {RATIO}
ALP SERPL-CCNC: 73 U/L (ref 38–126)
ALT SERPL-CCNC: 6 U/L (ref 3–72)
ANION GAP SERPL CALC-SCNC: 11 MMOL/L
AST SERPL W P-5'-P-CCNC: 14 U/L (ref 17–74)
BASOPHILS # BLD: 0 K/UL (ref 0–0.1)
BASOPHILS NFR BLD: 1 % (ref 0–2)
BILIRUB SERPL-MCNC: 0.8 MG/DL (ref 0.2–1)
BUN SERPL-MCNC: 54 MG/DL (ref 9–21)
BUN/CREAT SERPL: 18
CA-I BLD-MCNC: 8.9 MG/DL (ref 8.5–10.5)
CHLORIDE SERPL-SCNC: 105 MMOL/L (ref 94–111)
CHOLEST SERPL-MCNC: 143 MG/DL
CO2 SERPL-SCNC: 29 MMOL/L (ref 21–33)
CREAT SERPL-MCNC: 3 MG/DL (ref 0.8–1.5)
DIFFERENTIAL METHOD BLD: ABNORMAL
EOSINOPHIL # BLD: 0.1 K/UL (ref 0–0.4)
EOSINOPHIL NFR BLD: 2 % (ref 0–5)
ERYTHROCYTE [DISTWIDTH] IN BLOOD BY AUTOMATED COUNT: 12.8 % (ref 11.6–14.5)
EST. AVERAGE GLUCOSE BLD GHB EST-MCNC: 146 MG/DL
GLOBULIN SER CALC-MCNC: 3.2 G/DL
GLUCOSE SERPL-MCNC: 182 MG/DL (ref 70–110)
HBA1C MFR BLD: 6.7 % (ref 4.2–5.6)
HCT VFR BLD AUTO: 41.7 % (ref 36–48)
HDLC SERPL-MCNC: 68 MG/DL (ref 40–60)
HDLC SERPL: 2.1 {RATIO} (ref 0–5)
HGB BLD-MCNC: 13.3 G/DL (ref 13–16)
IMM GRANULOCYTES # BLD AUTO: 0 K/UL (ref 0–0.04)
IMM GRANULOCYTES NFR BLD AUTO: 0 % (ref 0–0.5)
LDLC SERPL CALC-MCNC: 59.6 MG/DL (ref 0–100)
LIPID PROFILE,FLP: ABNORMAL
LYMPHOCYTES # BLD: 1.3 K/UL (ref 0.9–3.6)
LYMPHOCYTES NFR BLD: 21 % (ref 21–52)
MCH RBC QN AUTO: 30.5 PG (ref 24–34)
MCHC RBC AUTO-ENTMCNC: 31.9 G/DL (ref 31–37)
MCV RBC AUTO: 95.6 FL (ref 78–100)
MONOCYTES # BLD: 0.3 K/UL (ref 0.05–1.2)
MONOCYTES NFR BLD: 6 % (ref 3–10)
NEUTS SEG # BLD: 4.2 K/UL (ref 1.8–8)
NEUTS SEG NFR BLD: 70 % (ref 40–73)
NRBC # BLD: 0 K/UL (ref 0–0.01)
NRBC BLD-RTO: 0 PER 100 WBC
PLATELET # BLD AUTO: 157 K/UL (ref 135–420)
PMV BLD AUTO: 11.9 FL (ref 9.2–11.8)
POTASSIUM SERPL-SCNC: 4.4 MMOL/L (ref 3.2–5.1)
PROT SERPL-MCNC: 7.1 G/DL (ref 6.1–8.4)
RBC # BLD AUTO: 4.36 M/UL (ref 4.35–5.65)
SODIUM SERPL-SCNC: 145 MMOL/L (ref 135–145)
T4 FREE SERPL-MCNC: 0.7 NG/DL (ref 0.7–1.5)
TRIGL SERPL-MCNC: 77 MG/DL (ref ?–150)
TSH SERPL DL<=0.05 MIU/L-ACNC: >48.1 UIU/ML (ref 0.35–6.2)
VLDLC SERPL CALC-MCNC: 15.4 MG/DL
WBC # BLD AUTO: 6 K/UL (ref 4.6–13.2)

## 2022-05-03 PROCEDURE — 83036 HEMOGLOBIN GLYCOSYLATED A1C: CPT

## 2022-05-03 PROCEDURE — 85025 COMPLETE CBC W/AUTO DIFF WBC: CPT

## 2022-05-03 PROCEDURE — 80061 LIPID PANEL: CPT

## 2022-05-03 PROCEDURE — 84443 ASSAY THYROID STIM HORMONE: CPT

## 2022-05-03 PROCEDURE — 84439 ASSAY OF FREE THYROXINE: CPT

## 2022-05-03 PROCEDURE — 36415 COLL VENOUS BLD VENIPUNCTURE: CPT

## 2022-05-03 PROCEDURE — 80053 COMPREHEN METABOLIC PANEL: CPT

## 2022-05-04 ENCOUNTER — HOSPITAL ENCOUNTER (OUTPATIENT)
Dept: LAB | Age: 82
Discharge: HOME | End: 2022-05-04
Payer: MEDICARE

## 2022-05-04 ENCOUNTER — TRANSCRIBE ORDER (OUTPATIENT)
Dept: REGISTRATION | Age: 82
End: 2022-05-04

## 2022-05-04 DIAGNOSIS — N39.0 URINARY TRACT INFECTION, SITE NOT SPECIFIED: ICD-10-CM

## 2022-05-04 DIAGNOSIS — N39.0 URINARY TRACT INFECTION, SITE NOT SPECIFIED: Primary | ICD-10-CM

## 2022-05-04 LAB
APPEARANCE UR: ABNORMAL
BACTERIA URNS QL MICRO: ABNORMAL /HPF
BILIRUB UR QL: NEGATIVE
COLOR UR: YELLOW
EPITH CASTS URNS QL MICRO: ABNORMAL /LPF (ref 0–20)
GLUCOSE UR STRIP.AUTO-MCNC: NEGATIVE MG/DL
HGB UR QL STRIP: NEGATIVE
KETONES UR QL STRIP.AUTO: NEGATIVE MG/DL
LEUKOCYTE ESTERASE UR QL STRIP.AUTO: ABNORMAL
NITRITE UR QL STRIP.AUTO: NEGATIVE
PH UR STRIP: 5 [PH] (ref 5–8)
PROT UR STRIP-MCNC: NEGATIVE MG/DL
RBC #/AREA URNS HPF: ABNORMAL /HPF (ref 0–2)
SP GR UR REFRACTOMETRY: 1.01 (ref 1–1.03)
UROBILINOGEN UR QL STRIP.AUTO: 0.2 EU/DL (ref 0.2–1)
WBC URNS QL MICRO: ABNORMAL /HPF (ref 0–4)

## 2022-05-04 PROCEDURE — 87086 URINE CULTURE/COLONY COUNT: CPT

## 2022-05-04 PROCEDURE — 81001 URINALYSIS AUTO W/SCOPE: CPT

## 2022-05-06 LAB
BACTERIA SPEC CULT: NORMAL
SPECIAL REQUESTS,SREQ: NORMAL

## 2022-10-24 NOTE — PROGRESS NOTES
Physical Exam  Skin:           Two nurse check with Tj Fung Rituxan Counseling:  I discussed with the patient the risks of Rituxan infusions. Side effects can include infusion reactions, severe drug rashes including mucocutaneous reactions, reactivation of latent hepatitis and other infections and rarely progressive multifocal leukoencephalopathy.  All of the patient's questions and concerns were addressed.

## 2023-02-09 ENCOUNTER — HOSPITAL ENCOUNTER (INPATIENT)
Age: 83
LOS: 6 days | Discharge: HOME | End: 2023-02-15
Attending: INTERNAL MEDICINE | Admitting: INTERNAL MEDICINE
Payer: MEDICARE

## 2023-02-09 ENCOUNTER — APPOINTMENT (OUTPATIENT)
Dept: GENERAL RADIOLOGY | Age: 83
End: 2023-02-09
Attending: EMERGENCY MEDICINE
Payer: MEDICARE

## 2023-02-09 ENCOUNTER — HOSPITAL ENCOUNTER (INPATIENT)
Age: 83
LOS: 2 days | Discharge: STILL ADMITTED OR ADMINISTRATIVE | End: 2023-02-11
Attending: EMERGENCY MEDICINE | Admitting: INTERNAL MEDICINE
Payer: MEDICARE

## 2023-02-09 DIAGNOSIS — Z99.81 HYPOXEMIA REQUIRING SUPPLEMENTAL OXYGEN: ICD-10-CM

## 2023-02-09 DIAGNOSIS — R09.02 HYPOXEMIA REQUIRING SUPPLEMENTAL OXYGEN: ICD-10-CM

## 2023-02-09 DIAGNOSIS — J18.9 PNEUMONIA OF RIGHT LOWER LOBE DUE TO INFECTIOUS ORGANISM: Primary | ICD-10-CM

## 2023-02-09 DIAGNOSIS — J81.0 ACUTE PULMONARY EDEMA (HCC): ICD-10-CM

## 2023-02-09 LAB
ALBUMIN SERPL-MCNC: 3.8 G/DL (ref 3.4–5)
ALBUMIN/GLOB SERPL: 0.9 (ref 0.8–1.7)
ALP SERPL-CCNC: 92 U/L (ref 45–117)
ALT SERPL-CCNC: 18 U/L (ref 16–61)
ANION GAP SERPL CALC-SCNC: 9 MMOL/L (ref 3–18)
ARTERIAL PATENCY WRIST A: YES
AST SERPL W P-5'-P-CCNC: 12 U/L (ref 10–38)
BASE DEFICIT BLDA-SCNC: 5.8 MMOL/L
BASOPHILS # BLD: 0 K/UL (ref 0–0.1)
BASOPHILS # BLD: 0 K/UL (ref 0–0.1)
BASOPHILS NFR BLD: 0 % (ref 0–2)
BASOPHILS NFR BLD: 0 % (ref 0–2)
BDY SITE: ABNORMAL
BILIRUB SERPL-MCNC: 0.8 MG/DL (ref 0.2–1)
BNP SERPL-MCNC: 6987 PG/ML (ref 0–1800)
BUN SERPL-MCNC: 32 MG/DL (ref 7–18)
BUN/CREAT SERPL: 15 (ref 12–20)
CA-I BLD-MCNC: 9.2 MG/DL (ref 8.5–10.1)
CHLORIDE SERPL-SCNC: 109 MMOL/L (ref 100–111)
CO2 SERPL-SCNC: 23 MMOL/L (ref 21–32)
COHGB MFR BLD: 1.3 % (ref 1–2)
CREAT SERPL-MCNC: 2.07 MG/DL (ref 0.6–1.3)
D DIMER PPP FEU-MCNC: 1.17 UG/ML(FEU)
DIFFERENTIAL METHOD BLD: ABNORMAL
DIFFERENTIAL METHOD BLD: ABNORMAL
EOSINOPHIL # BLD: 0 K/UL (ref 0–0.4)
EOSINOPHIL # BLD: 0.1 K/UL (ref 0–0.4)
EOSINOPHIL NFR BLD: 0 % (ref 0–5)
EOSINOPHIL NFR BLD: 2 % (ref 0–5)
ERYTHROCYTE [DISTWIDTH] IN BLOOD BY AUTOMATED COUNT: 13.5 % (ref 11.6–14.5)
ERYTHROCYTE [DISTWIDTH] IN BLOOD BY AUTOMATED COUNT: 13.6 % (ref 11.6–14.5)
FLUAV AG NPH QL IA: NEGATIVE
FLUBV AG NOSE QL IA: NEGATIVE
GAS FLOW.O2 O2 DELIVERY SYS: 15 L/MIN
GLOBULIN SER CALC-MCNC: 4.2 G/DL (ref 2–4)
GLUCOSE SERPL-MCNC: 173 MG/DL (ref 74–99)
HCO3 BLDA-SCNC: 20 MMOL/L (ref 22–26)
HCT VFR BLD AUTO: 44 % (ref 36–48)
HCT VFR BLD AUTO: 44.6 % (ref 36–48)
HGB BLD-MCNC: 13.8 G/DL (ref 13–16)
HGB BLD-MCNC: 14.3 G/DL (ref 13–16)
IMM GRANULOCYTES # BLD AUTO: 0 K/UL (ref 0–0.04)
IMM GRANULOCYTES # BLD AUTO: 0 K/UL (ref 0–0.04)
IMM GRANULOCYTES NFR BLD AUTO: 0 % (ref 0–0.5)
IMM GRANULOCYTES NFR BLD AUTO: 0 % (ref 0–0.5)
LACTATE SERPL-SCNC: 1.2 MMOL/L (ref 0.4–2)
LYMPHOCYTES # BLD: 0.3 K/UL (ref 0.9–3.6)
LYMPHOCYTES # BLD: 1.1 K/UL (ref 0.9–3.6)
LYMPHOCYTES NFR BLD: 16 % (ref 21–52)
LYMPHOCYTES NFR BLD: 2 % (ref 21–52)
MAGNESIUM SERPL-MCNC: 2.6 MG/DL (ref 1.6–2.6)
MCH RBC QN AUTO: 28.7 PG (ref 24–34)
MCH RBC QN AUTO: 29.4 PG (ref 24–34)
MCHC RBC AUTO-ENTMCNC: 31.4 G/DL (ref 31–37)
MCHC RBC AUTO-ENTMCNC: 32.1 G/DL (ref 31–37)
MCV RBC AUTO: 91.5 FL (ref 78–100)
MCV RBC AUTO: 91.8 FL (ref 78–100)
METHGB MFR BLD: 0.3 % (ref 0–1.4)
MONOCYTES # BLD: 0.4 K/UL (ref 0.05–1.2)
MONOCYTES # BLD: 0.6 K/UL (ref 0.05–1.2)
MONOCYTES NFR BLD: 4 % (ref 3–10)
MONOCYTES NFR BLD: 6 % (ref 3–10)
NEUTS SEG # BLD: 11.7 K/UL (ref 1.8–8)
NEUTS SEG # BLD: 5.5 K/UL (ref 1.8–8)
NEUTS SEG NFR BLD: 76 % (ref 40–73)
NEUTS SEG NFR BLD: 94 % (ref 40–73)
NRBC # BLD: 0 K/UL (ref 0–0.01)
NRBC # BLD: 0 K/UL (ref 0–0.01)
NRBC BLD-RTO: 0 PER 100 WBC
NRBC BLD-RTO: 0 PER 100 WBC
OXYHGB MFR BLD: 73.9 % (ref 95–99)
PCO2 BLDA: 41 MMHG (ref 35–45)
PERFORMED BY, TECHID: ABNORMAL
PH BLDA: 7.31 (ref 7.35–7.45)
PLATELET # BLD AUTO: 170 K/UL (ref 135–420)
PLATELET # BLD AUTO: 179 K/UL (ref 135–420)
PMV BLD AUTO: 11.2 FL (ref 9.2–11.8)
PMV BLD AUTO: 11.6 FL (ref 9.2–11.8)
PO2 BLDA: 43 MMHG (ref 80–100)
POTASSIUM SERPL-SCNC: 4.5 MMOL/L (ref 3.5–5.5)
PROCALCITONIN SERPL-MCNC: <0.05 NG/ML
PROT SERPL-MCNC: 8 G/DL (ref 6.4–8.2)
RBC # BLD AUTO: 4.81 M/UL (ref 4.35–5.65)
RBC # BLD AUTO: 4.86 M/UL (ref 4.35–5.65)
SAO2 % BLD: 75 % (ref 95–99)
SAO2% DEVICE SAO2% SENSOR NAME: ABNORMAL
SARS-COV-2 RDRP RESP QL NAA+PROBE: NOT DETECTED
SERVICE CMNT-IMP: ABNORMAL
SODIUM SERPL-SCNC: 141 MMOL/L (ref 136–145)
SPECIMEN SITE: ABNORMAL
TROPONIN I SERPL HS-MCNC: 41 NG/L (ref 0–78)
TROPONIN I SERPL HS-MCNC: 59 NG/L (ref 0–78)
WBC # BLD AUTO: 12.6 K/UL (ref 4.6–13.2)
WBC # BLD AUTO: 7.2 K/UL (ref 4.6–13.2)

## 2023-02-09 PROCEDURE — 94761 N-INVAS EAR/PLS OXIMETRY MLT: CPT

## 2023-02-09 PROCEDURE — 74011250636 HC RX REV CODE- 250/636

## 2023-02-09 PROCEDURE — 74011000258 HC RX REV CODE- 258: Performed by: NURSE PRACTITIONER

## 2023-02-09 PROCEDURE — 80053 COMPREHEN METABOLIC PANEL: CPT

## 2023-02-09 PROCEDURE — 74011000250 HC RX REV CODE- 250: Performed by: NURSE PRACTITIONER

## 2023-02-09 PROCEDURE — 74011250636 HC RX REV CODE- 250/636: Performed by: NURSE PRACTITIONER

## 2023-02-09 PROCEDURE — 87040 BLOOD CULTURE FOR BACTERIA: CPT

## 2023-02-09 PROCEDURE — 83605 ASSAY OF LACTIC ACID: CPT

## 2023-02-09 PROCEDURE — 65610000006 HC RM INTENSIVE CARE

## 2023-02-09 PROCEDURE — 87635 SARS-COV-2 COVID-19 AMP PRB: CPT

## 2023-02-09 PROCEDURE — 99285 EMERGENCY DEPT VISIT HI MDM: CPT

## 2023-02-09 PROCEDURE — 87804 INFLUENZA ASSAY W/OPTIC: CPT

## 2023-02-09 PROCEDURE — 83735 ASSAY OF MAGNESIUM: CPT

## 2023-02-09 PROCEDURE — 84484 ASSAY OF TROPONIN QUANT: CPT

## 2023-02-09 PROCEDURE — 82803 BLOOD GASES ANY COMBINATION: CPT

## 2023-02-09 PROCEDURE — 93005 ELECTROCARDIOGRAM TRACING: CPT

## 2023-02-09 PROCEDURE — 85379 FIBRIN DEGRADATION QUANT: CPT

## 2023-02-09 PROCEDURE — 83880 ASSAY OF NATRIURETIC PEPTIDE: CPT

## 2023-02-09 PROCEDURE — 85025 COMPLETE CBC W/AUTO DIFF WBC: CPT

## 2023-02-09 PROCEDURE — 77010033711 HC HIGH FLOW OXYGEN

## 2023-02-09 PROCEDURE — 36600 WITHDRAWAL OF ARTERIAL BLOOD: CPT

## 2023-02-09 PROCEDURE — 74011250637 HC RX REV CODE- 250/637: Performed by: NURSE PRACTITIONER

## 2023-02-09 PROCEDURE — 71045 X-RAY EXAM CHEST 1 VIEW: CPT

## 2023-02-09 PROCEDURE — 74011250636 HC RX REV CODE- 250/636: Performed by: EMERGENCY MEDICINE

## 2023-02-09 PROCEDURE — 65270000029 HC RM PRIVATE

## 2023-02-09 PROCEDURE — 84145 PROCALCITONIN (PCT): CPT

## 2023-02-09 PROCEDURE — 9990 CHARGE CONVERSION

## 2023-02-09 RX ORDER — FUROSEMIDE 10 MG/ML
80 INJECTION INTRAMUSCULAR; INTRAVENOUS ONCE
Status: COMPLETED | OUTPATIENT
Start: 2023-02-09 | End: 2023-02-09

## 2023-02-09 RX ORDER — ACETAMINOPHEN 325 MG/1
650 TABLET ORAL
Status: DISCONTINUED | OUTPATIENT
Start: 2023-02-09 | End: 2023-02-11 | Stop reason: HOSPADM

## 2023-02-09 RX ORDER — HEPARIN SODIUM 5000 [USP'U]/ML
5000 INJECTION, SOLUTION INTRAVENOUS; SUBCUTANEOUS EVERY 12 HOURS
Status: DISCONTINUED | OUTPATIENT
Start: 2023-02-09 | End: 2023-02-11 | Stop reason: HOSPADM

## 2023-02-09 RX ORDER — GUAIFENESIN 100 MG/5ML
81 LIQUID (ML) ORAL DAILY
Status: DISCONTINUED | OUTPATIENT
Start: 2023-02-10 | End: 2023-02-11 | Stop reason: HOSPADM

## 2023-02-09 RX ORDER — AMLODIPINE BESYLATE 5 MG/1
10 TABLET ORAL DAILY
Status: DISCONTINUED | OUTPATIENT
Start: 2023-02-10 | End: 2023-02-11 | Stop reason: HOSPADM

## 2023-02-09 RX ORDER — CARBIDOPA AND LEVODOPA 25; 100 MG/1; MG/1
0.5 TABLET ORAL 2 TIMES DAILY
Status: DISCONTINUED | OUTPATIENT
Start: 2023-02-09 | End: 2023-02-11 | Stop reason: HOSPADM

## 2023-02-09 RX ORDER — PANTOPRAZOLE SODIUM 40 MG/1
40 TABLET, DELAYED RELEASE ORAL
Status: DISCONTINUED | OUTPATIENT
Start: 2023-02-10 | End: 2023-02-11 | Stop reason: HOSPADM

## 2023-02-09 RX ORDER — LEVOTHYROXINE SODIUM 50 UG/1
50 TABLET ORAL
Status: DISCONTINUED | OUTPATIENT
Start: 2023-02-10 | End: 2023-02-11 | Stop reason: HOSPADM

## 2023-02-09 RX ORDER — TAMSULOSIN HYDROCHLORIDE 0.4 MG/1
0.4 CAPSULE ORAL
Status: DISCONTINUED | OUTPATIENT
Start: 2023-02-10 | End: 2023-02-11 | Stop reason: HOSPADM

## 2023-02-09 RX ORDER — PRAVASTATIN SODIUM 20 MG/1
20 TABLET ORAL
Status: DISCONTINUED | OUTPATIENT
Start: 2023-02-09 | End: 2023-02-11 | Stop reason: HOSPADM

## 2023-02-09 RX ORDER — AMIODARONE HYDROCHLORIDE 200 MG/1
200 TABLET ORAL DAILY
Status: DISCONTINUED | OUTPATIENT
Start: 2023-02-10 | End: 2023-02-11 | Stop reason: HOSPADM

## 2023-02-09 RX ORDER — FUROSEMIDE 10 MG/ML
INJECTION INTRAMUSCULAR; INTRAVENOUS
Status: COMPLETED
Start: 2023-02-09 | End: 2023-02-09

## 2023-02-09 RX ORDER — BUMETANIDE 0.25 MG/ML
1 INJECTION INTRAMUSCULAR; INTRAVENOUS EVERY 12 HOURS
Status: DISCONTINUED | OUTPATIENT
Start: 2023-02-09 | End: 2023-02-11 | Stop reason: HOSPADM

## 2023-02-09 RX ADMIN — CARBIDOPA AND LEVODOPA 0.5 TABLET: 25; 100 TABLET ORAL at 20:14

## 2023-02-09 RX ADMIN — HEPARIN SODIUM 5000 UNITS: 5000 INJECTION INTRAVENOUS; SUBCUTANEOUS at 18:10

## 2023-02-09 RX ADMIN — PRAVASTATIN SODIUM 20 MG: 20 TABLET ORAL at 21:11

## 2023-02-09 RX ADMIN — PIPERACILLIN AND TAZOBACTAM 3.38 G: 3; .375 INJECTION, POWDER, FOR SOLUTION INTRAVENOUS at 16:45

## 2023-02-09 RX ADMIN — FUROSEMIDE 80 MG: 10 INJECTION, SOLUTION INTRAMUSCULAR; INTRAVENOUS at 16:00

## 2023-02-09 RX ADMIN — FUROSEMIDE 80 MG: 10 INJECTION INTRAMUSCULAR; INTRAVENOUS at 16:00

## 2023-02-09 RX ADMIN — AZITHROMYCIN MONOHYDRATE 500 MG: 500 INJECTION, POWDER, LYOPHILIZED, FOR SOLUTION INTRAVENOUS at 14:38

## 2023-02-09 RX ADMIN — BUMETANIDE 1 MG: 0.25 INJECTION, SOLUTION INTRAMUSCULAR; INTRAVENOUS at 18:11

## 2023-02-09 NOTE — PROGRESS NOTES
Problem: Patient Education: Go to Patient Education Activity  Goal: Patient/Family Education  Outcome: Progressing Towards Goal     Problem: General Medical Care Plan  Goal: *Vital signs within specified parameters  Outcome: Progressing Towards Goal     Problem: Patient Education: Go to Patient Education Activity  Goal: Patient/Family Education  Outcome: Progressing Towards Goal

## 2023-02-09 NOTE — PROGRESS NOTES
15[facility];  RT AT BEDSIDE, PT A/O X 4, ATTEMPTING TO TAKE NON- RE BREATHER OFF- PATIENT EDUCATED, LUNGS SOUNDS RALES,  PLACED ON MONITOR (NSR), IV PATENT AND FLUSHED, SECOND IV PLACED, PERICARE PERFORMED, PRIMA FIT PLACED, PATIENT SWITCHED TO HIGH FLOW BY RT    NP AT BEDSIDE  STAT ABG PERFORMED  IV ANTIBIOTICS GIVEN  PATIENT TOLERATING HIGH FLOW- 02 90%    1700-PATIENT RESTING, VSS, 02 93%, SPOUSE AT BEDSIDE

## 2023-02-09 NOTE — H&P
History and Physical    Subjective:     Ceci Brady is a 78 y.o. male with a past medical Hx sig for HTN, DM-II, Aortic stenosis s/p TAVR (Jan 2022), Parkinson disease, CKD III-IV, and HFpEF who presents to the ED today with sudden shortness of breath since this morning. Hx is obtained from his wife, and children in the ED. Wife states overall ill feeling started 1 week ago, he was first noticed to have bilateral feet and ankle swelling, he went to see his pcp who declined starting lasix due to his CKD IV, but recommended them to f/up with cardio and nephro. Wife states patient had since been having poor appetite, feeling short of breath on exertion, and loosing weight. No fever or chills reported. No nause or vomit. No abdominal pain or discomfort. This morning, patient awoke from sleep and suddenly began to get short of breath. He also experienced a chest pain. Wife states he has been dealing with dysphagia, for a long time, and has been seeing a speech therapist. In the ED he was found hypoxic on room air, placed on 4lpm, and sats went up to 95%. His proBNP was 6987, and CXR shows RLL pneumonia+bilatertal interstitial infiltrates and mild pulmo edema. During transportation to the floor, he was more hypoxic and sats went down to 65% on 4lpm-placed on NRB mask and transferred to ICU, at the time of my second eval in ICU he was satting 77% on NRB mask, stat blood gas showed severe hypoxemia Po2 40, and metabolic acidosis, stat 29NQ IV lasix and high flow O2 administered.      Past Medical History:   Diagnosis Date    Benign prostatic hyperplasia with urinary obstruction     Dementia ([address]     early onset    Diabetes ([address]     Hypersomnia     Hypertension     Nocturia     OAB (overactive bladder)     Parkinson's disease ([address] 02/09/2023    Testicular cancer ([address]     Urge incontinence     Urinary incontinence, urge       Past Surgical History:   Procedure Laterality Date    HX CYST REMOVAL  [id number] cyst removed from right breast    HX OTHER SURGICAL      reemoval of testcle    HX OTHER SURGICAL  2018    Surgery: Blockage Lower bowels, [facility]    HX OTHER SURGICAL  2019    Heart Blockage: 30% [facility]: Dr. Peyman Donaldson    HX UROLOGICAL      Testicles removed     Family History   Problem Relation Age of Onset    Stroke Mother     Heart Disease Mother     Stroke Father     Heart Disease Father     Cancer Brother         Testicular    Heart Disease Brother       Social History     Tobacco Use    Smoking status: Former     Types: Cigarettes     Quit date: 1976     Years since quittin.1    Smokeless tobacco: Never   Substance Use Topics    Alcohol use: No     Comment: none       Prior to Admission medications    Medication Sig Start Date End Date Taking? Authorizing Provider   silodosin (RAPAFLO) 8 mg capsule Take 1 Capsule by mouth daily (with breakfast). 23   Madie Clifford NP   levothyroxine (SYNTHROID) 50 mcg tablet  22   Provider, Historical   amLODIPine (NORVASC) 10 mg tablet Take 10 mg by mouth daily. Provider, Historical   amiodarone (CORDARONE) 200 mg tablet  22   Provider, Historical   busPIRone (BUSPAR) 10 mg tablet  22   Provider, Historical   bumetanide (BUMEX) 1 mg tablet Take 1 mg by mouth daily. Other, MD Aime   sennosides (Senna) 8.6 mg cap Take  by mouth. Other, MD Aime   acetaminophen (TYLENOL) 325 mg tablet Take 650 mg by mouth every four (4) hours as needed. 20   Provider, Historical   carbidopa-levodopa (SINEMET)  mg per tablet Take 0.5 Tabs by mouth two (2) times a day. 20   Provider, Historical   aspirin 81 mg chewable tablet aspirin 81 mg chewable tablet   Chew 1 tablet every day by oral route. Provider, Historical   omeprazole (PRILOSEC) 20 mg capsule Take 20 mg by mouth daily.     Provider, Historical   glimepiride (AMARYL) 1 mg tablet  12/15/17   Provider, Historical   testosterone cypionate (DEPOTESTOTERONE CYPIONATE) 200 mg/mL injection 200 mg by IntraMUSCular route. 3/15/15   Provider, Historical   pravastatin (PRAVACHOL) 20 mg tablet Take 20 mg by mouth nightly. Provider, Historical     Allergies   Allergen Reactions    Iodinated Contrast Media Other (comments)     Hot flushing     Metformin Other (comments)        Review of Systems:  Negative except as mentioned in HPI. Objective:     Vitals:  Visit Vitals  BP (!) 150/68 (BP 1 Location: Right upper arm, BP Patient Position: Supine)   Pulse 80   Temp 98.5 °F (36.9 °C)   Resp 18   Ht 6' (1.829 m)   Wt 65.8 kg (145 lb)   SpO2 (!) 88%   BMI 19.67 kg/m²       Physical Exam:  General: pleasant thin elderly male in acute respiratory distress, \"cant talk much\" and on the non-rebreather O2,   Head:  Normocephalic, without obvious abnormality, atraumatic. Eyes: Conjunctivae/corneas clear. Pupils equal, round, reactive to light. Extraocular movements intact. No icterus. Lungs: coarse crackles to auscultation bilaterally, no wheezes,  Chest wall: No tenderness or deformity. Heart: Regular rate and rhythm, S1, S2 normal, no murmur, click, rub, or gallop. Abdomen: flat, soft, non-tender to palpation,bowel sounds active, no palpable masses. Back: No spine tenderness to palpation  Extremities:Extremities normal, atraumatic, no cyanosis or peripheral edema. Pulses: 2+ and symmetric all extremities. Skin:overall skin color, texture, turgor normal.   Lymph nodes: Cervical nodes normal.  Neurologic: Awake and oriented, x 4. In respiratory distress.        Labs:  Recent Results (from the past 24 hour(s))   EKG, 12 LEAD, INITIAL    Collection Time: 02/09/23 10:08 AM   Result Value Ref Range    Ventricular Rate 87 BPM    Atrial Rate 89 BPM    P-R Interval 68 ms    QRS Duration 152 ms    Q-T Interval 387 ms    QTC Calculation (Bezet) 466 ms    Calculated P Axis 0 degrees    Calculated R Axis 41 degrees    Calculated T Axis -145 degrees    Diagnosis Sinus rhythm  Vent pre-excit'n(WPW), right access'y pathway     CBC WITH AUTOMATED DIFF    Collection Time: 02/09/23 10:19 AM   Result Value Ref Range    WBC 7.2 4.6 - 13.2 K/uL    RBC 4.81 4.35 - 5.65 M/uL    HGB 13.8 13.0 - 16.0 g/dL    HCT 44.0 36.0 - 48.0 %    MCV 91.5 78.0 - 100.0 FL    MCH 28.7 24.0 - 34.0 PG    MCHC 31.4 31.0 - 37.0 g/dL    RDW 13.5 11.6 - 14.5 %    PLATELET 069 325 - 988 K/uL    MPV 11.6 9.2 - 11.8 FL    NRBC 0.0 0.0  WBC    ABSOLUTE NRBC 0.00 0.00 - 0.01 K/uL    NEUTROPHILS 76 (H) 40 - 73 %    LYMPHOCYTES 16 (L) 21 - 52 %    MONOCYTES 6 3 - 10 %    EOSINOPHILS 2 0 - 5 %    BASOPHILS 0 0 - 2 %    IMMATURE GRANULOCYTES 0 0 - 0.5 %    ABS. NEUTROPHILS 5.5 1.8 - 8.0 K/UL    ABS. LYMPHOCYTES 1.1 0.9 - 3.6 K/UL    ABS. MONOCYTES 0.4 0.05 - 1.2 K/UL    ABS. EOSINOPHILS 0.1 0.0 - 0.4 K/UL    ABS. BASOPHILS 0.0 0.0 - 0.1 K/UL    ABS. IMM. GRANS. 0.0 0.00 - 0.04 K/UL    DF AUTOMATED     METABOLIC PANEL, COMPREHENSIVE    Collection Time: 02/09/23 10:19 AM   Result Value Ref Range    Sodium 141 136 - 145 mmol/L    Potassium 4.5 3.5 - 5.5 mmol/L    Chloride 109 100 - 111 mmol/L    CO2 23 21 - 32 mmol/L    Anion gap 9 3.0 - 18.0 mmol/L    Glucose 173 (H) 74 - 99 mg/dL    BUN 32 (H) 7 - 18 mg/dL    Creatinine 2.07 (H) 0.60 - 1.30 mg/dL    BUN/Creatinine ratio 15 12 - 20      eGFR 31 (L) >60 ml/min/1.73m2    Calcium 9.2 8.5 - 10.1 mg/dL    Bilirubin, total 0.8 0.2 - 1.0 mg/dL    AST (SGOT) 12 10 - 38 U/L    ALT (SGPT) 18 16 - 61 U/L    Alk.  phosphatase 92 45 - 117 U/L    Protein, total 8.0 6.4 - 8.2 g/dL    Albumin 3.8 3.4 - 5.0 g/dL    Globulin 4.2 (H) 2.0 - 4.0 g/dL    A-G Ratio 0.9 0.8 - 1.7     MAGNESIUM    Collection Time: 02/09/23 10:19 AM   Result Value Ref Range    Magnesium 2.6 1.6 - 2.6 mg/dL   TROPONIN-HIGH SENSITIVITY    Collection Time: 02/09/23 10:19 AM   Result Value Ref Range    Troponin-High Sensitivity 41 0 - 78 ng/L   NT-PRO BNP    Collection Time: 02/09/23 10:19 AM   Result Value Ref Range    NT pro-BNP 6,987 (H) 0 - 1,800 pg/mL   COVID-19 RAPID TEST    Collection Time: 02/09/23 12:45 PM   Result Value Ref Range    COVID-19 rapid test Not Detected Not Detected     INFLUENZA A & B AG (RAPID TEST)    Collection Time: 02/09/23 12:45 PM   Result Value Ref Range    Influenza A Antigen Negative Negative      Influenza B Antigen Negative Negative     LACTIC ACID    Collection Time: 02/09/23 12:50 PM   Result Value Ref Range    Lactic acid 1.2 0.4 - 2.0 mmol/L   PROCALCITONIN    Collection Time: 02/09/23 12:50 PM   Result Value Ref Range    Procalcitonin <0.05 ng/mL   BLOOD GAS, ARTERIAL    Collection Time: 02/09/23  4:01 PM   Result Value Ref Range    pH 7.31 (L) 7.35 - 7.45      PCO2 41 35 - 45 mmHg    PO2 43 (LL) 80 - 100 mmHg    O2 SATURATION 75 (L) 95 - 99 %    BICARBONATE 20 (L) 22 - 26 mmol/L    BASE DEFICIT 5.8 mmol/L    O2 METHOD Non-rebreathing mask      O2 FLOW RATE 15.00 L/min    Sample source Arterial      SITE Left Radial      AMANUEL'S TEST YES      Carboxy-Hgb 1.3 1 - 2 %    Methemoglobin 0.3 0 - 1.4 %    Oxyhemoglobin 73.9 (LL) 95 - 99 %    Performed by Latasha Mcknight     Critical value read back Called to Oba on 02/09/2023 at 16:04        Imaging:  XR CHEST PORT    Result Date: 2/9/2023  EXAM: XR CHEST PORT INDICATION: dyspnea, cough, chest pain COMPARISON: 1121 FINDINGS: A portable AP radiograph of the chest was obtained at 1116 hours. The patient is on a cardiac monitor. Confluent airspace disease is noted in the right lower lobe with patchy bilateral interstitial and airspace opacities as well. Harden Beech Possible small right pleural effusion. The cardiac and mediastinal contours and pulmonary vascularity are normal.  The bones and soft tissues are grossly within normal limits. Findings most consistent with pneumonia in the right lower lobe with bilateral interstitial opacities which may reflect mild pulmonary edema.  Probable small right pleural effusion      Assessment & Plan:     #1: Acute respiratory failure with hypoxemia:  -likely multifactorial; suspect due to aspiration pneumonia, and CHF exacerbation, acute PE remains in the differential. Hx TAVR (1/4/22). -admit to inpatient ICU, monitor on telemetry.  -his blood gas shows metabolic acidosis with severe hypoxemia  -CXR shows Right lower lobe pneumonia with bilateral interstitial opacities and mild pulmonary edema, his pro-bnp was 6987.  -his trop was normal, ECG--SR, non-specific T wave abnormality, repeat ECG. Trop negative. -currently high flow O2 @fiO2 100%--cont RT O2 protocol  -stat Lasix 80g IV   -start antibiotics with zosyn 3.375mg q 8hr, renally dosed  -his lactic was only 1.2, no fever or elevated wbc.  -cont bumex 1mg IV twice daily  -trend troponin, and check his ddimer--1.17 VQ scan ordered  -check echocardiogram  -cardiology consultation  -ST tenorio--he has had chronic ongoing dysphagia. #2: Hypertension:  -chronic issue, cont amlodipine, add low dose metoprolol    #3: Hypothyroidism:   -chronic issue, cont levothyroxine    #4: Paroxysmal Afib:  -chronic issue, cont amiodarone, however not anticoagulated at home. #5: Diabetes mellitus Type II:  -chronic issue, cont accu-checks achs, SSI prn. #6: Hx of TAVR (1/4/22):  -stable. #7: CKD III:  -Cr is 2 at baseline. #8: Parkinson Disease:  -cont sinemet      VTE prophylaxis: Heparin Sq  Code status: Full code  Total time spent: 45 minutes  Plan of care discussed with patient, wife and children at bedside, and nursing staff. Note: Family [facility] where pts cardiiologist was, I have informed them that bed availabilty is unrealistic and so far, his aspiration pneumonia and CHF can be managed here, pls consider transfer if warranted.

## 2023-02-09 NOTE — ED TRIAGE NOTES
SOB since last night. Chest pain since this morning. He took and aspirin this morning. Awake and alert. Rates chest pain 3/10, non radiating. No nausea or vomiting. Also reports R elbow pain. ECHO done last week.

## 2023-02-09 NOTE — PROGRESS NOTES
Pt was transferred to room 252, upon arrival to unit pt's O2 sats were 65% on 5L NC. ED tech reports that pt's sats dropped upon transport and O2 was increased 4L to 5L. Pt placed on NRB and tx back to ICU bed 5. NP Oba called, orders received for STAT ABG and 80 mg lasix IVP now.

## 2023-02-09 NOTE — PROGRESS NOTES
1815 - Meds given. Family at bedside. Family stated patient is currently wearing hearing aids. Warm blanket requested.

## 2023-02-09 NOTE — PROGRESS NOTES
After ABG was ordered, taken on 100% NRB mask, pt was started on High flow % and 60L due to sats not climbing above 85 and ABG results showing Pa02 40, RR 26, sats then 88% with now rebound and HR 85. Still climbing up to 90 by end of my notes, 15 minutes later. No resp distress noted at this time.     Latest Reference Range & Units Most Recent   pH 7.35 - 7.45   7.31 (L)  2/9/23 16:01   PCO2 35 - 45 mmHg 41  2/9/23 16:01   PO2 80 - 100 mmHg 43 (LL)  2/9/23 16:01   BICARBONATE 22 - 26 mmol/L 20 (L)  2/9/23 16:01   BASE DEFICIT mmol/L 5.8  2/9/23 16:01   Oxyhemoglobin 95 - 99 % 73.9 (LL)  2/9/23 16:01   O2 SATURATION 95 - 99 % 75 (L)  2/9/23 16:01   Sample source -   Arterial  2/9/23 16:01   SITE -   Left Radial  2/9/23 16:01   AMANUEL'S TEST -   YES  2/9/23 16:01   O2 FLOW RATE L/min 15.00  2/9/23 16:01   O2 METHOD -   Non-rebreathing mask  2/9/23 16:01   Carboxy-Hgb 1 - 2 % 1.3  2/9/23 16:01   Methemoglobin 0 - 1.4 % 0.3  2/9/23 16:01   (LL): Data is critically low  (L): Data is abnormally low

## 2023-02-09 NOTE — ED NOTES
TRANSFER - OUT REPORT:    Verbal report given to Sean on [facility]  being transferred to 252 for routine progression of care       Report consisted of patients Situation, Background, Assessment and   Recommendations(SBAR). Information from the following report(s) ED Summary, MAR, and Recent Results was reviewed with the receiving nurse. Lines:   Peripheral IV 02/09/23 Anterior;Distal;Left Forearm (Active)        Opportunity for questions and clarification was provided.       Patient transported with:   Monitor  O2 @ 4 liters  Tech

## 2023-02-10 ENCOUNTER — APPOINTMENT (OUTPATIENT)
Dept: NUCLEAR MEDICINE | Age: 83
End: 2023-02-10
Attending: INTERNAL MEDICINE
Payer: MEDICARE

## 2023-02-10 ENCOUNTER — APPOINTMENT (OUTPATIENT)
Dept: NON INVASIVE DIAGNOSTICS | Age: 83
End: 2023-02-10
Attending: NURSE PRACTITIONER
Payer: MEDICARE

## 2023-02-10 ENCOUNTER — APPOINTMENT (OUTPATIENT)
Dept: GENERAL RADIOLOGY | Age: 83
End: 2023-02-10
Attending: INTERNAL MEDICINE
Payer: MEDICARE

## 2023-02-10 VITALS
HEART RATE: 54 BPM | HEIGHT: 72 IN | SYSTOLIC BLOOD PRESSURE: 131 MMHG | BODY MASS INDEX: 20.45 KG/M2 | DIASTOLIC BLOOD PRESSURE: 82 MMHG | WEIGHT: 151 LBS | TEMPERATURE: 98.3 F | RESPIRATION RATE: 19 BRPM | OXYGEN SATURATION: 98 %

## 2023-02-10 LAB
ANION GAP SERPL CALC-SCNC: 6 MMOL/L (ref 3–18)
ARTERIAL PATENCY WRIST A: ABNORMAL
ATRIAL RATE: 77 BPM
ATRIAL RATE: 89 BPM
BASE DEFICIT BLDA-SCNC: 0.2 MMOL/L
BASOPHILS # BLD: 0 K/UL (ref 0–0.1)
BASOPHILS NFR BLD: 0 % (ref 0–2)
BDY SITE: ABNORMAL
BUN SERPL-MCNC: 32 MG/DL (ref 7–18)
BUN/CREAT SERPL: 15 (ref 12–20)
CA-I BLD-MCNC: 8.4 MG/DL (ref 8.5–10.1)
CALCULATED P AXIS, ECG09: 0 DEGREES
CALCULATED P AXIS, ECG09: 45 DEGREES
CALCULATED R AXIS, ECG10: 11 DEGREES
CALCULATED R AXIS, ECG10: 41 DEGREES
CALCULATED T AXIS, ECG11: -145 DEGREES
CALCULATED T AXIS, ECG11: -175 DEGREES
CHLORIDE SERPL-SCNC: 108 MMOL/L (ref 100–111)
CO2 SERPL-SCNC: 27 MMOL/L (ref 21–32)
COHGB MFR BLD: 0.9 % (ref 1–2)
CREAT SERPL-MCNC: 2.07 MG/DL (ref 0.6–1.3)
DIAGNOSIS, 93000: NORMAL
DIAGNOSIS, 93000: NORMAL
DIFFERENTIAL METHOD BLD: ABNORMAL
ECHO AO ROOT DIAM: 2.8 CM
ECHO AO ROOT INDEX: 1.48 CM/M2
ECHO AR MAX VEL PISA: 3.9 M/S
ECHO AV AREA PEAK VELOCITY: 1.8 CM2
ECHO AV AREA VTI: 1.9 CM2
ECHO AV AREA/BSA PEAK VELOCITY: 1 CM2/M2
ECHO AV AREA/BSA VTI: 1 CM2/M2
ECHO AV MEAN GRADIENT: 8 MMHG
ECHO AV MEAN VELOCITY: 1.3 M/S
ECHO AV PEAK GRADIENT: 14 MMHG
ECHO AV PEAK VELOCITY: 1.8 M/S
ECHO AV REGURGITANT PHT: 677 MS
ECHO AV VELOCITY RATIO: 0.56
ECHO AV VTI: 38.5 CM
ECHO IVC PROX: 1.6 CM
ECHO LA AREA 2C: 24.1 CM2
ECHO LA AREA 4C: 21.9 CM2
ECHO LA DIAMETER INDEX: 1.96 CM/M2
ECHO LA DIAMETER: 3.7 CM
ECHO LA MAJOR AXIS: 5.9 CM
ECHO LA MINOR AXIS: 5.8 CM
ECHO LA TO AORTIC ROOT RATIO: 1.32
ECHO LA VOL BP: 73 ML (ref 18–58)
ECHO LA VOL/BSA BIPLANE: 39 ML/M2 (ref 16–34)
ECHO LV E' LATERAL VELOCITY: 6 CM/S
ECHO LV E' SEPTAL VELOCITY: 4 CM/S
ECHO LV EDV A2C: 109 ML
ECHO LV EDV A4C: 102 ML
ECHO LV EDV INDEX A4C: 54 ML/M2
ECHO LV EDV NDEX A2C: 58 ML/M2
ECHO LV EJECTION FRACTION A2C: 47 %
ECHO LV EJECTION FRACTION A4C: 39 %
ECHO LV EJECTION FRACTION BIPLANE: 42 % (ref 55–100)
ECHO LV ESV A2C: 58 ML
ECHO LV ESV A4C: 63 ML
ECHO LV ESV INDEX A2C: 31 ML/M2
ECHO LV ESV INDEX A4C: 33 ML/M2
ECHO LV FRACTIONAL SHORTENING: 21 % (ref 28–44)
ECHO LV GLOBAL LONGITUDINAL STRAIN (GLS): -12.3 %
ECHO LV GLOBAL LONGITUDINAL STRAIN (GLS): -13.4 %
ECHO LV GLOBAL LONGITUDINAL STRAIN (GLS): -13.8 %
ECHO LV GLOBAL LONGITUDINAL STRAIN (GLS): -14.1 %
ECHO LV INTERNAL DIMENSION DIASTOLE INDEX: 2.75 CM/M2
ECHO LV INTERNAL DIMENSION DIASTOLIC: 5.2 CM (ref 4.2–5.9)
ECHO LV INTERNAL DIMENSION SYSTOLIC INDEX: 2.17 CM/M2
ECHO LV INTERNAL DIMENSION SYSTOLIC: 4.1 CM
ECHO LV IVSD: 1.3 CM (ref 0.6–1)
ECHO LV MASS 2D: 278.4 G (ref 88–224)
ECHO LV MASS INDEX 2D: 147.3 G/M2 (ref 49–115)
ECHO LV POSTERIOR WALL DIASTOLIC: 1.3 CM (ref 0.6–1)
ECHO LV RELATIVE WALL THICKNESS RATIO: 0.5
ECHO LVOT AREA: 3.5 CM2
ECHO LVOT AV VTI INDEX: 0.54
ECHO LVOT DIAM: 2.1 CM
ECHO LVOT MEAN GRADIENT: 2 MMHG
ECHO LVOT PEAK GRADIENT: 4 MMHG
ECHO LVOT PEAK VELOCITY: 1 M/S
ECHO LVOT STROKE VOLUME INDEX: 37.7 ML/M2
ECHO LVOT SV: 71.3 ML
ECHO LVOT VTI: 20.6 CM
ECHO MV A VELOCITY: 1.24 M/S
ECHO MV AREA VTI: 2.1 CM2
ECHO MV E DECELERATION TIME (DT): 302 MS
ECHO MV E VELOCITY: 0.86 M/S
ECHO MV E/A RATIO: 0.69
ECHO MV E/E' LATERAL: 14.33
ECHO MV E/E' RATIO (AVERAGED): 17.92
ECHO MV E/E' SEPTAL: 21.5
ECHO MV LVOT VTI INDEX: 1.67
ECHO MV MAX VELOCITY: 1.3 M/S
ECHO MV MEAN GRADIENT: 2 MMHG
ECHO MV MEAN VELOCITY: 0.7 M/S
ECHO MV PEAK GRADIENT: 7 MMHG
ECHO MV VTI: 34.3 CM
ECHO RA AREA 4C: 14.2 CM2
ECHO RA END SYSTOLIC VOLUME APICAL 4 CHAMBER INDEX BSA: 20 ML/M2
ECHO RA VOLUME: 38 ML
ECHO RV BASAL DIMENSION: 3.3 CM
ECHO RV LONGITUDINAL DIMENSION: 6.1 CM
ECHO RV MID DIMENSION: 3 CM
ECHO RV TAPSE: 1.7 CM (ref 1.7–?)
ECHO TV REGURGITANT MAX VELOCITY: 1.87 M/S
ECHO TV REGURGITANT PEAK GRADIENT: 14 MMHG
EOSINOPHIL # BLD: 0 K/UL (ref 0–0.4)
EOSINOPHIL NFR BLD: 0 % (ref 0–5)
ERYTHROCYTE [DISTWIDTH] IN BLOOD BY AUTOMATED COUNT: 13.5 % (ref 11.6–14.5)
FIO2 ON VENT: 40 %
GAS FLOW.O2 O2 DELIVERY SYS: 40 L/MIN
GLUCOSE SERPL-MCNC: 141 MG/DL (ref 74–99)
HCO3 BLDA-SCNC: 24 MMOL/L (ref 22–26)
HCT VFR BLD AUTO: 39.8 % (ref 36–48)
HGB BLD-MCNC: 12.7 G/DL (ref 13–16)
IMM GRANULOCYTES # BLD AUTO: 0 K/UL (ref 0–0.04)
IMM GRANULOCYTES NFR BLD AUTO: 0 % (ref 0–0.5)
LYMPHOCYTES # BLD: 0.6 K/UL (ref 0.9–3.6)
LYMPHOCYTES NFR BLD: 6 % (ref 21–52)
MAGNESIUM SERPL-MCNC: 2.3 MG/DL (ref 1.6–2.6)
MCH RBC QN AUTO: 29.2 PG (ref 24–34)
MCHC RBC AUTO-ENTMCNC: 31.9 G/DL (ref 31–37)
MCV RBC AUTO: 91.5 FL (ref 78–100)
METHGB MFR BLD: 0.3 % (ref 0–1.4)
MONOCYTES # BLD: 0.5 K/UL (ref 0.05–1.2)
MONOCYTES NFR BLD: 6 % (ref 3–10)
NEUTS SEG # BLD: 7.9 K/UL (ref 1.8–8)
NEUTS SEG NFR BLD: 88 % (ref 40–73)
NRBC # BLD: 0 K/UL (ref 0–0.01)
NRBC BLD-RTO: 0 PER 100 WBC
OXYHGB MFR BLD: 89.6 % (ref 95–99)
P-R INTERVAL, ECG05: 202 MS
P-R INTERVAL, ECG05: 68 MS
PCO2 BLDA: 37 MMHG (ref 35–45)
PERFORMED BY, TECHID: ABNORMAL
PH BLDA: 7.43 (ref 7.35–7.45)
PLATELET # BLD AUTO: 172 K/UL (ref 135–420)
PMV BLD AUTO: 12 FL (ref 9.2–11.8)
PO2 BLDA: 58 MMHG (ref 80–100)
POTASSIUM SERPL-SCNC: 4.2 MMOL/L (ref 3.5–5.5)
Q-T INTERVAL, ECG07: 387 MS
Q-T INTERVAL, ECG07: 421 MS
QRS DURATION, ECG06: 150 MS
QRS DURATION, ECG06: 152 MS
QTC CALCULATION (BEZET), ECG08: 466 MS
QTC CALCULATION (BEZET), ECG08: 474 MS
RBC # BLD AUTO: 4.35 M/UL (ref 4.35–5.65)
SAO2 % BLD: 91 % (ref 95–99)
SAO2% DEVICE SAO2% SENSOR NAME: ABNORMAL
SODIUM SERPL-SCNC: 141 MMOL/L (ref 136–145)
SPECIMEN SITE: ABNORMAL
TROPONIN I SERPL HS-MCNC: 76 NG/L (ref 0–78)
TROPONIN I SERPL HS-MCNC: 88 NG/L (ref 0–78)
VENTRICULAR RATE, ECG03: 76 BPM
VENTRICULAR RATE, ECG03: 87 BPM
WBC # BLD AUTO: 9.1 K/UL (ref 4.6–13.2)

## 2023-02-10 PROCEDURE — 83735 ASSAY OF MAGNESIUM: CPT

## 2023-02-10 PROCEDURE — 65270000029 HC RM PRIVATE

## 2023-02-10 PROCEDURE — 80048 BASIC METABOLIC PNL TOTAL CA: CPT

## 2023-02-10 PROCEDURE — 82803 BLOOD GASES ANY COMBINATION: CPT

## 2023-02-10 PROCEDURE — 92526 ORAL FUNCTION THERAPY: CPT

## 2023-02-10 PROCEDURE — 74230 X-RAY XM SWLNG FUNCJ C+: CPT

## 2023-02-10 PROCEDURE — 85025 COMPLETE CBC W/AUTO DIFF WBC: CPT

## 2023-02-10 PROCEDURE — 36600 WITHDRAWAL OF ARTERIAL BLOOD: CPT

## 2023-02-10 PROCEDURE — 74011250637 HC RX REV CODE- 250/637: Performed by: NURSE PRACTITIONER

## 2023-02-10 PROCEDURE — 74011000258 HC RX REV CODE- 258: Performed by: NURSE PRACTITIONER

## 2023-02-10 PROCEDURE — 78580 LUNG PERFUSION IMAGING: CPT

## 2023-02-10 PROCEDURE — 97161 PT EVAL LOW COMPLEX 20 MIN: CPT

## 2023-02-10 PROCEDURE — 84484 ASSAY OF TROPONIN QUANT: CPT

## 2023-02-10 PROCEDURE — 74011000250 HC RX REV CODE- 250: Performed by: NURSE PRACTITIONER

## 2023-02-10 PROCEDURE — 92611 MOTION FLUOROSCOPY/SWALLOW: CPT

## 2023-02-10 PROCEDURE — 94761 N-INVAS EAR/PLS OXIMETRY MLT: CPT

## 2023-02-10 PROCEDURE — 93306 TTE W/DOPPLER COMPLETE: CPT

## 2023-02-10 PROCEDURE — 74011250636 HC RX REV CODE- 250/636: Performed by: NURSE PRACTITIONER

## 2023-02-10 PROCEDURE — 74011000250 HC RX REV CODE- 250: Performed by: INTERNAL MEDICINE

## 2023-02-10 PROCEDURE — 77010033711 HC HIGH FLOW OXYGEN

## 2023-02-10 PROCEDURE — 9990 CHARGE CONVERSION

## 2023-02-10 PROCEDURE — 36415 COLL VENOUS BLD VENIPUNCTURE: CPT

## 2023-02-10 RX ORDER — TAMSULOSIN HYDROCHLORIDE 0.4 MG/1
0.4 CAPSULE ORAL
Status: DISCONTINUED | OUTPATIENT
Start: 2023-02-11 | End: 2023-02-15 | Stop reason: HOSPADM

## 2023-02-10 RX ORDER — ACETAMINOPHEN 325 MG/1
650 TABLET ORAL EVERY 6 HOURS PRN
Status: DISCONTINUED | OUTPATIENT
Start: 2023-02-11 | End: 2023-02-15 | Stop reason: HOSPADM

## 2023-02-10 RX ORDER — ASPIRIN 81 MG/1
81 TABLET, CHEWABLE ORAL DAILY
Status: DISCONTINUED | OUTPATIENT
Start: 2023-02-11 | End: 2023-02-15 | Stop reason: HOSPADM

## 2023-02-10 RX ORDER — HEPARIN SODIUM 5000 [USP'U]/ML
5000 INJECTION, SOLUTION INTRAVENOUS; SUBCUTANEOUS EVERY 12 HOURS
Status: DISCONTINUED | OUTPATIENT
Start: 2023-02-11 | End: 2023-02-15 | Stop reason: HOSPADM

## 2023-02-10 RX ORDER — LEVOTHYROXINE SODIUM 0.05 MG/1
50 TABLET ORAL DAILY
Status: DISCONTINUED | OUTPATIENT
Start: 2023-02-11 | End: 2023-02-15 | Stop reason: HOSPADM

## 2023-02-10 RX ORDER — AMLODIPINE BESYLATE 5 MG/1
10 TABLET ORAL DAILY
Status: DISCONTINUED | OUTPATIENT
Start: 2023-02-11 | End: 2023-02-15 | Stop reason: HOSPADM

## 2023-02-10 RX ORDER — PANTOPRAZOLE SODIUM 40 MG/1
40 TABLET, DELAYED RELEASE ORAL
Status: DISCONTINUED | OUTPATIENT
Start: 2023-02-11 | End: 2023-02-15 | Stop reason: HOSPADM

## 2023-02-10 RX ORDER — BUMETANIDE 0.25 MG/ML
1 INJECTION, SOLUTION INTRAMUSCULAR; INTRAVENOUS EVERY 12 HOURS
Status: DISCONTINUED | OUTPATIENT
Start: 2023-02-11 | End: 2023-02-15 | Stop reason: HOSPADM

## 2023-02-10 RX ORDER — PRAVASTATIN SODIUM 20 MG
40 TABLET ORAL NIGHTLY
Status: DISCONTINUED | OUTPATIENT
Start: 2023-02-11 | End: 2023-02-15 | Stop reason: HOSPADM

## 2023-02-10 RX ORDER — AMIODARONE HYDROCHLORIDE 200 MG/1
200 TABLET ORAL DAILY
Status: DISCONTINUED | OUTPATIENT
Start: 2023-02-11 | End: 2023-02-14

## 2023-02-10 RX ADMIN — HEPARIN SODIUM 5000 UNITS: 5000 INJECTION INTRAVENOUS; SUBCUTANEOUS at 17:28

## 2023-02-10 RX ADMIN — LEVOTHYROXINE SODIUM 50 MCG: 0.05 TABLET ORAL at 05:49

## 2023-02-10 RX ADMIN — BARIUM SULFATE 15 ML: 400 SUSPENSION ORAL at 14:45

## 2023-02-10 RX ADMIN — BUMETANIDE 1 MG: 0.25 INJECTION, SOLUTION INTRAMUSCULAR; INTRAVENOUS at 21:32

## 2023-02-10 RX ADMIN — BARIUM SULFATE 30 ML: 0.81 POWDER, FOR SUSPENSION ORAL at 14:45

## 2023-02-10 RX ADMIN — CARBIDOPA AND LEVODOPA 0.5 TABLET: 25; 100 TABLET ORAL at 09:51

## 2023-02-10 RX ADMIN — PANTOPRAZOLE SODIUM 40 MG: 40 TABLET, DELAYED RELEASE ORAL at 07:30

## 2023-02-10 RX ADMIN — ASPIRIN 81 MG: 81 TABLET, CHEWABLE ORAL at 09:51

## 2023-02-10 RX ADMIN — AMIODARONE HYDROCHLORIDE 200 MG: 200 TABLET ORAL at 09:51

## 2023-02-10 RX ADMIN — BARIUM SULFATE 15 ML: 400 PASTE ORAL at 14:45

## 2023-02-10 RX ADMIN — PIPERACILLIN AND TAZOBACTAM 3.38 G: 3; .375 INJECTION, POWDER, FOR SOLUTION INTRAVENOUS at 09:51

## 2023-02-10 RX ADMIN — PIPERACILLIN AND TAZOBACTAM 3.38 G: 3; .375 INJECTION, POWDER, FOR SOLUTION INTRAVENOUS at 17:28

## 2023-02-10 RX ADMIN — BUMETANIDE 1 MG: 0.25 INJECTION, SOLUTION INTRAMUSCULAR; INTRAVENOUS at 09:51

## 2023-02-10 RX ADMIN — HEPARIN SODIUM 5000 UNITS: 5000 INJECTION INTRAVENOUS; SUBCUTANEOUS at 05:49

## 2023-02-10 RX ADMIN — TAMSULOSIN HYDROCHLORIDE 0.4 MG: 0.4 CAPSULE ORAL at 09:51

## 2023-02-10 RX ADMIN — PIPERACILLIN AND TAZOBACTAM 3.38 G: 3; .375 INJECTION, POWDER, FOR SOLUTION INTRAVENOUS at 00:30

## 2023-02-10 RX ADMIN — PRAVASTATIN SODIUM 20 MG: 20 TABLET ORAL at 21:29

## 2023-02-10 RX ADMIN — AMLODIPINE BESYLATE 10 MG: 5 TABLET ORAL at 09:51

## 2023-02-10 RX ADMIN — CARBIDOPA AND LEVODOPA 0.5 TABLET: 25; 100 TABLET ORAL at 17:29

## 2023-02-10 NOTE — PROGRESS NOTES
2005- Patient wife expresses concerns with diet orders as she was told by day shift providers, patient currently has aspiration pneumonia. 2010- Orders clarified with provider, Kip, NP at this time. Verbal orders received for patient to be NPO, with ice chips and meds crushed in applesauce. Wife updated. 2200- Patient FiO2 titrates down to 80% at this time. Patient saturations remain 98% on monitor with RR 20. Respirations even and unlabored, no evidence of distress. 2321- Call received from patient wife, requesting to either leave patient hearing aids in, or to properly label them in container at bedside, as they have lost hearing aids in hospitals in the past.     2348- Patient calls nursing staff to bedside, states he hears a radio in the room with static. Patient assured there is no radio in the room, he likely hears the hi flow of the oxygen. 0000- Reassessment completed at this time. Patient continues to tolerate titrating off Airvo. VSS. Patient calm and cooperative, respirations even and unlabored. 0115- Rt at bedside. 18- Phlebo at bedside. 3397- Bedside shift change report given to EDUAR Kelly RN (oncoming nurse) by Ting Stevenson RN (offgoing nurse).  Report included the following information SBAR, Kardex, ED Summary, Intake/Output, MAR, Accordion, Recent Results, and Cardiac Rhythm SR BBB 1AVB depressed ST inverted T .

## 2023-02-10 NOTE — PROGRESS NOTES
Hospitalist Progress Note             Date of Service:  2/10/2023  NAME:  Kumar Grey  :  1940  MRN:  [id number]    Assessment & Plan:      #1: Acute respiratory failure with hypoxemia:  -likely multifactorial; suspect due to aspiration pneumonia, and CHF exacerbation, acute PE remains in the differential. 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 -tele  -his blood gas shows metabolic acidosis with severe hypoxemia  -CXR shows Right lower lobe pneumonia with bilateral interstitial opacities and mild pulmonary edema, his pro-bnp was 6987.  -his trop was normal, ECG--SR, non-specific T wave abnormality, repeat ECG. Trop negative.  -start antibiotics with zosyn 3.375mg q 8hr, renally dosed  -his lactic was only 1.2, no fever or elevated wbc.  -cont bumex 1mg IV twice daily  -trend troponin, and check his ddimer--1.17   VQ scan with low to intermediate risk, in light of other pulm issues, will rule out pe for now  -echocardiogram-pending    Dysphagia, aspiration on MBS  - modified diet     #2: Hypertension:  -chronic issue, cont amlodipine, add low dose metoprolol     #3: Hypothyroidism:   -chronic issue, cont levothyroxine     #4: Paroxysmal Afib:  -chronic issue, cont amiodarone, however not anticoagulated at home. #5: Diabetes mellitus Type II:  -chronic issue, cont accu-checks achs, SSI prn. #6: Hx of TAVR (22):  -stable. #7: CKD III:  -Cr is 2 at baseline. #8: [facility]  [facility]      Hospital Problems  Date Reviewed: 2023            Codes Class Noted POA    Pneumonia ICD-10-CM: J18.9  ICD-9-CM: 321  2023 Unknown             Review of Systems:   A comprehensive review of systems was negative except for that written in the HPI. Vital Signs:    Last 24hrs VS reviewed since prior progress note.  Most recent are:  Visit Vitals  BP (!) 144/54   Pulse 63   Temp 98.5 °F (36.9 °C)   Resp 17   Ht 6' (1.829 m)   Wt 68.5 kg (151 lb)   SpO2 99%   BMI 20.48 kg/m²         Intake/Output Summary (Last 24 hours) at 2/10/2023 1546  Last data filed at 2/10/2023 1507  Gross per 24 hour   Intake 350 ml   Output 4550 ml   Net -4200 ml        Physical Examination:             General:          Alert, cooperative, no distress, appears stated age. HEENT:           Atraumatic, anicteric sclerae, pink conjunctivae                          No oral ulcers, mucosa moist, throat clear, dentition fair  Neck:               Supple, symmetrical  Lungs:             rhnchi, worse on R  Chest wall:      No tenderness  No Accessory muscle use. Heart:              Regular  rhythm,  No  murmur   No edema  Abdomen:        Soft, non-tender. Not distended. Bowel sounds normal  Extremities:     No cyanosis. No clubbing,                            Skin turgor normal, Capillary refill normal  Skin:                Not pale. Not Jaundiced  No rashes   Psych:             Not anxious or agitated. Neurologic:      Alert, moves all extremities, answers questions appropriately and responds to commands        Data Review:    Review and/or order of clinical lab test  Review and/or order of tests in the radiology section of CPT  Review and/or order of tests in the medicine section of CPT      Labs:     Recent Labs     02/10/23  0115 02/09/23  1735   WBC 9.1 12.6   HGB 12.7* 14.3   HCT 39.8 44.6    170     Recent Labs     02/10/23  0115 02/09/23  1019    141   K 4.2 4.5    109   CO2 27 23   BUN 32* 32*   CREA 2.07* 2.07*   * 173*   CA 8.4* 9.2   MG 2.3 2.6     Recent Labs     02/09/23  1019   ALT 18   AP 92   TBILI 0.8   TP 8.0   ALB 3.8   GLOB 4.2*     No results for input(s): INR, PTP, APTT, INREXT in the last 72 hours. No results for input(s): FE, TIBC, PSAT, FERR in the last 72 hours.    Lab Results   Component Value Date/Time    Folate 22.5 (H) 11/18/2020 02:09 PM      Recent Labs     02/10/23  0430 02/09/23  1601 PH 7.43 7.31*   PCO2 37 41   PO2 58* 43*     No results for input(s): CPK, CKNDX, TROIQ in the last 72 hours.     No lab exists for component: CPKMB  Lab Results   Component Value Date/Time    Cholesterol, total 143 05/03/2022 10:28 AM    HDL Cholesterol 68 (H) 05/03/2022 10:28 AM    LDL, calculated 59.6 05/03/2022 10:28 AM    Triglyceride 77 05/03/2022 10:28 AM    CHOL/HDL Ratio 2.1 05/03/2022 10:28 AM     Lab Results   Component Value Date/Time    Glucose (POC) 160 (H) 04/11/2020 07:07 AM    Glucose (POC) 149 (H) 04/10/2020 09:05 PM    Glucose (POC) 158 (H) 04/10/2020 04:07 PM    Glucose (POC) 124 (H) 04/10/2020 11:39 AM    Glucose (POC) 152 (H) 04/10/2020 07:38 AM     Lab Results   Component Value Date/Time    Color Yellow 05/04/2022 02:11 PM    Appearance Cloudy 05/04/2022 02:11 PM    Specific gravity 1.015 05/04/2022 02:11 PM    pH (UA) 5.0 05/04/2022 02:11 PM    Protein Negative 05/04/2022 02:11 PM    Glucose Negative 05/04/2022 02:11 PM    Ketone Negative 05/04/2022 02:11 PM    Bilirubin Negative 05/04/2022 02:11 PM    Urobilinogen 0.2 05/04/2022 02:11 PM    Nitrites Negative 05/04/2022 02:11 PM    Leukocyte Esterase Large (A) 05/04/2022 02:11 PM    Epithelial cells Few 05/04/2022 02:11 PM    Bacteria 1+ (A) 05/04/2022 02:11 PM    WBC  05/04/2022 02:11 PM    RBC 0-5 05/04/2022 02:11 PM         Medications Reviewed:     Current Facility-Administered Medications   Medication Dose Route Frequency    piperacillin-tazobactam (ZOSYN) 3.375 g in 0.9% sodium chloride (MBP/ADV) 100 mL MBP  3.375 g IntraVENous Q8H    acetaminophen (TYLENOL) tablet 650 mg  650 mg Oral Q6H PRN    heparin (porcine) injection 5,000 Units  5,000 Units SubCUTAneous Q12H    bumetanide (BUMEX) injection 1 mg  1 mg IntraVENous Q12H    amiodarone (CORDARONE) tablet 200 mg  200 mg Oral DAILY    amLODIPine (NORVASC) tablet 10 mg  10 mg Oral DAILY    aspirin chewable tablet 81 mg  81 mg Oral DAILY    carbidopa-levodopa (SINEMET)  mg per tablet 0.5 Tablet  0.5 Tablet Oral BID    levothyroxine (SYNTHROID) tablet 50 mcg  50 mcg Oral 6am    pantoprazole (PROTONIX) tablet 40 mg  40 mg Oral ACB    pravastatin (PRAVACHOL) tablet 20 mg  20 mg Oral QHS    tamsulosin (FLOMAX) capsule 0.4 mg  0.4 mg Oral DAILY WITH BREAKFAST     ______________________________________________________________________  EXPECTED LENGTH OF STAY: 5d 4h  ACTUAL LENGTH OF STAY:          1                 Alana Valdez MD

## 2023-02-10 NOTE — PROGRESS NOTES
Care Management Interventions  PCP Verified by CM: Yes  Transition of Care Consult (CM Consult): Discharge Planning  Physical Therapy Consult: Yes  Occupational Therapy Consult: Yes  Speech Therapy Consult: Yes  Support Systems: Spouse/Significant Other  The Plan for Transition of Care is Related to the Following Treatment Goals : Patient centered discharge planning to ensure smooth transition to community and PLOF. Discharge Location  Patient Expects to be Discharged to[de-identified] Home    Reason for Admission:  Chart reviewed and patients wife called on phone and interviewed. Patient presented to ED with complaints of SOB, chest pain, BLE edema and BLUM. Patient found to be hypoxic, BNP 6987 and CXR shows RLL pneumonia. Hospitalist consulted for admission. RUR Score:  14%                   Plan for utilizing home health:   TBD       PCP: First and Last name:  Dev Fraser MD     Name of Practice:    Are you a current patient: Yes/No:    Approximate date of last visit:    Can you participate in a virtual visit with your PCP:                     Current Advanced Directive/Advance Care Plan: Full Code      Healthcare Decision Maker:   Click here to complete [address] including selection of the Healthcare Decision Maker Relationship (ie \"Primary\")                             Transition of Care Plan:   Plan of care includes medication reconciliation to be complete, education will be given with teach back method, and will identify need for post-acute care follow up. Patient centered discharge planning to ensure smooth transition to community and PLOF. Patient lives at home with wife and independent of ADLs PTA, has DME but does not use any. No further DME needs. No oxygen at home. No HH use or needs per wife. CM following for DC needs.

## 2023-02-10 NOTE — CONSULTS
CARDIOLOGY CONSULTATION      REASON FOR CONSULT: Shortness of breath    REQUESTING PROVIDER: Denice Barbour NP    CHIEF COMPLAINT:  Shortness of breath    HISTORY OF PRESENT ILLNESS:  Rachell Macdonald is a 78y.o. year-old male with past medical history significant for hypertension, diabetes, aortic stenosis s/p TAVR 1/2022, Parkinson's, CKD, PAF, and HFpEF who was evaluated today due to shortness of breath. Onset of symptoms about a week ago with bilateral feet and ankle swelling. PCP didn't want to give diuretics due to CKD but deferred to cardiology and nephrology. He states SOB has continued, he has poor appetite and wt loss. Prior to arrival, he woke to sudden SOB with chest pain. In the ED, was found to be hypoxic on room air. proBNP 6987, CXR w/ RLL pneumonia and bilateral interstitial infiltrates w/ mild pulm edema. During transfer to the hospital unit, became more hypoxic w/ sat 65% on 4L and was brought to ICU. On exam, reports breathing feels better. Remains on 8L oxygen, saturations 95%. No further complaints of chest pain. Records from hospital admission course thus far reviewed. Telemetry reviewed. No events overnight. Sinus rhythm w/ LBBB. INPATIENT MEDICATIONS:  Home medications reviewed.     Current Facility-Administered Medications:     piperacillin-tazobactam (ZOSYN) 3.375 g in 0.9% sodium chloride (MBP/ADV) 100 mL MBP, 3.375 g, IntraVENous, Q8H, Oba, Oluwabunmi S, NP, Last Rate: 25 mL/hr at 02/10/23 0951, 3.375 g at 02/10/23 0951    acetaminophen (TYLENOL) tablet 650 mg, 650 mg, Oral, Q6H PRN, Oba, Oluwabunmi S, NP    heparin (porcine) injection 5,000 Units, 5,000 Units, SubCUTAneous, Q12H, Oba, Oluwabunmi S, NP, 5,000 Units at 02/10/23 0549    bumetanide (BUMEX) injection 1 mg, 1 mg, IntraVENous, Q12H, Oba, Oluwabunmi S, NP, 1 mg at 02/10/23 0951    amiodarone (CORDARONE) tablet 200 mg, 200 mg, Oral, DAILY, Oba, Oluwabunmi S, NP, 200 mg at 02/10/23 0951    amLODIPine (NORVASC) tablet 10 mg, 10 mg, Oral, DAILY, Oba, Oluwabunmi S, NP, 10 mg at 02/10/23 0951    aspirin chewable tablet 81 mg, 81 mg, Oral, DAILY, Oba, Oluwabunmi S, NP, 81 mg at 02/10/23 0951    carbidopa-levodopa (SINEMET)  mg per tablet 0.5 Tablet, 0.5 Tablet, Oral, BID, Oba, Oluwabunmi S, NP, 0.5 Tablet at 02/10/23 2930    levothyroxine (SYNTHROID) tablet 50 mcg, 50 mcg, Oral, 6am, Oba, Oluwabunmi S, NP, 50 mcg at 02/10/23 0549    pantoprazole (PROTONIX) tablet 40 mg, 40 mg, Oral, ACB, Oba, Oluwabunmi S, NP, 40 mg at 02/10/23 0730    pravastatin (PRAVACHOL) tablet 20 mg, 20 mg, Oral, QHS, Oba, Oluwabunmi S, NP, 20 mg at 02/09/23 2111    tamsulosin (FLOMAX) capsule 0.4 mg, 0.4 mg, Oral, DAILY WITH BREAKFAST, Oba, Oluwabunmi S, NP, 0.4 mg at 02/10/23 0310     ALLERGIES:  Allergies reviewed with the patient,  Allergies   Allergen Reactions    Iodinated Contrast Media Other (comments)     Hot flushing     Metformin Other (comments)    . FAMILY HISTORY:  Family history reviewed. SOCIAL HISTORY:  Notable for past tobacco use, no heavy alcohol or illicit drug use. REVIEW OF SYSTEMS:  Complete review of systems performed, pertinents noted above, all other systems are negative. PHYSICAL EXAMINATION:  Vital sign assessment reveal a blood pressure of  138/58  and pulse rate of 67. Cardiovascular exam has a heart with a regular rate and rhythm, normal S1 and S2. Soft murmur present. There are no rubs or gallops. Good peripheral pulses. No jugular venous distension. Respiratory exam reveals clear lung fields, no rales or rhonchi. Gastrointestinal exam has soft, nontender abdomen with normal bowel sounds. Lymphatic exam reveals trace pedal edema bilaterally. No notable skin changes. Neurologic exam is nonfocal.  Musculoskeletal exam gait not observed.      Visit Vitals  BP (!) 135/57   Pulse 66   Temp 98.5 °F (36.9 °C)   Resp 20   Ht 6' (1.829 m)   Wt 68.5 kg (151 lb)   SpO2 96%   BMI 20.48 kg/m² Recent labs results and imaging reviewed. Notable findings include   Lab Results   Component Value Date/Time    WBC 9.1 02/10/2023 01:15 AM    HGB 12.7 (L) 02/10/2023 01:15 AM    HCT 39.8 02/10/2023 01:15 AM    PLATELET 154 67/14/7605 01:15 AM    MCV 91.5 02/10/2023 01:15 AM     Lab Results   Component Value Date/Time    CK 52 04/06/2020 01:24 PM    CK - MB 5.0 (H) 04/06/2020 01:24 PM    CK-MB Index 9.6 (H) 04/06/2020 01:24 PM    Troponin-I, QT 1.06 (H) 04/06/2020 01:24 PM     (H) 12/11/2021 11:35 PM      Lab Results   Component Value Date/Time     (H) 12/11/2021 11:35 PM    NT pro-BNP 6,987 (H) 02/09/2023 10:19 AM      Lab Results   Component Value Date/Time    Sodium 141 02/10/2023 01:15 AM    Potassium 4.2 02/10/2023 01:15 AM    Chloride 108 02/10/2023 01:15 AM    CO2 27 02/10/2023 01:15 AM    Anion gap 6 02/10/2023 01:15 AM    Glucose 141 (H) 02/10/2023 01:15 AM    BUN 32 (H) 02/10/2023 01:15 AM    Creatinine 2.07 (H) 02/10/2023 01:15 AM    BUN/Creatinine ratio 15 02/10/2023 01:15 AM    GFR est AA 24 05/03/2022 10:28 AM    GFR est non-AA 20 05/03/2022 10:28 AM    Calcium 8.4 (L) 02/10/2023 01:15 AM     .       Discussed case with Dr. Yohannes Haskins and our impression and recommendations are as follows:  Acute on chronic diastolic heart failure: pulm edema on CXR, proBNP 6900. Agree w/ IV diuretics. Appreciate nephrology input. Echocardiogram pending. Daily wt and strict I&O. Troponin elevation: 57>88>76. Likely demand ischemic from hypoxia and volume overload. Continue asa, statin. Aortic Stenosis: s/p TAVR 1/2022. Echo pending. Hypertension: blood pressure closet to goal. Continue home meds. Paroxysmal atrial fibrillation: per history. Continue amiodarone. Thank you for involving us in the care of this patient. Please do not hesitate to call me or Dr. Yohannes Haskins if additional questions arise.     PATRIZIA Terry  2/10/2023

## 2023-02-10 NOTE — PROGRESS NOTES
SPEECH PATHOLOGY MODIFIED BARIUM SWALLOW STUDY & TREATMENT    Patient: Renea Ganser (40 y.o. male)  Date: 2/10/2023  Primary Diagnosis: Pneumonia [J18.9]       Precautions: aspiration      PLOF:Patient with extensive speech therapy history. Patient with hx of a PEG tube. ASSESSMENT :  Based on the objective data described below, the patient presents with delayed oral phase of swallow with premature spillage to valleculae to pyriforms. Patient with incomplete epiglottic inversion, flash penetration with clearance of thin liquids using a straw and silent aspiration of thin liquids via cup. Patient with increased residue on anterior and posterior pharyngeal wall with solids. Patient cleared pharyngeal residue with second swallow. Patient educated on diet recommendations of soft and bite size and mildly thick liquids. Treatment:  Skilled therapy initiated: Educated pt on MBS results, aspiration precautions, and importance of compensatory swallow techniques to decrease aspiration risk (decrease rate of intake & sip/bite size, upright @HOB for all po intake and ~30 minutes after po); verbalized comprehension. SLP to follow as indicated. Patient will benefit from skilled intervention to address the above impairments. Patient's rehabilitation potential is considered to be Fair  Factors which may influence rehabilitation potential include:   [x]              None noted  []              Mental ability/status  []              Medical condition  []              Home/family situation and support systems  []              Safety awareness  []              Pain tolerance/management  []              Other:      PLAN :  Recommendations and Planned Interventions:  ST recommends soft and bite sized solids with mildly thick liquids.  Patient would benefit from ST tx to strengthen pharyngeal phase of swallow   Frequency/Duration: Patient will be followed by speech-language pathology 3 times a week to address goals.  Discharge Recommendations: To Be Determined     SUBJECTIVE:   Patient stated I've been going to speech therapy for an eternity. 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 Toshia Manjarrez    HX UROLOGICAL      Testicles removed     Home Situation:   Home Situation  Support Systems: Spouse/Significant Other  Patient Expects to be Discharged to[de-identified] Home  Diet prior to admission: regular/thin  Current Diet:  NPO- ST recommends soft and bite sized with mildly thick    Radiologist:    Film Views: Lateral       Trial 1: Trial 2:   Consistency Presented:  All consistencies     How Presented: SLP-fed/presented;Cup/sip;Straw           Bolus Acceptance: No impairment     Bolus Formation/Control: Impaired: Premature spillage  :     Propulsion: No impairment     Oral Residue: Lingual     Initiation of Swallow: Triggered at vallecula;Triggered at pyriform sinus(es)     Timing: Vallecular;Pyriform sinus     Penetration: During swallow     Aspiration/Timing: Silent ;Trace;During     Pharyngeal Clearance: Vallecular residue;Pyriform residue ;10-50%     Attempted Modifications: Double swallow;Small sips and bites                         Trial 3: Trial 4:                            :    :                                                                        Decreased Tongue Base Retraction?: Yes  Laryngeal Elevation: Inadequate epiglottic inversion  Aspiration/Penetration Score: 8 (Aspiration-Contrast passes cords/glottis with no effort to eject, ie/silent aspiration)  Pharyngeal Symmetry: Symmetrical                  8-point Penetration-Aspiration Scale: Score 8    PAIN:  Pain level pre-treatment: 0/10   Pain level post-treatment: 0/10   Pain Intervention(s): N/A   Response to intervention: N/A      COMMUNICATION/EDUCATION:   [x]  Patient educated regarding MBS results and diet recommendations. []  Patient/family have participated as able in goal setting and plan of care. []  Patient/family agree to work toward stated goals and plan of care. []  Patient understands intent and goals of therapy, but is neutral about his/her participation. []  Patient is unable to participate in goal setting and plan of care.     Thank you for this referral.  Becky Escalante MA, CCC-SLP    Time Calculation: 40 minutes   MBS Time: 25 minutes  Treatment Time: 15 minutes

## 2023-02-10 NOTE — ACP (ADVANCE CARE PLANNING)
Advance Care Planning     General Advance Care Planning (ACP) Conversation      Date of Conversation: 2/9/2023  Conducted with: Patient with Decision Making Capacity    Healthcare Decision Maker:   No healthcare decision makers have been documented.    Click here to complete [address] including selection of the Healthcare Decision Maker Relationship (ie \"Primary\")      Content/Action Overview:   Has NO ACP documents/care preferences - information provided, considering goals and options  Reviewed DNR/DNI and patient elects Full Code (Attempt Resuscitation)         Length of Voluntary ACP Conversation in minutes:  <16 minutes (Non-Billable)    Silvia Collado

## 2023-02-10 NOTE — PROGRESS NOTES
Problem: Mobility Impaired (Adult and Pediatric)  Goal: *Acute Goals and Plan of Care (Insert Text)  Description: Physical Therapy Goals  Initiated 2/10/2023 and to be accomplished within 7 day(s)  1. Patient will move from supine to sit and sit to supine , scoot up and down, and roll side to side in bed with modified independence. 2.  Patient will transfer from bed to chair and chair to bed with modified independence using the least restrictive device. 3.  Patient will perform sit to stand with modified independence. 4.  Patient will ambulate with modified independence for 100 feet with the least restrictive device. 5.  Patient will ascend/descend 4 stairs with 2 handrail(s) with minimal assistance/contact guard assist.    PLOF: Community ambulator, no AD, (I) ADLs. Outcome: Progressing Towards Goal   PHYSICAL THERAPY EVALUATION    Patient: Miguel Ángel Sweeney (44 y.o. male)  Date: 2/10/2023   Start Time: 1054   Stop Time: 1109  $$ Initial PT Evaluation: Low Complex 20 Min  Primary Diagnosis: Pneumonia [J18.9]       Precautions: Other (comment) (Monitor SpO2 and BP)    ASSESSMENT :  Based on the objective data described below, the patient presents with pnuemonia and respiratory failure. He is currently on 8L via HFNC with SPO@ at 99%. He performs mobility well but c/o dizziness with BP noted to have significant drop while in sitting following standing. He does not c/o dyspnea with mobility and SPO2 stays around 96%. He returns to bed and is left with all needs met. Patient would benefit from further P.T. to improve strength, gait, and stair navigation. [facility] or outpatient therapy once medically stable. Patient will benefit from skilled intervention to address the above impairments.   Patient's rehabilitation potential is considered to be Good  Factors which may influence rehabilitation potential include:   []         None noted  []         Mental ability/status  [x] Medical condition  []         Home/family situation and support systems  []         Safety awareness  []         Pain tolerance/management  []         Other:      PLAN :  Recommendations and Planned Interventions:   [x]           Bed Mobility Training             [x]    Neuromuscular Re-Education  [x]           Transfer Training                   []    Orthotic/Prosthetic Training  [x]           Gait Training                          []    Modalities  [x]           Therapeutic Exercises           []    Edema Management/Control  [x]           Therapeutic Activities            [x]    Family Training/Education  []           Patient Education  []           Other (comment):    Frequency/Duration: Patient will be followed by physical therapy 1-2 times per day/4-7 days per week to address goals. Discharge Recommendations: Home Health, Home Physical Therapy, or Outpatient  AM-PAC: 18/24  Further Equipment Recommendations for Discharge: O2? SUBJECTIVE:   Patient stated I was taking outpatient therapy before Christmas.     OBJECTIVE DATA SUMMARY:     Past Medical History:   Diagnosis Date    Benign prostatic hyperplasia with urinary obstruction     D[facility] [address]     early onset    Diabetes ([facility]     Hypersomnia     Hypertension     Nocturia     OAB (overactive bladder)     Parkinson's disease ([facility] 02/09/2023    Testicular cancer ([facility]     Urge incontinence     Urinary incontinence, urge      Past Surgical History:   Procedure Laterality Date    HX CYST REMOVAL  1960-1970s    cyst removed from right breast    HX OTHER SURGICAL      reemoval of testcle    HX OTHER SURGICAL  08/2018    Surgery: Blockage Lower bowels, [facility]    HX OTHER SURGICAL  01/2019    Heart Blockage: 30% [facility]: Dr. Latoya Florence    HX Erwin Paul removed     Barriers to Learning/Limitations: None  Compensate with: N/A  Home Situation:  Home Situation  Home Environment: Private residence  # Steps to Enter: 3  Rails to Enter: Yes  Hand Rails : Bilateral  One/Two Story Residence: One story  Living Alone: No  Support Systems: Spouse/Significant Other  Patient Expects to be Discharged to[de-identified] Home with outpatient services  Current DME Used/Available at Home: trey Miller Ula Ruiz, kalpesh, Walker, rollator  Critical Behavior:  Neurologic State: Alert  Orientation Level: Oriented X4  Cognition: Appropriate for age attention/concentration; Follows commands  Strength:    Strength: Generally decreased, functional  RUE: 4/5  LUE: 4/5  RLE: 4/5  LLE: 4/5  Tone & Sensation:   Tone: Normal  Sensation: Intact  Coordination:  Coordination: Within functional limits  Range Of Motion:   AROM: Within functional limits  PROM: Within functional limits    Posture:  Posture (WDL): Exceptions to WDL  Posture Assessment: Forward head;Rounded shoulders  Functional Mobility:  Bed Mobility:  Rolling: Modified independent  Supine to Sit: Modified independent  Sit to Supine: Modified independent  Scooting: Modified independent  Transfers:  Sit to Stand: Contact guard assistance  Stand to Sit: Contact guard assistance  Other: Pt side steps to the [facility] with CGA. Balance:   Sitting: Intact  Standing: Intact  Ambulation/Gait Training:  Distance (ft): 3 Feet (ft)  Assistive Device: Other (comment) (holds to sink)  Ambulation - Level of Assistance: Contact guard assistance     Gait Description (WDL): Exceptions to WDL  Gait Abnormalities: Other (flexed posture)   AM-PAC:  18/24; Current research shows that an AM-PAC score of 17 or less is typically not associated with a discharge to the patient's home setting, whereas a score of 18 or greater is typically associated with a discharge to the patient's home setting.   Pain:  Pain level pre-treatment: 0/10   Pain level post-treatment: 0/10   Pain Location: N/A  Activity Tolerance:   Poor     02/10/23 1109   Vital Signs   Pulse (Heart Rate) 72   BP (!) 97/48   MAP (Calculated) (!) 64   BP 1 Location Right upper arm   BP Patient Position Sitting   More BP/Pulse rows needed? Yes   Additional Blood Pressure/Pulse Data   Pulse 2 80   BP 2 (!) 89/44   MAP 2 (Calculated) (!) 59   Patient Position 2 Other (Comment)  (after standing and sidestepping)   Pulse 3 70   BP 3 135/57   MAP 3 (Calculated) 83   Patient Position 3 Supine     Please refer to the flowsheet for vital signs taken during this treatment. After treatment:   []         Patient left in no apparent distress sitting up in chair  [x]         Patient left in no apparent distress in bed  [x]         Call bell left within reach  [x]         Nursing notified  []         Caregiver present  []         Bed alarm activated  []         SCDs applied    COMMUNICATION/EDUCATION:   [x]         Role of Physical Therapy in the acute care setting. [x]         Fall prevention education was provided and the patient/caregiver indicated understanding. [x]         Patient/family have participated as able in goal setting and plan of care. [x]         Patient/family agree to work toward stated goals and plan of care. []         Patient understands intent and goals of therapy, but is neutral about his/her participation. []         Patient is unable to participate in goal setting/plan of care: ongoing with therapy staff.  []         Other:     Thank you for this referral.  Venancio Albert, PT, DPT   Time Calculation: 15 mins

## 2023-02-10 NOTE — PROGRESS NOTES
Problem: General Medical Care Plan  Goal: *Vital signs within specified parameters  Outcome: Progressing Towards Goal  Goal: *Labs within defined limits  Outcome: Progressing Towards Goal  Goal: *Absence of infection signs and symptoms  Outcome: Progressing Towards Goal  Goal: *Optimal pain control at patient's stated goal  Outcome: Progressing Towards Goal  Goal: *Skin integrity maintained  Outcome: Progressing Towards Goal  Goal: *Fluid volume balance  Outcome: Progressing Towards Goal  Goal: *Optimize nutritional status  Outcome: Progressing Towards Goal  Goal: *Anxiety reduced or absent  Outcome: Progressing Towards Goal  Goal: *Progressive mobility and function (eg: ADL's)  Outcome: Progressing Towards Goal     Problem: Patient Education: Go to Patient Education Activity  Goal: Patient/Family Education  Outcome: Progressing Towards Goal     Problem: Pressure Injury - Risk of  Goal: *Prevention of pressure injury  Description: Document Rustam Scale and appropriate interventions in the flowsheet. Outcome: Progressing Towards Goal  Note: Pressure Injury Interventions:  Sensory Interventions: Assess changes in LOC         Activity Interventions: Increase time out of bed    Mobility Interventions: Float heels    Nutrition Interventions: Document food/fluid/supplement intake, Discuss nutritional consult with provider, Offer support with meals,snacks and hydration    Friction and Shear Interventions: Minimize layers (Simultaneous filing. User may not have seen previous data.)                Problem: Patient Education: Go to Patient Education Activity  Goal: Patient/Family Education  Outcome: Progressing Towards Goal     Problem: Falls - Risk of  Goal: *Absence of Falls  Description: Document Kyrie Antoine Fall Risk and appropriate interventions in the flowsheet.   Outcome: Progressing Towards Goal  Note: Fall Risk Interventions:  Mobility Interventions: Bed/chair exit alarm         Medication Interventions: Bed/chair exit alarm    Elimination Interventions: Call light in reach, Bed/chair exit alarm              Problem: Patient Education: Go to Patient Education Activity  Goal: Patient/Family Education  Outcome: Progressing Towards Goal

## 2023-02-11 LAB
ANION GAP SERPL CALC-SCNC: 6 MMOL/L (ref 3–18)
BASOPHILS # BLD: 0 K/UL (ref 0–0.1)
BASOPHILS NFR BLD: 0 % (ref 0–2)
BUN SERPL-MCNC: 41 MG/DL (ref 7–18)
BUN/CREAT SERPL: 16 (ref 12–20)
CA-I BLD-MCNC: 8.1 MG/DL (ref 8.5–10.1)
CHLORIDE SERPL-SCNC: 108 MMOL/L (ref 100–111)
CO2 SERPL-SCNC: 30 MMOL/L (ref 21–32)
CREAT SERPL-MCNC: 2.49 MG/DL (ref 0.6–1.3)
DIFFERENTIAL METHOD BLD: ABNORMAL
EOSINOPHIL # BLD: 0.1 K/UL (ref 0–0.4)
EOSINOPHIL NFR BLD: 2 % (ref 0–5)
ERYTHROCYTE [DISTWIDTH] IN BLOOD BY AUTOMATED COUNT: 13.6 % (ref 11.6–14.5)
GLUCOSE SERPL-MCNC: 61 MG/DL (ref 74–99)
HCT VFR BLD AUTO: 36.7 % (ref 36–48)
HGB BLD-MCNC: 11.8 G/DL (ref 13–16)
IMM GRANULOCYTES # BLD AUTO: 0 K/UL (ref 0–0.04)
IMM GRANULOCYTES NFR BLD AUTO: 0 % (ref 0–0.5)
LYMPHOCYTES # BLD: 0.8 K/UL (ref 0.9–3.6)
LYMPHOCYTES NFR BLD: 14 % (ref 21–52)
MAGNESIUM SERPL-MCNC: 2.2 MG/DL (ref 1.6–2.6)
MCH RBC QN AUTO: 29.6 PG (ref 24–34)
MCHC RBC AUTO-ENTMCNC: 32.2 G/DL (ref 31–37)
MCV RBC AUTO: 92 FL (ref 78–100)
MONOCYTES # BLD: 0.5 K/UL (ref 0.05–1.2)
MONOCYTES NFR BLD: 10 % (ref 3–10)
NEUTS SEG # BLD: 3.9 K/UL (ref 1.8–8)
NEUTS SEG NFR BLD: 74 % (ref 40–73)
NRBC # BLD: 0 K/UL (ref 0–0.01)
NRBC BLD-RTO: 0 PER 100 WBC
PHOSPHATE SERPL-MCNC: 4.9 MG/DL (ref 2.5–4.9)
PLATELET # BLD AUTO: 150 K/UL (ref 135–420)
PMV BLD AUTO: 11.6 FL (ref 9.2–11.8)
POTASSIUM SERPL-SCNC: 3.9 MMOL/L (ref 3.5–5.5)
RBC # BLD AUTO: 3.99 M/UL (ref 4.35–5.65)
SODIUM SERPL-SCNC: 144 MMOL/L (ref 136–145)
WBC # BLD AUTO: 5.4 K/UL (ref 4.6–13.2)

## 2023-02-11 PROCEDURE — 94761 N-INVAS EAR/PLS OXIMETRY MLT: CPT

## 2023-02-11 PROCEDURE — 2580000003 HC RX 258: Performed by: INTERNAL MEDICINE

## 2023-02-11 PROCEDURE — 6360000002 HC RX W HCPCS: Performed by: INTERNAL MEDICINE

## 2023-02-11 PROCEDURE — 83735 ASSAY OF MAGNESIUM: CPT

## 2023-02-11 PROCEDURE — 2000000000 HC ICU R&B

## 2023-02-11 PROCEDURE — 2500000003 HC RX 250 WO HCPCS: Performed by: NURSE PRACTITIONER

## 2023-02-11 PROCEDURE — 6370000000 HC RX 637 (ALT 250 FOR IP): Performed by: NURSE PRACTITIONER

## 2023-02-11 PROCEDURE — 36415 COLL VENOUS BLD VENIPUNCTURE: CPT

## 2023-02-11 PROCEDURE — 2700000000 HC OXYGEN THERAPY PER DAY

## 2023-02-11 PROCEDURE — 6370000000 HC RX 637 (ALT 250 FOR IP): Performed by: INTERNAL MEDICINE

## 2023-02-11 PROCEDURE — 80048 BASIC METABOLIC PNL TOTAL CA: CPT

## 2023-02-11 PROCEDURE — 84100 ASSAY OF PHOSPHORUS: CPT

## 2023-02-11 PROCEDURE — 85025 COMPLETE CBC W/AUTO DIFF WBC: CPT

## 2023-02-11 RX ORDER — SENNA PLUS 8.6 MG/1
2 TABLET ORAL 2 TIMES DAILY
Status: DISCONTINUED | OUTPATIENT
Start: 2023-02-11 | End: 2023-02-15 | Stop reason: HOSPADM

## 2023-02-11 RX ADMIN — PANTOPRAZOLE SODIUM 40 MG: 40 TABLET, DELAYED RELEASE ORAL at 06:25

## 2023-02-11 RX ADMIN — TAMSULOSIN HYDROCHLORIDE 0.4 MG: 0.4 CAPSULE ORAL at 09:31

## 2023-02-11 RX ADMIN — ASPIRIN 81 MG: 81 TABLET, CHEWABLE ORAL at 09:26

## 2023-02-11 RX ADMIN — SENNOSIDES 17.2 MG: 8.6 TABLET, FILM COATED ORAL at 12:36

## 2023-02-11 RX ADMIN — PRAVASTATIN SODIUM 40 MG: 20 TABLET ORAL at 21:24

## 2023-02-11 RX ADMIN — LEVOTHYROXINE SODIUM 50 MCG: 0.05 TABLET ORAL at 06:26

## 2023-02-11 RX ADMIN — CARBIDOPA AND LEVODOPA 0.5 TABLET: 25; 100 TABLET ORAL at 09:26

## 2023-02-11 RX ADMIN — SENNOSIDES 17.2 MG: 8.6 TABLET, FILM COATED ORAL at 21:23

## 2023-02-11 RX ADMIN — HEPARIN SODIUM 5000 UNITS: 5000 INJECTION INTRAVENOUS; SUBCUTANEOUS at 17:32

## 2023-02-11 RX ADMIN — PIPERACILLIN AND TAZOBACTAM 3375 MG: 3; .375 INJECTION, POWDER, FOR SOLUTION INTRAVENOUS at 00:30

## 2023-02-11 RX ADMIN — PIPERACILLIN AND TAZOBACTAM 3375 MG: 3; .375 INJECTION, POWDER, FOR SOLUTION INTRAVENOUS at 12:36

## 2023-02-11 RX ADMIN — BUMETANIDE 1 MG: 0.25 INJECTION INTRAMUSCULAR; INTRAVENOUS at 09:27

## 2023-02-11 RX ADMIN — AMLODIPINE BESYLATE 10 MG: 5 TABLET ORAL at 09:26

## 2023-02-11 RX ADMIN — AMIODARONE HYDROCHLORIDE 200 MG: 200 TABLET ORAL at 09:26

## 2023-02-11 RX ADMIN — HEPARIN SODIUM 5000 UNITS: 5000 INJECTION INTRAVENOUS; SUBCUTANEOUS at 06:27

## 2023-02-11 RX ADMIN — CARBIDOPA AND LEVODOPA 0.5 TABLET: 25; 100 TABLET ORAL at 17:32

## 2023-02-11 RX ADMIN — BUMETANIDE 1 MG: 0.25 INJECTION INTRAMUSCULAR; INTRAVENOUS at 21:26

## 2023-02-11 ASSESSMENT — PAIN SCALES - GENERAL
PAINLEVEL_OUTOF10: 0
PAINLEVEL_OUTOF10: 0

## 2023-02-11 NOTE — PROGRESS NOTES
-0400 Hourly rounding done at this time. Pt resting quietly in bed. VSS.      -0700 Verbal shift change report given to DAVI Anderson RN (oncoming nurse) by MARGARITA Richardson RN (offgoing nurse). Report included the following information Intake/Output, MAR, and Recent Results.

## 2023-02-11 NOTE — PROGRESS NOTES
-8759 Received care of pt from off going nurse. Pt turned and repositioned per nursing staff.    -2115 PM Assessment completed. A&OX3. Resp even and non-labored. Lungs clear in upper lobes, diminished in bases. O2 @ 5 lpm via n/c, SATS 94%. Primo fit intact, connected to suction.

## 2023-02-11 NOTE — PROGRESS NOTES
0700:  Bedside shift change report given to DAVI Feliciano RN (oncoming nurse) by MARGARITA Alamo RN (offgoing nurse). Report included the following information Nurse Handoff Report, Index, Intake/Output, MAR, and Recent Results. 6614:  Initial assessment complete, see flowsheet. Patient is A&OX3, oriented to self, place, and situation but disoriented to time. He is calm and cooperative, assisted to sit up on the side of the bed for breakfast without issue. He returned himself to bed independently upon completion. 1638:  Patient is resting quietly with his eyes closed at this time. 1140:  Spoke with provider about patient requesting medication for constipation, awaiting orders. Assisted patient up to sit at the side of the bed for lunch. 1328:  Patient is resting quietly with his eyes closed at this time within view of the nurse's station. 1415:  MD rounded on patient. 1527:  Patient cleaned of 2x bowel movements after digital disimpaction and 1x urine incontinence. Full linen change, new brief, primofit, tubing, and canister provided. Patient boosted up in the bed, provided with two pillows for neck support per patient request. Lights dimmed and two warm blankets provided. Patient's wife is at the bedside. CBWR.    1700:  Assisted patient back into bed. Patient accidentally removed his right IV during dinner. 1735:  Patient's purewick and suction canister changed and 400 mL of clear yellow urine emptied. 1900:  Bedside shift change report given to MARGARITA Alamo RN (oncoming nurse) by Marva Lozano RN (offgoing nurse). Report included the following information Nurse Handoff Report, Index, Intake/Output, MAR, and Recent Results.

## 2023-02-11 NOTE — PLAN OF CARE
Problem: Discharge Planning  Goal: Discharge to home or other facility with appropriate resources  Outcome: Progressing  Flowsheets (Taken 2/11/2023 0800)  Discharge to home or other facility with appropriate resources:   Identify barriers to discharge with patient and caregiver   Identify discharge learning needs (meds, wound care, etc)   Refer to discharge planning if patient needs post-hospital services based on physician order or complex needs related to functional status, cognitive ability or social support system     Problem: Chronic Conditions and Co-morbidities  Goal: Patient's chronic conditions and co-morbidity symptoms are monitored and maintained or improved  Outcome: Progressing  Flowsheets (Taken 2/11/2023 0800)  Care Plan - Patient's Chronic Conditions and Co-Morbidity Symptoms are Monitored and Maintained or Improved:   Monitor and assess patient's chronic conditions and comorbid symptoms for stability, deterioration, or improvement   Collaborate with multidisciplinary team to address chronic and comorbid conditions and prevent exacerbation or deterioration     Problem: Safety - Adult  Goal: Free from fall injury  Outcome: Progressing     Problem: Pain  Goal: Verbalizes/displays adequate comfort level or baseline comfort level  Outcome: Progressing     Problem: Skin/Tissue Integrity  Goal: Absence of new skin breakdown  Description: 1. Monitor for areas of redness and/or skin breakdown  2. Assess vascular access sites hourly  3. Every 4-6 hours minimum:  Change oxygen saturation probe site  4. Every 4-6 hours:  If on nasal continuous positive airway pressure, respiratory therapy assess nares and determine need for appliance change or resting period.   Outcome: Progressing

## 2023-02-11 NOTE — PROGRESS NOTES
02/11/23 1215   Oxygen Therapy/Pulse Ox   O2 Device Nasal cannula  (3L)   O2 Flow Rate (L/min) (S)  3 L/min  (lowered to 3 lpm per SPO2 98%)   Heart Rate 60   SpO2 98 %

## 2023-02-11 NOTE — CARE COORDINATION
Case Management Assessment  Initial Evaluation    Date/Time of Evaluation: 2/11/2023 1:38 PM  Assessment Completed by: Zohaib Yung    If patient is discharged prior to next notation, then this note serves as note for discharge by case management. Patient Name: Yair Fraga                   YOB: 1940  Diagnosis: Pneumonia                   Date / Time: 2/9/2023 10:02 AM    Patient Admission Status: Inpatient   Readmission Risk (Low < 19, Mod (19-27), High > 27): Readmission Risk Score: 13.3    Current PCP: Aníbal Tirado MD  PCP verified by CM? Yes    Chart Reviewed: Yes      History Provided by: Patient  Patient Orientation: Alert and Oriented    Patient Cognition: Alert    Hospitalization in the last 30 days (Readmission):  No    If yes, Readmission Assessment in  Navigator will be completed. Advance Directives:      Code Status: Full Code   Patient's Primary Decision Maker is: Patient Declined (Legal Next of Kin Remains as Decision Maker)      Discharge Planning:    Patient lives with: Spouse/Significant Other Type of Home: House  Primary Care Giver: Spouse  Patient Support Systems include: Spouse/Significant Other   Current Financial resources: Medicare, Other (Comment) ([facility]- secondary)  Current community resources:    Current services prior to admission:              Current DME:              Type of Home Care services:  Skilled Therapy    ADLS  Prior functional level:    Current functional level: Independent in ADLs/IADLs    PT AM-PAC:   /24  OT AM-PAC:   /24    Family can provide assistance at DC: Yes  Would you like Case Management to discuss the discharge plan with any other family members/significant others, and if so, who?  Yes (Wife, Sofia Earing)  Plans to Return to Present Housing: Yes  Other Identified Issues/Barriers to RETURNING to current housing: Yes  Potential Assistance needed at discharge: Home Care            Potential DME:    Patient expects to discharge to: [facility] for transportation at discharge: [address]: MEDICARE / Plan: MEDICARE PART A AND B / Product Type: *No Product type* /     Does insurance require precert for SNF: Yes    Potential assistance Purchasing Medications: No  Meds-to-Beds request:        [phone] [facility]  [place] [address]  Phone: [phone] Fax: [phone]      Notes:    Factors facilitating achievement of predicted outcomes: Family support    Barriers to discharge: NONE    Additional Case Management Notes: Pt interviewed at bedside. Alert and oriented. The Plan for Transition of Care is related to the following treatment goals of Pneumonia    IF APPLICABLE: The Patient and/or patient representative Coy Herbert and his family were provided with a choice of provider and agrees with the discharge plan. Freedom of choice list with basic dialogue that supports the patient's individualized plan of care/goals and shares the quality data associated with the providers was provided to: Patient   Patient Representative Name:       The Patient and/or Patient Representative Agree with the Discharge Plan?  Yes    Kassidy Issa  Case Management Department  Ph: [phone] Fax: [phone]

## 2023-02-11 NOTE — PROGRESS NOTES
INTERNAL MEDICINE PROGRESS NOTE      Patient: Jyoti Parker   YOB: 1940   MRN: [id number]      Hospital course / Assessment and Plans   Principle Problems:  Acute respiratory failure with hypoxemia:   -likely multifactorial; suspect due to aspiration pneumonia, and CHF exacerbation, acute PE remains in the differential. 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 -tele no event    -his blood gas shows metabolic acidosis with severe hypoxemia   -CXR shows Right lower lobe pneumonia with bilateral interstitial opacities and mild pulmonary edema, his pro-bnp was 6987.   -his trop was normal, ECG--SR, non-specific T wave abnormality, repeat ECG. Trop negative. - antibiotics with zosyn 3.375mg q 8hr, renally dosed   -Normal lactic , no fever or elevated wbc., ddimer--1.17    -cont bumex 1mg IV twice daily   VQ scan with low to intermediate risk, in light of other pulm issues, will rule out pe for now   -echocardiogram-pending      Dysphagia, aspiration on MBS   - modified diet  Hypertension:   -chronic issue, cont amlodipine, add low dose metoprolol  Hypothyroidism:    -chronic issue, cont levothyroxine  Paroxysmal Afib:   -chronic issue, cont amiodarone, however not anticoagulated at home. Diabetes mellitus Type II:   -chronic issue, cont accu-checks achs, SSI prn. 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 CKD III:   -Cr is 2 at baseline.    Parkinson Disease:   -cont sinemet    DVT prophylaxis: pharmacologic and mechanical    Disposition    Disposition: TBD    Subjective / ROS:   Patient states no fever or chills       Medical Decision Making   Chart, Images and Lab data reviewed, necessary medical Orders placed   Discussed with nursing staff     Vitals:    23 0747 23 0800 23 1108 23 1215   BP:   (!) 127/50    Pulse: 67 67 56 60   Resp: 18  15    Temp:   97.4 °F (36.3 °C)    TempSrc:   Oral    SpO2: 97%  99% 98%   Weight:   131 lb 13.4 oz (59.8 kg)    Height:   6' (1.829 m)      Temp (24hrs), Av.7 °F (36.5 °C), Min:97.4 °F (36.3 °C), Max:98.2 °F (36.8 °C)      Intake/Output Summary (Last 24 hours) at 2/11/2023 1356  Last data filed at 2/11/2023 1236  Gross per 24 hour   Intake 1340 ml   Output 1700 ml   Net -360 ml       Physical Exam:   General Appearance:   Appears in no acute distress.,  HEENT:   Moist oral mucous membranes, conjunctiva clear,   Neck:   Supple  Lungs: No wheezes. , No rales. , Normal respiratory effort,   Heart:   Regular rate and rhythm  Abdomen:   Soft , Non-distended and Non-tender,   Extremities:   no edema of legs  Neuro:   alert, oriented, moves all extremities well    Current medications:     Current Facility-Administered Medications   Medication Dose Route Frequency    piperacillin-tazobactam (ZOSYN) 3,375 mg in sodium chloride 0.9 % 100 mL extended IVPB (mini-bag)  3,375 mg IntraVENous Q12H    senna (SENOKOT) tablet 17.2 mg  2 tablet Oral BID    acetaminophen (TYLENOL) tablet 650 mg  650 mg Oral Q6H PRN    amiodarone (CORDARONE) tablet 200 mg  200 mg Oral Daily    amLODIPine (NORVASC) tablet 10 mg  10 mg Oral Daily    aspirin chewable tablet 81 mg  81 mg Oral Daily    bumetanide (BUMEX) injection 1 mg  1 mg IntraVENous Q12H    carbidopa-levodopa (SINEMET)  MG per tablet 0.5 tablet  0.5 tablet Oral BID    heparin (porcine) injection 5,000 Units  5,000 Units SubCUTAneous Q12H    levothyroxine (SYNTHROID) tablet 50 mcg  50 mcg Oral Daily    pantoprazole (PROTONIX) tablet 40 mg  40 mg Oral QAM AC    pravastatin (PRAVACHOL) tablet 40 mg  40 mg Oral Nightly    tamsulosin (FLOMAX) capsule 0.4 mg  0.4 mg Oral Daily with breakfast          Laboratory and Radiology Data :      No results found for: FERR  WBC   Date Value Ref Range Status   02/11/2023 5.4 4.6 - 13.2 K/uL Final     No results found for: MANISH  No components found for: UEO    Microbiology    No components found for: GMS    Recent Results (from the past 24 hour(s))   Transthoracic echocardiogram (TTE) complete with contrast, bubble, strain, and 3D PRN    Collection Time: 02/10/23  2:00 PM   Result Value Ref Range    LV EDV A2C 109 mL    LV EDV A4C 102 mL    LV ESV A2C 58 mL    LV ESV A4C 63 mL    IVSd 1.3 (A) 0.6 - 1 cm    LVIDd 5.2 4.2 - 5.9 cm    LVIDs 4.1 cm    LVOT Diameter 2.1 cm    LVOT Mean Gradient 2 mmHg    LVOT VTI 20.6 cm    LVOT Peak Velocity 1.0 m/s    LVOT Peak Gradient 4 mmHg    LVPWd 1.3 (A) 0.6 - 1 cm    LV E' Lateral Velocity 6 cm/s    LV E' Septal Velocity 4 cm/s    Global Longitudinal Strain -14.1 %    Global Longitudinal Strain -12.3 %    Global Longitudinal Strain -13.8 %    Global Longitudinal Strain -13.4 %    LV Ejection Fraction A2C 47 %    LV Ejection Fraction A4C 39 %    EF BP 42 (A) 55 - 100 %    LVOT Area 3.5 cm2    LVOT SV 71.3 ml    LA Minor Axis 5.8 cm    LA Major Axis 5.9 cm    LA Area 2C 24.1 cm2    LA Area 4C 21.9 cm2    LA Volume BP 73 (A) 18 - 58 mL    LA Diameter 3.7 cm    RA Area 4C 14.2 cm2    RA Volume 38 ml    AR .0 ms    AR Max Velocity PISA 3.9 m/s    AV Peak Velocity 1.8 m/s    AV Peak Gradient 14 mmHg    AV Mean Gradient 8 mmHg    AV VTI 38.5 cm    AV Mean Velocity 1.3 m/s    AV Area by VTI 1.9 cm2    AV Area by Peak Velocity 1.8 cm2    Aortic Root 2.8 cm    IVC Proxmal 1.6 cm    MV E Wave Deceleration Time 302.0 ms    MV A Velocity 1.24 m/s    MV E Velocity 0.86 m/s    MV Mean Gradient 2 mmHg    MV VTI 34.3 cm    MV Mean Velocity 0.7 m/s    MV Max Velocity 1.3 m/s    MV Peak Gradient 7 mmHg    MV Area by VTI 2.1 cm2    RV Basal Dimension 3.3 cm    RV Longitudinal Dimension 6.1 cm    RV Mid Dimension 3.0 cm    TAPSE 1.7 1.7 cm    TR Max Velocity 1.87 m/s    TR Peak Gradient 14 mmHg    Fractional Shortening 2D 21 28 - 44 %    LV ESV Index A4C 33 mL/m2    LV EDV Index A4C 54 mL/m2    LV ESV Index A2C 31 mL/m2    LV EDV Index A2C 58 mL/m2    LVIDd Index 2.75 cm/m2    LVIDs Index 2.17 cm/m2    LV RWT Ratio 0.50     LV Mass 2D 278.4 (A) 88 - 224 g    LV Mass 2D Index 147.3 (A) 49 - 115 g/m2    MV E/A 0.69     E/E' Ratio (Averaged) 17.92     E/E' Lateral 14.33     E/E' Septal 21.50     LA Volume Index BP 39 (A) 16 - 34 ml/m2    LVOT Stroke Volume Index 37.7 mL/m2    LA Size Index 1.96 cm/m2    LA/AO Root Ratio 1.32     RA Volume Index A4C 20 mL/m2    Ao Root Index 1.48 cm/m2    AV Velocity Ratio 0.56     LVOT:AV VTI Index 0.54     ANNI/BSA VTI 1.0 cm2/m2    ANNI/BSA Peak Velocity 1.0 cm2/m2    MV:LVOT VTI Index 1.67    EKG 12 Lead    Collection Time: 02/10/23  4:11 PM   Result Value Ref Range    Ventricular Rate 87 BPM    Atrial Rate 89 BPM    P-R Interval 68 ms    QRS Duration 152 ms    Q-T Interval 387 ms    QTc Calculation (Bazett) 466 ms    P Axis 0 degrees    R Axis 41 degrees    T Axis -145 degrees    Diagnosis       Sinus rhythm  Vent pre-excit'n(WPW), right access'y pathway    Confirmed by Kenroy Lima ([id number]) on 2/10/2023 4:11:01 PM     EKG 12 Lead    Collection Time: 02/10/23  4:21 PM   Result Value Ref Range    Ventricular Rate 76 BPM    Atrial Rate 77 BPM    P-R Interval 202 ms    QRS Duration 150 ms    Q-T Interval 421 ms    QTc Calculation (Bazett) 474 ms    P Axis 45 degrees    R Axis 11 degrees    T Axis -175 degrees    Diagnosis       Sinus rhythm  Probable left atrial enlargement  Left bundle branch block  Baseline wander in lead(s) V2    Confirmed by Kenroy Lima [id number]) on 2/10/2023 4:21:01 PM     CBC with Auto Differential    Collection Time: 02/11/23  3:20 AM   Result Value Ref Range    WBC 5.4 4.6 - 13.2 K/uL    RBC 3.99 (L) 4.35 - 5.65 M/uL    Hemoglobin 11.8 (L) 13.0 - 16.0 g/dL    Hematocrit 36.7 36.0 - 48.0 %    MCV 92.0 78.0 - 100.0 FL    MCH 29.6 24.0 - 34.0 PG    MCHC 32.2 31.0 - 37.0 g/dL    RDW 13.6 11.6 - 14.5 %    Platelets 116 577 - 091 K/uL    MPV 11.6 9.2 - 11.8 FL    Nucleated RBCs 0.0 0.0  WBC    nRBC 0.00 0.00 - 0.01 K/uL    Seg Neutrophils 74 (H) 40 - 73 %    Lymphocytes 14 (L) 21 - 52 %    Monocytes 10 3 - 10 %    Eosinophils % 2 0 - 5 %    Basophils 0 0 - 2 %    Immature Granulocytes 0 0 - 0.5 %    Segs Absolute 3.9 1.8 - 8.0 K/UL    Absolute Lymph # 0.8 (L) 0.9 - 3.6 K/UL    Absolute Mono # 0.5 0.05 - 1.2 K/UL    Absolute Eos # 0.1 0.0 - 0.4 K/UL    Basophils Absolute 0.0 0.0 - 0.1 K/UL    Absolute Immature Granulocyte 0.0 0.00 - 0.04 K/UL    Differential Type AUTOMATED     Basic Metabolic Panel    Collection Time: 02/11/23  3:20 AM   Result Value Ref Range    Sodium 144 136 - 145 mmol/L    Potassium 3.9 3.5 - 5.5 mmol/L    Chloride 108 100 - 111 mmol/L    CO2 30 21 - 32 mmol/L    Anion Gap 6 3.0 - 18.0 mmol/L    Glucose 61 (L) 74 - 99 mg/dL    BUN 41 (H) 7 - 18 mg/dL    Creatinine 2.49 (H) 0.60 - 1.30 mg/dL    Bun/Cre Ratio 16 12 - 20      Est, Glom Filt Rate 25 (L) >60 ml/min/1.73m2    Calcium 8.1 (L) 8.5 - 10.1 mg/dL   Magnesium    Collection Time: 02/11/23  3:20 AM   Result Value Ref Range    Magnesium 2.2 1.6 - 2.6 mg/dL   Phosphorus    Collection Time: 02/11/23  3:20 AM   Result Value Ref Range    Phosphorus 4.9 2.5 - 4.9 mg/dL       XR Results:  @BSHSILASTIMGCAT(DOI3297:1)@    CT Results:  @BSHSILASTIMGCAT(JMR0216:1)@    MRI Results:  @BSHSILASTIMGCAT(WFC4387:1)@    Nuclear Medicine Results:  @BSHSILASTIMGCAT(BPS5405:1)@    US Results:  @BSHSILASTIMGCAT(DJZ1993:1)@    IR Results:  @BSHSILASTIMGCAT(DUF2490:1)@    VAS/US Results:  @BSHSILASTIMGCAT(WXL5006:1)@            Nessa Dee M.D.

## 2023-02-12 LAB
ANION GAP SERPL CALC-SCNC: 7 MMOL/L (ref 3–18)
BACTERIA SPEC CULT: NORMAL
BACTERIA SPEC CULT: NORMAL
BASOPHILS # BLD: 0 K/UL (ref 0–0.1)
BASOPHILS NFR BLD: 1 % (ref 0–2)
BUN SERPL-MCNC: 46 MG/DL (ref 7–18)
BUN/CREAT SERPL: 19 (ref 12–20)
CA-I BLD-MCNC: 8 MG/DL (ref 8.5–10.1)
CHLORIDE SERPL-SCNC: 106 MMOL/L (ref 100–111)
CO2 SERPL-SCNC: 30 MMOL/L (ref 21–32)
CREAT SERPL-MCNC: 2.48 MG/DL (ref 0.6–1.3)
DIFFERENTIAL METHOD BLD: ABNORMAL
EOSINOPHIL # BLD: 0.2 K/UL (ref 0–0.4)
EOSINOPHIL NFR BLD: 3 % (ref 0–5)
ERYTHROCYTE [DISTWIDTH] IN BLOOD BY AUTOMATED COUNT: 13.2 % (ref 11.6–14.5)
GLUCOSE SERPL-MCNC: 97 MG/DL (ref 74–99)
HCT VFR BLD AUTO: 35 % (ref 36–48)
HGB BLD-MCNC: 11.3 G/DL (ref 13–16)
IMM GRANULOCYTES # BLD AUTO: 0 K/UL (ref 0–0.04)
IMM GRANULOCYTES NFR BLD AUTO: 0 % (ref 0–0.5)
LYMPHOCYTES # BLD: 0.8 K/UL (ref 0.9–3.6)
LYMPHOCYTES NFR BLD: 13 % (ref 21–52)
MCH RBC QN AUTO: 29.4 PG (ref 24–34)
MCHC RBC AUTO-ENTMCNC: 32.3 G/DL (ref 31–37)
MCV RBC AUTO: 90.9 FL (ref 78–100)
MONOCYTES # BLD: 0.6 K/UL (ref 0.05–1.2)
MONOCYTES NFR BLD: 9 % (ref 3–10)
NEUTS SEG # BLD: 4.8 K/UL (ref 1.8–8)
NEUTS SEG NFR BLD: 74 % (ref 40–73)
NRBC # BLD: 0 K/UL (ref 0–0.01)
NRBC BLD-RTO: 0 PER 100 WBC
PLATELET # BLD AUTO: 154 K/UL (ref 135–420)
PMV BLD AUTO: 11.8 FL (ref 9.2–11.8)
POTASSIUM SERPL-SCNC: 3.9 MMOL/L (ref 3.5–5.5)
RBC # BLD AUTO: 3.85 M/UL (ref 4.35–5.65)
SODIUM SERPL-SCNC: 143 MMOL/L (ref 136–145)
SPECIAL REQUESTS,SREQ: NORMAL
SPECIAL REQUESTS,SREQ: NORMAL
WBC # BLD AUTO: 6.3 K/UL (ref 4.6–13.2)

## 2023-02-12 PROCEDURE — 2580000003 HC RX 258: Performed by: INTERNAL MEDICINE

## 2023-02-12 PROCEDURE — 85025 COMPLETE CBC W/AUTO DIFF WBC: CPT

## 2023-02-12 PROCEDURE — 6370000000 HC RX 637 (ALT 250 FOR IP): Performed by: NURSE PRACTITIONER

## 2023-02-12 PROCEDURE — 2700000000 HC OXYGEN THERAPY PER DAY

## 2023-02-12 PROCEDURE — 2000000000 HC ICU R&B

## 2023-02-12 PROCEDURE — 6360000002 HC RX W HCPCS: Performed by: INTERNAL MEDICINE

## 2023-02-12 PROCEDURE — 6370000000 HC RX 637 (ALT 250 FOR IP): Performed by: INTERNAL MEDICINE

## 2023-02-12 PROCEDURE — 80048 BASIC METABOLIC PNL TOTAL CA: CPT

## 2023-02-12 PROCEDURE — 94761 N-INVAS EAR/PLS OXIMETRY MLT: CPT

## 2023-02-12 PROCEDURE — 36415 COLL VENOUS BLD VENIPUNCTURE: CPT

## 2023-02-12 PROCEDURE — 2500000003 HC RX 250 WO HCPCS: Performed by: NURSE PRACTITIONER

## 2023-02-12 RX ADMIN — CARBIDOPA AND LEVODOPA 0.5 TABLET: 25; 100 TABLET ORAL at 17:18

## 2023-02-12 RX ADMIN — TAMSULOSIN HYDROCHLORIDE 0.4 MG: 0.4 CAPSULE ORAL at 08:30

## 2023-02-12 RX ADMIN — LEVOTHYROXINE SODIUM 50 MCG: 0.05 TABLET ORAL at 06:26

## 2023-02-12 RX ADMIN — PRAVASTATIN SODIUM 40 MG: 20 TABLET ORAL at 21:39

## 2023-02-12 RX ADMIN — CARBIDOPA AND LEVODOPA 0.5 TABLET: 25; 100 TABLET ORAL at 08:30

## 2023-02-12 RX ADMIN — PANTOPRAZOLE SODIUM 40 MG: 40 TABLET, DELAYED RELEASE ORAL at 06:27

## 2023-02-12 RX ADMIN — AMIODARONE HYDROCHLORIDE 200 MG: 200 TABLET ORAL at 08:30

## 2023-02-12 RX ADMIN — ASPIRIN 81 MG: 81 TABLET, CHEWABLE ORAL at 08:29

## 2023-02-12 RX ADMIN — BUMETANIDE 1 MG: 0.25 INJECTION INTRAMUSCULAR; INTRAVENOUS at 21:40

## 2023-02-12 RX ADMIN — PIPERACILLIN AND TAZOBACTAM 3375 MG: 3; .375 INJECTION, POWDER, FOR SOLUTION INTRAVENOUS at 00:21

## 2023-02-12 RX ADMIN — HEPARIN SODIUM 5000 UNITS: 5000 INJECTION INTRAVENOUS; SUBCUTANEOUS at 17:18

## 2023-02-12 RX ADMIN — AMLODIPINE BESYLATE 10 MG: 5 TABLET ORAL at 08:30

## 2023-02-12 RX ADMIN — PIPERACILLIN AND TAZOBACTAM 3375 MG: 3; .375 INJECTION, POWDER, FOR SOLUTION INTRAVENOUS at 11:35

## 2023-02-12 RX ADMIN — HEPARIN SODIUM 5000 UNITS: 5000 INJECTION INTRAVENOUS; SUBCUTANEOUS at 06:26

## 2023-02-12 RX ADMIN — BUMETANIDE 1 MG: 0.25 INJECTION INTRAMUSCULAR; INTRAVENOUS at 08:30

## 2023-02-12 RX ADMIN — SENNOSIDES 17.2 MG: 8.6 TABLET, FILM COATED ORAL at 08:30

## 2023-02-12 ASSESSMENT — PAIN SCALES - GENERAL
PAINLEVEL_OUTOF10: 0

## 2023-02-12 NOTE — PLAN OF CARE
Problem: Gastrointestinal - Adult  Goal: Maintains or returns to baseline bowel function  Outcome: Not Progressing     Problem: Metabolic/Fluid and Electrolytes - Adult  Goal: Hemodynamic stability and optimal renal function maintained  Outcome: Not Progressing     Problem: Safety - Adult  Goal: Free from fall injury  Outcome: Progressing     Problem: Pain  Goal: Verbalizes/displays adequate comfort level or baseline comfort level  Outcome: Progressing     Problem: Skin/Tissue Integrity  Goal: Absence of new skin breakdown  Description: 1. Monitor for areas of redness and/or skin breakdown  2. Assess vascular access sites hourly  3. Every 4-6 hours minimum:  Change oxygen saturation probe site  4. Every 4-6 hours:  If on nasal continuous positive airway pressure, respiratory therapy assess nares and determine need for appliance change or resting period.   Outcome: Progressing     Problem: Respiratory - Adult  Goal: Achieves optimal ventilation and oxygenation  Outcome: Progressing     Problem: Cardiovascular - Adult  Goal: Maintains optimal cardiac output and hemodynamic stability  Outcome: Progressing     Problem: Skin/Tissue Integrity - Adult  Goal: Skin integrity remains intact  Outcome: Progressing  Flowsheets (Taken 2/11/2023 1952)  Skin Integrity Remains Intact: Monitor for areas of redness and/or skin breakdown  Goal: Oral mucous membranes remain intact  Outcome: Progressing     Problem: Gastrointestinal - Adult  Goal: Maintains or returns to baseline bowel function  Outcome: Not Progressing     Problem: Metabolic/Fluid and Electrolytes - Adult  Goal: Hemodynamic stability and optimal renal function maintained  Outcome: Not Progressing

## 2023-02-12 NOTE — PROGRESS NOTES
HOSPITALIST PROGRESS NOTE  Jeremy Duran MD, [address]          Daily Progress Note: 2/12/2023      Subjective:     Patient is alert and oriented x3. Continues to be dependent on 3 L NC O2. Continues to have significant shortness of breath without any cough, fever/chills, chest pain or nausea or vomiting overnight. Counseled extensively on current significant diagnoses of acute heart failure with aspiration pneumonia currently on IV antibiotics in the setting of chronic renal failure. Also counseled on CODE STATUS and he states that he will discuss with his wife. Medications reviewed  Current Facility-Administered Medications   Medication Dose Route Frequency    piperacillin-tazobactam (ZOSYN) 3,375 mg in sodium chloride 0.9 % 100 mL extended IVPB (mini-bag)  3,375 mg IntraVENous Q12H    senna (SENOKOT) tablet 17.2 mg  2 tablet Oral BID    acetaminophen (TYLENOL) tablet 650 mg  650 mg Oral Q6H PRN    amiodarone (CORDARONE) tablet 200 mg  200 mg Oral Daily    amLODIPine (NORVASC) tablet 10 mg  10 mg Oral Daily    aspirin chewable tablet 81 mg  81 mg Oral Daily    bumetanide (BUMEX) injection 1 mg  1 mg IntraVENous Q12H    carbidopa-levodopa (SINEMET)  MG per tablet 0.5 tablet  0.5 tablet Oral BID    heparin (porcine) injection 5,000 Units  5,000 Units SubCUTAneous Q12H    levothyroxine (SYNTHROID) tablet 50 mcg  50 mcg Oral Daily    pantoprazole (PROTONIX) tablet 40 mg  40 mg Oral QAM AC    pravastatin (PRAVACHOL) tablet 40 mg  40 mg Oral Nightly    tamsulosin (FLOMAX) capsule 0.4 mg  0.4 mg Oral Daily with breakfast       Review of Systems:   Pertinent items are noted in HPI.     Objective:   Physical Exam:     BP (!) 135/52   Pulse 60   Temp 97.5 °F (36.4 °C) (Oral)   Resp 18   Ht 6' (1.829 m)   Wt 131 lb 8 oz (59.6 kg)   SpO2 96%   BMI 17.83 kg/m²       Patient Vitals for the past 8 hrs:   Temp Pulse Resp BP SpO2 23 1502 -- 60 -- -- --   23 1403 -- 64 18 -- 96 %   23 1110 97.5 °F (36.4 °C) 61 18 (!) 135/52 94 %   23 1040 -- 59 -- -- 93 %   23 0957 -- 61 -- -- 98 %   23 0824 -- 61 17 -- 98 %   23 0823 -- 57 17 -- 97 %          Temp (24hrs), Av.7 °F (36.5 °C), Min:97.4 °F (36.3 °C), Max:98.2 °F (36.8 °C)    No intake/output data recorded. 02/10 1901 -  0700  In: 2360 [P.O.:2160]  Out: 9445 [Urine:3525]    General:  Alert, cooperative, no distress, appears stated age. Lungs:   Bilateral diffuse Rales without any rhonchi or wheezing noted. Chest wall:  No tenderness or deformity. Heart:  Regular rate and rhythm, S1, S2 normal, no murmur, click, rub or gallop. Abdomen:   Soft, non-tender. Bowel sounds normal. No masses,  No organomegaly. Extremities: Extremities normal, atraumatic, 1+ bilateral lower extremity edema noted. Pulses: 2+ and symmetric all extremities.    Skin: Skin color, texture, turgor normal. No rashes or lesions   Neurologic: No gross sensory or motor deficits     Data Review:       Recent Days:  Recent Labs     02/10/23  0115 23  0320 23  0404   WBC 9.1 5.4 6.3   HGB 12.7* 11.8* 11.3*   HCT 39.8 36.7 35.0*    150 154     Recent Labs     02/10/23  0115 23  0320 23  0404    144 143   K 4.2 3.9 3.9    108 106   CO2 27 30 30   BUN 32* 41* 46*   MG 2.3 2.2  --    PHOS  --  4.9  --      Recent Labs     23  1601 02/10/23  0430   PH 7.31* 7.43   PCO2 41 37   PO2 43* 58*   HCO3 20* 24   FIO2  --  40       24 Hour Results:  Recent Results (from the past 24 hour(s))   CBC with Auto Differential    Collection Time: 23  4:04 AM   Result Value Ref Range    WBC 6.3 4.6 - 13.2 K/uL    RBC 3.85 (L) 4.35 - 5.65 M/uL    Hemoglobin 11.3 (L) 13.0 - 16.0 g/dL    Hematocrit 35.0 (L) 36.0 - 48.0 %    MCV 90.9 78.0 - 100.0 FL    MCH 29.4 24.0 - 34.0 PG    MCHC 32.3 31.0 - 37.0 g/dL    RDW 13.2 11.6 - 14.5 % Platelets 189 837 - 991 K/uL    MPV 11.8 9.2 - 11.8 FL    Nucleated RBCs 0.0 0.0  WBC    nRBC 0.00 0.00 - 0.01 K/uL    Seg Neutrophils 74 (H) 40 - 73 %    Lymphocytes 13 (L) 21 - 52 %    Monocytes 9 3 - 10 %    Eosinophils % 3 0 - 5 %    Basophils 1 0 - 2 %    Immature Granulocytes 0 0 - 0.5 %    Segs Absolute 4.8 1.8 - 8.0 K/UL    Absolute Lymph # 0.8 (L) 0.9 - 3.6 K/UL    Absolute Mono # 0.6 0.05 - 1.2 K/UL    Absolute Eos # 0.2 0.0 - 0.4 K/UL    Basophils Absolute 0.0 0.0 - 0.1 K/UL    Absolute Immature Granulocyte 0.0 0.00 - 0.04 K/UL    Differential Type AUTOMATED     Basic Metabolic Panel    Collection Time: 02/12/23  4:04 AM   Result Value Ref Range    Sodium 143 136 - 145 mmol/L    Potassium 3.9 3.5 - 5.5 mmol/L    Chloride 106 100 - 111 mmol/L    CO2 30 21 - 32 mmol/L    Anion Gap 7 3.0 - 18.0 mmol/L    Glucose 97 74 - 99 mg/dL    BUN 46 (H) 7 - 18 mg/dL    Creatinine 2.48 (H) 0.60 - 1.30 mg/dL    Bun/Cre Ratio 19 12 - 20      Est, Glom Filt Rate 25 (L) >60 ml/min/1.73m2    Calcium 8.0 (L) 8.5 - 10.1 mg/dL       [facility]  [facility]        Assessment/Plan:     Acute respiratory failure with hypoxemia:  Currently dependent on 3 L NC O2. Based on imaging and speech therapy evaluation patient likely with aspiration pneumonia along with CHF exacerbation superimposed on acute on chronic renal failure. CXR shows Right lower lobe pneumonia with bilateral interstitial opacities and mild pulmonary edema, his pro-bnp was 6987. Trop was normal, ECG--SR, non-specific T wave abnormality, repeat ECG. Trop negative. VQ scan with low to intermediate risk. Right lower lobe pneumonia  Continue with zosyn 3.375mg q 8hr, renally dosed  Continues with hypoxia however no active fevers or significant leukocytosis. Acute on chronic diastolic heart failure  Continue bumex 1mg IV twice daily in the setting of acute on chronic renal failure.   Echocardiogram with new mildly reduced left ventricular systolic function with EF of 42% along with bioprosthetic valve noted. Cardiology to follow-up in AM.    Acute on chronic renal failure  Baseline creatinine around 2.3 noted. Worsening renal function with active diuresis with Bumex noted. Consult nephrology in AM.    Dysphagia   Aspiration on MBS  Modified diet with assistance from speech therapy. Hypertension:  Cont amlodipine, add low dose metoprolol      Hypothyroidism:   Cont levothyroxine      Paroxysmal Afib:  Cont amiodarone, however not anticoagulated at home. Diabetes mellitus Type II:  Cont accu-checks achs, SSI prn. 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 Parkinson Disease:  Cont sinemet    DVT prophylaxis  Heparin twice daily. Care Plan discussed with: Patient/Family, Nurse, and     Total time spent with patient: 30 minutes. With greater than 50% spent in coordination of care and counseling.     Simona Lee MD

## 2023-02-12 NOTE — PROGRESS NOTES
-[id number] Received care of pt from off going nurse.     -1952 PM Assessment completed. A&OX3. Resp even and non-labored. Lung sounds clear in upper lobes, diminished in bases. O2 @ 3 lpm via n/c, SATS 98%. Skin warm and dry. Primo fit patent, draining yellow colored urine.    -2126 Pt took po pm meds in applesauce without any difficulty. CBWR, bed in lowest position. NAD noted. -2226 Pt resting quietly in bed with eyes closed.    -0015 Hourly rounding done at this time. Pt resting quietly in bed. CBWR, bed in lowest position. -0330  into draw am labs. -1565 Pt bathed. Gown and bed linens changed. New primo fit applied and connected to suction. Pt turns self.

## 2023-02-12 NOTE — FLOWSHEET NOTE
02/12/23 0706   Handoff   Communication Given Shift Handoff   Handoff Given To Ni Nails RN   Handoff Received From Fabio Cardenas RN   Handoff Communication Face to Face; At bedside   Time Handoff Given 0706   End of Shift Check Performed Yes

## 2023-02-12 NOTE — PLAN OF CARE
Problem: Discharge Planning  Goal: Discharge to home or other facility with appropriate resources  Outcome: Progressing     Problem: Chronic Conditions and Co-morbidities  Goal: Patient's chronic conditions and co-morbidity symptoms are monitored and maintained or improved  Outcome: Progressing     Problem: Safety - Adult  Goal: Free from fall injury  2/12/2023 1019 by Talita Erazo RN  Outcome: Progressing  2/12/2023 0503 by Camila Alex RN  Outcome: Progressing     Problem: Pain  Goal: Verbalizes/displays adequate comfort level or baseline comfort level  2/12/2023 1019 by Talita Erazo RN  Outcome: Progressing  2/12/2023 0503 by Camila lAex RN  Outcome: Progressing     Problem: Skin/Tissue Integrity  Goal: Absence of new skin breakdown  Description: 1. Monitor for areas of redness and/or skin breakdown  2. Assess vascular access sites hourly  3. Every 4-6 hours minimum:  Change oxygen saturation probe site  4. Every 4-6 hours:  If on nasal continuous positive airway pressure, respiratory therapy assess nares and determine need for appliance change or resting period.   2/12/2023 1019 by Talita Erazo RN  Outcome: Progressing  2/12/2023 0503 by Camila Alex RN  Outcome: Progressing     Problem: Respiratory - Adult  Goal: Achieves optimal ventilation and oxygenation  2/12/2023 1019 by Talita Erazo RN  Outcome: Progressing  2/12/2023 0503 by Camila Alex RN  Outcome: Progressing     Problem: Cardiovascular - Adult  Goal: Maintains optimal cardiac output and hemodynamic stability  2/12/2023 1019 by Talita Erazo RN  Outcome: Progressing  2/12/2023 0503 by Camila Alex RN  Outcome: Progressing  Goal: Absence of cardiac dysrhythmias or at baseline  Outcome: Progressing     Problem: Skin/Tissue Integrity - Adult  Goal: Skin integrity remains intact  2/12/2023 1019 by Talita Erazo RN  Outcome: Progressing  2/12/2023 0503 by Shasta Duffy RN  Outcome: Progressing  Flowsheets (Taken 2/11/2023 1952)  Skin Integrity Remains Intact: Monitor for areas of redness and/or skin breakdown  Goal: Incisions, wounds, or drain sites healing without S/S of infection  Outcome: Progressing  Goal: Oral mucous membranes remain intact  2/12/2023 1019 by Sylvia Escobar RN  Outcome: Progressing  2/12/2023 0503 by Shasta Duffy RN  Outcome: Progressing     Problem: Gastrointestinal - Adult  Goal: Minimal or absence of nausea and vomiting  Outcome: Progressing  Goal: Maintains or returns to baseline bowel function  2/12/2023 1019 by Sylvia Escobar RN  Outcome: Progressing  2/12/2023 0503 by Shasta Duffy RN  Outcome: Not Progressing  Goal: Maintains adequate nutritional intake  Outcome: Progressing  Goal: Establish and maintain optimal ostomy function  Outcome: Progressing     Problem: Metabolic/Fluid and Electrolytes - Adult  Goal: Electrolytes maintained within normal limits  Outcome: Progressing  Goal: Hemodynamic stability and optimal renal function maintained  2/12/2023 1019 by Sylvia Escobar RN  Outcome: Progressing  2/12/2023 0503 by Shasta Duffy RN  Outcome: Not Progressing  Goal: Glucose maintained within prescribed range  Outcome: Progressing     Problem: ABCDS Injury Assessment  Goal: Absence of physical injury  Outcome: Progressing     Problem: Gastrointestinal - Adult  Goal: Maintains or returns to baseline bowel function  2/12/2023 1019 by Sylvia Escobar RN  Outcome: Progressing  2/12/2023 0503 by Shasta Duffy RN  Outcome: Not Progressing     Problem: Metabolic/Fluid and Electrolytes - Adult  Goal: Hemodynamic stability and optimal renal function maintained  2/12/2023 1019 by Sylvia Escobar RN  Outcome: Progressing  2/12/2023 0503 by Shasta Duffy RN  Outcome: Not Progressing

## 2023-02-12 NOTE — PROGRESS NOTES
0700:  Bedside shift change report given to DAVI Wallis RN (oncoming nurse) by MARGARITA Alamo RN (offgoing nurse). Report included the following information Nurse Handoff Report, Intake/Output, MAR, Recent Results, and Cardiac Rhythm Sinus deepali . 0710:  Initial assessment complete, see flowsheet. Patient is A&OX3, oriented to self, place, and situation, but unsure of time. He is calm and cooperative with a pleasant demeanor this AM. He has no c/o pain or s/s of distress at this time. 0840:  Patient checked for incontinence prior to starting breakfast. Noted small skin tear to left buttock Assisted patient to sit up on the side of the bed for breakfast and medication administration. Patient expresses no other needs at this time and is currently speaking on his personal cell phone with his wife. CBWR.    [id number]:  Assisted patient to return to bed after he finished his breakfast.    1020:  Placed patient on RA, O2 sats sustained at 93-95% and patient shows no s/s of distress. 1139:  Patient would like to rest at this time and requested his lunch be warmed up later when he is ready to eat. 1247:  Patient cleaned of 1x bowel and urine incontinence. Brief changed and patient repositioned in bed. Primofit repositioned into place. Patient states he would like to continue trying to take a nap. CBWR    1430:  Patient awake, assisted to side of bed to eat a late lunch. Patient's wife at the bedside. 1546:  Patient cleaned of 1x bowel incontinence. Primofit, brief, tubing, and suction canister emptied of 675 mL of clear yellow urine and changed. New foam dressing applied to sacral area and over small noted skin tear on left buttock. Patient repositioned and encouraged to turn himself as he is able, pillow placed under his back and buttocks for pressure relief and support. 1641:  Cleaned patient of 1x bowel incontinence. Brief changed and primofit repositioned and secured.  Small mepolex changed due to soilage. Silicone barrier cream applied to patient's buttocks. Sacral mepolex is clean, dry, and intact. 1709:  Patient is resting quietly with his eyes closed. He does not want to eat at this time due to eating a late lunch, but would like his dinner tray kept at the bedside for when he is ready. 1816:  Patient sat up and ate his dinner with assistance from his wife. Patient checked for incontinence and is clean at this time. Patient expresses no other needs at this time. Lights remained on per patient and wife. Would like to keep his tray at bedside as he wants to try and eat more later. 1900:  Bedside shift change report given to MARGARITA Alamo RN (oncoming nurse) by Corby Novoa RN (offgoing nurse). Report included the following information Nurse Handoff Report, Intake/Output, MAR, and Recent Results.

## 2023-02-13 LAB
ANION GAP SERPL CALC-SCNC: 6 MMOL/L (ref 3–18)
BASOPHILS # BLD: 0 K/UL (ref 0–0.1)
BASOPHILS NFR BLD: 0 % (ref 0–2)
BUN SERPL-MCNC: 43 MG/DL (ref 7–18)
BUN/CREAT SERPL: 18 (ref 12–20)
CA-I BLD-MCNC: 8.2 MG/DL (ref 8.5–10.1)
CHLORIDE SERPL-SCNC: 104 MMOL/L (ref 100–111)
CO2 SERPL-SCNC: 31 MMOL/L (ref 21–32)
CREAT SERPL-MCNC: 2.34 MG/DL (ref 0.6–1.3)
CREAT UR-MCNC: 68 MG/DL (ref 30–125)
DIFFERENTIAL METHOD BLD: ABNORMAL
EOSINOPHIL # BLD: 0.1 K/UL (ref 0–0.4)
EOSINOPHIL NFR BLD: 2 % (ref 0–5)
ERYTHROCYTE [DISTWIDTH] IN BLOOD BY AUTOMATED COUNT: 13.1 % (ref 11.6–14.5)
GLUCOSE SERPL-MCNC: 95 MG/DL (ref 74–99)
HCT VFR BLD AUTO: 36.2 % (ref 36–48)
HGB BLD-MCNC: 11.7 G/DL (ref 13–16)
IMM GRANULOCYTES # BLD AUTO: 0 K/UL (ref 0–0.04)
IMM GRANULOCYTES NFR BLD AUTO: 0 % (ref 0–0.5)
LYMPHOCYTES # BLD: 0.8 K/UL (ref 0.9–3.6)
LYMPHOCYTES NFR BLD: 15 % (ref 21–52)
MCH RBC QN AUTO: 29.3 PG (ref 24–34)
MCHC RBC AUTO-ENTMCNC: 32.3 G/DL (ref 31–37)
MCV RBC AUTO: 90.7 FL (ref 78–100)
MONOCYTES # BLD: 0.5 K/UL (ref 0.05–1.2)
MONOCYTES NFR BLD: 10 % (ref 3–10)
NEUTS SEG # BLD: 3.9 K/UL (ref 1.8–8)
NEUTS SEG NFR BLD: 73 % (ref 40–73)
NRBC # BLD: 0 K/UL (ref 0–0.01)
NRBC BLD-RTO: 0 PER 100 WBC
PLATELET # BLD AUTO: 148 K/UL (ref 135–420)
PMV BLD AUTO: 12 FL (ref 9.2–11.8)
POTASSIUM SERPL-SCNC: 3.7 MMOL/L (ref 3.5–5.5)
PROT UR-MCNC: 14 MG/DL
PROT/CREAT UR-RTO: 0.2
RBC # BLD AUTO: 3.99 M/UL (ref 4.35–5.65)
SODIUM SERPL-SCNC: 141 MMOL/L (ref 136–145)
WBC # BLD AUTO: 5.4 K/UL (ref 4.6–13.2)

## 2023-02-13 PROCEDURE — 97110 THERAPEUTIC EXERCISES: CPT

## 2023-02-13 PROCEDURE — 84156 ASSAY OF PROTEIN URINE: CPT

## 2023-02-13 PROCEDURE — 84300 ASSAY OF URINE SODIUM: CPT

## 2023-02-13 PROCEDURE — 2580000003 HC RX 258: Performed by: INTERNAL MEDICINE

## 2023-02-13 PROCEDURE — 80048 BASIC METABOLIC PNL TOTAL CA: CPT

## 2023-02-13 PROCEDURE — 6360000002 HC RX W HCPCS: Performed by: INTERNAL MEDICINE

## 2023-02-13 PROCEDURE — 92526 ORAL FUNCTION THERAPY: CPT

## 2023-02-13 PROCEDURE — 94761 N-INVAS EAR/PLS OXIMETRY MLT: CPT

## 2023-02-13 PROCEDURE — 97530 THERAPEUTIC ACTIVITIES: CPT

## 2023-02-13 PROCEDURE — 6370000000 HC RX 637 (ALT 250 FOR IP): Performed by: NURSE PRACTITIONER

## 2023-02-13 PROCEDURE — 85025 COMPLETE CBC W/AUTO DIFF WBC: CPT

## 2023-02-13 PROCEDURE — 36415 COLL VENOUS BLD VENIPUNCTURE: CPT

## 2023-02-13 PROCEDURE — 1100000000 HC RM PRIVATE

## 2023-02-13 PROCEDURE — 82436 ASSAY OF URINE CHLORIDE: CPT

## 2023-02-13 PROCEDURE — 2500000003 HC RX 250 WO HCPCS: Performed by: NURSE PRACTITIONER

## 2023-02-13 PROCEDURE — 6370000000 HC RX 637 (ALT 250 FOR IP): Performed by: INTERNAL MEDICINE

## 2023-02-13 RX ORDER — SODIUM CHLORIDE 450 MG/100ML
INJECTION, SOLUTION INTRAVENOUS CONTINUOUS
Status: DISPENSED | OUTPATIENT
Start: 2023-02-13 | End: 2023-02-14

## 2023-02-13 RX ADMIN — PRAVASTATIN SODIUM 40 MG: 20 TABLET ORAL at 21:13

## 2023-02-13 RX ADMIN — ASPIRIN 81 MG: 81 TABLET, CHEWABLE ORAL at 09:39

## 2023-02-13 RX ADMIN — PANTOPRAZOLE SODIUM 40 MG: 40 TABLET, DELAYED RELEASE ORAL at 06:27

## 2023-02-13 RX ADMIN — SODIUM CHLORIDE 75 ML/HR: 4.5 INJECTION, SOLUTION INTRAVENOUS at 19:51

## 2023-02-13 RX ADMIN — LEVOTHYROXINE SODIUM 50 MCG: 0.05 TABLET ORAL at 06:26

## 2023-02-13 RX ADMIN — AMIODARONE HYDROCHLORIDE 200 MG: 200 TABLET ORAL at 09:39

## 2023-02-13 RX ADMIN — HEPARIN SODIUM 5000 UNITS: 5000 INJECTION INTRAVENOUS; SUBCUTANEOUS at 17:55

## 2023-02-13 RX ADMIN — PIPERACILLIN AND TAZOBACTAM 3375 MG: 3; .375 INJECTION, POWDER, FOR SOLUTION INTRAVENOUS at 12:44

## 2023-02-13 RX ADMIN — PIPERACILLIN AND TAZOBACTAM 3375 MG: 3; .375 INJECTION, POWDER, FOR SOLUTION INTRAVENOUS at 23:08

## 2023-02-13 RX ADMIN — AMLODIPINE BESYLATE 10 MG: 5 TABLET ORAL at 09:39

## 2023-02-13 RX ADMIN — BUMETANIDE 1 MG: 0.25 INJECTION INTRAMUSCULAR; INTRAVENOUS at 09:38

## 2023-02-13 RX ADMIN — CARBIDOPA AND LEVODOPA 0.5 TABLET: 25; 100 TABLET ORAL at 17:55

## 2023-02-13 RX ADMIN — TAMSULOSIN HYDROCHLORIDE 0.4 MG: 0.4 CAPSULE ORAL at 09:39

## 2023-02-13 RX ADMIN — HEPARIN SODIUM 5000 UNITS: 5000 INJECTION INTRAVENOUS; SUBCUTANEOUS at 06:26

## 2023-02-13 RX ADMIN — PIPERACILLIN AND TAZOBACTAM 3375 MG: 3; .375 INJECTION, POWDER, FOR SOLUTION INTRAVENOUS at 00:13

## 2023-02-13 RX ADMIN — CARBIDOPA AND LEVODOPA 0.5 TABLET: 25; 100 TABLET ORAL at 09:39

## 2023-02-13 ASSESSMENT — PAIN SCALES - GENERAL
PAINLEVEL_OUTOF10: 0

## 2023-02-13 NOTE — PROGRESS NOTES
CARDIOLOGY PROGRESS NOTE - NP    Patient seen and examined. This is a patient who is followed for acute decompensated HFrEF. He reports feeling improved with IV diuresing. He is unsure if he takes diuretics at home. He is followed by Dr. Nicky Sanchez with [facility] cardiology. No orthopnea. No chest pain. He has not ambulated with PT. No other complaints reported. Telemetry reviewed, there were no events noted in the past 24 hours. Remains in SB to NSR with frequent PACs. Pertinent review of systems items noted above, all other systems are negative. Current medications reviewed. Physical Examination  Vital signs are stable. Blood pressure 132/56, Pulse 60  No apparent distress. Heart is regular, rate and rhythm. Normal S1, S2, no murmurs are appreciated. Lungs are diminished L>R. No rales, rhonci, wheezing  Abdomen is soft, nontender, normal bowel sounds. Extremities have trace to mild edema. Labs reviewed: Creatinine stable in the     Case discussed with Dr. Renetta Bustamante and our impression and recommendations are as follows:    Acute on chronic HFrEF:  Echocardiogram showing an EF of 42% and well seated AV replacement. Volume status improving as are symptoms. Would recommend one more day of IV diuretics prior to transitioning to PO. Review of records from [facility] via [facility] showing EF of 40% with most recent echo with their group. GDMT limited by CKD and bradycardia. Could consider decreasing amiodarone and adding beta blocker as OP. Troponin elevation: 57>88>76. Likely demand ischemic from hypoxia and volume overload. Continue asa, statin. Aortic Stenosis: s/p TAVR 1/2022. Echo showing well seated TAVR. Hypertension: Ideally would transition amlodipine to beta blocker. Will focus on diuresing for now and defer to his primary cardiologist at [facility]. Paroxysmal atrial fibrillation: per history. Continue amiodarone.  Review of Care Everywhere states that he is not anti-coagulated due to a history of GIB with coffee ground emesis in 2020. Non-obstructive CAD:  Review of records showing a cath in 2020 with [facility] showing 30-40% LAD and 50% RCA. Continue asa and statin. Please do not hesitate to call me or Dr. Bella Madera if additional questions arise.

## 2023-02-13 NOTE — PROGRESS NOTES
0700:  Bedside shift change report given to DAVI Hatch RN (oncoming nurse) by MARGARITA Alamo RN (offgoing nurse). Report included the following information Nurse Handoff Report, Intake/Output, MAR, and Recent Results. 0730:  Initial assessment complete, see flowsheet. Patient assessed for incontinence with offgoing nurse, patient clean at this time. Patient is A&OX4, calm and cooperative this morning. He has no c/o pain or s/s of distress at this time. He is tolerating RA with O2 of 94%. Assisted patient to sit up on the side of the bed for breakfast and any needs with setting up his tray. He requested the morning news be turned on and no other needs were expressed at this time. CBWR.    [id number]:  Patient returned to bed, repositioned onto his right side with pillows and encouraged to turn throughout the day. Will continue to reinforce teaching. Clean at this time when checked. 0930:  MD rounded on patient. 1121:  Patient cleaned of 2x bowel incontinence and 1x urine incontinence. Primofit replaced and new brief applied. Barrier cream applied to buttocks. Dressings clean, dry, and intact at this time. Patient repositioned onto left side with pillows. Patient states he would like to take a nap and to place his lunch tray to the side when it arrives for when he wakes up to eat later. CBWR.    8925:  PT at the bedside to work with the patient. When performing orthostatics, patient became hypotensive. Provider notified. No orders at this time. Cardiology team called, left message and awaiting callback to update on patient status change per hospitalist.     [phone]:  Cleaned patient of urinary incontinence due to leaking primofit after PT. Placed a medium [place] catheter on the patient. New diaper applied and fresh bed pad placed under the patient. 650 mL clear yellow urine emptied from prior primofit canister. 1613:  SLP at bedside evaluating patient.  Patient's lunch was still at bedside, reheated per request. Patient sat up to side of bed with minimal assistance. Patient has family at bedside. Patient would like his dinner tray left at the bedside to eat later. 1750:  Nephrology rounded on the patient, stated he would put in orders for orthostatic blood pressure issues documented earlier this shift. 1841:  Patient checked for incontinence and is clean at this time. Repositioned his [place] catheter and secured to his thigh with a kessler anchor. Patient assisted to sit up on the side of the bed to eat his dinner. Food warmed up per request. [place] draped over patient's shoulders for comfort. No other needs at this time. CBWR    1900:  Bedside shift change report given to AMRGARITA Alamo RN (oncoming nurse) by Douglas Troncoso RN (offgoing nurse). Report included the following information Nurse Handoff Report, Intake/Output, MAR, and Recent Results.

## 2023-02-13 NOTE — PLAN OF CARE
Problem: Gastrointestinal - Adult  Goal: Maintains or returns to baseline bowel function  Outcome: Not Progressing     Problem: Chronic Conditions and Co-morbidities  Goal: Patient's chronic conditions and co-morbidity symptoms are monitored and maintained or improved  Outcome: Progressing     Problem: Safety - Adult  Goal: Free from fall injury  Outcome: Progressing  Flowsheets (Taken 2/12/2023 1930)  Free From Fall Injury: Based on caregiver fall risk screen, instruct family/caregiver to ask for assistance with transferring infant if caregiver noted to have fall risk factors     Problem: Pain  Goal: Verbalizes/displays adequate comfort level or baseline comfort level  Outcome: Progressing     Problem: Skin/Tissue Integrity  Goal: Absence of new skin breakdown  Description: 1. Monitor for areas of redness and/or skin breakdown  2. Assess vascular access sites hourly  3. Every 4-6 hours minimum:  Change oxygen saturation probe site  4. Every 4-6 hours:  If on nasal continuous positive airway pressure, respiratory therapy assess nares and determine need for appliance change or resting period.   Outcome: Progressing     Problem: Respiratory - Adult  Goal: Achieves optimal ventilation and oxygenation  Outcome: Progressing     Problem: Cardiovascular - Adult  Goal: Maintains optimal cardiac output and hemodynamic stability  Outcome: Progressing     Problem: Skin/Tissue Integrity - Adult  Goal: Skin integrity remains intact  Outcome: Progressing  Goal: Oral mucous membranes remain intact  Outcome: Progressing     Problem: ABCDS Injury Assessment  Goal: Absence of physical injury  Outcome: Progressing     Problem: Gastrointestinal - Adult  Goal: Maintains or returns to baseline bowel function  Outcome: Not Progressing

## 2023-02-13 NOTE — PROGRESS NOTES
-[id number] Received care of pt from off going nurse. Pt wife at bedside.     -2009 PM Assessment completed. A&OX3. Lungs clear, SATS 94% on RA. Skin warm and dry. Dressing to sacrum D&I.     -0010 Pt easily arousal.  Pt turning and repositioning self for comfort. CBWR, bed in lowest position.

## 2023-02-13 NOTE — PROGRESS NOTES
SPEECH LANGUAGE PATHOLOGY DYSPHAGIA TREATMENT    Patient: Brennon Hernández (89 y.o. male)  Date: 2/13/2023  Diagnosis: Pneumonia <principal problem not specified>      Precautions: aspiration, decreased PO intake     PLOF:Patient with hx of PEG tube, extensive speech therapy treatment in outpatient setting specifically targeting dysphagia      ASSESSMENT:  Patient seen in bed with wife present. Patient's wife educated on results of modified barium swallow performed last week. All of patient's wife's questions were addressed. Patient and wife educated on safe swallowing strategies to decrease aspiration risk. Patient verbalized understanding. ST assisted patient with lunch meal that he had not yet eaten. Patient is currently on soft and bite sized diet with mildly thickened liquids. Patient tolerated without s/sx of aspiration. Progression toward goals:  [x]         Improving appropriately and progressing toward goals  []         Improving slowly and progressing toward goals  []         Not making progress toward goals and plan of care will be adjusted     PLAN:  Recommendations and Planned Interventions:  Continue ST during hospitalization  Patient continues to benefit from skilled intervention to address the above impairments. Continue treatment per established plan of care. Discharge Recommendations:  resume outpatient ST      SUBJECTIVE:   Patient stated I just don't have much appetite. OBJECTIVE:   Cognitive and Communication Status:  WFL                    Dysphagia Treatment:  Oral Assessment:     P.O.  Trials: soft and bite sized, mildly thick liquids        PAIN:  Pain level pre-treatment: 0/10   Pain level post-treatment: 0/10   Pain Intervention(s): N/A   Response to intervention: N/A    After treatment:   []              Patient left in no apparent distress sitting up in chair  [x]              Patient left in no apparent distress in bed  []              Call bell left within reach  [] Nursing notified  []              Family present  []              Caregiver present  []              Bed alarm activated      COMMUNICATION/EDUCATION:   [x] Aspiration precautions; swallow safety; compensatory techniques  []        Patient unable to participate in education; education ongoing with staff  []  Posted safety precautions in patient's room.   [] Oral-motor/laryngeal strengthening exercises    [address] [place] [address], CCC-SLP

## 2023-02-13 NOTE — PROGRESS NOTES
HOSPITALIST PROGRESS NOTE  Doug Steen MD, [address] [facility]         Daily Progress Note: 2/13/2023      Subjective:     Patient is alert and oriented x3. Able to be weaned off of supplemental O2 3 L throughout the day yesterday and early this a.m. Currently tolerating room air. Improved shortness of breath without any cough, fever/chills, chest pain or nausea or vomiting overnight. Counseled extensively on current significant diagnoses of acute heart failure with aspiration pneumonia currently on IV antibiotics in the setting of chronic renal failure. Also counseled on CODE STATUS and he states that he will discuss with his wife. Medications reviewed  Current Facility-Administered Medications   Medication Dose Route Frequency    piperacillin-tazobactam (ZOSYN) 3,375 mg in sodium chloride 0.9 % 100 mL extended IVPB (mini-bag)  3,375 mg IntraVENous Q12H    senna (SENOKOT) tablet 17.2 mg  2 tablet Oral BID    acetaminophen (TYLENOL) tablet 650 mg  650 mg Oral Q6H PRN    amiodarone (CORDARONE) tablet 200 mg  200 mg Oral Daily    amLODIPine (NORVASC) tablet 10 mg  10 mg Oral Daily    aspirin chewable tablet 81 mg  81 mg Oral Daily    bumetanide (BUMEX) injection 1 mg  1 mg IntraVENous Q12H    carbidopa-levodopa (SINEMET)  MG per tablet 0.5 tablet  0.5 tablet Oral BID    heparin (porcine) injection 5,000 Units  5,000 Units SubCUTAneous Q12H    levothyroxine (SYNTHROID) tablet 50 mcg  50 mcg Oral Daily    pantoprazole (PROTONIX) tablet 40 mg  40 mg Oral QAM AC    pravastatin (PRAVACHOL) tablet 40 mg  40 mg Oral Nightly    tamsulosin (FLOMAX) capsule 0.4 mg  0.4 mg Oral Daily with breakfast       Review of Systems:   Pertinent items are noted in HPI.     Objective:   Physical Exam:     BP (!) 133/49   Pulse 67   Temp 97.8 °F (36.6 °C) (Oral)   Resp 18   Ht 6' (1.829 m)   Wt 128 lb 9.6 oz (58.3 kg)   SpO2 95%   BMI 17.44 kg/m²       Patient Vitals for the past 8 hrs:   Temp Pulse Resp BP SpO2   23 1123 97.8 °F (36.6 °C) 67 18 (!) 133/49 95 %   23 0734 97.6 °F (36.4 °C) 60 18 (!) 132/56 94 %   23 0724 -- 57 20 -- 94 %          Temp (24hrs), Av.9 °F (36.6 °C), Min:97.4 °F (36.3 °C), Max:98.4 °F (36.9 °C)    701 - 1900  In: 600 [P.O.:600]  Out: -    1901 -  0700  In: 2350 [P.O.:2150]  Out: 3225 [Urine:3225]    General:  Alert, cooperative, no distress, appears stated age. Lungs:   Improved diffuse Rales without any rhonchi or wheezing noted. Chest wall:  No tenderness or deformity. Heart:  Regular rate and rhythm, S1, S2 normal, no murmur, click, rub or gallop. Abdomen:   Soft, non-tender. Bowel sounds normal. No masses,  No organomegaly. Extremities: Extremities normal, atraumatic, 1+ bilateral lower extremity edema noted. Pulses: 2+ and symmetric all extremities. Skin: Skin color, texture, turgor normal. No rashes or lesions   Neurologic: No gross sensory or motor deficits     Data Review:       Recent Days:  Recent Labs     23  0320 23  0404 23  0442   WBC 5.4 6.3 5.4   HGB 11.8* 11.3* 11.7*   HCT 36.7 35.0* 36.2    154 148     Recent Labs     23  0320 23  0404 23  0442    143 141   K 3.9 3.9 3.7    106 104   CO2 30 30 31   BUN 41* 46* 43*   MG 2.2  --   --    PHOS 4.9  --   --      No results for input(s): PH, PCO2, PO2, HCO3, FIO2 in the last 72 hours.       24 Hour Results:  Recent Results (from the past 24 hour(s))   CBC with Auto Differential    Collection Time: 23  4:42 AM   Result Value Ref Range    WBC 5.4 4.6 - 13.2 K/uL    RBC 3.99 (L) 4.35 - 5.65 M/uL    Hemoglobin 11.7 (L) 13.0 - 16.0 g/dL    Hematocrit 36.2 36.0 - 48.0 %    MCV 90.7 78.0 - 100.0 FL    MCH 29.3 24.0 - 34.0 PG    MCHC 32.3 31.0 - 37.0 g/dL    RDW 13.1 11.6 - 14.5 %    Platelets 685 948 - 566 K/uL    MPV 12.0 (H) 9.2 - 11.8 FL    Nucleated RBCs 0.0 0.0  WBC    nRBC 0. 00 0.00 - 0.01 K/uL    Seg Neutrophils 73 40 - 73 %    Lymphocytes 15 (L) 21 - 52 %    Monocytes 10 3 - 10 %    Eosinophils % 2 0 - 5 %    Basophils 0 0 - 2 %    Immature Granulocytes 0 0 - 0.5 %    Segs Absolute 3.9 1.8 - 8.0 K/UL    Absolute Lymph # 0.8 (L) 0.9 - 3.6 K/UL    Absolute Mono # 0.5 0.05 - 1.2 K/UL    Absolute Eos # 0.1 0.0 - 0.4 K/UL    Basophils Absolute 0.0 0.0 - 0.1 K/UL    Absolute Immature Granulocyte 0.0 0.00 - 0.04 K/UL    Differential Type AUTOMATED     Basic Metabolic Panel    Collection Time: 02/13/23  4:42 AM   Result Value Ref Range    Sodium 141 136 - 145 mmol/L    Potassium 3.7 3.5 - 5.5 mmol/L    Chloride 104 100 - 111 mmol/L    CO2 31 21 - 32 mmol/L    Anion Gap 6 3.0 - 18.0 mmol/L    Glucose 95 74 - 99 mg/dL    BUN 43 (H) 7 - 18 mg/dL    Creatinine 2.34 (H) 0.60 - 1.30 mg/dL    Bun/Cre Ratio 18 12 - 20      Est, Glom Filt Rate 27 (L) >60 ml/min/1.73m2    Calcium 8.2 (L) 8.5 - 10.1 mg/dL       RADIOLOGY:  [unfilled]        Assessment/Plan:     Acute respiratory failure with hypoxemia:  Currently dependent on 3 L NC O2. Based on imaging and speech therapy evaluation patient likely with aspiration pneumonia along with CHF exacerbation superimposed on acute on chronic renal failure. CXR shows Right lower lobe pneumonia with bilateral interstitial opacities and mild pulmonary edema, his pro-bnp was [id number]. Trop was normal, ECG--SR, non-specific T wave abnormality, repeat ECG. Trop negative. VQ scan with low to intermediate risk. Right lower lobe pneumonia  Continue with zosyn 3.375mg q 12hr, renally dosed. Patient only received 1 dose of Zosyn on 2/11/2023 and for some reason did not receive any Zosyn on 2/12/2023. Therefore will need at least 48 to 72 hours of IV antibiotics then transition to oral Augmentin or clindamycin based on clinical scenario, for discharge.   Significant improvement in hypoxia and no significant fevers with improvement in cough noted as well.    Acute on chronic diastolic heart failure  Continue bumex 1mg IV twice daily in the setting of acute on chronic renal failure. Echocardiogram with new mildly reduced left ventricular systolic function with EF of 42% along with bioprosthetic valve noted. Cardiology and nephrology consultations pending. Acute on chronic renal failure  Baseline creatinine around 2.3 noted. Worsening renal function with active diuresis with Bumex noted. Consult Nephrology pending. Dysphagia   Aspiration on MBS  Modified diet with assistance from speech therapy. Hypertension:  Cont amlodipine, add low dose metoprolol      Hypothyroidism:   Cont levothyroxine      Paroxysmal Afib:  Cont amiodarone, however not anticoagulated at home. Diabetes mellitus Type II:  Cont accu-checks achs, SSI prn. 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 Parkinson Disease:  Cont sinemet    Generalized debility  Will consult physical therapy for further evaluation. DVT prophylaxis  Heparin twice daily. Care Plan discussed with: Patient/Family, Nurse, and     Total time spent with patient: 34 minutes. With greater than 50% spent in coordination of care and counseling.     Li Sung MD

## 2023-02-13 NOTE — FLOWSHEET NOTE
02/13/23 [address] Received From Vitor Rebolledo NP   Handoff Communication Face to Face; At bedside   Time Handoff Given 0721   End of Shift Check Performed Yes

## 2023-02-13 NOTE — CONSULTS
NAME:  Claudetta Hailstone   :   1940   MRN:   [id number]     ATTENDING: Elsy Berg MD  PCP:  Kenyatta Lamas MD    Date/Time:  2023 6:48 PM      Subjective:   REQUESTING PHYSICIAN:Joaquín Anderson MD  REASON FOR CONSULT:  SADIQ, CKD      History of presenting illness: Patient is an 78-year-old white male with past medical history of hypertension, type 2 diabetes, aortic stenosis status post TAVR in 2022, chronic kidney disease, congestive heart failure, Parkinson's disease presented to the emergency room with shortness of breath. Initial chest x-ray suggestive of pneumonia, ? Pulm edema. Labs done this morning showed a creatinine of 2.34, nephrology consultation is requested for further evaluation and management. Patient seen at bedside in the intensive care unit, his comfortably lying down in the bed, significant debility and recent weight loss present. Appears clinically dry without any edema. Review of old labs showed a baseline creatinine above 2 on multiple occasions. Patient has history of BPH apparently seen by a urologist in the past but not aware of any history of chronic kidney disease.   Patient is hemodynamically stable    Past Medical History:   Diagnosis Date    Benign prostatic hyperplasia with urinary obstruction     Dementia ([address]     early onset    Diabetes ([address]     Hypersomnia     Hypertension     Nocturia     OAB (overactive bladder)     Parkinson's disease ([address] 2023    Testicular cancer ([address]     Urge incontinence     Urinary incontinence, urge       Past Surgical History:   Procedure Laterality Date    CYST REMOVAL  -    cyst removed from right breast    OTHER SURGICAL HISTORY  2019    Heart Blockage: 30% [facility]: Dr. Tanmay Hamm      reemoval of testcle    OTHER SURGICAL HISTORY  2018    Surgery: Blockage Lower bowels, [facility]    UROLOGICAL SURGERY      Testicles removed     Social History Tobacco Use    Smoking status: Former     Types: Cigarettes     Quit date: 1976     Years since quittin.1    Smokeless tobacco: Never   Substance Use Topics    Alcohol use: No      Family History   Problem Relation Age of Onset    Heart Disease Brother     Cancer Brother         Testicular    Heart Disease Father     Stroke Father     Heart Disease Mother     Stroke Mother        Allergies   Allergen Reactions    Iodinated Contrast Media Other (See Comments)     Hot flushing     Metformin Other (See Comments)      Prior to Admission medications    Medication Sig Start Date End Date Taking? Authorizing Provider   acetaminophen (TYLENOL) 325 MG tablet Take 650 mg by mouth every 4 hours as needed 20   Ar Automatic Reconciliation   amiodarone (CORDARONE) 200 MG tablet ceived the following from [facility]: Outside name: amiodarone (CORDARONE) 200 mg tablet 22   Ar Automatic Reconciliation   amLODIPine (NORVASC) 10 MG tablet Take 10 mg by mouth daily    Ar Automatic Reconciliation   aspirin 81 MG chewable tablet aspirin 81 mg chewable tablet   Chew 1 tablet every day by oral route.     Ar Automatic Reconciliation   bumetanide (BUMEX) 1 MG tablet Take 1 mg by mouth daily    Ar Automatic Reconciliation   busPIRone (BUSPAR) 10 MG tablet ceived the following from [facility]: Outside name: busPIRone (BUSPAR) 10 mg tablet 22   Ar Automatic Reconciliation   carbidopa-levodopa (SINEMET)  MG per tablet Take 0.5 tablets by mouth 2 times daily 20   Ar Automatic Reconciliation   glimepiride (AMARYL) 1 MG tablet ceived the following from [facility]: Outside name: glimepiride (AMARYL) 1 mg tablet 12/15/17   Ar Automatic Reconciliation   levothyroxine (SYNTHROID) 50 MCG tablet ceived the following from [facility]: Outside name: levothyroxine (SYNTHROID) 50 mcg tablet 22   Ar Automatic Reconciliation   omeprazole (PRILOSEC) 20 MG delayed release capsule Take 20 mg by mouth daily    Ar Automatic Reconciliation   pravastatin (PRAVACHOL) 20 MG tablet Take 20 mg by mouth    Ar Automatic Reconciliation   Sennosides 8.6 MG CAPS Take by mouth    Ar Automatic Reconciliation   silodosin (RAPAFLO) 8 MG CAPS Take 8 mg by mouth daily (with breakfast)    Ar Automatic Reconciliation   solifenacin (VESICARE) 10 MG tablet Take 10 mg by mouth daily 10/18/22   Ar Automatic Reconciliation   testosterone cypionate (DEPOTESTOTERONE CYPIONATE) 200 MG/ML injection Inject 200 mg into the muscle. 3/15/15   Ar Automatic Reconciliation   trospium (SANCTURA) 60 MG CP24 extended release capsule Take 60 mg by mouth every morning (before breakfast) 10/18/22   Ar Automatic Reconciliation     Current Facility-Administered Medications   Medication Dose Route Frequency    piperacillin-tazobactam (ZOSYN) 3,375 mg in sodium chloride 0.9 % 100 mL extended IVPB (mini-bag)  3,375 mg IntraVENous Q12H    senna (SENOKOT) tablet 17.2 mg  2 tablet Oral BID    acetaminophen (TYLENOL) tablet 650 mg  650 mg Oral Q6H PRN    amiodarone (CORDARONE) tablet 200 mg  200 mg Oral Daily    amLODIPine (NORVASC) tablet 10 mg  10 mg Oral Daily    aspirin chewable tablet 81 mg  81 mg Oral Daily    bumetanide (BUMEX) injection 1 mg  1 mg IntraVENous Q12H    carbidopa-levodopa (SINEMET)  MG per tablet 0.5 tablet  0.5 tablet Oral BID    heparin (porcine) injection 5,000 Units  5,000 Units SubCUTAneous Q12H    levothyroxine (SYNTHROID) tablet 50 mcg  50 mcg Oral Daily    pantoprazole (PROTONIX) tablet 40 mg  40 mg Oral QAM AC    pravastatin (PRAVACHOL) tablet 40 mg  40 mg Oral Nightly    tamsulosin (FLOMAX) capsule 0.4 mg  0.4 mg Oral Daily with breakfast       REVIEW OF SYSTEMS:     Complaints as mentioned in the history of presenting illness. Generalized debility, poor appetite, history of multiple hospitalizations and cardiac surgery.   Patient has a significant weight loss in last few months  No other significant complaints on complete review of systems. Objective:   VITALS:    BP (!) 128/55   Pulse 72   Temp 97.7 °F (36.5 °C) (Oral)   Resp 16   Ht 6' (1.829 m)   Wt 128 lb 9.6 oz (58.3 kg)   SpO2 94%   BMI 17.44 kg/m²   Temp (24hrs), Av °F (36.7 °C), Min:97.6 °F (36.4 °C), Max:98.4 °F (36.9 °C)      PHYSICAL EXAM:   General: alert awake well-oriented, no acute distress.   HEENT: EOMI, no Icterus, no Pallor, pupils reactive, mucosa dry,   Neck: Neck is supple, No JVD, no thyromegaly  Lungs: Good air entry present bilaterally, no Rales or rhonchi heard CVS: heart sounds normal,   GI: soft, nontender, normal BS, no palpable organomegaly,  Extremeties: no clubbing, no cyanosis, no edema,  Neuro: Alert, awake, oriented x3, answering simple questions but slow to respond   skin: poor skin turgor, no skin rashes   Musculoskeletal: no acute joint swellings,      LAB DATA REVIEWED:    Recent Results (from the past 24 hour(s))   CBC with Auto Differential    Collection Time: 23  4:42 AM   Result Value Ref Range    WBC 5.4 4.6 - 13.2 K/uL    RBC 3.99 (L) 4.35 - 5.65 M/uL    Hemoglobin 11.7 (L) 13.0 - 16.0 g/dL    Hematocrit 36.2 36.0 - 48.0 %    MCV 90.7 78.0 - 100.0 FL    MCH 29.3 24.0 - 34.0 PG    MCHC 32.3 31.0 - 37.0 g/dL    RDW 13.1 11.6 - 14.5 %    Platelets 521 717 - 786 K/uL    MPV 12.0 (H) 9.2 - 11.8 FL    Nucleated RBCs 0.0 0.0  WBC    nRBC 0.00 0.00 - 0.01 K/uL    Seg Neutrophils 73 40 - 73 %    Lymphocytes 15 (L) 21 - 52 %    Monocytes 10 3 - 10 %    Eosinophils % 2 0 - 5 %    Basophils 0 0 - 2 %    Immature Granulocytes 0 0 - 0.5 %    Segs Absolute 3.9 1.8 - 8.0 K/UL    Absolute Lymph # 0.8 (L) 0.9 - 3.6 K/UL    Absolute Mono # 0.5 0.05 - 1.2 K/UL    Absolute Eos # 0.1 0.0 - 0.4 K/UL    Basophils Absolute 0.0 0.0 - 0.1 K/UL    Absolute Immature Granulocyte 0.0 0.00 - 0.04 K/UL    Differential Type AUTOMATED     Basic Metabolic Panel    Collection Time: 23  4:42 AM   Result Value Ref Range    Sodium 141 136 - 145 mmol/L    Potassium 3.7 3.5 - 5.5 mmol/L    Chloride 104 100 - 111 mmol/L    CO2 31 21 - 32 mmol/L    Anion Gap 6 3.0 - 18.0 mmol/L    Glucose 95 74 - 99 mg/dL    BUN 43 (H) 7 - 18 mg/dL    Creatinine 2.34 (H) 0.60 - 1.30 mg/dL    Bun/Cre Ratio 18 12 - 20      Est, Glom Filt Rate 27 (L) >60 ml/min/1.73m2    Calcium 8.2 (L) 8.5 - 10.1 mg/dL       Assessment and plan:     1. Acute Kidney Injury on CKD: probably prerenal azotemia secondary to use of diuretics, poor intake  Recommend to hold diuretics for 48 hrs, will give gentle IVF for 24-48 hrs with 1/2 NS at 75ml/hr  Recommend to check urinalysis,urine random electrolytes, creatinine and protein. Check CPK levels to rule out rhabdomyolysis   will continue to monitor renal functions and adjust management as needed    2. Chronic kidney disease: probably has stage 3/4 chronic kidney disease related to nephrosclerosis. Recommend to check urine random protein creatinine ratio to assess proteinuria. Will get phosphorus level, intact PTH level, vitamin D levels to assess for renal osteodystrophy related to chronic kidney disease. Will get an ultrasound of kidneys and bladder to rule out outflow obstruction and to evaluate the kidney size. We will continue to monitor renal functions to assess the baseline    3. SOB: no obvious fluid overload now, appears clinically dry  EF preserved on Echo  Suspected punemonia, repeat CXR, on Zosyn  Monitor closely with gentle IV hydration    4. Hypertension: Borderline low blood pressures   Decrease amlodipine to 5 mg daily and hold for systolic blood pressure less than 120   Hold diuretics for now   continue to monitor and blood pressures and adjust antihypertensive regimen as needed. 5.  History of BPH, on Flomax  Check renal ultrasonogram to rule out obstruction    6. Parkinson's disease: On Sinemet  7. Hypothyroidism: On levothyroxine  8. Hyperlipidemia on Pravachol  9.   History of atrial fibrillation on amiodarone      Signed: Sharifa Tillman MD

## 2023-02-13 NOTE — PROGRESS NOTES
Physical Therapy  Facility/Department: [facility] 2 ICU  Daily Treatment Note  NAME: Mimi Osborne  : 1940  MRN: [id number]    Date of Service: 2023      Discharge Recommendations: Home Health, Home Physical Therapy, or Outpatient      Patient Diagnosis(es): There were no encounter diagnoses. Assessment  Pt eager and willing to participate in PT today. Pt lying in supine upon entry. Pt comes to EOB sitting, then to standing. Pt was unable to perform ambulation due to orthostatic BP readings. Pt showed no visual signs of low BP. No SOB or dizziness noted. SpO2 WNL on room air. Pt transfers back to supine where he completes therex. See vitals and treatment details below. EXERCISE   Sets   Reps   Active Active Assist   Passive Self ROM   Comments   Ankle Pumps 2 20  [x] [] [] []    Supine Marches 2 20  [x] [] [] []    Supine Hip Abduction 2 10 [x] [] [] []    Heel Slides 2 10 [x] [] [] []    Straight Leg Raises 2 10 [x] [] [] []         Plan    Physcial Therapy Plan  General Plan: 1 time a day 3-6 times a week       Subjective   Pt reports feeling good despite orthostatic BP. Objective   Vitals    Blood Pressure Lyin/55       Blood Pressure Sittin/46       Blood Pressure Standin/40    Bed Mobility Training  Supine to Sit: Modified independent  Sit to Supine: Modified independent  Scooting: Modified independent  Balance  Sitting: Intact  Standing: Intact  Sit to Stand: (P) Modified independent  Stand to Sit: (P) Modified independent        Goals  Short Term Goals  Time Frame for Short Term Goals: 7 days  Short Term Goal 1: Patient will move from supine  to sit and sit to supine , scoot up and down, and roll side to side in bed with modified independence. Short Term Goal 2: Patient will transfer from bed to chair and chair to bed with modified independence using the least restrictive device. Short Term Goal 3: Patient  will perform sit to stand with modified independence.   Short Term Goal 4: Patient will ambulate with modified independence for 100 feet with the least restrictive device.   Short Term Goal 5: Patient will ascend/descend 4 stairs with 2 handrail(s) with minimal assistance/contact  guard assist.      Therapy Time   Individual Concurrent Group Co-treatment   Time In 1411         Time Out 1442         Minutes 0195 [place], [place]

## 2023-02-14 PROBLEM — E43 SEVERE PROTEIN-CALORIE MALNUTRITION (HCC): Status: ACTIVE | Noted: 2023-02-14

## 2023-02-14 LAB
25(OH)D3 SERPL-MCNC: 33.7 NG/ML (ref 30–100)
ALBUMIN SERPL-MCNC: 2.8 G/DL (ref 3.4–5)
ANION GAP SERPL CALC-SCNC: 4 MMOL/L (ref 3–18)
BUN SERPL-MCNC: 44 MG/DL (ref 7–18)
BUN/CREAT SERPL: 19 (ref 12–20)
CA-I BLD-MCNC: 8.3 MG/DL (ref 8.5–10.1)
CA-I BLD-MCNC: 8.3 MG/DL (ref 8.5–10.1)
CHLORIDE SERPL-SCNC: 101 MMOL/L (ref 100–111)
CHLORIDE UR-SCNC: 79 MMOL/L (ref 55–125)
CO2 SERPL-SCNC: 32 MMOL/L (ref 21–32)
CREAT SERPL-MCNC: 2.34 MG/DL (ref 0.6–1.3)
ERYTHROCYTE [DISTWIDTH] IN BLOOD BY AUTOMATED COUNT: 13.1 % (ref 11.6–14.5)
FERRITIN SERPL-MCNC: 30 NG/ML (ref 8–388)
GLUCOSE BLD STRIP.AUTO-MCNC: 142 MG/DL (ref 70–110)
GLUCOSE BLD STRIP.AUTO-MCNC: 205 MG/DL (ref 70–110)
GLUCOSE BLD STRIP.AUTO-MCNC: 248 MG/DL (ref 70–110)
GLUCOSE SERPL-MCNC: 122 MG/DL (ref 74–99)
HCT VFR BLD AUTO: 36 % (ref 36–48)
HGB BLD-MCNC: 11.5 G/DL (ref 13–16)
IRON SATN MFR SERPL: 47 % (ref 20–50)
IRON SERPL-MCNC: 110 UG/DL (ref 50–175)
MCH RBC QN AUTO: 29 PG (ref 24–34)
MCHC RBC AUTO-ENTMCNC: 31.9 G/DL (ref 31–37)
MCV RBC AUTO: 90.9 FL (ref 78–100)
NRBC # BLD: 0 K/UL (ref 0–0.01)
NRBC BLD-RTO: 0 PER 100 WBC
PERFORMED BY:: ABNORMAL
PHOSPHATE SERPL-MCNC: 3.8 MG/DL (ref 2.5–4.9)
PLATELET # BLD AUTO: 151 K/UL (ref 135–420)
PMV BLD AUTO: 12.4 FL (ref 9.2–11.8)
POTASSIUM SERPL-SCNC: 3.8 MMOL/L (ref 3.5–5.5)
PTH-INTACT SERPL-MCNC: 154.8 PG/ML (ref 18.4–88)
RBC # BLD AUTO: 3.96 M/UL (ref 4.35–5.65)
SODIUM SERPL-SCNC: 137 MMOL/L (ref 136–145)
SODIUM UR-SCNC: 77 MMOL/L (ref 20–110)
TIBC SERPL-MCNC: 232 UG/DL (ref 250–450)
WBC # BLD AUTO: 4.9 K/UL (ref 4.6–13.2)

## 2023-02-14 PROCEDURE — 6370000000 HC RX 637 (ALT 250 FOR IP): Performed by: NURSE PRACTITIONER

## 2023-02-14 PROCEDURE — 6370000000 HC RX 637 (ALT 250 FOR IP): Performed by: INTERNAL MEDICINE

## 2023-02-14 PROCEDURE — 97116 GAIT TRAINING THERAPY: CPT

## 2023-02-14 PROCEDURE — 92526 ORAL FUNCTION THERAPY: CPT

## 2023-02-14 PROCEDURE — 80069 RENAL FUNCTION PANEL: CPT

## 2023-02-14 PROCEDURE — 97166 OT EVAL MOD COMPLEX 45 MIN: CPT

## 2023-02-14 PROCEDURE — 85027 COMPLETE CBC AUTOMATED: CPT

## 2023-02-14 PROCEDURE — 82306 VITAMIN D 25 HYDROXY: CPT

## 2023-02-14 PROCEDURE — 82728 ASSAY OF FERRITIN: CPT

## 2023-02-14 PROCEDURE — 36415 COLL VENOUS BLD VENIPUNCTURE: CPT

## 2023-02-14 PROCEDURE — 2580000003 HC RX 258: Performed by: INTERNAL MEDICINE

## 2023-02-14 PROCEDURE — 6360000002 HC RX W HCPCS: Performed by: INTERNAL MEDICINE

## 2023-02-14 PROCEDURE — 1100000000 HC RM PRIVATE

## 2023-02-14 PROCEDURE — 83970 ASSAY OF PARATHORMONE: CPT

## 2023-02-14 PROCEDURE — 83540 ASSAY OF IRON: CPT

## 2023-02-14 PROCEDURE — 82962 GLUCOSE BLOOD TEST: CPT

## 2023-02-14 RX ORDER — METRONIDAZOLE 250 MG/1
500 TABLET ORAL EVERY 8 HOURS SCHEDULED
Status: DISCONTINUED | OUTPATIENT
Start: 2023-02-15 | End: 2023-02-14

## 2023-02-14 RX ORDER — AMOXICILLIN 250 MG/1
500 CAPSULE ORAL EVERY 8 HOURS SCHEDULED
Status: DISCONTINUED | OUTPATIENT
Start: 2023-02-15 | End: 2023-02-14

## 2023-02-14 RX ORDER — METRONIDAZOLE 250 MG/1
500 TABLET ORAL EVERY 8 HOURS SCHEDULED
Status: DISCONTINUED | OUTPATIENT
Start: 2023-02-14 | End: 2023-02-15 | Stop reason: HOSPADM

## 2023-02-14 RX ORDER — AMIODARONE HYDROCHLORIDE 200 MG/1
100 TABLET ORAL DAILY
Status: DISCONTINUED | OUTPATIENT
Start: 2023-02-15 | End: 2023-02-15 | Stop reason: HOSPADM

## 2023-02-14 RX ORDER — AMOXICILLIN 250 MG/1
500 CAPSULE ORAL EVERY 8 HOURS SCHEDULED
Status: DISCONTINUED | OUTPATIENT
Start: 2023-02-14 | End: 2023-02-15 | Stop reason: HOSPADM

## 2023-02-14 RX ADMIN — SODIUM CHLORIDE: 4.5 INJECTION, SOLUTION INTRAVENOUS at 10:30

## 2023-02-14 RX ADMIN — METRONIDAZOLE 500 MG: 250 TABLET ORAL at 15:09

## 2023-02-14 RX ADMIN — METRONIDAZOLE 500 MG: 250 TABLET ORAL at 22:18

## 2023-02-14 RX ADMIN — AMOXICILLIN 500 MG: 250 CAPSULE ORAL at 15:08

## 2023-02-14 RX ADMIN — TAMSULOSIN HYDROCHLORIDE 0.4 MG: 0.4 CAPSULE ORAL at 08:56

## 2023-02-14 RX ADMIN — CARBIDOPA AND LEVODOPA 0.5 TABLET: 25; 100 TABLET ORAL at 08:55

## 2023-02-14 RX ADMIN — SENNOSIDES 17.2 MG: 8.6 TABLET, FILM COATED ORAL at 22:19

## 2023-02-14 RX ADMIN — PRAVASTATIN SODIUM 40 MG: 20 TABLET ORAL at 22:19

## 2023-02-14 RX ADMIN — PIPERACILLIN AND TAZOBACTAM 3375 MG: 3; .375 INJECTION, POWDER, FOR SOLUTION INTRAVENOUS at 11:29

## 2023-02-14 RX ADMIN — CARBIDOPA AND LEVODOPA 0.5 TABLET: 25; 100 TABLET ORAL at 17:03

## 2023-02-14 RX ADMIN — AMLODIPINE BESYLATE 10 MG: 5 TABLET ORAL at 08:55

## 2023-02-14 RX ADMIN — AMOXICILLIN 500 MG: 250 CAPSULE ORAL at 22:18

## 2023-02-14 RX ADMIN — HEPARIN SODIUM 5000 UNITS: 5000 INJECTION INTRAVENOUS; SUBCUTANEOUS at 05:57

## 2023-02-14 RX ADMIN — PANTOPRAZOLE SODIUM 40 MG: 40 TABLET, DELAYED RELEASE ORAL at 06:05

## 2023-02-14 RX ADMIN — ASPIRIN 81 MG: 81 TABLET, CHEWABLE ORAL at 08:56

## 2023-02-14 RX ADMIN — AMIODARONE HYDROCHLORIDE 200 MG: 200 TABLET ORAL at 08:55

## 2023-02-14 RX ADMIN — HEPARIN SODIUM 5000 UNITS: 5000 INJECTION INTRAVENOUS; SUBCUTANEOUS at 17:03

## 2023-02-14 RX ADMIN — LEVOTHYROXINE SODIUM 50 MCG: 0.05 TABLET ORAL at 06:00

## 2023-02-14 ASSESSMENT — PAIN SCALES - GENERAL
PAINLEVEL_OUTOF10: 0
PAINLEVEL_OUTOF10: 0

## 2023-02-14 NOTE — PROGRESS NOTES
Physical Therapy  Facility/Department: [facility] 2 ICU  Daily Treatment Note  NAME: Vernon Knight  : 1940  MRN: [id number]    Date of Service: 2023    Discharge Recommendations: Home Health, Home Physical Therapy, or Outpatient    Patient Diagnosis(es): There were no encounter diagnoses. Assessment     Pt eager and willing to participate in PT today. Pt lying in supine upon entry. Pt orthostatic BP checked prior to treatment and he remained WNL. Pt performed 1 bout of ambulation using RW on room air. Pt needed cues to stand more erect during ambulation. Pt left in bed with all needs met. Plan  Patient continues to benefit from skilled intervention to address the above impairments. Continue treatment per established plan of care. Subjective    Subjective  Subjective: Pt reported feeliing good today and that his BP has been better. Objective     Bed Mobility Training  Bed Mobility Training: Yes  Overall Level of Assistance: Modified independent  Rolling: Modified independent  Supine to Sit: Modified independent  Sit to Supine: Modified independent  Scooting: Modified independent  Balance  Sitting: Intact  Standing: Intact  Transfer Training  Transfer Training: Yes  Overall Level of Assistance: Modified independent  Sit to Stand: Modified independent  Stand to Sit: Modified independent  Gait  Overall Level of Assistance: Stand-by assistance  Gait Abnormalities: Shuffling gait  Distance (ft): 120 Feet  Assistive Device: Walker, rolling     Goals  Short Term Goals  Time Frame for Short Term Goals: 7 days  Short Term Goal 1: Patient will move from supine  to sit and sit to supine , scoot up and down, and roll side to side in bed with modified independence. Short Term Goal 2: Patient will transfer from bed to chair and chair to bed with modified independence using the least restrictive device. Short Term Goal 3: Patient  will perform sit to stand with modified independence.   Short Term Goal 4: Patient will ambulate with modified independence for 100 feet with the least restrictive device.   Short Term Goal 5: Patient will ascend/descend 4 stairs with 2 handrail(s) with minimal assistance/contact  guard assist.    Education  Patient Education  Education Given To: (P) Patient  Education Method: (P) Demonstration;Verbal  Barriers to Learning: (P) None  Education Outcome: (P) Verbalized understanding;Demonstrated understanding      Therapy Time   Individual Concurrent Group Co-treatment   Time In 1200 [place]          Time Out 1506         Minutes 1501 [place], [place]

## 2023-02-14 NOTE — FLOWSHEET NOTE
02/14/23 0713   Handoff   Communication Given Shift Handoff   Handoff Given To CHARBEL Ray RN   Handoff Communication Face to Face; At bedside   Time Handoff Given 0715   End of Shift Check Performed Yes

## 2023-02-14 NOTE — PLAN OF CARE
Problem: Metabolic/Fluid and Electrolytes - Adult  Goal: Hemodynamic stability and optimal renal function maintained  Outcome: Not Progressing     Problem: Metabolic/Fluid and Electrolytes - Adult  Goal: Hemodynamic stability and optimal renal function maintained  Outcome: Not Progressing     Problem: Chronic Conditions and Co-morbidities  Goal: Patient's chronic conditions and co-morbidity symptoms are monitored and maintained or improved  Outcome: Progressing     Problem: Safety - Adult  Goal: Free from fall injury  Outcome: Progressing     Problem: Pain  Goal: Verbalizes/displays adequate comfort level or baseline comfort level  Outcome: Progressing     Problem: Skin/Tissue Integrity  Goal: Absence of new skin breakdown  Description: 1. Monitor for areas of redness and/or skin breakdown  2. Assess vascular access sites hourly  3. Every 4-6 hours minimum:  Change oxygen saturation probe site  4. Every 4-6 hours:  If on nasal continuous positive airway pressure, respiratory therapy assess nares and determine need for appliance change or resting period.   Outcome: Progressing     Problem: Respiratory - Adult  Goal: Achieves optimal ventilation and oxygenation  Outcome: Progressing     Problem: Cardiovascular - Adult  Goal: Maintains optimal cardiac output and hemodynamic stability  Outcome: Progressing  Goal: Absence of cardiac dysrhythmias or at baseline  Outcome: Progressing     Problem: Skin/Tissue Integrity - Adult  Goal: Skin integrity remains intact  Outcome: Progressing  Goal: Oral mucous membranes remain intact  Outcome: Progressing     Problem: Gastrointestinal - Adult  Goal: Minimal or absence of nausea and vomiting  Outcome: Progressing  Goal: Maintains or returns to baseline bowel function  Outcome: Progressing  Goal: Maintains adequate nutritional intake  Outcome: Progressing     Problem: ABCDS Injury Assessment  Goal: Absence of physical injury  Outcome: Progressing     Problem: Metabolic/Fluid and Electrolytes - Adult  Goal: Hemodynamic stability and optimal renal function maintained  Outcome: Not Progressing

## 2023-02-14 NOTE — PROGRESS NOTES
Speech Language Pathology  Facility/Department: [facility] 2 ICU  Dysphagia Daily Treatment Note    NAME: Dawson Overton  : 1940  MRN: [id number]    Patient Diagnosis(es):   Patient Active Problem List    Diagnosis Date Noted    Benign prostatic hyperplasia with urinary obstruction 2015    Septic shock ([address] 2020    Post-void dribbling 2015    Nocturia 2015    Urge incontinence 2015    Malignant neoplasm of testis ([address] 2015    Acquired absence of genital organ 2015    Pneumonia 2023    Crohn's disease ([address] 2020    GI bleeding 2020    Acute blood loss anemia 2020    UTI (urinary tract infection) 2020    Sepsis ([address] 2020    Test[facility] cancer ([facility]     Diabetes ([facility]     Dementia ([facility]     Urinary incontinence, urge     OAB (overactive bladder)     Hypersomnia     Hearing loss 10/27/2016    Pseudophakia 10/14/2013    Urinary tract infection 2013    Atopic rhinitis 2013    Hypertension 2013    Hyperlipidemia 2013     Allergies: Allergies   Allergen Reactions    Iodinated Contrast Media Other (See Comments)     Hot flushing     Metformin Other (See Comments)     Onset Date: 23  Current Diet Level:  soft and bite sized, thin liquids     Pain:  Pain Assessment  Pain Assessment: None - Denies Pain  Pain Level: 0  Patient's Stated Pain Goal: 0 - No pain  Faces, Legs, Activity, Cry, and Consolability (FLACC)  Face (F): no particular expression or smile  Legs (L): normal position or relaxed  Activity (A): lying quietly, normal position, moves easily  Cry (C): no cry (awake or asleep)  Consolability (C): content, relaxed  FLACC Score : 0    Diet Tolerance:  Patient tolerating current diet level with no signs/symptoms of penetration/aspiration. P.O. Trials:   Thin       Byram    No trials administered on this date   Honey       Puree       Solid    No trials administered on this date Impressions:  Nursing reports that patient is tolerating prescribed diet without clinical s/sx of aspiration      Patient/Family/Caregiver Education:  Patient educated on pharyngeal strengthening tasks to improve swallowing function and increase airway protection to decrease risk of aspiration. Patient demonstrated understanding. Compensatory Strategies:  Small bites and sips, sitting upright during meals, no talking while eating/drinking, alternating solids and liquids    Plan:  Continued daily Dysphagia treatment with goals per plan of care.          [place], [place], [address] [facility]

## 2023-02-14 NOTE — PROGRESS NOTES
-1850 Received care of pt from off going nurse. Pt sitting on side of bed eating dinner.    -1909 PM Assessment completed. Pt assisted with repositioning in bed. A&OX4. Lungs clear, SATS 95% on RA. Skin warm and dry. Condom catheter patent, draining yellow colored urine. Pt denies any complaints at present.    -1951 1/2 NS started @ 75 ml/hr per MD orders.    -1957 urine specimen sent to lab.     -0035 INT leaking. #20 INT inserted to right forearm. Pt passing large amounts of flatus. Pt gown and bed linens changed. CBWR, bed in lowest position.

## 2023-02-14 NOTE — PROGRESS NOTES
CARDIOLOGY PROGRESS NOTE - NP     Patient seen and examined. This is a patient who is followed for acute decompensated HFrEF. He had some orthostasis yesterday with working with PT. Renal saw him in the afternoon and is holding diuretics and giving IVF. He has had no worsening dyspnea with this. No chest pain. He is followed by Dr. Tay Lima with [facility] cardiology. No orthopnea. No other complaints reported. Telemetry reviewed, there were no events noted in the past 24 hours. Remains in SB to NSR with rare PACs. Pertinent review of systems items noted above, all other systems are negative. Current medications reviewed. Physical Examination  Vital signs are stable. Blood pressure 131/61, Pulse 57  No apparent distress. Heart is regular, rate and rhythm. Normal S1, S2, no murmurs are appreciated. Lungs are diminished L>R. No rales, rhonci, wheezing  Abdomen is soft, nontender, normal bowel sounds. Extremities have trace edema. Labs reviewed: Creatinine stable in the mid 2's. Case discussed with Dr. Marli Dalton and our impression and recommendations are as follows:    Acute on chronic HFrEF:  Echocardiogram showing an EF of 42% and well seated AV replacement. Appears to be tolerating IVF and holding diuretics. Renal to recommend when to resume. Review of records from [facility] via [facility] showing EF of 40% with most recent echo with their group. GDMT limited by CKD and bradycardia. Troponin elevation: 57>88>76. Likely demand ischemic from hypoxia and volume overload. Continue asa, statin. Aortic Stenosis: s/p TAVR 1/2022. Echo showing well seated TAVR. Hypertension: Ideally would transition amlodipine to beta blocker. Will focus on diuresing for now and      defer to his primary cardiologist at [facility]. Paroxysmal atrial fibrillation: per history. Will reduce amiodarone to 100mg daily given bradycardia.  Review of Care Everywhere states that he is not anti-coagulated due to a history of GIB with coffee ground emesis in 2020. Continue asa 81mg. Non-obstructive CAD:  Review of records showing a cath in 2020 with [facility] showing 30-40% LAD and 50% RCA. Continue asa and statin. Please do not hesitate to call me or Dr. Nisa Cochran if additional questions arise.

## 2023-02-14 NOTE — PROGRESS NOTES
HOSPITALIST PROGRESS NOTE  Meagan Lance MD, [address]          Daily Progress Note: 2/14/2023      Subjective:     Patient is alert and oriented x3. Able to be weaned off of supplemental O2 3 L. Currently tolerating room air. Improved shortness of breath without any cough, fever/chills, chest pain or nausea or vomiting overnight. Counseled extensively on current significant diagnoses of acute heart failure with aspiration pneumonia currently on IV antibiotics in the setting of chronic renal failure. Per nephrology, continue to hold Bumex for now with gentle IV fluids to monitor for improvement in renal function. Counseled on changing IV antibiotics to p.o. antibiotics for aspiration pneumonia. Medications reviewed  Current Facility-Administered Medications   Medication Dose Route Frequency    [START ON 2/15/2023] amiodarone (CORDARONE) tablet 100 mg  100 mg Oral Daily    amoxicillin (AMOXIL) capsule 500 mg  500 mg Oral 3 times per day    metroNIDAZOLE (FLAGYL) tablet 500 mg  500 mg Oral 3 times per day    0.45 % sodium chloride infusion   IntraVENous Continuous    senna (SENOKOT) tablet 17.2 mg  2 tablet Oral BID    acetaminophen (TYLENOL) tablet 650 mg  650 mg Oral Q6H PRN    amLODIPine (NORVASC) tablet 10 mg  10 mg Oral Daily    aspirin chewable tablet 81 mg  81 mg Oral Daily    [Held by provider] bumetanide (BUMEX) injection 1 mg  1 mg IntraVENous Q12H    carbidopa-levodopa (SINEMET)  MG per tablet 0.5 tablet  0.5 tablet Oral BID    heparin (porcine) injection 5,000 Units  5,000 Units SubCUTAneous Q12H    levothyroxine (SYNTHROID) tablet 50 mcg  50 mcg Oral Daily    pantoprazole (PROTONIX) tablet 40 mg  40 mg Oral QAM AC    pravastatin (PRAVACHOL) tablet 40 mg  40 mg Oral Nightly    tamsulosin (FLOMAX) capsule 0.4 mg  0.4 mg Oral Daily with breakfast       Review of Systems:   Pertinent items are noted in HPI.     Objective:   Physical Exam: BP (!) 135/57   Pulse 53   Temp 98.7 °F (37.1 °C) (Oral)   Resp 10   Ht 6' (1.829 m)   Wt 129 lb 1.6 oz (58.6 kg)   SpO2 94%   BMI 17.51 kg/m²       Patient Vitals for the past 8 hrs:   Temp Pulse Resp BP SpO2   23 1140 -- 53 10 (!) 135/57 --   23 0725 98.7 °F (37.1 °C) 57 19 131/61 94 %          Temp (24hrs), Av.1 °F (36.7 °C), Min:97.7 °F (36.5 °C), Max:98.7 °F (37.1 °C)    701 -  1900  In: 480 [P.O.:480]  Out: -    1901 -  0700  In: 3082.5 [P.O.:2200; I.V.:682.5]  Out: 2450 [Urine:2450]    General:  Alert, cooperative, no distress, appears stated age. Lungs:   Improved diffuse Rales without any rhonchi or wheezing noted. Chest wall:  No tenderness or deformity. Heart:  Regular rate and rhythm, S1, S2 normal, no murmur, click, rub or gallop. Abdomen:   Soft, non-tender. Bowel sounds normal. No masses,  No organomegaly. Extremities: Extremities normal, atraumatic, 1+ bilateral lower extremity edema noted. Pulses: 2+ and symmetric all extremities. Skin: Skin color, texture, turgor normal. No rashes or lesions   Neurologic: No gross sensory or motor deficits     Data Review:       Recent Days:  Recent Labs     23  0404 23   WBC 6.3 5.4 4.9   HGB 11.3* 11.7* 11.5*   HCT 35.0* 36.2 36.0    148 151     Recent Labs     23  0404 23  0442 23    141 137   K 3.9 3.7 3.8    104 101   CO2 30 31 32   BUN 46* 43* 44*   PHOS  --   --  3.8     No results for input(s): PH, PCO2, PO2, HCO3, FIO2 in the last 72 hours.       24 Hour Results:  Recent Results (from the past 24 hour(s))   Sodium, urine, random    Collection Time: 23  7:57 PM   Result Value Ref Range    SODIUM, RANDOM URINE 77 20 - 110 mmol/L   Protein / creatinine ratio, urine    Collection Time: 23  7:57 PM   Result Value Ref Range    Protein, Urine, Random 14 (H) <11.9 mg/dL    Creatinine, Ur 68.00 30.00 - 125.00 mg/dL PROTEIN/CREAT RATIO URINE RAN 0.2     Chloride, Random Urine    Collection Time: 02/13/23  7:57 PM   Result Value Ref Range    Chloride 79 55 - 125 mmol/L   Renal Function Panel    Collection Time: 02/14/23  3:17 AM   Result Value Ref Range    Sodium 137 136 - 145 mmol/L    Potassium 3.8 3.5 - 5.5 mmol/L    Chloride 101 100 - 111 mmol/L    CO2 32 21 - 32 mmol/L    Anion Gap 4 3.0 - 18.0 mmol/L    Glucose 122 (H) 74 - 99 mg/dL    BUN 44 (H) 7 - 18 mg/dL    Creatinine 2.34 (H) 0.60 - 1.30 mg/dL    Bun/Cre Ratio 19 12 - 20      Est, Glom Filt Rate 27 (L) >60 ml/min/1.73m2    Calcium 8.3 (L) 8.5 - 10.1 mg/dL    Phosphorus 3.8 2.5 - 4.9 mg/dL    Albumin 2.8 (L) 3.4 - 5.0 g/dL   CBC    Collection Time: 02/14/23  3:17 AM   Result Value Ref Range    WBC 4.9 4.6 - 13.2 K/uL    RBC 3.96 (L) 4.35 - 5.65 M/uL    Hemoglobin 11.5 (L) 13.0 - 16.0 g/dL    Hematocrit 36.0 36.0 - 48.0 %    MCV 90.9 78.0 - 100.0 FL    MCH 29.0 24.0 - 34.0 PG    MCHC 31.9 31.0 - 37.0 g/dL    RDW 13.1 11.6 - 14.5 %    Platelets 635 913 - 133 K/uL    MPV 12.4 (H) 9.2 - 11.8 FL    Nucleated RBCs 0.0 0.0  WBC    nRBC 0.00 0.00 - 0.01 K/uL   PTH, Intact    Collection Time: 02/14/23  3:17 AM   Result Value Ref Range    Calcium 8.3 (L) 8.5 - 10.1 mg/dL    Pth Intact 154.8 (H) 18.4 - 88.0 pg/mL   Vitamin D 25 Hydroxy    Collection Time: 02/14/23  3:17 AM   Result Value Ref Range    Vit D, 25-Hydroxy 33.7 30 - 100 ng/mL   Iron and TIBC    Collection Time: 02/14/23  3:17 AM   Result Value Ref Range    Iron 110 50 - 175 ug/dL    TIBC 232 (L) 250 - 450 ug/dL    Iron % Saturation 47 20 - 50 %   Ferritin    Collection Time: 02/14/23  3:17 AM   Result Value Ref Range    Ferritin 30 8 - 388 ng/mL   POCT Glucose    Collection Time: 02/14/23  7:24 AM   Result Value Ref Range    POC Glucose 142 (H) 70 - 110 mg/dL    Performed by: Carito Albarado    POCT Glucose    Collection Time: 02/14/23 11:33 AM   Result Value Ref Range    POC Glucose 248 (H) 70 - 110 mg/dL Performed by: Herman Tariq        RADIOLOGY:  [unfilled]        Assessment/Plan:     Acute respiratory failure with hypoxemia:  Currently dependent on 3 L NC O2. Based on imaging and speech therapy evaluation patient likely with aspiration pneumonia along with CHF exacerbation superimposed on acute on chronic renal failure. CXR shows Right lower lobe pneumonia with bilateral interstitial opacities and mild pulmonary edema, his pro-bnp was [id number]. Trop was normal, ECG--SR, non-specific T wave abnormality, repeat ECG. Trop negative. VQ scan with low to intermediate risk. Aspiration right lower lobe pneumonia  After day 4 of Zosyn IV transition to oral Flagyl and amoxicillin x10 additional days. Significant improvement in hypoxia and no significant fevers with improvement in cough noted as well. Status post MBS and speech therapy evaluation with change in diet noted per speech therapy. Acute on chronic diastolic heart failure  Currently holding Bumex 1mg IV twice daily in the setting of acute on chronic renal failure. Echocardiogram with new mildly reduced left ventricular systolic function with EF of 42% along with bioprosthetic valve noted. [address] cardiology consultation. Acute on chronic renal failure  Baseline creatinine around 2.3 noted. Worsening renal function with active diuresis with Bumex noted. Holding IV Bumex with gentle IV fluids per nephrology. Appreciate nephrology consultation. Dysphagia   Aspiration on MBS  Modified diet with assistance from speech therapy. Hypertension:  Cont amlodipine, add low dose metoprolol      Hypothyroidism:   Cont levothyroxine      Paroxysmal Afib:  Cont amiodarone, however not anticoagulated at home. Diabetes mellitus Type II:  Cont accu-checks achs, SSI prn. 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     Parkinson Disease:  Cont sinemet    Generalized debility  Will consult physical therapy for further evaluation. DVT prophylaxis  Heparin twice daily. Care Plan discussed with: Patient/Family, Nurse, and     Total time spent with patient: 350minutes. With greater than 50% spent in coordination of care and counseling.     Janelle Norris MD

## 2023-02-14 NOTE — PROGRESS NOTES
Renal Progress Note    Patient: Tatiana Beard MRN: [id number]  SSN: [id number]    YOB: 1940  Age: 78 y.o. Sex: male      Admit Date: 2/9/2023    LOS: 5 days     Subjective:   Patient seen in ICU. Alert and awake, no acute distress. Patient is feeling better, more interactive from yesterday  Improved skin turgor with IVF  Creat stable at 2.34  No complaints of any swelling in lower extremities. No complaints of shortness of breath. Current Facility-Administered Medications   Medication Dose Route Frequency    [START ON 2/15/2023] amiodarone (CORDARONE) tablet 100 mg  100 mg Oral Daily    amoxicillin (AMOXIL) capsule 500 mg  500 mg Oral 3 times per day    metroNIDAZOLE (FLAGYL) tablet 500 mg  500 mg Oral 3 times per day    0.45 % sodium chloride infusion   IntraVENous Continuous    senna (SENOKOT) tablet 17.2 mg  2 tablet Oral BID    acetaminophen (TYLENOL) tablet 650 mg  650 mg Oral Q6H PRN    amLODIPine (NORVASC) tablet 10 mg  10 mg Oral Daily    aspirin chewable tablet 81 mg  81 mg Oral Daily    [Held by provider] bumetanide (BUMEX) injection 1 mg  1 mg IntraVENous Q12H    carbidopa-levodopa (SINEMET)  MG per tablet 0.5 tablet  0.5 tablet Oral BID    heparin (porcine) injection 5,000 Units  5,000 Units SubCUTAneous Q12H    levothyroxine (SYNTHROID) tablet 50 mcg  50 mcg Oral Daily    pantoprazole (PROTONIX) tablet 40 mg  40 mg Oral QAM AC    pravastatin (PRAVACHOL) tablet 40 mg  40 mg Oral Nightly    tamsulosin (FLOMAX) capsule 0.4 mg  0.4 mg Oral Daily with breakfast        Vitals:    02/14/23 0343 02/14/23 0457 02/14/23 0725 02/14/23 1140   BP: 136/61  131/61 (!) 135/57   Pulse: 56  57 53   Resp: 19  19 10   Temp: 98.4 °F (36.9 °C)  98.7 °F (37.1 °C)    TempSrc: Oral  Oral    SpO2: 93%  94%    Weight:  129 lb 1.6 oz (58.6 kg)     Height:         Objective:   General: alert awake well-oriented, no acute distress.   HEENT: EOMI, no Icterus, no Pallor, pupils reactive, mucosa moist,   Neck: Neck is supple, No JVD,   Lungs: Good air entry present bilaterally, no Rales or rhonchi heard CVS: heart sounds normal,   GI: soft, nontender, normal BS,   Extremeties: no clubbing, no cyanosis, no edema,  Neuro: Alert, awake, oriented x3, answering simple questions   skin: improved skin turgor, no skin rashes        Intake and Output:  Current Shift: 02/14 0701 - 02/14 1900  In: 840 [P.O.:840]  Out: -   Last three shifts: 02/12 1901 - 02/14 0700  In: 3082.5 [P.O.:2200; I.V.:682.5]  Out: 2450 [Urine:2450]      Lab/Data Review:  Recent Labs     02/12/23  0404 02/13/23  0442 02/14/23  0317   WBC 6.3 5.4 4.9   HGB 11.3* 11.7* 11.5*   HCT 35.0* 36.2 36.0    148 151     Recent Labs     02/12/23  0404 02/13/23  0442 02/14/23  0317    141 137   K 3.9 3.7 3.8    104 101   CO2 30 31 32   BUN 46* 43* 44*   PHOS  --   --  3.8     No results for input(s): PH, PCO2, PO2, HCO3, FIO2 in the last 72 hours.   Recent Results (from the past 24 hour(s))   Sodium, urine, random    Collection Time: 02/13/23  7:57 PM   Result Value Ref Range    SODIUM, RANDOM URINE 77 20 - 110 mmol/L   Protein / creatinine ratio, urine    Collection Time: 02/13/23  7:57 PM   Result Value Ref Range    Protein, Urine, Random 14 (H) <11.9 mg/dL    Creatinine, Ur 68.00 30.00 - 125.00 mg/dL    PROTEIN/CREAT RATIO URINE RAN 0.2     Chloride, Random Urine    Collection Time: 02/13/23  7:57 PM   Result Value Ref Range    Chloride 79 55 - 125 mmol/L   Renal Function Panel    Collection Time: 02/14/23  3:17 AM   Result Value Ref Range    Sodium 137 136 - 145 mmol/L    Potassium 3.8 3.5 - 5.5 mmol/L    Chloride 101 100 - 111 mmol/L    CO2 32 21 - 32 mmol/L    Anion Gap 4 3.0 - 18.0 mmol/L    Glucose 122 (H) 74 - 99 mg/dL    BUN 44 (H) 7 - 18 mg/dL    Creatinine 2.34 (H) 0.60 - 1.30 mg/dL    Bun/Cre Ratio 19 12 - 20      Est, Glom Filt Rate 27 (L) >60 ml/min/1.73m2    Calcium 8.3 (L) 8.5 - 10.1 mg/dL    Phosphorus 3.8 2.5 - 4.9 mg/dL Albumin 2.8 (L) 3.4 - 5.0 g/dL   CBC    Collection Time: 02/14/23  3:17 AM   Result Value Ref Range    WBC 4.9 4.6 - 13.2 K/uL    RBC 3.96 (L) 4.35 - 5.65 M/uL    Hemoglobin 11.5 (L) 13.0 - 16.0 g/dL    Hematocrit 36.0 36.0 - 48.0 %    MCV 90.9 78.0 - 100.0 FL    MCH 29.0 24.0 - 34.0 PG    MCHC 31.9 31.0 - 37.0 g/dL    RDW 13.1 11.6 - 14.5 %    Platelets 991 767 - 035 K/uL    MPV 12.4 (H) 9.2 - 11.8 FL    Nucleated RBCs 0.0 0.0  WBC    nRBC 0.00 0.00 - 0.01 K/uL   PTH, Intact    Collection Time: 02/14/23  3:17 AM   Result Value Ref Range    Calcium 8.3 (L) 8.5 - 10.1 mg/dL    Pth Intact 154.8 (H) 18.4 - 88.0 pg/mL   Vitamin D 25 Hydroxy    Collection Time: 02/14/23  3:17 AM   Result Value Ref Range    Vit D, 25-Hydroxy 33.7 30 - 100 ng/mL   Iron and TIBC    Collection Time: 02/14/23  3:17 AM   Result Value Ref Range    Iron 110 50 - 175 ug/dL    TIBC 232 (L) 250 - 450 ug/dL    Iron % Saturation 47 20 - 50 %   Ferritin    Collection Time: 02/14/23  3:17 AM   Result Value Ref Range    Ferritin 30 8 - 388 ng/mL   POCT Glucose    Collection Time: 02/14/23  7:24 AM   Result Value Ref Range    POC Glucose 142 (H) 70 - 110 mg/dL    Performed by: Kelsey Steen    POCT Glucose    Collection Time: 02/14/23 11:33 AM   Result Value Ref Range    POC Glucose 248 (H) 70 - 110 mg/dL    Performed by: Kelsey Steen         Assessment and Plan:     1. Acute Kidney Injury on CKD: probably prerenal azotemia secondary to use of diuretics, poor intake  Recommend to hold diuretics for another 24 hrs, continue gentle IVF 1/2 NS at 75ml/hr for 2 L infusion  Check CPK levels to rule out rhabdomyolysis   will continue to monitor renal functions and adjust management as needed    2. Chronic kidney disease: probably has stage 3/4 chronic kidney disease related to nephrosclerosis.   No Significant proteinuria   Renal ultrasound is negative for any obstruction, right kidney is smaller than left kidney  will continue to monitor renal functions to assess the baseline    3. Renal osteodystrophy: Calcium and phosphorus are normal  Secondary hyperparathyroidism: PTH level is high at 154  Recommend to add calcitriol 0.25 mcg once daily  vitamin D level is normal at 33.7    4. SOB: no obvious fluid overload now, appears clinically dry  EF preserved on Echo  Suspected punemonia,  on Zosyn  Monitor closely with gentle IV hydration     4. Hypertension: Had Borderline low blood pressures   Decreased amlodipine to 5 mg daily    diuretics on hold for now   continue to monitor and blood pressures and adjust antihypertensive regimen as needed. 5.  History of BPH, on Flomax   renal ultrasonogram is negative for any obstruction obstruction     6. Parkinson's disease: On Sinemet  7. Hypothyroidism: On levothyroxine  8. Hyperlipidemia on Pravachol  9.   History of atrial fibrillation on amiodarone       Signed By: Naomie Cabello MD     February 14, 2023

## 2023-02-15 VITALS
SYSTOLIC BLOOD PRESSURE: 130 MMHG | RESPIRATION RATE: 18 BRPM | TEMPERATURE: 97.3 F | HEIGHT: 72 IN | OXYGEN SATURATION: 94 % | WEIGHT: 130 LBS | DIASTOLIC BLOOD PRESSURE: 58 MMHG | HEART RATE: 61 BPM | BODY MASS INDEX: 17.61 KG/M2

## 2023-02-15 LAB
ANION GAP SERPL CALC-SCNC: 6 MMOL/L (ref 3–18)
BACTERIA SPEC CULT: NORMAL
BACTERIA SPEC CULT: NORMAL
BUN SERPL-MCNC: 39 MG/DL (ref 7–18)
BUN/CREAT SERPL: 18 (ref 12–20)
CA-I BLD-MCNC: 8.3 MG/DL (ref 8.5–10.1)
CHLORIDE SERPL-SCNC: 105 MMOL/L (ref 100–111)
CK SERPL-CCNC: 36 U/L (ref 39–308)
CO2 SERPL-SCNC: 29 MMOL/L (ref 21–32)
CREAT SERPL-MCNC: 2.2 MG/DL (ref 0.6–1.3)
GLUCOSE SERPL-MCNC: 123 MG/DL (ref 74–99)
POTASSIUM SERPL-SCNC: 4 MMOL/L (ref 3.5–5.5)
SODIUM SERPL-SCNC: 140 MMOL/L (ref 136–145)
SPECIAL REQUESTS,SREQ: NORMAL
SPECIAL REQUESTS,SREQ: NORMAL

## 2023-02-15 PROCEDURE — 6370000000 HC RX 637 (ALT 250 FOR IP): Performed by: NURSE PRACTITIONER

## 2023-02-15 PROCEDURE — 97530 THERAPEUTIC ACTIVITIES: CPT

## 2023-02-15 PROCEDURE — 82550 ASSAY OF CK (CPK): CPT

## 2023-02-15 PROCEDURE — 80048 BASIC METABOLIC PNL TOTAL CA: CPT

## 2023-02-15 PROCEDURE — 36415 COLL VENOUS BLD VENIPUNCTURE: CPT

## 2023-02-15 PROCEDURE — 97116 GAIT TRAINING THERAPY: CPT

## 2023-02-15 PROCEDURE — 92526 ORAL FUNCTION THERAPY: CPT

## 2023-02-15 PROCEDURE — 6370000000 HC RX 637 (ALT 250 FOR IP): Performed by: INTERNAL MEDICINE

## 2023-02-15 PROCEDURE — 6360000002 HC RX W HCPCS: Performed by: INTERNAL MEDICINE

## 2023-02-15 RX ORDER — AMIODARONE HYDROCHLORIDE 200 MG/1
100 TABLET ORAL DAILY
Qty: 1 TABLET | Refills: 0
Start: 2023-02-15

## 2023-02-15 RX ORDER — AMOXICILLIN 500 MG/1
500 CAPSULE ORAL EVERY 8 HOURS SCHEDULED
Qty: 24 CAPSULE | Refills: 0 | Status: SHIPPED | OUTPATIENT
Start: 2023-02-15 | End: 2023-02-23

## 2023-02-15 RX ADMIN — HEPARIN SODIUM 5000 UNITS: 5000 INJECTION INTRAVENOUS; SUBCUTANEOUS at 05:42

## 2023-02-15 RX ADMIN — SENNOSIDES 17.2 MG: 8.6 TABLET, FILM COATED ORAL at 08:36

## 2023-02-15 RX ADMIN — AMLODIPINE BESYLATE 10 MG: 5 TABLET ORAL at 08:34

## 2023-02-15 RX ADMIN — PANTOPRAZOLE SODIUM 40 MG: 40 TABLET, DELAYED RELEASE ORAL at 05:42

## 2023-02-15 RX ADMIN — AMOXICILLIN 500 MG: 250 CAPSULE ORAL at 14:54

## 2023-02-15 RX ADMIN — ASPIRIN 81 MG: 81 TABLET, CHEWABLE ORAL at 08:35

## 2023-02-15 RX ADMIN — TAMSULOSIN HYDROCHLORIDE 0.4 MG: 0.4 CAPSULE ORAL at 08:35

## 2023-02-15 RX ADMIN — AMOXICILLIN 500 MG: 250 CAPSULE ORAL at 05:42

## 2023-02-15 RX ADMIN — METRONIDAZOLE 500 MG: 250 TABLET ORAL at 05:42

## 2023-02-15 RX ADMIN — AMIODARONE HYDROCHLORIDE 100 MG: 200 TABLET ORAL at 08:35

## 2023-02-15 RX ADMIN — LEVOTHYROXINE SODIUM 50 MCG: 0.05 TABLET ORAL at 05:42

## 2023-02-15 RX ADMIN — CARBIDOPA AND LEVODOPA 0.5 TABLET: 25; 100 TABLET ORAL at 08:43

## 2023-02-15 RX ADMIN — METRONIDAZOLE 500 MG: 250 TABLET ORAL at 14:54

## 2023-02-15 ASSESSMENT — PAIN SCALES - GENERAL: PAINLEVEL_OUTOF10: 0

## 2023-02-15 NOTE — PLAN OF CARE
Comprehensive Nutrition Assessment    Type and Reason for Visit:  Initial, RD Nutrition Re-Screen/LOS    Nutrition Recommendations/Plan:   Consistent carbohydrate, low saturated fat, less than 3 gm Na, mildly thick liquids  Gelatein  twice daily (180 Kcal, 40 gm protein if 100% consumed)     Malnutrition Assessment:  Malnutrition Status:  Severe malnutrition (02/14/23 1846)    Context:  Acute Illness     Findings of the 6 clinical characteristics of malnutrition:  Energy Intake:  75% or less of estimated energy requirements for 7 or more days  Weight Loss:  Greater than 5% over 1 month     Body Fat Loss: Moderate body fat loss Triceps, Fat Overlying Ribs, Buccal region   Muscle Mass Loss: Moderate muscle mass loss Clavicles (pectoralis & deltoids), Hand (interosseous)  Fluid Accumulation:  No significant fluid accumulation    Nutrition Assessment:    Pt admitted with Acute respiratory failure due to aspiration pneumonia, dysphagia,  SADIQ on CKD with PMHx Parkinson's DM, hypothyroidism, hyperlipidemia. Intake has improved the past 2 days to 51-75% for at least 2 meals. S.L. P states soft and bite size with mildly thickened liquids is the safest texture. Glucose 209- may benefit from carbohydrate consistent diet. Nutrition Related Findings:    Pt to start on calcitrol due to elevated iPTH renal osteodystrophy, vitamin D and phosphorus WNL. Pt suffers with constipation due to Parkinson's medications and inactivity with recent poor po intake. Wound Type: None       Current Nutrition Intake & Therapies:    Average Meal Intake: 51-75%  Average Supplements Intake: None Ordered  ADULT DIET; Dysphagia - Soft and Bite Sized; Low Fat/Low Chol/High Fiber/2 gm Na; Mildly Thick (Nectar)    Anthropometric Measures:  Height: 6' (182.9 cm)  Ideal Body Weight (IBW): 178 lbs (81 kg)       Current Body Weight: 129 lb 3 oz (58.6 kg), 72.6 % IBW.  Weight Source: Bed Scale  Current BMI (kg/m2): 17.5  Usual Body Weight: 140 lb (63.5 kg)  % Weight Change (Calculated): -7.7      BMI Categories: Underweight (BMI less than 22) age over 72    Estimated Daily Nutrient Needs:  Energy Requirements Based On: Kcal/kg     Energy (kcal/day): 7463-2458 Kcal/d  Weight Used for Protein Requirements: Current  Protein (g/day): 59-73 gm/d  Method Used for Fluid Requirements: Standard Renal  Fluid (ml/day): 2292-0229 ml/d    Nutrition Diagnosis:   Severe malnutrition, In context of acute, non-illness related related to cardiac dysfunction, endocrine dysfuntion, swallowing difficulty, renal dysfunction, impaired respiratory function as evidenced by BMI, Criteria as identified in malnutrition assessment, lab values    Nutrition Interventions:   Food and/or Nutrient Delivery: Start Oral Nutrition Supplement, Continue Current Diet  Nutrition Education/Counseling: Education declined  Coordination of Nutrition Care: Continue to monitor while inpatient     Goals:     Goals: PO intake 75% or greater, other (specify)  Specify Other Goals: Glucose , no further weight loss    Nutrition Monitoring and Evaluation:      Food/Nutrient Intake Outcomes: Food and Nutrient Intake, Supplement Intake  Physical Signs/Symptoms Outcomes: Biochemical Data, Fluid Status or Edema, Skin, Weight    Discharge Planning:    Continue Oral Nutrition Supplement, Continue current diet     [place]  Contact: [phone] or [employer]

## 2023-02-15 NOTE — PROGRESS NOTES
Speech Language Pathology  Facility/Department: [address] Daily Treatment Note    NAME: Aung Alvarez  : 1940  MRN: [id number]    Patient Diagnosis(es):   Patient Active Problem List    Diagnosis Date Noted    Benign prostatic hyperplasia with urinary obstruction 2015    Severe protein-calorie malnutrition ([address] 2023    Septic shock ([address] 2020    Post-void dribbling 2015    Nocturia 2015    Urge incontinence 2015    Malignant neoplasm of testis ([address] 2015    Acquired absence of genital organ 2015    Pneumonia 2023    Crohn's disease ([address] 2020    GI bleeding 2020    Acute blood loss anemia 2020    UTI (urinary tract infection) 2020    Sepsis ([address] 2020    Testicular cancer ([address]     Diabetes ([address]     Dementia ([address]     Urinary incontinence, urge     OAB (overactive bladder)     Hypersomnia     Hearing loss 10/27/2016    Pseudophakia 10/14/2013    Urinary tract infection 2013    Atopic rhinitis 2013    Hypertension 2013    Hyperlipidemia 2013     Allergies: Allergies   Allergen Reactions    Iodinated Contrast Media Other (See Comments)     Hot flushing     Metformin Other (See Comments)     Onset Date: 23  Current Diet Level:  soft and bite sized, mildly thick    Pain:  Pain Assessment  Pain Assessment: None - Denies Pain  Pain Level: 0  Patient's Stated Pain Goal: 0 - No pain  Faces, Legs, Activity, Cry, and Consolability (FLACC)  Face (F): no particular expression or smile  Legs (L): normal position or relaxed  Activity (A): lying quietly, normal position, moves easily  Cry (C): no cry (awake or asleep)  Consolability (C): content, relaxed  FLACC Score : 0    Diet Tolerance:  Patient tolerating current diet level with no signs/symptoms of penetration/aspiration. P.O. Trials:   Thin    N/a   Nectar    N/A   Honey    N/A   Puree    N/A   Solid    N/A Impressions:  Patient expected to go home on this date. Patient states that he has not been completing swallowing exercises today. ST unable to locate swallowing exercises provided to patient at last visit. Patient did not have any bolus trials on this date. Patient educated on safe swallowing strategies, appropriate liquids consistency and swallowing exercises to complete. Patient verbalized understanding. ST educated patient on thickening liquids at home and provided patient with small amount of thickener as he stated that he did not have any at home.  Patient states that he is planning to resume outpatient speech therapy upon discharge       Plan:  Patient expected to be discharged home on this date       [place], [address] [facility], [address] [facility]

## 2023-02-15 NOTE — PROGRESS NOTES
Physical Therapy  Facility/Department: [address]  Daily Treatment Note  NAME: Yaa Charlton  : 1940  MRN: [id number]    Date of Service: 2/15/2023    Discharge Recommendations:  Home Health, Home Physical Therapy, or Outpatient           Patient Diagnosis(es): There were no encounter diagnoses. Assessment   Pt eager and willing to participate in PT today. Pt lying in supine upon entry. Pt orthostatic BP checked prior to treatment and he remained WNL. Pt performed 1 bout of ambulation using RW on room air. Pt needed cues to stand more erect during ambulation. Pt left sitting up in chair with all needs met. Plan  Patient continues to benefit from skilled intervention to address the above impairments. Continue treatment per established plan of care. Subjective       Pt reported feeling ready to go home today. Objective     Bed Mobility Training  Bed Mobility Training: No  Overall Level of Assistance: Modified independent  Rolling: Modified independent  Supine to Sit: Modified independent  Sit to Supine: Modified independent  Scooting: Modified independent  Balance  Sitting: Intact  Standing: Intact  Transfer Training  Overall Level of Assistance: Modified independent  Sit to Stand: Modified independent  Stand to Sit: Modified independent  Gait  Overall Level of Assistance: Modified independent  Gait Abnormalities: Shuffling gait  Distance (ft): 210 Feet  Assistive Device: Walker, rolling                   Goals  Short Term Goals  Time Frame for Short Term Goals: 7 days  Short Term Goal 1: Patient will move from supine  to sit and sit to supine , scoot up and down, and roll side to side in bed with modified independence. Short Term Goal 2: Patient will transfer from bed to chair and chair to bed with modified independence using the least restrictive device. Short Term Goal 3: Patient  will perform sit to stand with modified independence.   Short Term Goal 4: Patient will ambulate with modified independence for 100 feet with the least restrictive device.   Short Term Goal 5: Patient will ascend/descend 4 stairs with 2 handrail(s) with minimal assistance/contact  guard assist.    Education  Patient Education  Education Given To: Patient  Education Method: Demonstration;Verbal  Barriers to Learning: None  Education Outcome: Demonstrated understanding    Therapy Time   Individual Concurrent Group Co-treatment   Time In [address]         Time Out 1213         Minutes [address] [place], [place]

## 2023-02-15 NOTE — DISCHARGE SUMMARY
Discharge Summary       PATIENT ID: Bere Doherty  MRN: [id number]   YOB: 1940    DATE OF ADMISSION: 2/9/2023 10:02 AM    DATE OF DISCHARGE: 02/15/23    PRIMARY CARE PROVIDER: Maggie Gonzalez MD     ATTENDING PHYSICIAN: Ester Jaquez MD  DISCHARGING PROVIDER: Ester Jaquez MD        CONSULTATIONS: IP CONSULT TO NEPHROLOGY  IP CONSULT TO CARDIOLOGY    PROCEDURES/SURGERIES: * No surgery found *    [address] COURSE:    Bere Doherty is a 78 y.o. male with a past medical Hx sig for HTN, DM-II, Aortic stenosis s/p TAVR (Jan 2022), Parkinson disease, CKD III-IV, and HFpEF who presents to the ED today with sudden shortness of breath since this morning. Hx is obtained  from his wife, and children in the ED. Wife states overall ill feeling started 1 week ago, he was first noticed to have bilateral feet and ankle swelling, he went to see his pcp who declined starting lasix due to his CKD IV, but recommended them to f/up  with cardio and nephro. Wife states patient had since been having poor appetite, feeling short of breath on exertion, and loosing weight. No fever or chills reported. No nause or vomit. No abdominal pain or discomfort. This morning, patient awoke from  sleep and suddenly began to get short of breath. He also experienced a chest pain. Wife states he has been dealing with dysphagia, for a long time, and has been seeing a speech therapist. In the ED he was found hypoxic on room air, placed on 4lpm, and  sats went up to 95%. His proBNP was 6987, and CXR shows RLL pneumonia+bilatertal interstitial infiltrates and mild pulmo edema. During transportation to the floor, he was more hypoxic and sats went down to 65% on 4lpm-placed on NRB mask and transferred  to ICU, at the time of my second eval in ICU he was satting 77% on NRB mask, stat blood gas showed severe hypoxemia Po2 40, and metabolic acidosis, stat 76NF IV lasix and high flow O2 administered.          DISCHARGE DIAGNOSES / PLAN:      Assessment/Plan:      Acute respiratory failure with hypoxemia:  Currently dependent on 3 L NC O2. Based on imaging and speech therapy evaluation patient likely with aspiration pneumonia along with CHF exacerbation superimposed on acute on chronic renal failure. CXR shows Right lower lobe pneumonia with bilateral interstitial opacities and mild pulmonary edema, his pro-bnp was 6987. Trop was normal, ECG--SR, non-specific T wave abnormality, repeat ECG. Trop negative. VQ scan with low to intermediate risk. Aspiration right lower lobe pneumonia  After day 4 of Zosyn IV transition to oral Flagyl and amoxicillin x10 additional days. Significant improvement in hypoxia and no significant fevers with improvement in cough noted as well. Status post MBS and speech therapy evaluation with change in diet noted per speech therapy. Acute on chronic diastolic heart failure  Currently holding Bumex 1mg IV twice daily in the setting of acute on chronic renal failure. Echocardiogram with new mildly reduced left ventricular systolic function with EF of 42% along with bioprosthetic valve noted. [address] cardiology consultation. Acute on chronic renal failure  Baseline creatinine around 2.3 noted. Worsening renal function with active diuresis with Bumex noted. Holding IV Bumex with gentle IV fluids per nephrology. Appreciate nephrology consultation. Dysphagia   Aspiration on MBS  Modified diet with assistance from speech therapy. Hypertension:  Cont amlodipine, add low dose metoprolol      Hypothyroidism:   Cont levothyroxine      Paroxysmal Afib:  Cont amiodarone, however not anticoagulated at home. Diabetes mellitus Type II:  Cont accu-checks achs, SSI prn. 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 Parkinson Disease:  Cont sinemet     Generalized debility  Will consult physical therapy for further evaluation. DVT prophylaxis  Heparin twice daily.       Day of dc  Finish abx for pna, suspecting aspiriation,   Amio cut to 100mg by cardio due to bradycardia  Ok for home with hh  DISCHARGE MEDICATIONS:     Medication List        START taking these medications      amoxicillin 500 MG capsule  Commonly known as: AMOXIL  Take 1 capsule by mouth every 8 hours for 8 days            CHANGE how you take these medications      amiodarone 200 MG tablet  Commonly known as: CORDARONE  Take 0.5 tablets by mouth daily ceived the following from Serafin  - [facility]: Outside name: amiodarone (CORDARONE) 200 mg tablet  What changed:   how much to take  how to take this  when to take this            CONTINUE taking these medications      acetaminophen 325 MG tablet  Commonly known as: TYLENOL     amLODIPine 10 MG tablet  Commonly known as: NORVASC     aspirin 81 MG chewable tablet     bumetanide 1 MG tablet  Commonly known as: BUMEX     busPIRone 10 MG tablet  Commonly known as: BUSPAR     carbidopa-levodopa  MG per tablet  Commonly known as: SINEMET     glimepiride 1 MG tablet  Commonly known as: AMARYL     levothyroxine 50 MCG tablet  Commonly known as: SYNTHROID     omeprazole 20 MG delayed release capsule  Commonly known as: PRILOSEC     pravastatin 20 MG tablet  Commonly known as: PRAVACHOL     Sennosides 8.6 MG Caps     silodosin 8 MG Caps  Commonly known as: RAPAFLO     solifenacin 10 MG tablet  Commonly known as: VESICARE     testosterone cypionate 200 MG/ML injection  Commonly known as: DEPOTESTOTERONE CYPIONATE     trospium 60 MG Cp24 extended release capsule  Commonly known as: SANCTURA               Where to Get Your Medications        These medications were sent to [facility] [address] [facility]  [address], [address]      Phone: [phone]   amoxicillin 500 MG capsule       Information about where to get these medications is not yet available    Ask your nurse or doctor about these medications  amiodarone 200 MG tablet           NOTIFY YOUR PHYSICIAN FOR ANY OF THE FOLLOWING:   Fever over 101 degrees for 24 hours. Chest pain, shortness of breath, fever, chills, nausea, vomiting, diarrhea, change in mentation, falling, weakness, bleeding. Severe pain or pain not relieved by medications. Or, any other signs or symptoms that you may have questions about. DISPOSITION:home with hh/pt    Home With:   OT  PT  HH  RN       Long term SNF/Inpatient Rehab    Independent/assisted living    Hospice    Other:       PATIENT CONDITION AT DISCHARGE:     Functional status    Poor    x Deconditioned     Independent      Cognition   x  Lucid     Forgetful     Dementia      Catheters/lines (plus indication)    Love     PICC     PEG    x None      Code status    x Full code     DNR      PHYSICAL EXAMINATION AT DISCHARGE: cachexia  General:          Alert, cooperative, no distress, appears stated age. HEENT:           Atraumatic, anicteric sclerae, pink conjunctivae                          No oral ulcers, mucosa moist, throat clear, dentition fair  Neck:               Supple, symmetrical  Lungs:             Clear to auscultation bilaterally. No Wheezing or Rhonchi. No rales. Chest wall:      No tenderness  No Accessory muscle use. Heart:              Regular  rhythm,  No  murmur   No edema  Abdomen:        Soft, non-tender. Not distended. Bowel sounds normal  Extremities:     No cyanosis. No clubbing,                            Skin turgor normal, Capillary refill normal  Skin:                Not pale. Not Jaundiced  No rashes   Psych:             Not anxious or agitated.   Neurologic:      Alert, moves all extremities, answers questions appropriately and responds to commands       CHRONIC MEDICAL DIAGNOSES:      Greater than 45 minutes were spent with the patient on counseling and coordination of care    Signed:   Luis Montalvo MD  2/15/2023  2:35 PM

## 2023-02-15 NOTE — PROGRESS NOTES
0700- Shift report received    0730- Patient sleeping. Updated white board. 8192- Shift assessment. Scheduled medication administered in applesauce whole. OT came to evaluate patient and got patient up to chair. I warmed up breakfast. Patient asked about discharge. 9069- Patient ate all breakfast. Patient wanted to get back in bed. Patient resting comfortable. 1100- PT in room- ambulated patient in rico and assisted to chair for lunch. Dr. Kyle John rounded and approved discharge. 1255- Assisted patient back to bed. Wanted to take a nap while waiting for discharge orders    [phone]- Discharge order received. Changed patient's soiled brief. Removed condom cath. Removed IV. Scheduled medication administered. Wife notified. 0- Wife arrived in room. Discharge instructions explained to patient and wife. Assisted dressing patient. Pants, blue corduroy, discovered missing. Looked in ICU and in soiled utilities, but did not find them. Patient discharged in [facility].

## 2023-02-16 NOTE — PROGRESS NOTES
OCCUPATIONAL THERAPY EVALUATION    Patient: Jammie Jimenes (76 y.o. male)  Date: 2/16/2023  Primary Diagnosis: Pneumonia       Precautions: fall      PLOF: lives at home with wife and had been I with basic ADLs and mobility    ASSESSMENT :  Based on the objective data described below, the patient presents with declines in basic ADLs and mobility. Patient will benefit from skilled intervention to address the above impairments. Patient's rehabilitation potential is considered to be good   Factors which may influence rehabilitation potential include:   []             None noted  [x]             Mental ability/status  [x]             Medical condition  [x]             Home/family situation and support systems  [x]             Safety awareness  []             Pain tolerance/management  []             Other:      PLAN :  Recommendations and Planned Interventions:   [x]               Self Care Training                  [x]      Therapeutic Activities  [x]               Functional Mobility Training   []      Cognitive Retraining  [x]               Therapeutic Exercises           []      Endurance Activities  []               Balance Training                    [x]      Neuromuscular Re-Education  []               Visual/Perceptual Training     [x]      Home Safety Training  [x]               Patient Education                   []      Family Training/Education  []               Other (comment):    Frequency/Duration: Patient will be followed by occupational therapy 4x/ wk  to address goals. Discharge Recommendations: To Be Determined  Further Equipment Recommendations for Discharge: none      SUBJECTIVE:   Patient stated I hope I can get out of here soon.     OBJECTIVE DATA SUMMARY:     Past Medical History:   Diagnosis Date    Benign prostatic hyperplasia with urinary obstruction     Dementia ([facility] [address]     early onset    Diabetes ([facility] [address]     Hypersomnia     Hypertension     Nocturia     OAB (overactive bladder) Parkinson's disease ([facility] [address] 02/09/2023    Testicular cancer ([facility] [address]     Urge incontinence     Urinary incontinence, urge      Past Surgical History:   Procedure Laterality Date    CYST REMOVAL  1960-1970s    cyst removed from right breast    OTHER SURGICAL HISTORY  01/2019    Heart Blockage: 30% [facility]: Dr. Van Martinez      reemoval of testcle    OTHER SURGICAL HISTORY  08/2018    Surgery: Blockage Lower bowels, [facility]    UROLOGICAL SURGERY      Testicles removed     Barriers to Learning/Limitations: none   Compensate with: visual, verbal, tactile, kinesthetic cues/model    Home Situation:      [x]  Right hand dominant   []  Left hand dominant    Cognitive/Behavioral Status:       A & O x 3; follows multi step commands           Skin: intact   Edema: none noted     Vision/Perceptual:       WFLs                               Coordination: BUE    WFLs             Balance:    Normal     Strength: BUE  3+/5                    Tone & Sensation: BUE  Normal                             Range of Motion: BUE  WFLs                             Functional Mobility and Transfers for ADLs:  Bed Mobility:    CGA            Transfers:    CGA                                     ADL Assessment:     Feeding- setup   Grooming- CGA   UE ADLs- CGA   LE ADLs- CGA   Toileting- Cga          ADL Intervention:    Pt requesting orthostatics. All WFL for lying, sitting, standing          Pain:  Pain level pre-treatment: 0/10   Pain level post-treatment: 0/10   Pain Intervention(s): Medication (see MAR); Rest, Ice, Repositioning   Response to intervention: Nurse notified, See doc flow    Activity Tolerance:   Wfls     Please refer to the flowsheet for vital signs taken during this treatment.   After treatment:   [x] Patient left in no apparent distress sitting up in chair  [] Patient left in no apparent distress in bed  [x] Call bell left within reach  [x] Nursing notified  [] Caregiver present  [] Bed alarm activated    COMMUNICATION/EDUCATION:   [x] Role of Occupational Therapy in the acute care setting  [x] Home safety education was provided and the patient/caregiver indicated understanding. [x] Patient/family have participated as able in goal setting and plan of care. [] Patient/family agree to work toward stated goals and plan of care. [] Patient understands intent and goals of therapy, but is neutral about his/her participation. [] Patient is unable to participate in goal setting and plan of care.     Thank you for this referral.  Noreene Habermann, OTR/L        Eval Complexity: History: mod A ;   Examination: mod A ;   Decision Making:mod A

## 2023-02-16 NOTE — PROGRESS NOTES
OT DAILY TREATMENT NOTE     Patient Name: Aung Alvarez  Date:2/15/2023  : 1940  [x]  Patient  Verified  Payor: MEDICARE / Plan: MEDICARE PART A AND B / Product Type: *No Product type* /    In time:830  Out time:859  Total Treatment Time (min): 29      Treatment Area: Pneumonia    SUBJECTIVE  Pre- tx Pain Level (0-10 scale): 0/10  Post- tx pain level (0/10 scale)0/10  Any medication changes, allergies to medications, adverse drug reactions, diagnosis change, or new procedure performed?: [x] No    [] Yes (see summary sheet for update)  Subjective functional status/changes:   [] No changes reported  'Im ready to go home\"         29 min Therapeutic Activity:  []  See flow sheet :   Rationale:   to improve the patients ability to complete mobility and perform basic tasks   Sat on L side of bed and transferred with distant supervision   Completed basic grooming and breakfast meal tray setup with supervision       With   [] TE   [x] TA   [] neuro   [] other: Patient Education: [x] Review HEP    [x] Progressed/Changed HEP based on:   [x] positioning   [x] body mechanics   [] transfers   [] heat/ice application   [] Splint wear/care   [] Sensory re-education   [] scar management      [] other:        ASSESSMENT  Progress towards goals/ change in function: pt making excellent progress with mobility. Plans to return home with wife later today. Cont if still here on      Patient will continue to benefit from skilled OT services to attain remaining goals.      [x]  See Plan of Care  []  See progress note/recertification  []  See Discharge Summary         PLAN  [x]  Upgrade activities as tolerated       []  Continue plan of care        []  Discharge due to:_  []  Other:_      FABIEN Agustin/L  2/15/2023  4:50 PM    Future Appointments   Date Time Provider Malaika Almeida   2023  3:20 PM FIDELIA Barrera NP

## 2023-03-17 ENCOUNTER — HOSPITAL ENCOUNTER (OUTPATIENT)
Age: 83
End: 2023-03-17
Payer: MEDICARE

## 2023-03-17 LAB
ANION GAP SERPL CALC-SCNC: 5 MMOL/L (ref 3–18)
BUN SERPL-MCNC: 65 MG/DL (ref 7–18)
BUN/CREAT SERPL: 30 (ref 12–20)
CA-I BLD-MCNC: 8.2 MG/DL (ref 8.5–10.1)
CHLORIDE SERPL-SCNC: 108 MMOL/L (ref 100–111)
CO2 SERPL-SCNC: 30 MMOL/L (ref 21–32)
CREAT SERPL-MCNC: 2.2 MG/DL (ref 0.6–1.3)
GLUCOSE SERPL-MCNC: 172 MG/DL (ref 74–99)
POTASSIUM SERPL-SCNC: 3.8 MMOL/L (ref 3.5–5.5)
SODIUM SERPL-SCNC: 143 MMOL/L (ref 136–145)

## 2023-03-17 PROCEDURE — 80048 BASIC METABOLIC PNL TOTAL CA: CPT

## 2023-03-22 ENCOUNTER — HOSPITAL ENCOUNTER (OUTPATIENT)
Age: 83
Discharge: HOME | End: 2023-03-25
Payer: MEDICARE

## 2023-03-22 LAB
ANION GAP SERPL CALC-SCNC: 10 MMOL/L (ref 3–18)
BUN SERPL-MCNC: 47 MG/DL (ref 7–18)
BUN/CREAT SERPL: 20 (ref 12–20)
CA-I BLD-MCNC: 8.3 MG/DL (ref 8.5–10.1)
CHLORIDE SERPL-SCNC: 102 MMOL/L (ref 100–111)
CO2 SERPL-SCNC: 27 MMOL/L (ref 21–32)
CREAT SERPL-MCNC: 2.32 MG/DL (ref 0.6–1.3)
GLUCOSE SERPL-MCNC: 256 MG/DL (ref 74–99)
MAGNESIUM SERPL-MCNC: 2.3 MG/DL (ref 1.6–2.6)
POTASSIUM SERPL-SCNC: 4.6 MMOL/L (ref 3.5–5.5)
SODIUM SERPL-SCNC: 139 MMOL/L (ref 136–145)

## 2023-03-22 PROCEDURE — 83735 ASSAY OF MAGNESIUM: CPT

## 2023-03-22 PROCEDURE — 80048 BASIC METABOLIC PNL TOTAL CA: CPT

## 2023-03-24 ENCOUNTER — HOSPITAL ENCOUNTER (OUTPATIENT)
Age: 83
End: 2023-03-24
Payer: MEDICARE

## 2023-03-24 LAB
25(OH)D3 SERPL-MCNC: 32.2 NG/ML (ref 30–100)
ALBUMIN SERPL-MCNC: 3.2 G/DL (ref 3.4–5)
ALBUMIN/GLOB SERPL: 0.9 (ref 0.8–1.7)
ALP SERPL-CCNC: 73 U/L (ref 45–117)
ALT SERPL-CCNC: 16 U/L (ref 16–61)
ANION GAP SERPL CALC-SCNC: 10 MMOL/L (ref 3–18)
AST SERPL W P-5'-P-CCNC: 12 U/L (ref 10–38)
BASOPHILS # BLD: 0 K/UL (ref 0–0.1)
BASOPHILS NFR BLD: 0 % (ref 0–2)
BILIRUB SERPL-MCNC: 0.5 MG/DL (ref 0.2–1)
BUN SERPL-MCNC: 58 MG/DL (ref 7–18)
BUN/CREAT SERPL: 21 (ref 12–20)
CA-I BLD-MCNC: 8.4 MG/DL (ref 8.5–10.1)
CHLORIDE SERPL-SCNC: 102 MMOL/L (ref 100–111)
CHOLEST SERPL-MCNC: 138 MG/DL
CO2 SERPL-SCNC: 28 MMOL/L (ref 21–32)
CREAT SERPL-MCNC: 2.82 MG/DL (ref 0.6–1.3)
DIFFERENTIAL METHOD BLD: ABNORMAL
EOSINOPHIL # BLD: 0.1 K/UL (ref 0–0.4)
EOSINOPHIL NFR BLD: 1 % (ref 0–5)
ERYTHROCYTE [DISTWIDTH] IN BLOOD BY AUTOMATED COUNT: 14.4 % (ref 11.6–14.5)
EST. AVERAGE GLUCOSE BLD GHB EST-MCNC: 148 MG/DL
FERRITIN SERPL-MCNC: 18 NG/ML (ref 8–388)
FOLATE SERPL-MCNC: 18 NG/ML (ref 3.1–17.5)
GLOBULIN SER CALC-MCNC: 3.6 G/DL (ref 2–4)
GLUCOSE SERPL-MCNC: 84 MG/DL (ref 74–99)
HBA1C MFR BLD: 6.8 % (ref 4.2–5.6)
HCT VFR BLD AUTO: 37.6 % (ref 36–48)
HDLC SERPL-MCNC: 57 MG/DL (ref 40–60)
HDLC SERPL: 2.4 (ref 0–5)
HGB BLD-MCNC: 11.8 G/DL (ref 13–16)
IMM GRANULOCYTES # BLD AUTO: 0 K/UL (ref 0–0.04)
IMM GRANULOCYTES NFR BLD AUTO: 0 % (ref 0–0.5)
LDLC SERPL CALC-MCNC: 61.2 MG/DL (ref 0–100)
LIPID PANEL: NORMAL
LYMPHOCYTES # BLD: 0.8 K/UL (ref 0.9–3.6)
LYMPHOCYTES NFR BLD: 9 % (ref 21–52)
MCH RBC QN AUTO: 29.1 PG (ref 24–34)
MCHC RBC AUTO-ENTMCNC: 31.4 G/DL (ref 31–37)
MCV RBC AUTO: 92.8 FL (ref 78–100)
MONOCYTES # BLD: 0.8 K/UL (ref 0.05–1.2)
MONOCYTES NFR BLD: 9 % (ref 3–10)
NEUTS SEG # BLD: 7.4 K/UL (ref 1.8–8)
NEUTS SEG NFR BLD: 81 % (ref 40–73)
NRBC # BLD: 0 K/UL (ref 0–0.01)
NRBC BLD-RTO: 0 PER 100 WBC
PLATELET # BLD AUTO: 246 K/UL (ref 135–420)
PMV BLD AUTO: 12.1 FL (ref 9.2–11.8)
POTASSIUM SERPL-SCNC: 4.3 MMOL/L (ref 3.5–5.5)
PROT SERPL-MCNC: 6.8 G/DL (ref 6.4–8.2)
RBC # BLD AUTO: 4.05 M/UL (ref 4.35–5.65)
SODIUM SERPL-SCNC: 140 MMOL/L (ref 136–145)
T4 FREE SERPL-MCNC: 1.4 NG/DL (ref 0.7–1.5)
TRIGL SERPL-MCNC: 99 MG/DL
TSH SERPL DL<=0.05 MIU/L-ACNC: 4.57 UIU/ML (ref 0.36–3.74)
VIT B12 SERPL-MCNC: 352 PG/ML (ref 211–911)
VLDLC SERPL CALC-MCNC: 19.8 MG/DL
WBC # BLD AUTO: 9.1 K/UL (ref 4.6–13.2)

## 2023-03-24 PROCEDURE — 80061 LIPID PANEL: CPT

## 2023-03-24 PROCEDURE — 84443 ASSAY THYROID STIM HORMONE: CPT

## 2023-03-24 PROCEDURE — 82607 VITAMIN B-12: CPT

## 2023-03-24 PROCEDURE — 82306 VITAMIN D 25 HYDROXY: CPT

## 2023-03-24 PROCEDURE — 83036 HEMOGLOBIN GLYCOSYLATED A1C: CPT

## 2023-03-24 PROCEDURE — 84439 ASSAY OF FREE THYROXINE: CPT

## 2023-03-24 PROCEDURE — 82728 ASSAY OF FERRITIN: CPT

## 2023-03-24 PROCEDURE — 80053 COMPREHEN METABOLIC PANEL: CPT

## 2023-03-24 PROCEDURE — 85025 COMPLETE CBC W/AUTO DIFF WBC: CPT

## 2023-04-06 ENCOUNTER — HOSPITAL ENCOUNTER (OUTPATIENT)
Age: 83
Discharge: HOME | End: 2023-04-06
Payer: MEDICARE

## 2023-04-06 LAB
ANION GAP SERPL CALC-SCNC: 9 MMOL/L (ref 3–18)
BUN SERPL-MCNC: 88 MG/DL (ref 7–18)
BUN/CREAT SERPL: 32 (ref 12–20)
CA-I BLD-MCNC: 8.5 MG/DL (ref 8.5–10.1)
CHLORIDE SERPL-SCNC: 100 MMOL/L (ref 100–111)
CO2 SERPL-SCNC: 32 MMOL/L (ref 21–32)
CREAT SERPL-MCNC: 2.75 MG/DL (ref 0.6–1.3)
GLUCOSE SERPL-MCNC: 228 MG/DL (ref 74–99)
POTASSIUM SERPL-SCNC: 4.4 MMOL/L (ref 3.5–5.5)
SODIUM SERPL-SCNC: 141 MMOL/L (ref 136–145)

## 2023-04-06 PROCEDURE — 80048 BASIC METABOLIC PNL TOTAL CA: CPT

## 2023-05-04 ENCOUNTER — HOSPITAL ENCOUNTER (EMERGENCY)
Age: 83
Discharge: HOME | End: 2023-05-04
Attending: EMERGENCY MEDICINE
Payer: MEDICARE

## 2023-05-04 ENCOUNTER — APPOINTMENT (OUTPATIENT)
Age: 83
End: 2023-05-04
Payer: MEDICARE

## 2023-05-04 VITALS
TEMPERATURE: 97.7 F | HEART RATE: 55 BPM | RESPIRATION RATE: 18 BRPM | HEIGHT: 73 IN | BODY MASS INDEX: 16.57 KG/M2 | DIASTOLIC BLOOD PRESSURE: 77 MMHG | WEIGHT: 125 LBS | SYSTOLIC BLOOD PRESSURE: 155 MMHG | OXYGEN SATURATION: 99 %

## 2023-05-04 DIAGNOSIS — N18.9 CHRONIC RENAL IMPAIRMENT, UNSPECIFIED CKD STAGE: ICD-10-CM

## 2023-05-04 DIAGNOSIS — E11.65 HYPERGLYCEMIA DUE TO DIABETES MELLITUS (HCC): Primary | ICD-10-CM

## 2023-05-04 DIAGNOSIS — N39.0 URINARY TRACT INFECTION WITHOUT HEMATURIA, SITE UNSPECIFIED: ICD-10-CM

## 2023-05-04 LAB
ALBUMIN SERPL-MCNC: 2.9 G/DL (ref 3.4–5)
ALBUMIN/GLOB SERPL: 0.7 (ref 0.8–1.7)
ALP SERPL-CCNC: 95 U/L (ref 45–117)
ALT SERPL-CCNC: 25 U/L (ref 16–61)
ANION GAP SERPL CALC-SCNC: 8 MMOL/L (ref 3–18)
APPEARANCE UR: ABNORMAL
AST SERPL W P-5'-P-CCNC: 24 U/L (ref 10–38)
BACTERIA URNS QL MICRO: ABNORMAL /HPF
BASOPHILS # BLD: 0 K/UL (ref 0–0.1)
BASOPHILS NFR BLD: 1 % (ref 0–2)
BILIRUB SERPL-MCNC: 0.4 MG/DL (ref 0.2–1)
BILIRUB UR QL: NEGATIVE
BUN SERPL-MCNC: 106 MG/DL (ref 7–18)
BUN/CREAT SERPL: 34 (ref 12–20)
CA-I BLD-MCNC: 8 MG/DL (ref 8.5–10.1)
CHLORIDE SERPL-SCNC: 93 MMOL/L (ref 100–111)
CO2 SERPL-SCNC: 31 MMOL/L (ref 21–32)
COLOR UR: YELLOW
CREAT SERPL-MCNC: 3.09 MG/DL (ref 0.6–1.3)
DIFFERENTIAL METHOD BLD: ABNORMAL
EOSINOPHIL # BLD: 0.1 K/UL (ref 0–0.4)
EOSINOPHIL NFR BLD: 1 % (ref 0–5)
EPITH CASTS URNS QL MICRO: ABNORMAL /LPF (ref 0–20)
ERYTHROCYTE [DISTWIDTH] IN BLOOD BY AUTOMATED COUNT: 14.5 % (ref 11.6–14.5)
GLOBULIN SER CALC-MCNC: 3.9 G/DL (ref 2–4)
GLUCOSE BLD STRIP.AUTO-MCNC: 297 MG/DL (ref 70–110)
GLUCOSE BLD STRIP.AUTO-MCNC: 511 MG/DL (ref 70–110)
GLUCOSE BLD STRIP.AUTO-MCNC: 513 MG/DL (ref 70–110)
GLUCOSE SERPL-MCNC: 521 MG/DL (ref 74–99)
GLUCOSE UR STRIP.AUTO-MCNC: 500 MG/DL
HCT VFR BLD AUTO: 36.6 % (ref 36–48)
HGB BLD-MCNC: 11.5 G/DL (ref 13–16)
HGB UR QL STRIP: ABNORMAL
IMM GRANULOCYTES # BLD AUTO: 0 K/UL (ref 0–0.04)
IMM GRANULOCYTES NFR BLD AUTO: 0 % (ref 0–0.5)
KETONES UR QL STRIP.AUTO: NEGATIVE MG/DL
LEUKOCYTE ESTERASE UR QL STRIP.AUTO: ABNORMAL
LYMPHOCYTES # BLD: 0.8 K/UL (ref 0.9–3.6)
LYMPHOCYTES NFR BLD: 9 % (ref 21–52)
MCH RBC QN AUTO: 29.7 PG (ref 24–34)
MCHC RBC AUTO-ENTMCNC: 31.4 G/DL (ref 31–37)
MCV RBC AUTO: 94.6 FL (ref 78–100)
MONOCYTES # BLD: 0.8 K/UL (ref 0.05–1.2)
MONOCYTES NFR BLD: 10 % (ref 3–10)
NEUTS SEG # BLD: 6.5 K/UL (ref 1.8–8)
NEUTS SEG NFR BLD: 79 % (ref 40–73)
NITRITE UR QL STRIP.AUTO: POSITIVE
NRBC # BLD: 0 K/UL (ref 0–0.01)
NRBC BLD-RTO: 0 PER 100 WBC
PERFORMED BY:: ABNORMAL
PH UR STRIP: 7 (ref 5–8)
PLATELET # BLD AUTO: 158 K/UL (ref 135–420)
PMV BLD AUTO: 12.7 FL (ref 9.2–11.8)
POTASSIUM SERPL-SCNC: 4.8 MMOL/L (ref 3.5–5.5)
PROT SERPL-MCNC: 6.8 G/DL (ref 6.4–8.2)
PROT UR STRIP-MCNC: NEGATIVE MG/DL
RBC # BLD AUTO: 3.87 M/UL (ref 4.35–5.65)
RBC #/AREA URNS HPF: ABNORMAL /HPF (ref 0–2)
SODIUM SERPL-SCNC: 132 MMOL/L (ref 136–145)
SP GR UR REFRACTOMETRY: 1.01 (ref 1–1.03)
TROPONIN I SERPL HS-MCNC: 14 NG/L (ref 0–78)
TSH SERPL DL<=0.05 MIU/L-ACNC: 5.37 UIU/ML (ref 0.36–3.74)
UROBILINOGEN UR QL STRIP.AUTO: 0.2 EU/DL (ref 0.2–1)
WBC # BLD AUTO: 8.2 K/UL (ref 4.6–13.2)
WBC URNS QL MICRO: >100 /HPF (ref 0–4)

## 2023-05-04 PROCEDURE — 93005 ELECTROCARDIOGRAM TRACING: CPT | Performed by: EMERGENCY MEDICINE

## 2023-05-04 PROCEDURE — 81001 URINALYSIS AUTO W/SCOPE: CPT

## 2023-05-04 PROCEDURE — 2580000003 HC RX 258: Performed by: EMERGENCY MEDICINE

## 2023-05-04 PROCEDURE — 84443 ASSAY THYROID STIM HORMONE: CPT

## 2023-05-04 PROCEDURE — 99285 EMERGENCY DEPT VISIT HI MDM: CPT

## 2023-05-04 PROCEDURE — 80053 COMPREHEN METABOLIC PANEL: CPT

## 2023-05-04 PROCEDURE — 71045 X-RAY EXAM CHEST 1 VIEW: CPT

## 2023-05-04 PROCEDURE — 6370000000 HC RX 637 (ALT 250 FOR IP): Performed by: EMERGENCY MEDICINE

## 2023-05-04 PROCEDURE — 84484 ASSAY OF TROPONIN QUANT: CPT

## 2023-05-04 PROCEDURE — 82962 GLUCOSE BLOOD TEST: CPT

## 2023-05-04 PROCEDURE — 85025 COMPLETE CBC W/AUTO DIFF WBC: CPT

## 2023-05-04 PROCEDURE — 96374 THER/PROPH/DIAG INJ IV PUSH: CPT

## 2023-05-04 PROCEDURE — 84439 ASSAY OF FREE THYROXINE: CPT

## 2023-05-04 PROCEDURE — 87077 CULTURE AEROBIC IDENTIFY: CPT

## 2023-05-04 PROCEDURE — 87086 URINE CULTURE/COLONY COUNT: CPT

## 2023-05-04 PROCEDURE — 87186 SC STD MICRODIL/AGAR DIL: CPT

## 2023-05-04 PROCEDURE — 36415 COLL VENOUS BLD VENIPUNCTURE: CPT

## 2023-05-04 PROCEDURE — 96361 HYDRATE IV INFUSION ADD-ON: CPT

## 2023-05-04 RX ORDER — SENNA AND DOCUSATE SODIUM 50; 8.6 MG/1; MG/1
1 TABLET, FILM COATED ORAL DAILY
COMMUNITY

## 2023-05-04 RX ORDER — SULFAMETHOXAZOLE AND TRIMETHOPRIM 800; 160 MG/1; MG/1
1 TABLET ORAL 2 TIMES DAILY
Qty: 20 TABLET | Refills: 0 | Status: SHIPPED | OUTPATIENT
Start: 2023-05-04 | End: 2023-05-04

## 2023-05-04 RX ORDER — 0.9 % SODIUM CHLORIDE 0.9 %
1000 INTRAVENOUS SOLUTION INTRAVENOUS ONCE
Status: COMPLETED | OUTPATIENT
Start: 2023-05-04 | End: 2023-05-04

## 2023-05-04 RX ORDER — SULFAMETHOXAZOLE AND TRIMETHOPRIM 800; 160 MG/1; MG/1
1 TABLET ORAL ONCE
Status: COMPLETED | OUTPATIENT
Start: 2023-05-04 | End: 2023-05-04

## 2023-05-04 RX ORDER — ACETAMINOPHEN 160 MG/5ML
500 SOLUTION ORAL EVERY 4 HOURS PRN
COMMUNITY

## 2023-05-04 RX ORDER — CARVEDILOL 3.12 MG/1
3.12 TABLET ORAL 2 TIMES DAILY WITH MEALS
COMMUNITY

## 2023-05-04 RX ORDER — CEPHALEXIN 250 MG/1
250 CAPSULE ORAL 4 TIMES DAILY
Qty: 40 CAPSULE | Refills: 0 | Status: SHIPPED | OUTPATIENT
Start: 2023-05-04 | End: 2023-05-09

## 2023-05-04 RX ORDER — FERROUS SULFATE 7.5 MG/0.5
15 SYRINGE (EA) ORAL DAILY
COMMUNITY

## 2023-05-04 RX ORDER — LOSARTAN POTASSIUM 50 MG/1
50 TABLET ORAL DAILY
COMMUNITY
Start: 2023-03-10

## 2023-05-04 RX ADMIN — SULFAMETHOXAZOLE AND TRIMETHOPRIM 1 TABLET: 800; 160 TABLET ORAL at 21:19

## 2023-05-04 RX ADMIN — INSULIN HUMAN 6 UNITS: 100 INJECTION, SOLUTION PARENTERAL at 20:00

## 2023-05-04 RX ADMIN — SODIUM CHLORIDE 1000 ML: 9 INJECTION, SOLUTION INTRAVENOUS at 18:49

## 2023-05-04 ASSESSMENT — PAIN - FUNCTIONAL ASSESSMENT
PAIN_FUNCTIONAL_ASSESSMENT: NONE - DENIES PAIN

## 2023-05-04 ASSESSMENT — LIFESTYLE VARIABLES
HOW MANY STANDARD DRINKS CONTAINING ALCOHOL DO YOU HAVE ON A TYPICAL DAY: PATIENT DOES NOT DRINK
HOW OFTEN DO YOU HAVE A DRINK CONTAINING ALCOHOL: NEVER

## 2023-05-04 NOTE — ED TRIAGE NOTES
Pt. Presented to his PCP office for hospital admission follow up. Pt. Has been using jevity for tube feeding at home and has been having higher blood sugars at home. In the office today pts. BS was over 600. Pt. Was given an additional dose of 4mg of glimeperide in the office and told to come to the ER.

## 2023-05-04 NOTE — ED NOTES
Verbal shift change report given to [address] Sar[address]it Rhodes (oncoming nurse) by Michela Fontana RN (offgoing nurse). Report included the following information ED SBAR.        Junior Ambrocio RN  05/04/23 7712

## 2023-05-04 NOTE — ED PROVIDER NOTES
Mouth: Mucous membranes are dry. Pharynx: Oropharynx is clear. No oropharyngeal exudate or posterior oropharyngeal erythema. Eyes:      General: No scleral icterus. Right eye: No discharge. Left eye: No discharge. Extraocular Movements: Extraocular movements intact. Conjunctiva/sclera: Conjunctivae normal.      Pupils: Pupils are equal, round, and reactive to light. Neck:      Vascular: No carotid bruit. Cardiovascular:      Rate and Rhythm: Normal rate and regular rhythm. Pulses: Normal pulses. Heart sounds: Normal heart sounds. No murmur heard. No friction rub. No gallop. Pulmonary:      Effort: Pulmonary effort is normal. No respiratory distress. Breath sounds: Normal breath sounds. No stridor. No wheezing, rhonchi or rales. Chest:      Chest wall: No tenderness. Abdominal:      General: Abdomen is flat. Bowel sounds are normal. There is no distension. Palpations: Abdomen is soft. There is no mass. Tenderness: There is no abdominal tenderness. There is no right CVA tenderness, left CVA tenderness, guarding or rebound. Hernia: No hernia is present. Comments: PEG tube is clean/dry/intact without surrounding erythema or tenderness to palpation. Bumpers are still in place. Musculoskeletal:         General: No swelling, tenderness, deformity or signs of injury. Normal range of motion. Cervical back: Normal range of motion and neck supple. No rigidity or tenderness. Right lower leg: No edema. Left lower leg: No edema. Lymphadenopathy:      Cervical: No cervical adenopathy. Skin:     General: Skin is warm and dry. Capillary Refill: Capillary refill takes less than 2 seconds. Coloration: Skin is not jaundiced or pale. Findings: No bruising, erythema, lesion or rash. Neurological:      General: No focal deficit present. Mental Status: He is alert and oriented to person, place, and time.       Cranial

## 2023-05-05 LAB
BACTERIA SPEC CULT: ABNORMAL
COLONY COUNT, CNT: ABNORMAL
EKG ATRIAL RATE: 56 BPM
EKG DIAGNOSIS: NORMAL
EKG P AXIS: 87 DEGREES
EKG P-R INTERVAL: 214 MS
EKG Q-T INTERVAL: 514 MS
EKG QRS DURATION: 178 MS
EKG QTC CALCULATION (BAZETT): 496 MS
EKG R AXIS: 47 DEGREES
EKG T AXIS: 238 DEGREES
EKG VENTRICULAR RATE: 56 BPM
Lab: ABNORMAL
T4 FREE SERPL-MCNC: 1.2 NG/DL (ref 0.7–1.5)

## 2023-05-05 NOTE — ED NOTES
2115, pt req dc, declines curr sx's, w spouse. Gluc 297 after treatment. Labs not c/w dka. All findings d/w pt. Cri, pt aware. No pain or complaints, shared decision making, to dc per d/w pt. Detailed ret inst given.       Aroldo Crandall MD  05/05/23 2341

## 2023-05-05 NOTE — ED NOTES
Urine specimen collected and to lab. Linens changed due to spilling urine on bed. Up to chair for linen change.  Steady gait, no complaints voiced at this time     Paris Alvarez RN  05/04/23 2006

## 2023-05-05 NOTE — ED NOTES
I have reviewed discharge instructions with the patient and spouse. The patient and spouse verbalized understanding.        Reviewed medication compliance, follow up with PCP, return to ER for any new or worrisome concerns     Hamzah Lincoln RN  05/05/23 5402

## 2023-05-09 LAB
BACTERIA SPEC CULT: NORMAL
COLONY COUNT, CNT: NORMAL
Lab: NORMAL

## 2023-05-09 RX ORDER — SULFAMETHOXAZOLE AND TRIMETHOPRIM 800; 160 MG/1; MG/1
0.5 TABLET ORAL DAILY
Qty: 4 TABLET | Refills: 0 | Status: SHIPPED | OUTPATIENT
Start: 2023-05-09 | End: 2023-05-16

## 2023-05-09 NOTE — RESULT ENCOUNTER NOTE
Appropriate care. Patient and wife called for follow up. Changed keflex to bactrim 0.5 tabs qd for 7 days based on creatinine clearance in the ED.

## 2023-07-14 ENCOUNTER — HOSPITAL ENCOUNTER (OUTPATIENT)
Age: 83
End: 2023-07-14
Payer: MEDICARE

## 2023-07-14 DIAGNOSIS — I51.9 MYXEDEMA HEART DISEASE: ICD-10-CM

## 2023-07-14 DIAGNOSIS — D64.9 ANEMIA, UNSPECIFIED TYPE: ICD-10-CM

## 2023-07-14 DIAGNOSIS — E78.00 PURE HYPERCHOLESTEROLEMIA: ICD-10-CM

## 2023-07-14 DIAGNOSIS — I10 ESSENTIAL HYPERTENSION, MALIGNANT: ICD-10-CM

## 2023-07-14 DIAGNOSIS — E11.22 TYPE 2 DIABETES MELLITUS WITH DIABETIC CHRONIC KIDNEY DISEASE, UNSPECIFIED CKD STAGE, UNSPECIFIED WHETHER LONG TERM INSULIN USE (HCC): ICD-10-CM

## 2023-07-14 DIAGNOSIS — D50.9 IRON DEFICIENCY ANEMIA, UNSPECIFIED IRON DEFICIENCY ANEMIA TYPE: ICD-10-CM

## 2023-07-14 DIAGNOSIS — R97.20 ELEVATED PROSTATE SPECIFIC ANTIGEN (PSA): ICD-10-CM

## 2023-07-14 DIAGNOSIS — Z12.5 SPECIAL SCREENING FOR MALIGNANT NEOPLASM OF PROSTATE: ICD-10-CM

## 2023-07-14 DIAGNOSIS — E03.9 MYXEDEMA HEART DISEASE: ICD-10-CM

## 2023-07-14 LAB
ALBUMIN SERPL-MCNC: 3.4 G/DL (ref 3.4–5)
ALBUMIN/GLOB SERPL: 0.9 (ref 0.8–1.7)
ALP SERPL-CCNC: 111 U/L (ref 45–117)
ALT SERPL-CCNC: 17 U/L (ref 16–61)
ANION GAP SERPL CALC-SCNC: 6 MMOL/L (ref 3–18)
AST SERPL W P-5'-P-CCNC: 13 U/L (ref 10–38)
BASOPHILS # BLD: 0 K/UL (ref 0–0.1)
BASOPHILS NFR BLD: 0 % (ref 0–2)
BILIRUB SERPL-MCNC: 0.5 MG/DL (ref 0.2–1)
BUN SERPL-MCNC: 81 MG/DL (ref 7–18)
BUN/CREAT SERPL: 27 (ref 12–20)
CA-I BLD-MCNC: 8.4 MG/DL (ref 8.5–10.1)
CHLORIDE SERPL-SCNC: 103 MMOL/L (ref 100–111)
CHOLEST SERPL-MCNC: 133 MG/DL
CO2 SERPL-SCNC: 32 MMOL/L (ref 21–32)
CREAT SERPL-MCNC: 3 MG/DL (ref 0.6–1.3)
DIFFERENTIAL METHOD BLD: ABNORMAL
EOSINOPHIL # BLD: 0.2 K/UL (ref 0–0.4)
EOSINOPHIL NFR BLD: 2 % (ref 0–5)
ERYTHROCYTE [DISTWIDTH] IN BLOOD BY AUTOMATED COUNT: 14.5 % (ref 11.6–14.5)
EST. AVERAGE GLUCOSE BLD GHB EST-MCNC: 177 MG/DL
GLOBULIN SER CALC-MCNC: 3.7 G/DL (ref 2–4)
GLUCOSE SERPL-MCNC: 210 MG/DL (ref 74–99)
HBA1C MFR BLD: 7.8 % (ref 4.2–5.6)
HCT VFR BLD AUTO: 39.7 % (ref 36–48)
HDLC SERPL-MCNC: 57 MG/DL (ref 40–60)
HDLC SERPL: 2.3 (ref 0–5)
HGB BLD-MCNC: 12 G/DL (ref 13–16)
IMM GRANULOCYTES # BLD AUTO: 0 K/UL (ref 0–0.04)
IMM GRANULOCYTES NFR BLD AUTO: 0 % (ref 0–0.5)
IRON SERPL-MCNC: 27 UG/DL (ref 50–175)
LDLC SERPL CALC-MCNC: 59.2 MG/DL (ref 0–100)
LIPID PANEL: NORMAL
LYMPHOCYTES # BLD: 0.9 K/UL (ref 0.9–3.6)
LYMPHOCYTES NFR BLD: 13 % (ref 21–52)
MCH RBC QN AUTO: 28.6 PG (ref 24–34)
MCHC RBC AUTO-ENTMCNC: 30.2 G/DL (ref 31–37)
MCV RBC AUTO: 94.7 FL (ref 78–100)
MONOCYTES # BLD: 0.6 K/UL (ref 0.05–1.2)
MONOCYTES NFR BLD: 9 % (ref 3–10)
NEUTS SEG # BLD: 5.6 K/UL (ref 1.8–8)
NEUTS SEG NFR BLD: 76 % (ref 40–73)
NRBC # BLD: 0 K/UL (ref 0–0.01)
NRBC BLD-RTO: 0 PER 100 WBC
PLATELET # BLD AUTO: 181 K/UL (ref 135–420)
PMV BLD AUTO: 12.2 FL (ref 9.2–11.8)
POTASSIUM SERPL-SCNC: 4.5 MMOL/L (ref 3.5–5.5)
PROT SERPL-MCNC: 7.1 G/DL (ref 6.4–8.2)
PSA SERPL-MCNC: 0.5 NG/ML (ref 0–4)
RBC # BLD AUTO: 4.19 M/UL (ref 4.35–5.65)
SODIUM SERPL-SCNC: 141 MMOL/L (ref 136–145)
T4 FREE SERPL-MCNC: 1.4 NG/DL (ref 0.7–1.5)
TRIGL SERPL-MCNC: 84 MG/DL
TSH SERPL DL<=0.05 MIU/L-ACNC: 2.77 UIU/ML (ref 0.36–3.74)
VLDLC SERPL CALC-MCNC: 16.8 MG/DL
WBC # BLD AUTO: 7.4 K/UL (ref 4.6–13.2)

## 2023-07-14 PROCEDURE — 36415 COLL VENOUS BLD VENIPUNCTURE: CPT

## 2023-07-14 PROCEDURE — 83036 HEMOGLOBIN GLYCOSYLATED A1C: CPT

## 2023-07-14 PROCEDURE — 83540 ASSAY OF IRON: CPT

## 2023-07-14 PROCEDURE — 84439 ASSAY OF FREE THYROXINE: CPT

## 2023-07-14 PROCEDURE — 85025 COMPLETE CBC W/AUTO DIFF WBC: CPT

## 2023-07-14 PROCEDURE — 80053 COMPREHEN METABOLIC PANEL: CPT

## 2023-07-14 PROCEDURE — 83550 IRON BINDING TEST: CPT

## 2023-07-14 PROCEDURE — 84153 ASSAY OF PSA TOTAL: CPT

## 2023-07-14 PROCEDURE — 84443 ASSAY THYROID STIM HORMONE: CPT

## 2023-07-14 PROCEDURE — 80061 LIPID PANEL: CPT

## 2023-11-27 ENCOUNTER — HOSPITAL ENCOUNTER (OUTPATIENT)
Age: 83
Discharge: HOME | End: 2023-11-30
Payer: MEDICARE

## 2023-11-27 DIAGNOSIS — I51.9 MYXEDEMA HEART DISEASE: ICD-10-CM

## 2023-11-27 DIAGNOSIS — E03.9 MYXEDEMA HEART DISEASE: ICD-10-CM

## 2023-11-27 DIAGNOSIS — T45.1X5A ANEMIA DUE TO CHEMOTHERAPY: ICD-10-CM

## 2023-11-27 DIAGNOSIS — J44.9 CHRONIC OBSTRUCTIVE PULMONARY DISEASE, UNSPECIFIED COPD TYPE (HCC): ICD-10-CM

## 2023-11-27 DIAGNOSIS — D64.81 ANEMIA DUE TO CHEMOTHERAPY: ICD-10-CM

## 2023-11-27 LAB
ALBUMIN SERPL-MCNC: 3.5 G/DL (ref 3.4–5)
ALBUMIN/GLOB SERPL: 0.9 (ref 0.8–1.7)
ALP SERPL-CCNC: 95 U/L (ref 45–117)
ALT SERPL-CCNC: 24 U/L (ref 16–61)
ANION GAP SERPL CALC-SCNC: 6 MMOL/L (ref 3–18)
AST SERPL W P-5'-P-CCNC: 16 U/L (ref 10–38)
BASOPHILS # BLD: 0 K/UL (ref 0–0.1)
BASOPHILS NFR BLD: 0 % (ref 0–2)
BILIRUB SERPL-MCNC: 0.7 MG/DL (ref 0.2–1)
BUN SERPL-MCNC: 84 MG/DL (ref 7–18)
BUN/CREAT SERPL: 26 (ref 12–20)
CA-I BLD-MCNC: 9 MG/DL (ref 8.5–10.1)
CHLORIDE SERPL-SCNC: 101 MMOL/L (ref 100–111)
CO2 SERPL-SCNC: 31 MMOL/L (ref 21–32)
CREAT SERPL-MCNC: 3.22 MG/DL (ref 0.6–1.3)
DIFFERENTIAL METHOD BLD: ABNORMAL
EOSINOPHIL # BLD: 0.1 K/UL (ref 0–0.4)
EOSINOPHIL NFR BLD: 2 % (ref 0–5)
ERYTHROCYTE [DISTWIDTH] IN BLOOD BY AUTOMATED COUNT: 13.5 % (ref 11.6–14.5)
GLOBULIN SER CALC-MCNC: 3.9 G/DL (ref 2–4)
GLUCOSE SERPL-MCNC: 198 MG/DL (ref 74–99)
HCT VFR BLD AUTO: 43.1 % (ref 36–48)
HGB BLD-MCNC: 14.1 G/DL (ref 13–16)
IMM GRANULOCYTES # BLD AUTO: 0 K/UL (ref 0–0.04)
IMM GRANULOCYTES NFR BLD AUTO: 0 % (ref 0–0.5)
LYMPHOCYTES # BLD: 0.9 K/UL (ref 0.9–3.6)
LYMPHOCYTES NFR BLD: 12 % (ref 21–52)
MCH RBC QN AUTO: 31.1 PG (ref 24–34)
MCHC RBC AUTO-ENTMCNC: 32.7 G/DL (ref 31–37)
MCV RBC AUTO: 94.9 FL (ref 78–100)
MONOCYTES # BLD: 0.6 K/UL (ref 0.05–1.2)
MONOCYTES NFR BLD: 8 % (ref 3–10)
NEUTS SEG # BLD: 5.6 K/UL (ref 1.8–8)
NEUTS SEG NFR BLD: 78 % (ref 40–73)
NRBC # BLD: 0 K/UL (ref 0–0.01)
NRBC BLD-RTO: 0 PER 100 WBC
PLATELET # BLD AUTO: 137 K/UL (ref 135–420)
PMV BLD AUTO: 13.6 FL (ref 9.2–11.8)
POTASSIUM SERPL-SCNC: 4.5 MMOL/L (ref 3.5–5.5)
PROT SERPL-MCNC: 7.4 G/DL (ref 6.4–8.2)
RBC # BLD AUTO: 4.54 M/UL (ref 4.35–5.65)
SODIUM SERPL-SCNC: 138 MMOL/L (ref 136–145)
TSH SERPL DL<=0.05 MIU/L-ACNC: 3.33 UIU/ML (ref 0.36–3.74)
WBC # BLD AUTO: 7.3 K/UL (ref 4.6–13.2)

## 2023-11-27 PROCEDURE — 36415 COLL VENOUS BLD VENIPUNCTURE: CPT

## 2023-11-27 PROCEDURE — 84443 ASSAY THYROID STIM HORMONE: CPT

## 2023-11-27 PROCEDURE — 83036 HEMOGLOBIN GLYCOSYLATED A1C: CPT

## 2023-11-27 PROCEDURE — 71046 X-RAY EXAM CHEST 2 VIEWS: CPT

## 2023-11-27 PROCEDURE — 84153 ASSAY OF PSA TOTAL: CPT

## 2023-11-27 PROCEDURE — 85025 COMPLETE CBC W/AUTO DIFF WBC: CPT

## 2023-11-27 PROCEDURE — 80053 COMPREHEN METABOLIC PANEL: CPT

## 2023-11-27 PROCEDURE — 80061 LIPID PANEL: CPT

## 2023-11-27 PROCEDURE — 84439 ASSAY OF FREE THYROXINE: CPT

## 2023-11-28 LAB
CHOLEST SERPL-MCNC: 178 MG/DL
EST. AVERAGE GLUCOSE BLD GHB EST-MCNC: 166 MG/DL
HBA1C MFR BLD: 7.4 % (ref 4.2–5.6)
HDLC SERPL-MCNC: 76 MG/DL (ref 40–60)
HDLC SERPL: 2.3 (ref 0–5)
LDLC SERPL CALC-MCNC: 79.4 MG/DL (ref 0–100)
LIPID PANEL: ABNORMAL
PSA SERPL-MCNC: 0.2 NG/ML (ref 0–4)
T4 FREE SERPL-MCNC: 1.4 NG/DL (ref 0.7–1.5)
TRIGL SERPL-MCNC: 113 MG/DL
VLDLC SERPL CALC-MCNC: 22.6 MG/DL

## 2024-01-10 ENCOUNTER — OFFICE VISIT (OUTPATIENT)
Age: 84
End: 2024-01-10
Payer: MEDICARE

## 2024-01-10 ENCOUNTER — TELEPHONE (OUTPATIENT)
Age: 84
End: 2024-01-10

## 2024-01-10 VITALS
TEMPERATURE: 98.5 F | WEIGHT: 136 LBS | BODY MASS INDEX: 18.42 KG/M2 | HEIGHT: 72 IN | HEART RATE: 76 BPM | RESPIRATION RATE: 20 BRPM | DIASTOLIC BLOOD PRESSURE: 70 MMHG | SYSTOLIC BLOOD PRESSURE: 136 MMHG | OXYGEN SATURATION: 99 %

## 2024-01-10 DIAGNOSIS — I48.0 PAROXYSMAL ATRIAL FIBRILLATION (HCC): ICD-10-CM

## 2024-01-10 DIAGNOSIS — I11.0 HYPERTENSIVE HEART DISEASE WITH CHRONIC COMBINED SYSTOLIC AND DIASTOLIC CONGESTIVE HEART FAILURE (HCC): ICD-10-CM

## 2024-01-10 DIAGNOSIS — J84.9 INTERSTITIAL LUNG DISEASE (HCC): ICD-10-CM

## 2024-01-10 DIAGNOSIS — I50.42 CHRONIC COMBINED SYSTOLIC AND DIASTOLIC CHF (CONGESTIVE HEART FAILURE) (HCC): ICD-10-CM

## 2024-01-10 DIAGNOSIS — I50.42 HYPERTENSIVE HEART DISEASE WITH CHRONIC COMBINED SYSTOLIC AND DIASTOLIC CONGESTIVE HEART FAILURE (HCC): ICD-10-CM

## 2024-01-10 DIAGNOSIS — R94.31 ABNORMAL ECG: ICD-10-CM

## 2024-01-10 DIAGNOSIS — I25.10 CORONARY ARTERY DISEASE INVOLVING NATIVE CORONARY ARTERY OF NATIVE HEART WITHOUT ANGINA PECTORIS: ICD-10-CM

## 2024-01-10 DIAGNOSIS — R06.02 EXERTIONAL SHORTNESS OF BREATH: Primary | ICD-10-CM

## 2024-01-10 DIAGNOSIS — N18.32 STAGE 3B CHRONIC KIDNEY DISEASE (HCC): ICD-10-CM

## 2024-01-10 DIAGNOSIS — R01.1 SYSTOLIC MURMUR: ICD-10-CM

## 2024-01-10 DIAGNOSIS — I35.9 AORTIC VALVE DISEASE: ICD-10-CM

## 2024-01-10 PROCEDURE — 1124F ACP DISCUSS-NO DSCNMKR DOCD: CPT | Performed by: INTERNAL MEDICINE

## 2024-01-10 PROCEDURE — G8484 FLU IMMUNIZE NO ADMIN: HCPCS | Performed by: INTERNAL MEDICINE

## 2024-01-10 PROCEDURE — G8427 DOCREV CUR MEDS BY ELIG CLIN: HCPCS | Performed by: INTERNAL MEDICINE

## 2024-01-10 PROCEDURE — 3078F DIAST BP <80 MM HG: CPT | Performed by: INTERNAL MEDICINE

## 2024-01-10 PROCEDURE — 99215 OFFICE O/P EST HI 40 MIN: CPT | Performed by: INTERNAL MEDICINE

## 2024-01-10 PROCEDURE — 1036F TOBACCO NON-USER: CPT | Performed by: INTERNAL MEDICINE

## 2024-01-10 PROCEDURE — 3075F SYST BP GE 130 - 139MM HG: CPT | Performed by: INTERNAL MEDICINE

## 2024-01-10 PROCEDURE — G8419 CALC BMI OUT NRM PARAM NOF/U: HCPCS | Performed by: INTERNAL MEDICINE

## 2024-01-10 ASSESSMENT — ENCOUNTER SYMPTOMS
EYES NEGATIVE: 1
SHORTNESS OF BREATH: 1
GASTROINTESTINAL NEGATIVE: 1
ALLERGIC/IMMUNOLOGIC NEGATIVE: 1

## 2024-01-18 ENCOUNTER — HOSPITAL ENCOUNTER (OUTPATIENT)
Facility: HOSPITAL | Age: 84
Discharge: HOME | End: 2024-01-18
Attending: INTERNAL MEDICINE
Payer: MEDICARE

## 2024-01-18 DIAGNOSIS — R06.02 EXERTIONAL SHORTNESS OF BREATH: ICD-10-CM

## 2024-01-18 DIAGNOSIS — J84.9 INTERSTITIAL LUNG DISEASE (HCC): ICD-10-CM

## 2024-01-18 PROCEDURE — 71250 CT THORAX DX C-: CPT

## 2024-03-15 ENCOUNTER — HOSPITAL ENCOUNTER (OUTPATIENT)
Age: 84
End: 2024-03-15
Payer: MEDICARE

## 2024-03-15 DIAGNOSIS — E78.00 PURE HYPERCHOLESTEROLEMIA: ICD-10-CM

## 2024-03-15 DIAGNOSIS — E55.9 AVITAMINOSIS D: ICD-10-CM

## 2024-03-15 DIAGNOSIS — E53.8 BIOTIN-(PROPIONYL-COA-CARBOXYLASE) LIGASE DEFICIENCY: ICD-10-CM

## 2024-03-15 DIAGNOSIS — D50.9 IRON DEFICIENCY ANEMIA, UNSPECIFIED IRON DEFICIENCY ANEMIA TYPE: ICD-10-CM

## 2024-03-15 DIAGNOSIS — D64.9 ANEMIA, UNSPECIFIED TYPE: ICD-10-CM

## 2024-03-15 DIAGNOSIS — I13.10 CARDIORENAL DISEASE: ICD-10-CM

## 2024-03-15 DIAGNOSIS — E11.29 TYPE 2 DIABETES MELLITUS WITH OTHER KIDNEY COMPLICATION, UNSPECIFIED WHETHER LONG TERM INSULIN USE (HCC): ICD-10-CM

## 2024-03-15 DIAGNOSIS — D52.9 ANEMIA DUE TO FOLIC ACID DEFICIENCY, UNSPECIFIED DEFICIENCY TYPE: ICD-10-CM

## 2024-03-15 LAB
25(OH)D3 SERPL-MCNC: 53.6 NG/ML (ref 30–100)
ALBUMIN SERPL-MCNC: 3.4 G/DL (ref 3.4–5)
ALBUMIN/GLOB SERPL: 0.9 (ref 0.8–1.7)
ALP SERPL-CCNC: 150 U/L (ref 45–117)
ALT SERPL-CCNC: 22 U/L (ref 16–61)
ANION GAP SERPL CALC-SCNC: 5 MMOL/L (ref 3–18)
AST SERPL W P-5'-P-CCNC: 13 U/L (ref 10–38)
BASOPHILS # BLD: 0 K/UL (ref 0–0.1)
BASOPHILS NFR BLD: 1 % (ref 0–2)
BILIRUB SERPL-MCNC: 0.5 MG/DL (ref 0.2–1)
BUN SERPL-MCNC: 112 MG/DL (ref 7–18)
BUN/CREAT SERPL: 29 (ref 12–20)
CA-I BLD-MCNC: 9.2 MG/DL (ref 8.5–10.1)
CHLORIDE SERPL-SCNC: 102 MMOL/L (ref 100–111)
CHOLEST SERPL-MCNC: 149 MG/DL
CO2 SERPL-SCNC: 30 MMOL/L (ref 21–32)
CREAT SERPL-MCNC: 3.87 MG/DL (ref 0.6–1.3)
DIFFERENTIAL METHOD BLD: ABNORMAL
EOSINOPHIL # BLD: 0.2 K/UL (ref 0–0.4)
EOSINOPHIL NFR BLD: 3 % (ref 0–5)
ERYTHROCYTE [DISTWIDTH] IN BLOOD BY AUTOMATED COUNT: 12.9 % (ref 11.6–14.5)
EST. AVERAGE GLUCOSE BLD GHB EST-MCNC: 174 MG/DL
FOLATE SERPL-MCNC: 19 NG/ML (ref 3.1–17.5)
GLOBULIN SER CALC-MCNC: 3.7 G/DL (ref 2–4)
GLUCOSE SERPL-MCNC: 299 MG/DL (ref 74–99)
HBA1C MFR BLD: 7.7 % (ref 4.2–5.6)
HCT VFR BLD AUTO: 36.6 % (ref 36–48)
HDLC SERPL-MCNC: 63 MG/DL (ref 40–60)
HDLC SERPL: 2.4 (ref 0–5)
HGB BLD-MCNC: 12 G/DL (ref 13–16)
IMM GRANULOCYTES # BLD AUTO: 0 K/UL (ref 0–0.04)
IMM GRANULOCYTES NFR BLD AUTO: 0 % (ref 0–0.5)
IRON SATN MFR SERPL: 25 % (ref 20–50)
IRON SERPL-MCNC: 72 UG/DL (ref 50–175)
LDLC SERPL CALC-MCNC: 65 MG/DL (ref 0–100)
LIPID PANEL: ABNORMAL
LYMPHOCYTES # BLD: 1 K/UL (ref 0.9–3.6)
LYMPHOCYTES NFR BLD: 14 % (ref 21–52)
MCH RBC QN AUTO: 31.7 PG (ref 24–34)
MCHC RBC AUTO-ENTMCNC: 32.8 G/DL (ref 31–37)
MCV RBC AUTO: 96.6 FL (ref 78–100)
MONOCYTES # BLD: 0.5 K/UL (ref 0.05–1.2)
MONOCYTES NFR BLD: 6 % (ref 3–10)
NEUTS SEG # BLD: 5.9 K/UL (ref 1.8–8)
NEUTS SEG NFR BLD: 76 % (ref 40–73)
NRBC # BLD: 0 K/UL (ref 0–0.01)
NRBC BLD-RTO: 0 PER 100 WBC
PLATELET # BLD AUTO: 115 K/UL (ref 135–420)
PMV BLD AUTO: 13.9 FL (ref 9.2–11.8)
POTASSIUM SERPL-SCNC: 5 MMOL/L (ref 3.5–5.5)
PROT SERPL-MCNC: 7.1 G/DL (ref 6.4–8.2)
RBC # BLD AUTO: 3.79 M/UL (ref 4.35–5.65)
SODIUM SERPL-SCNC: 137 MMOL/L (ref 136–145)
TIBC SERPL-MCNC: 286 UG/DL (ref 250–450)
TRIGL SERPL-MCNC: 105 MG/DL
VIT B12 SERPL-MCNC: 447 PG/ML (ref 211–911)
VLDLC SERPL CALC-MCNC: 21 MG/DL
WBC # BLD AUTO: 7.7 K/UL (ref 4.6–13.2)

## 2024-03-15 PROCEDURE — 82306 VITAMIN D 25 HYDROXY: CPT

## 2024-03-15 PROCEDURE — 85025 COMPLETE CBC W/AUTO DIFF WBC: CPT

## 2024-03-15 PROCEDURE — 82746 ASSAY OF FOLIC ACID SERUM: CPT

## 2024-03-15 PROCEDURE — 83036 HEMOGLOBIN GLYCOSYLATED A1C: CPT

## 2024-03-15 PROCEDURE — 83540 ASSAY OF IRON: CPT

## 2024-03-15 PROCEDURE — 80053 COMPREHEN METABOLIC PANEL: CPT

## 2024-03-15 PROCEDURE — 82607 VITAMIN B-12: CPT

## 2024-03-15 PROCEDURE — 80061 LIPID PANEL: CPT

## 2024-03-15 PROCEDURE — 36415 COLL VENOUS BLD VENIPUNCTURE: CPT

## 2024-05-01 ENCOUNTER — HOSPITAL ENCOUNTER (EMERGENCY)
Age: 84
Discharge: HOME | End: 2024-05-01
Attending: EMERGENCY MEDICINE
Payer: MEDICARE

## 2024-05-01 VITALS
TEMPERATURE: 97.5 F | HEIGHT: 72 IN | SYSTOLIC BLOOD PRESSURE: 130 MMHG | DIASTOLIC BLOOD PRESSURE: 86 MMHG | HEART RATE: 76 BPM | RESPIRATION RATE: 16 BRPM | BODY MASS INDEX: 20.91 KG/M2 | WEIGHT: 154.4 LBS | OXYGEN SATURATION: 97 %

## 2024-05-01 DIAGNOSIS — E16.2 HYPOGLYCEMIA: Primary | ICD-10-CM

## 2024-05-01 LAB
ANION GAP SERPL CALC-SCNC: 7 MMOL/L (ref 3–18)
BASOPHILS # BLD: 0 K/UL (ref 0–0.1)
BASOPHILS NFR BLD: 0 % (ref 0–2)
BUN SERPL-MCNC: 46 MG/DL (ref 7–18)
BUN/CREAT SERPL: 18 (ref 12–20)
CA-I BLD-MCNC: 9 MG/DL (ref 8.5–10.1)
CHLORIDE SERPL-SCNC: 103 MMOL/L (ref 100–111)
CO2 SERPL-SCNC: 28 MMOL/L (ref 21–32)
CREAT SERPL-MCNC: 2.5 MG/DL (ref 0.6–1.3)
DIFFERENTIAL METHOD BLD: ABNORMAL
EOSINOPHIL # BLD: 0 K/UL (ref 0–0.4)
EOSINOPHIL NFR BLD: 0 % (ref 0–5)
ERYTHROCYTE [DISTWIDTH] IN BLOOD BY AUTOMATED COUNT: 13.3 % (ref 11.6–14.5)
GLUCOSE BLD STRIP.AUTO-MCNC: 105 MG/DL (ref 70–110)
GLUCOSE BLD STRIP.AUTO-MCNC: 139 MG/DL (ref 70–110)
GLUCOSE BLD STRIP.AUTO-MCNC: 165 MG/DL (ref 70–110)
GLUCOSE SERPL-MCNC: 140 MG/DL (ref 74–99)
HCT VFR BLD AUTO: 34.3 % (ref 36–48)
HGB BLD-MCNC: 10.9 G/DL (ref 13–16)
IMM GRANULOCYTES # BLD AUTO: 0 K/UL (ref 0–0.04)
IMM GRANULOCYTES NFR BLD AUTO: 0 % (ref 0–0.5)
LYMPHOCYTES # BLD: 0.3 K/UL (ref 0.9–3.6)
LYMPHOCYTES NFR BLD: 3 % (ref 21–52)
MCH RBC QN AUTO: 31.2 PG (ref 24–34)
MCHC RBC AUTO-ENTMCNC: 31.8 G/DL (ref 31–37)
MCV RBC AUTO: 98.3 FL (ref 78–100)
MONOCYTES # BLD: 0.3 K/UL (ref 0.05–1.2)
MONOCYTES NFR BLD: 4 % (ref 3–10)
NEUTS SEG # BLD: 8.3 K/UL (ref 1.8–8)
NEUTS SEG NFR BLD: 93 % (ref 40–73)
NRBC # BLD: 0 K/UL (ref 0–0.01)
NRBC BLD-RTO: 0 PER 100 WBC
PERFORMED BY:: ABNORMAL
PERFORMED BY:: ABNORMAL
PERFORMED BY:: NORMAL
PLATELET # BLD AUTO: 214 K/UL (ref 135–420)
PMV BLD AUTO: 11.7 FL (ref 9.2–11.8)
POTASSIUM SERPL-SCNC: 4.6 MMOL/L (ref 3.5–5.5)
RBC # BLD AUTO: 3.49 M/UL (ref 4.35–5.65)
SODIUM SERPL-SCNC: 138 MMOL/L (ref 136–145)
WBC # BLD AUTO: 9 K/UL (ref 4.6–13.2)

## 2024-05-01 PROCEDURE — 82962 GLUCOSE BLOOD TEST: CPT

## 2024-05-01 PROCEDURE — 80048 BASIC METABOLIC PNL TOTAL CA: CPT

## 2024-05-01 PROCEDURE — 99283 EMERGENCY DEPT VISIT LOW MDM: CPT

## 2024-05-01 PROCEDURE — 85025 COMPLETE CBC W/AUTO DIFF WBC: CPT

## 2024-05-01 RX ORDER — UBIDECARENONE 75 MG
50 CAPSULE ORAL DAILY
COMMUNITY

## 2024-05-01 RX ORDER — INSULIN LISPRO 100 [IU]/ML
INJECTION, SUSPENSION SUBCUTANEOUS
COMMUNITY
Start: 2024-03-11

## 2024-05-01 ASSESSMENT — PAIN - FUNCTIONAL ASSESSMENT: PAIN_FUNCTIONAL_ASSESSMENT: NONE - DENIES PAIN

## 2024-05-01 NOTE — ED TRIAGE NOTES
Ems received call from pts. Family that he was not acting right. Pts. BS was 40 on arrival. Pt. Given oral glucose. BS came up to 43. PIV started and d10 given. Pts. BS now over 200. Pt. Is a&ox4. Pt. Has a recent diagnosis of renal failure.

## 2024-05-02 NOTE — ED NOTES
Pt. Currently eating frozen dinner and having gingerale with no swallowing difficulty. Pt. Remains a&ox4

## 2024-05-02 NOTE — ED NOTES
Date: 5/1/2024  Patient Name: Rahul Camarillo  MRN: [id number]  Birthdate 1940  Date of evaluation: 5/1/2024  Provider: Jhonathan Pisano MD       Chief Complaint:  Chief Complaint   Patient presents with    Blood Sugar Problem       HPI:   Rahul Camarillo is a 84 y.o. male who presents to the ED with complaint of hypoglycemia.  This is an insulin-dependent diabetic brought in by medics from a local living facility.  According to family he was not acting himself.  Blood sugar was on there and it was in the 40s.  He was given oral glucose, and subsequently intravenous dextrose.  Medics note that he is much more alert at this point.  The patient says he is feeling fine and has no current complaints.  He says that he has been having trouble swallowing.  He is seeing a specialist at [facility] and has been told to alter his diet, and so he thinks he has not been eating as much as usual.  He does take insulin for the diabetes, but is not able to elaborate on what types of insulin or of the dose.  He denies any fever, chest pain, shortness of breath, vomiting, diarrhea, urinary symptoms, or other complaints today.    Additional Historians: EMS      Review of Systems:  Other systems reviewed and are negative except as noted in the HPI.      Past Medical and Surgical History:  Past Medical History:   Diagnosis Date    Benign prostatic hyperplasia with urinary obstruction     CHF (congestive heart failure) [facility]     Dementia ([facility])     early onset    Diabetes ([facility])     Dysphagia     Hypersomnia     Hypertension     Nocturia     OAB (overactive bladder)     Parkinson's disease ([facility]) 02/09/2023    Parkinson's disease ([facility])     Testicular cancer ([facility])     TIA (transient ischemic attack)     Urge incontinence     Urinary incontinence, urge      Past Surgical History:   Procedure Laterality Date    CYST REMOVAL  1960-1970s    cyst removed from right breast    GASTROSTOMY TUBE PLACEMENT      OTHER SURGICAL HISTORY  01/2019

## 2024-05-02 NOTE — ED NOTES
I have reviewed discharge instructions with the patient and spouse.  The patient and spouse verbalized understanding.

## 2024-05-22 ENCOUNTER — HOSPITAL ENCOUNTER (EMERGENCY)
Age: 84
Discharge: HOME | End: 2024-05-22
Attending: EMERGENCY MEDICINE
Payer: MEDICARE

## 2024-05-22 VITALS
BODY MASS INDEX: 21.02 KG/M2 | WEIGHT: 155.2 LBS | DIASTOLIC BLOOD PRESSURE: 63 MMHG | HEIGHT: 72 IN | HEART RATE: 59 BPM | OXYGEN SATURATION: 93 % | RESPIRATION RATE: 16 BRPM | TEMPERATURE: 98.7 F | SYSTOLIC BLOOD PRESSURE: 163 MMHG

## 2024-05-22 DIAGNOSIS — E16.2 HYPOGLYCEMIA: Primary | ICD-10-CM

## 2024-05-22 DIAGNOSIS — N18.9 CHRONIC RENAL FAILURE, UNSPECIFIED CKD STAGE: ICD-10-CM

## 2024-05-22 LAB
ANION GAP SERPL CALC-SCNC: 8 MMOL/L (ref 3–18)
BASOPHILS # BLD: 0 K/UL (ref 0–0.1)
BASOPHILS NFR BLD: 1 % (ref 0–2)
BUN SERPL-MCNC: 46 MG/DL (ref 7–18)
BUN/CREAT SERPL: 16 (ref 12–20)
CA-I BLD-MCNC: 8.1 MG/DL (ref 8.5–10.1)
CHLORIDE SERPL-SCNC: 110 MMOL/L (ref 100–111)
CO2 SERPL-SCNC: 27 MMOL/L (ref 21–32)
CREAT SERPL-MCNC: 2.88 MG/DL (ref 0.6–1.3)
DIFFERENTIAL METHOD BLD: ABNORMAL
EOSINOPHIL # BLD: 0.3 K/UL (ref 0–0.4)
EOSINOPHIL NFR BLD: 4 % (ref 0–5)
ERYTHROCYTE [DISTWIDTH] IN BLOOD BY AUTOMATED COUNT: 14.5 % (ref 11.6–14.5)
GLUCOSE BLD STRIP.AUTO-MCNC: 110 MG/DL (ref 70–110)
GLUCOSE BLD STRIP.AUTO-MCNC: 124 MG/DL (ref 70–110)
GLUCOSE BLD STRIP.AUTO-MCNC: 69 MG/DL (ref 70–110)
GLUCOSE SERPL-MCNC: 71 MG/DL (ref 74–99)
HCT VFR BLD AUTO: 35.6 % (ref 36–48)
HGB BLD-MCNC: 11 G/DL (ref 13–16)
IMM GRANULOCYTES # BLD AUTO: 0 K/UL (ref 0–0.04)
IMM GRANULOCYTES NFR BLD AUTO: 0 % (ref 0–0.5)
LYMPHOCYTES # BLD: 1.2 K/UL (ref 0.9–3.6)
LYMPHOCYTES NFR BLD: 16 % (ref 21–52)
MCH RBC QN AUTO: 30.7 PG (ref 24–34)
MCHC RBC AUTO-ENTMCNC: 30.9 G/DL (ref 31–37)
MCV RBC AUTO: 99.4 FL (ref 78–100)
MONOCYTES # BLD: 0.8 K/UL (ref 0.05–1.2)
MONOCYTES NFR BLD: 10 % (ref 3–10)
NEUTS SEG # BLD: 5.2 K/UL (ref 1.8–8)
NEUTS SEG NFR BLD: 69 % (ref 40–73)
NRBC # BLD: 0 K/UL (ref 0–0.01)
NRBC BLD-RTO: 0 PER 100 WBC
PERFORMED BY:: ABNORMAL
PERFORMED BY:: ABNORMAL
PERFORMED BY:: NORMAL
PLATELET # BLD AUTO: 181 K/UL (ref 135–420)
PMV BLD AUTO: 11 FL (ref 9.2–11.8)
POTASSIUM SERPL-SCNC: 4 MMOL/L (ref 3.5–5.5)
RBC # BLD AUTO: 3.58 M/UL (ref 4.35–5.65)
SODIUM SERPL-SCNC: 145 MMOL/L (ref 136–145)
WBC # BLD AUTO: 7.6 K/UL (ref 4.6–13.2)

## 2024-05-22 PROCEDURE — 80048 BASIC METABOLIC PNL TOTAL CA: CPT

## 2024-05-22 PROCEDURE — 85025 COMPLETE CBC W/AUTO DIFF WBC: CPT

## 2024-05-22 PROCEDURE — 82962 GLUCOSE BLOOD TEST: CPT

## 2024-05-22 PROCEDURE — 99284 EMERGENCY DEPT VISIT MOD MDM: CPT

## 2024-05-22 PROCEDURE — 2580000003 HC RX 258: Performed by: EMERGENCY MEDICINE

## 2024-05-22 RX ADMIN — DEXTROSE MONOHYDRATE 125 ML: 100 INJECTION, SOLUTION INTRAVENOUS at 04:37

## 2024-05-22 ASSESSMENT — PAIN - FUNCTIONAL ASSESSMENT: PAIN_FUNCTIONAL_ASSESSMENT: 0-10

## 2024-05-22 ASSESSMENT — LIFESTYLE VARIABLES
HOW OFTEN DO YOU HAVE A DRINK CONTAINING ALCOHOL: NEVER
HOW MANY STANDARD DRINKS CONTAINING ALCOHOL DO YOU HAVE ON A TYPICAL DAY: PATIENT DOES NOT DRINK

## 2024-05-22 ASSESSMENT — PAIN SCALES - GENERAL: PAINLEVEL_OUTOF10: 0

## 2024-05-22 NOTE — DISCHARGE INSTRUCTIONS
Call your endocrinologist to discuss decreasing your insulin as we discussed, this morning  Return for pain, fever not resolving with motrin or tylenol, shortness of breath, vomiting, decreased fluid intake, weakness, numbness, dizziness, or any change or concerns.

## 2024-05-22 NOTE — ED NOTES
I have reviewed discharge instructions with the patient and spouse.  The patient and spouse verbalized understanding. Encouraged to return to ER with any other concerns or emergent care needed. Patient escorted to waiting room with steady gait and no distress noted.

## 2024-05-22 NOTE — ED PROVIDER NOTES
[facility] EMERGENCY DEPT  EMERGENCY DEPARTMENT ENCOUNTER      Pt Name: Rahul Camarillo  MRN: [id number]  Birthdate 1940  Date of evaluation: 5/22/2024  Provider: Maycol Cross MD    CHIEF COMPLAINT       Chief Complaint   Patient presents with    Blood Sugar Problem       HPI:  Rahul Caamrillo is a 84 y.o. male who presents to the emergency department pt here w recent h/o hypoglycemia 3 weeks ago, pt/spouse states oral agent stopped after but insulin was increased.  Nl po intake yest, but felt like glucose dropping this am, fatigued, sweaty, gluc meter read 39, ate, now feels better.       HPI    Nursing Notes were reviewed.    REVIEW OF SYSTEMS    (2-9 systems for level 4, 10 or more for level 5)     Review of Systems    Except as noted above the remainder of the review of systems was reviewed and negative.       PAST MEDICAL HISTORY     Past Medical History:   Diagnosis Date    Benign prostatic hyperplasia with urinary obstruction     CHF (congestive heart failure) (HCC)     Dementia (HCC)     early onset    Diabetes (HCC)     Dysphagia     Hypersomnia     Hypertension     Nocturia     OAB (overactive bladder)     Parkinson's disease ([facility]) 02/09/2023    Parkinson's disease (HCC)     Testicular cancer (HCC)     TIA (transient ischemic attack)     Urge incontinence     Urinary incontinence, urge          SURGICAL HISTORY       Past Surgical History:   Procedure Laterality Date    CYST REMOVAL  1960-1970s    cyst removed from right breast    GASTROSTOMY TUBE PLACEMENT      OTHER SURGICAL HISTORY  01/2019    Heart Blockage: 30% [facility]: Dr. Arias Shaver    OTHER SURGICAL HISTORY      reemoval of testcle    OTHER SURGICAL HISTORY  08/2018    Surgery: Blockage Lower bowels, [facility]    UROLOGICAL SURGERY      Testicles removed         CURRENT MEDICATIONS       Discharge Medication List as of 5/22/2024  6:29 AM        CONTINUE these medications which have NOT CHANGED    Details   vitamin B-12

## 2024-05-22 NOTE — ED TRIAGE NOTES
Patient ambulatory to room with steady gait. Patient wife states his blood sugar has been up and down. Patient CGM read in the 30's and his glucose meter reading was 375. Patient reports he has been sweating. Patient wife states she gave him 2 pieces of candy and he ate a banana. Wife states his blood glucose around 8 pm was 350 which required him to get 15 units of his Humalog 75/25.

## 2024-05-28 ENCOUNTER — HOSPITAL ENCOUNTER (OUTPATIENT)
Age: 84
Discharge: HOME | End: 2024-05-31
Payer: MEDICARE

## 2024-05-28 ENCOUNTER — TRANSCRIBE ORDERS (OUTPATIENT)
Age: 84
End: 2024-05-28

## 2024-05-28 DIAGNOSIS — N18.4 CHRONIC KIDNEY DISEASE, STAGE IV (SEVERE) (HCC): ICD-10-CM

## 2024-05-28 DIAGNOSIS — N17.9 ACUTE RENAL FAILURE, UNSPECIFIED ACUTE RENAL FAILURE TYPE (HCC): ICD-10-CM

## 2024-05-28 DIAGNOSIS — I10 ESSENTIAL HYPERTENSION, MALIGNANT: Primary | ICD-10-CM

## 2024-05-28 DIAGNOSIS — I10 ESSENTIAL HYPERTENSION, MALIGNANT: ICD-10-CM

## 2024-05-28 LAB
ALBUMIN SERPL-MCNC: 3.2 G/DL (ref 3.4–5)
ANION GAP SERPL CALC-SCNC: 7 MMOL/L (ref 3–18)
BASOPHILS # BLD: 0 K/UL (ref 0–0.1)
BASOPHILS NFR BLD: 1 % (ref 0–2)
BUN SERPL-MCNC: 37 MG/DL (ref 7–18)
BUN/CREAT SERPL: 14 (ref 12–20)
CA-I BLD-MCNC: 8.6 MG/DL (ref 8.5–10.1)
CA-I BLD-MCNC: 8.8 MG/DL (ref 8.5–10.1)
CHLORIDE SERPL-SCNC: 108 MMOL/L (ref 100–111)
CO2 SERPL-SCNC: 27 MMOL/L (ref 21–32)
CREAT SERPL-MCNC: 2.66 MG/DL (ref 0.6–1.3)
CREAT UR-MCNC: 100 MG/DL (ref 30–125)
CREAT UR-MCNC: 99 MG/DL (ref 30–125)
DIFFERENTIAL METHOD BLD: ABNORMAL
EOSINOPHIL # BLD: 0.3 K/UL (ref 0–0.4)
EOSINOPHIL NFR BLD: 5 % (ref 0–5)
ERYTHROCYTE [DISTWIDTH] IN BLOOD BY AUTOMATED COUNT: 14.7 % (ref 11.6–14.5)
GLUCOSE SERPL-MCNC: 195 MG/DL (ref 74–99)
HCT VFR BLD AUTO: 39.2 % (ref 36–48)
HGB BLD-MCNC: 11.9 G/DL (ref 13–16)
IMM GRANULOCYTES # BLD AUTO: 0 K/UL (ref 0–0.04)
IMM GRANULOCYTES NFR BLD AUTO: 0 % (ref 0–0.5)
LYMPHOCYTES # BLD: 1.1 K/UL (ref 0.9–3.6)
LYMPHOCYTES NFR BLD: 19 % (ref 21–52)
MAGNESIUM SERPL-MCNC: 2.4 MG/DL (ref 1.6–2.6)
MCH RBC QN AUTO: 30.6 PG (ref 24–34)
MCHC RBC AUTO-ENTMCNC: 30.4 G/DL (ref 31–37)
MCV RBC AUTO: 100.8 FL (ref 78–100)
MICROALBUMIN UR-MCNC: 2.51 MG/DL (ref 0–3)
MICROALBUMIN/CREAT UR-RTO: 25 MGMALB/GCRE (ref 0–30)
MONOCYTES # BLD: 0.6 K/UL (ref 0.05–1.2)
MONOCYTES NFR BLD: 10 % (ref 3–10)
NEUTS SEG # BLD: 3.7 K/UL (ref 1.8–8)
NEUTS SEG NFR BLD: 65 % (ref 40–73)
NRBC # BLD: 0 K/UL (ref 0–0.01)
NRBC BLD-RTO: 0 PER 100 WBC
PHOSPHATE SERPL-MCNC: 3.2 MG/DL (ref 2.5–4.9)
PLATELET # BLD AUTO: 221 K/UL (ref 135–420)
PMV BLD AUTO: 11.1 FL (ref 9.2–11.8)
POTASSIUM SERPL-SCNC: 4.7 MMOL/L (ref 3.5–5.5)
PROT UR-MCNC: 21 MG/DL
PROT/CREAT UR-RTO: 0.2
PTH-INTACT SERPL-MCNC: 204.6 PG/ML (ref 18.4–88)
RBC # BLD AUTO: 3.89 M/UL (ref 4.35–5.65)
SODIUM SERPL-SCNC: 142 MMOL/L (ref 136–145)
WBC # BLD AUTO: 5.7 K/UL (ref 4.6–13.2)

## 2024-05-28 PROCEDURE — 82043 UR ALBUMIN QUANTITATIVE: CPT

## 2024-05-28 PROCEDURE — 82570 ASSAY OF URINE CREATININE: CPT

## 2024-05-28 PROCEDURE — 85025 COMPLETE CBC W/AUTO DIFF WBC: CPT

## 2024-05-28 PROCEDURE — 84156 ASSAY OF PROTEIN URINE: CPT

## 2024-05-28 PROCEDURE — 83735 ASSAY OF MAGNESIUM: CPT

## 2024-05-28 PROCEDURE — 83970 ASSAY OF PARATHORMONE: CPT

## 2024-05-28 PROCEDURE — 36415 COLL VENOUS BLD VENIPUNCTURE: CPT

## 2024-05-28 PROCEDURE — 80069 RENAL FUNCTION PANEL: CPT

## 2024-08-12 ENCOUNTER — HOSPITAL ENCOUNTER (OUTPATIENT)
Age: 84
Discharge: HOME | End: 2024-08-15
Payer: MEDICARE

## 2024-08-12 DIAGNOSIS — I50.9 HEART FAILURE, UNSPECIFIED HF CHRONICITY, UNSPECIFIED HEART FAILURE TYPE (HCC): ICD-10-CM

## 2024-08-12 DIAGNOSIS — N18.4 CHRONIC KIDNEY DISEASE, STAGE IV (SEVERE) (HCC): ICD-10-CM

## 2024-08-12 DIAGNOSIS — I10 HYPERTENSION, ESSENTIAL: ICD-10-CM

## 2024-08-12 DIAGNOSIS — E03.9 HYPOTHYROIDISM, UNSPECIFIED TYPE: ICD-10-CM

## 2024-08-12 DIAGNOSIS — D50.9 IRON DEFICIENCY ANEMIA, UNSPECIFIED IRON DEFICIENCY ANEMIA TYPE: ICD-10-CM

## 2024-08-12 DIAGNOSIS — D64.9 ANEMIA, UNSPECIFIED TYPE: ICD-10-CM

## 2024-08-12 DIAGNOSIS — N39.0 URINARY TRACT INFECTION WITHOUT HEMATURIA, SITE UNSPECIFIED: ICD-10-CM

## 2024-08-12 DIAGNOSIS — E11.21 DIABETIC GLOMERULOPATHY (HCC): ICD-10-CM

## 2024-08-12 DIAGNOSIS — E78.00 PURE HYPERCHOLESTEROLEMIA: ICD-10-CM

## 2024-08-12 LAB
ALBUMIN SERPL-MCNC: 3.2 G/DL (ref 3.4–5)
ALBUMIN SERPL-MCNC: 3.3 G/DL (ref 3.4–5)
ALBUMIN/GLOB SERPL: 0.9 (ref 0.8–1.7)
ALP SERPL-CCNC: 87 U/L (ref 45–117)
ALT SERPL-CCNC: 12 U/L (ref 16–61)
ANION GAP SERPL CALC-SCNC: 7 MMOL/L (ref 3–18)
ANION GAP SERPL CALC-SCNC: 7 MMOL/L (ref 3–18)
APPEARANCE UR: ABNORMAL
APPEARANCE UR: ABNORMAL
AST SERPL W P-5'-P-CCNC: 9 U/L (ref 10–38)
BACTERIA URNS QL MICRO: NEGATIVE /HPF
BACTERIA URNS QL MICRO: NEGATIVE /HPF
BASOPHILS # BLD: 0 K/UL (ref 0–0.1)
BASOPHILS NFR BLD: 1 % (ref 0–2)
BILIRUB SERPL-MCNC: 0.5 MG/DL (ref 0.2–1)
BILIRUB UR QL: NEGATIVE
BILIRUB UR QL: NEGATIVE
BUN SERPL-MCNC: 46 MG/DL (ref 7–18)
BUN SERPL-MCNC: 47 MG/DL (ref 7–18)
BUN/CREAT SERPL: 16 (ref 12–20)
BUN/CREAT SERPL: 16 (ref 12–20)
CA-I BLD-MCNC: 8.8 MG/DL (ref 8.5–10.1)
CA-I BLD-MCNC: 9 MG/DL (ref 8.5–10.1)
CHLORIDE SERPL-SCNC: 105 MMOL/L (ref 100–111)
CHLORIDE SERPL-SCNC: 106 MMOL/L (ref 100–111)
CO2 SERPL-SCNC: 29 MMOL/L (ref 21–32)
CO2 SERPL-SCNC: 29 MMOL/L (ref 21–32)
COLOR UR: YELLOW
COLOR UR: YELLOW
CREAT SERPL-MCNC: 2.84 MG/DL (ref 0.6–1.3)
CREAT SERPL-MCNC: 2.92 MG/DL (ref 0.6–1.3)
DIFFERENTIAL METHOD BLD: ABNORMAL
EOSINOPHIL # BLD: 0.3 K/UL (ref 0–0.4)
EOSINOPHIL NFR BLD: 4 % (ref 0–5)
ERYTHROCYTE [DISTWIDTH] IN BLOOD BY AUTOMATED COUNT: 14.7 % (ref 11.6–14.5)
ERYTHROCYTE [DISTWIDTH] IN BLOOD BY AUTOMATED COUNT: 14.7 % (ref 11.6–14.5)
GLOBULIN SER CALC-MCNC: 3.7 G/DL (ref 2–4)
GLUCOSE SERPL-MCNC: 139 MG/DL (ref 74–99)
GLUCOSE SERPL-MCNC: 139 MG/DL (ref 74–99)
GLUCOSE UR STRIP.AUTO-MCNC: NEGATIVE MG/DL
GLUCOSE UR STRIP.AUTO-MCNC: NEGATIVE MG/DL
HCT VFR BLD AUTO: 41.6 % (ref 36–48)
HCT VFR BLD AUTO: 42.1 % (ref 36–48)
HGB BLD-MCNC: 12.8 G/DL (ref 13–16)
HGB BLD-MCNC: 12.9 G/DL (ref 13–16)
HGB UR QL STRIP: ABNORMAL
HGB UR QL STRIP: ABNORMAL
IMM GRANULOCYTES # BLD AUTO: 0 K/UL (ref 0–0.04)
IMM GRANULOCYTES NFR BLD AUTO: 0 % (ref 0–0.5)
KETONES UR QL STRIP.AUTO: NEGATIVE MG/DL
KETONES UR QL STRIP.AUTO: NEGATIVE MG/DL
LEUKOCYTE ESTERASE UR QL STRIP.AUTO: ABNORMAL
LEUKOCYTE ESTERASE UR QL STRIP.AUTO: ABNORMAL
LYMPHOCYTES # BLD: 0.8 K/UL (ref 0.9–3.6)
LYMPHOCYTES NFR BLD: 13 % (ref 21–52)
MAGNESIUM SERPL-MCNC: 2.8 MG/DL (ref 1.6–2.6)
MCH RBC QN AUTO: 29.1 PG (ref 24–34)
MCH RBC QN AUTO: 29.2 PG (ref 24–34)
MCHC RBC AUTO-ENTMCNC: 30.6 G/DL (ref 31–37)
MCHC RBC AUTO-ENTMCNC: 30.8 G/DL (ref 31–37)
MCV RBC AUTO: 95 FL (ref 78–100)
MCV RBC AUTO: 95 FL (ref 78–100)
MONOCYTES # BLD: 0.5 K/UL (ref 0.05–1.2)
MONOCYTES NFR BLD: 8 % (ref 3–10)
NEUTS SEG # BLD: 4.7 K/UL (ref 1.8–8)
NEUTS SEG NFR BLD: 74 % (ref 40–73)
NITRITE UR QL STRIP.AUTO: NEGATIVE
NITRITE UR QL STRIP.AUTO: NEGATIVE
NRBC # BLD: 0 K/UL (ref 0–0.01)
NRBC # BLD: 0 K/UL (ref 0–0.01)
NRBC BLD-RTO: 0 PER 100 WBC
NRBC BLD-RTO: 0 PER 100 WBC
PH UR STRIP: 7 (ref 5–8)
PH UR STRIP: 7 (ref 5–8)
PHOSPHATE SERPL-MCNC: 3.9 MG/DL (ref 2.5–4.9)
PLATELET # BLD AUTO: 146 K/UL (ref 135–420)
PLATELET # BLD AUTO: 146 K/UL (ref 135–420)
PMV BLD AUTO: 12.7 FL (ref 9.2–11.8)
PMV BLD AUTO: 12.8 FL (ref 9.2–11.8)
POTASSIUM SERPL-SCNC: 4.9 MMOL/L (ref 3.5–5.5)
POTASSIUM SERPL-SCNC: 5 MMOL/L (ref 3.5–5.5)
PROT SERPL-MCNC: 6.9 G/DL (ref 6.4–8.2)
PROT UR STRIP-MCNC: 30 MG/DL
PROT UR STRIP-MCNC: 30 MG/DL
RBC # BLD AUTO: 4.38 M/UL (ref 4.35–5.65)
RBC # BLD AUTO: 4.43 M/UL (ref 4.35–5.65)
RBC #/AREA URNS HPF: ABNORMAL /HPF (ref 0–2)
RBC #/AREA URNS HPF: ABNORMAL /HPF (ref 0–2)
SODIUM SERPL-SCNC: 141 MMOL/L (ref 136–145)
SODIUM SERPL-SCNC: 142 MMOL/L (ref 136–145)
SP GR UR REFRACTOMETRY: 1.01 (ref 1–1.03)
SP GR UR REFRACTOMETRY: 1.01 (ref 1–1.03)
TSH SERPL DL<=0.05 MIU/L-ACNC: 2.49 UIU/ML (ref 0.36–3.74)
UROBILINOGEN UR QL STRIP.AUTO: 0.2 EU/DL (ref 0.2–1)
UROBILINOGEN UR QL STRIP.AUTO: 0.2 EU/DL (ref 0.2–1)
WBC # BLD AUTO: 6.3 K/UL (ref 4.6–13.2)
WBC # BLD AUTO: 6.3 K/UL (ref 4.6–13.2)
WBC URNS QL MICRO: >100 /HPF (ref 0–4)
WBC URNS QL MICRO: >100 /HPF (ref 0–4)

## 2024-08-12 PROCEDURE — 83036 HEMOGLOBIN GLYCOSYLATED A1C: CPT

## 2024-08-12 PROCEDURE — 83550 IRON BINDING TEST: CPT

## 2024-08-12 PROCEDURE — 85025 COMPLETE CBC W/AUTO DIFF WBC: CPT

## 2024-08-12 PROCEDURE — 82043 UR ALBUMIN QUANTITATIVE: CPT

## 2024-08-12 PROCEDURE — 82570 ASSAY OF URINE CREATININE: CPT

## 2024-08-12 PROCEDURE — 87086 URINE CULTURE/COLONY COUNT: CPT

## 2024-08-12 PROCEDURE — 82746 ASSAY OF FOLIC ACID SERUM: CPT

## 2024-08-12 PROCEDURE — 81001 URINALYSIS AUTO W/SCOPE: CPT

## 2024-08-12 PROCEDURE — 80069 RENAL FUNCTION PANEL: CPT

## 2024-08-12 PROCEDURE — 83735 ASSAY OF MAGNESIUM: CPT

## 2024-08-12 PROCEDURE — 82607 VITAMIN B-12: CPT

## 2024-08-12 PROCEDURE — 80061 LIPID PANEL: CPT

## 2024-08-12 PROCEDURE — 83540 ASSAY OF IRON: CPT

## 2024-08-12 PROCEDURE — 80053 COMPREHEN METABOLIC PANEL: CPT

## 2024-08-12 PROCEDURE — 84443 ASSAY THYROID STIM HORMONE: CPT

## 2024-08-12 PROCEDURE — 84439 ASSAY OF FREE THYROXINE: CPT

## 2024-08-12 PROCEDURE — 87077 CULTURE AEROBIC IDENTIFY: CPT

## 2024-08-12 PROCEDURE — 87186 SC STD MICRODIL/AGAR DIL: CPT

## 2024-08-12 PROCEDURE — 36415 COLL VENOUS BLD VENIPUNCTURE: CPT

## 2024-08-12 PROCEDURE — 83970 ASSAY OF PARATHORMONE: CPT

## 2024-08-12 PROCEDURE — 85027 COMPLETE CBC AUTOMATED: CPT

## 2024-08-13 LAB
CA-I BLD-MCNC: 8.5 MG/DL (ref 8.5–10.1)
CHOLEST SERPL-MCNC: 135 MG/DL
CREAT UR-MCNC: 94 MG/DL (ref 30–125)
CREAT UR-MCNC: 94 MG/DL (ref 30–125)
EST. AVERAGE GLUCOSE BLD GHB EST-MCNC: 157 MG/DL
FOLATE SERPL-MCNC: >20 NG/ML (ref 3.1–17.5)
HBA1C MFR BLD: 7.1 % (ref 4.2–5.6)
HDLC SERPL-MCNC: 71 MG/DL (ref 40–60)
HDLC SERPL: 1.9 (ref 0–5)
IRON SERPL-MCNC: 64 UG/DL (ref 50–175)
LDLC SERPL CALC-MCNC: 56.4 MG/DL (ref 0–100)
LIPID PANEL: ABNORMAL
MICROALBUMIN UR-MCNC: 16.3 MG/DL (ref 0–3)
MICROALBUMIN/CREAT UR-RTO: 173 MGMALB/GCRE (ref 0–30)
PTH-INTACT SERPL-MCNC: 115.4 PG/ML (ref 18.4–88)
T4 FREE SERPL-MCNC: 1.4 NG/DL (ref 0.7–1.5)
TRIGL SERPL-MCNC: 38 MG/DL
VIT B12 SERPL-MCNC: 1427 PG/ML (ref 211–911)
VLDLC SERPL CALC-MCNC: 7.6 MG/DL

## 2024-08-15 LAB
BACTERIA SPEC CULT: ABNORMAL
COLONY COUNT, CNT: ABNORMAL
Lab: ABNORMAL

## 2024-09-20 ENCOUNTER — HOSPITAL ENCOUNTER (OUTPATIENT)
Age: 84
Discharge: HOME | End: 2024-09-23
Payer: MEDICARE

## 2024-09-20 DIAGNOSIS — I10 HYPERTENSION, ESSENTIAL: ICD-10-CM

## 2024-09-20 DIAGNOSIS — N18.4 CHRONIC KIDNEY DISEASE, STAGE IV (SEVERE) (HCC): ICD-10-CM

## 2024-09-20 DIAGNOSIS — I50.9 CONGESTIVE HEART FAILURE, UNSPECIFIED HF CHRONICITY, UNSPECIFIED HEART FAILURE TYPE (HCC): ICD-10-CM

## 2024-09-20 LAB
ALBUMIN SERPL-MCNC: 3.2 G/DL (ref 3.4–5)
ANION GAP SERPL CALC-SCNC: 4 MMOL/L (ref 3–18)
APPEARANCE UR: CLEAR
BACTERIA URNS QL MICRO: NEGATIVE /HPF
BILIRUB UR QL: NEGATIVE
BUN SERPL-MCNC: 52 MG/DL (ref 7–18)
BUN/CREAT SERPL: 20 (ref 12–20)
CA-I BLD-MCNC: 8.7 MG/DL (ref 8.5–10.1)
CA-I BLD-MCNC: 8.8 MG/DL (ref 8.5–10.1)
CHLORIDE SERPL-SCNC: 106 MMOL/L (ref 100–111)
CO2 SERPL-SCNC: 30 MMOL/L (ref 21–32)
COLOR UR: YELLOW
CREAT SERPL-MCNC: 2.58 MG/DL (ref 0.6–1.3)
CREAT UR-MCNC: 73 MG/DL (ref 30–125)
CREAT UR-MCNC: 74 MG/DL (ref 30–125)
EPITH CASTS URNS QL MICRO: ABNORMAL /LPF (ref 0–20)
ERYTHROCYTE [DISTWIDTH] IN BLOOD BY AUTOMATED COUNT: 14.4 % (ref 11.6–14.5)
FOLATE SERPL-MCNC: 17.5 NG/ML (ref 3.1–17.5)
GLUCOSE SERPL-MCNC: 166 MG/DL (ref 74–99)
GLUCOSE UR STRIP.AUTO-MCNC: NEGATIVE MG/DL
HCT VFR BLD AUTO: 37.7 % (ref 36–48)
HGB BLD-MCNC: 11.8 G/DL (ref 13–16)
HGB UR QL STRIP: NEGATIVE
KETONES UR QL STRIP.AUTO: NEGATIVE MG/DL
LEUKOCYTE ESTERASE UR QL STRIP.AUTO: NEGATIVE
MAGNESIUM SERPL-MCNC: 2.4 MG/DL (ref 1.6–2.6)
MCH RBC QN AUTO: 29.5 PG (ref 24–34)
MCHC RBC AUTO-ENTMCNC: 31.3 G/DL (ref 31–37)
MCV RBC AUTO: 94.3 FL (ref 78–100)
MICROALBUMIN UR-MCNC: 2.1 MG/DL (ref 0–3)
MICROALBUMIN/CREAT UR-RTO: 28 MGMALB/GCRE (ref 0–30)
NITRITE UR QL STRIP.AUTO: NEGATIVE
NRBC # BLD: 0 K/UL (ref 0–0.01)
NRBC BLD-RTO: 0 PER 100 WBC
PH UR STRIP: 7.5 (ref 5–8)
PHOSPHATE SERPL-MCNC: 3.6 MG/DL (ref 2.5–4.9)
PLATELET # BLD AUTO: 112 K/UL (ref 135–420)
PMV BLD AUTO: 12.9 FL (ref 9.2–11.8)
POTASSIUM SERPL-SCNC: 4.7 MMOL/L (ref 3.5–5.5)
PROT UR STRIP-MCNC: ABNORMAL MG/DL
PTH-INTACT SERPL-MCNC: 96.6 PG/ML (ref 18.4–88)
RBC # BLD AUTO: 4 M/UL (ref 4.35–5.65)
RBC #/AREA URNS HPF: ABNORMAL /HPF (ref 0–2)
SODIUM SERPL-SCNC: 140 MMOL/L (ref 136–145)
SP GR UR REFRACTOMETRY: 1.01 (ref 1–1.03)
UROBILINOGEN UR QL STRIP.AUTO: 0.2 EU/DL (ref 0.2–1)
WBC # BLD AUTO: 5.4 K/UL (ref 4.6–13.2)
WBC URNS QL MICRO: ABNORMAL /HPF (ref 0–4)

## 2024-09-20 PROCEDURE — 85027 COMPLETE CBC AUTOMATED: CPT

## 2024-09-20 PROCEDURE — 82746 ASSAY OF FOLIC ACID SERUM: CPT

## 2024-09-20 PROCEDURE — 83735 ASSAY OF MAGNESIUM: CPT

## 2024-09-20 PROCEDURE — 83970 ASSAY OF PARATHORMONE: CPT

## 2024-09-20 PROCEDURE — 36415 COLL VENOUS BLD VENIPUNCTURE: CPT

## 2024-09-20 PROCEDURE — 80069 RENAL FUNCTION PANEL: CPT

## 2024-09-20 PROCEDURE — 82570 ASSAY OF URINE CREATININE: CPT

## 2024-09-20 PROCEDURE — 81001 URINALYSIS AUTO W/SCOPE: CPT

## 2024-09-20 PROCEDURE — 82043 UR ALBUMIN QUANTITATIVE: CPT

## 2024-10-12 ENCOUNTER — APPOINTMENT (OUTPATIENT)
Age: 84
DRG: 177 | End: 2024-10-12
Payer: MEDICARE

## 2024-10-12 ENCOUNTER — HOSPITAL ENCOUNTER (INPATIENT)
Age: 84
LOS: 2 days | Discharge: HOME | DRG: 177 | End: 2024-10-14
Attending: EMERGENCY MEDICINE | Admitting: INTERNAL MEDICINE
Payer: MEDICARE

## 2024-10-12 DIAGNOSIS — R06.02 SHORTNESS OF BREATH: ICD-10-CM

## 2024-10-12 DIAGNOSIS — I50.23 ACUTE ON CHRONIC SYSTOLIC CONGESTIVE HEART FAILURE (HCC): ICD-10-CM

## 2024-10-12 DIAGNOSIS — J90 PLEURAL EFFUSION: ICD-10-CM

## 2024-10-12 DIAGNOSIS — R06.03 ACUTE RESPIRATORY DISTRESS: Primary | ICD-10-CM

## 2024-10-12 DIAGNOSIS — I50.9 ACUTE ON CHRONIC CONGESTIVE HEART FAILURE, UNSPECIFIED HEART FAILURE TYPE (HCC): ICD-10-CM

## 2024-10-12 PROBLEM — J96.01 ACUTE RESPIRATORY FAILURE WITH HYPOXIA: Status: ACTIVE | Noted: 2024-10-12

## 2024-10-12 LAB
ALBUMIN SERPL-MCNC: 3.2 G/DL (ref 3.4–5)
ALBUMIN/GLOB SERPL: 0.8 (ref 0.8–1.7)
ALP SERPL-CCNC: 106 U/L (ref 45–117)
ALT SERPL-CCNC: 22 U/L (ref 16–61)
ANION GAP SERPL CALC-SCNC: 10 MMOL/L (ref 3–18)
ARTERIAL PATENCY WRIST A: YES
AST SERPL W P-5'-P-CCNC: 37 U/L (ref 10–38)
BASE DEFICIT BLDA-SCNC: 4 MMOL/L
BASOPHILS # BLD: 0.1 K/UL (ref 0–0.1)
BASOPHILS NFR BLD: 0 % (ref 0–2)
BDY SITE: ABNORMAL
BILIRUB SERPL-MCNC: 0.7 MG/DL (ref 0.2–1)
BNP SERPL-MCNC: ABNORMAL PG/ML (ref 0–1800)
BUN SERPL-MCNC: 29 MG/DL (ref 7–18)
BUN/CREAT SERPL: 12 (ref 12–20)
CA-I BLD-MCNC: 8.5 MG/DL (ref 8.5–10.1)
CHLORIDE SERPL-SCNC: 108 MMOL/L (ref 100–111)
CO2 SERPL-SCNC: 24 MMOL/L (ref 21–32)
COHGB MFR BLD: 0.7 % (ref 1–2)
CREAT SERPL-MCNC: 2.37 MG/DL (ref 0.6–1.3)
DIFFERENTIAL METHOD BLD: ABNORMAL
EOSINOPHIL # BLD: 0.3 K/UL (ref 0–0.4)
EOSINOPHIL NFR BLD: 3 % (ref 0–5)
ERYTHROCYTE [DISTWIDTH] IN BLOOD BY AUTOMATED COUNT: 15.5 % (ref 11.6–14.5)
FIO2 ON VENT: 100 %
FLUAV RNA SPEC QL NAA+PROBE: NOT DETECTED
FLUBV RNA SPEC QL NAA+PROBE: NOT DETECTED
GLOBULIN SER CALC-MCNC: 4.1 G/DL (ref 2–4)
GLUCOSE BLD STRIP.AUTO-MCNC: 170 MG/DL (ref 70–110)
GLUCOSE BLD STRIP.AUTO-MCNC: 205 MG/DL (ref 70–110)
GLUCOSE SERPL-MCNC: 260 MG/DL (ref 74–99)
HCO3 BLDA-SCNC: 23 MMOL/L (ref 22–26)
HCT VFR BLD AUTO: 45.3 % (ref 36–48)
HGB BLD-MCNC: 14.1 G/DL (ref 13–16)
IMM GRANULOCYTES # BLD AUTO: 0 K/UL (ref 0–0.04)
IMM GRANULOCYTES NFR BLD AUTO: 0 % (ref 0–0.5)
LYMPHOCYTES # BLD: 1.6 K/UL (ref 0.9–3.6)
LYMPHOCYTES NFR BLD: 14 % (ref 21–52)
MAGNESIUM SERPL-MCNC: 2.4 MG/DL (ref 1.6–2.6)
MCH RBC QN AUTO: 30.7 PG (ref 24–34)
MCHC RBC AUTO-ENTMCNC: 31.1 G/DL (ref 31–37)
MCV RBC AUTO: 98.5 FL (ref 78–100)
METHGB MFR BLD: 0.3 % (ref 0–1.4)
MONOCYTES # BLD: 0.7 K/UL (ref 0.05–1.2)
MONOCYTES NFR BLD: 6 % (ref 3–10)
NEUTS SEG # BLD: 9.5 K/UL (ref 1.8–8)
NEUTS SEG NFR BLD: 77 % (ref 40–73)
NRBC # BLD: 0 K/UL (ref 0–0.01)
NRBC BLD-RTO: 0 PER 100 WBC
OXYHGB MFR BLD: 93.7 % (ref 95–99)
PCO2 BLDA: 47 MMHG (ref 35–45)
PERFORMED BY:: ABNORMAL
PH BLDA: 7.3 (ref 7.35–7.45)
PLATELET # BLD AUTO: 165 K/UL (ref 135–420)
PMV BLD AUTO: 12.7 FL (ref 9.2–11.8)
PO2 BLDA: 81 MMHG (ref 80–100)
POTASSIUM SERPL-SCNC: 4.8 MMOL/L (ref 3.5–5.5)
PROT SERPL-MCNC: 7.3 G/DL (ref 6.4–8.2)
RBC # BLD AUTO: 4.6 M/UL (ref 4.35–5.65)
SAO2 % BLD: 95 % (ref 95–99)
SAO2% DEVICE SAO2% SENSOR NAME: ABNORMAL
SARS-COV-2 RNA RESP QL NAA+PROBE: DETECTED
SODIUM SERPL-SCNC: 142 MMOL/L (ref 136–145)
SPECIMEN SITE: ABNORMAL
TROPONIN I SERPL HS-MCNC: 207 NG/L (ref 0–78)
TROPONIN I SERPL HS-MCNC: 22 NG/L (ref 0–78)
TROPONIN I SERPL HS-MCNC: 83 NG/L (ref 0–78)
WBC # BLD AUTO: 12.2 K/UL (ref 4.6–13.2)

## 2024-10-12 PROCEDURE — 94640 AIRWAY INHALATION TREATMENT: CPT

## 2024-10-12 PROCEDURE — 2580000003 HC RX 258: Performed by: EMERGENCY MEDICINE

## 2024-10-12 PROCEDURE — 93005 ELECTROCARDIOGRAM TRACING: CPT | Performed by: EMERGENCY MEDICINE

## 2024-10-12 PROCEDURE — 71045 X-RAY EXAM CHEST 1 VIEW: CPT

## 2024-10-12 PROCEDURE — 96374 THER/PROPH/DIAG INJ IV PUSH: CPT

## 2024-10-12 PROCEDURE — 6370000000 HC RX 637 (ALT 250 FOR IP): Performed by: EMERGENCY MEDICINE

## 2024-10-12 PROCEDURE — 6370000000 HC RX 637 (ALT 250 FOR IP): Performed by: NURSE PRACTITIONER

## 2024-10-12 PROCEDURE — 2700000000 HC OXYGEN THERAPY PER DAY

## 2024-10-12 PROCEDURE — 36600 WITHDRAWAL OF ARTERIAL BLOOD: CPT

## 2024-10-12 PROCEDURE — 87636 SARSCOV2 & INF A&B AMP PRB: CPT

## 2024-10-12 PROCEDURE — 82803 BLOOD GASES ANY COMBINATION: CPT

## 2024-10-12 PROCEDURE — 2000000000 HC ICU R&B

## 2024-10-12 PROCEDURE — 99285 EMERGENCY DEPT VISIT HI MDM: CPT

## 2024-10-12 PROCEDURE — 6360000002 HC RX W HCPCS: Performed by: NURSE PRACTITIONER

## 2024-10-12 PROCEDURE — 2580000003 HC RX 258: Performed by: NURSE PRACTITIONER

## 2024-10-12 PROCEDURE — 5A09457 ASSISTANCE WITH RESPIRATORY VENTILATION, 24-96 CONSECUTIVE HOURS, CONTINUOUS POSITIVE AIRWAY PRESSURE: ICD-10-PCS | Performed by: EMERGENCY MEDICINE

## 2024-10-12 PROCEDURE — 83880 ASSAY OF NATRIURETIC PEPTIDE: CPT

## 2024-10-12 PROCEDURE — 94660 CPAP INITIATION&MGMT: CPT

## 2024-10-12 PROCEDURE — 84484 ASSAY OF TROPONIN QUANT: CPT

## 2024-10-12 PROCEDURE — 85025 COMPLETE CBC W/AUTO DIFF WBC: CPT

## 2024-10-12 PROCEDURE — 94761 N-INVAS EAR/PLS OXIMETRY MLT: CPT

## 2024-10-12 PROCEDURE — 94762 N-INVAS EAR/PLS OXIMTRY CONT: CPT

## 2024-10-12 PROCEDURE — 83735 ASSAY OF MAGNESIUM: CPT

## 2024-10-12 PROCEDURE — 96375 TX/PRO/DX INJ NEW DRUG ADDON: CPT

## 2024-10-12 PROCEDURE — 80053 COMPREHEN METABOLIC PANEL: CPT

## 2024-10-12 PROCEDURE — 6360000002 HC RX W HCPCS: Performed by: EMERGENCY MEDICINE

## 2024-10-12 PROCEDURE — 82962 GLUCOSE BLOOD TEST: CPT

## 2024-10-12 RX ORDER — CARBIDOPA AND LEVODOPA 25; 100 MG/1; MG/1
0.5 TABLET ORAL 3 TIMES DAILY
Status: DISCONTINUED | OUTPATIENT
Start: 2024-10-12 | End: 2024-10-14 | Stop reason: HOSPADM

## 2024-10-12 RX ORDER — INSULIN LISPRO 100 [IU]/ML
0-4 INJECTION, SOLUTION INTRAVENOUS; SUBCUTANEOUS EVERY 6 HOURS
Status: DISCONTINUED | OUTPATIENT
Start: 2024-10-12 | End: 2024-10-13

## 2024-10-12 RX ORDER — LOSARTAN POTASSIUM 25 MG/1
50 TABLET ORAL DAILY
Status: DISCONTINUED | OUTPATIENT
Start: 2024-10-13 | End: 2024-10-14 | Stop reason: HOSPADM

## 2024-10-12 RX ORDER — UBIDECARENONE 75 MG
50 CAPSULE ORAL DAILY
Status: DISCONTINUED | OUTPATIENT
Start: 2024-10-12 | End: 2024-10-13

## 2024-10-12 RX ORDER — LOSARTAN POTASSIUM 25 MG/1
50 TABLET ORAL DAILY
Status: DISCONTINUED | OUTPATIENT
Start: 2024-10-12 | End: 2024-10-12

## 2024-10-12 RX ORDER — IPRATROPIUM BROMIDE AND ALBUTEROL SULFATE 2.5; .5 MG/3ML; MG/3ML
1 SOLUTION RESPIRATORY (INHALATION) EVERY 4 HOURS PRN
Status: DISCONTINUED | OUTPATIENT
Start: 2024-10-12 | End: 2024-10-14 | Stop reason: HOSPADM

## 2024-10-12 RX ORDER — ACETAMINOPHEN 325 MG/1
650 TABLET ORAL EVERY 4 HOURS PRN
Status: DISCONTINUED | OUTPATIENT
Start: 2024-10-12 | End: 2024-10-14 | Stop reason: HOSPADM

## 2024-10-12 RX ORDER — HEPARIN SODIUM 5000 [USP'U]/ML
5000 INJECTION, SOLUTION INTRAVENOUS; SUBCUTANEOUS 2 TIMES DAILY
Status: DISCONTINUED | OUTPATIENT
Start: 2024-10-12 | End: 2024-10-14 | Stop reason: HOSPADM

## 2024-10-12 RX ORDER — LORAZEPAM 2 MG/ML
1 INJECTION INTRAMUSCULAR ONCE
Status: COMPLETED | OUTPATIENT
Start: 2024-10-12 | End: 2024-10-12

## 2024-10-12 RX ORDER — LORAZEPAM 1 MG/1
1 TABLET ORAL
Status: DISCONTINUED | OUTPATIENT
Start: 2024-10-12 | End: 2024-10-12

## 2024-10-12 RX ORDER — AMIODARONE HYDROCHLORIDE 200 MG/1
100 TABLET ORAL DAILY
Status: DISCONTINUED | OUTPATIENT
Start: 2024-10-12 | End: 2024-10-14 | Stop reason: HOSPADM

## 2024-10-12 RX ORDER — DEXTROSE MONOHYDRATE 100 MG/ML
INJECTION, SOLUTION INTRAVENOUS CONTINUOUS PRN
Status: DISCONTINUED | OUTPATIENT
Start: 2024-10-12 | End: 2024-10-14 | Stop reason: HOSPADM

## 2024-10-12 RX ORDER — TAMSULOSIN HYDROCHLORIDE 0.4 MG/1
0.4 CAPSULE ORAL DAILY
Status: DISCONTINUED | OUTPATIENT
Start: 2024-10-12 | End: 2024-10-14 | Stop reason: HOSPADM

## 2024-10-12 RX ORDER — BUSPIRONE HYDROCHLORIDE 5 MG/1
5 TABLET ORAL 2 TIMES DAILY
Status: DISCONTINUED | OUTPATIENT
Start: 2024-10-12 | End: 2024-10-14 | Stop reason: HOSPADM

## 2024-10-12 RX ORDER — LEVOTHYROXINE SODIUM 75 UG/1
75 TABLET ORAL DAILY
Status: DISCONTINUED | OUTPATIENT
Start: 2024-10-12 | End: 2024-10-12

## 2024-10-12 RX ORDER — PRAVASTATIN SODIUM 20 MG
20 TABLET ORAL NIGHTLY
Status: DISCONTINUED | OUTPATIENT
Start: 2024-10-12 | End: 2024-10-14 | Stop reason: HOSPADM

## 2024-10-12 RX ORDER — BUMETANIDE 0.25 MG/ML
1 INJECTION INTRAMUSCULAR; INTRAVENOUS 2 TIMES DAILY
Status: DISCONTINUED | OUTPATIENT
Start: 2024-10-12 | End: 2024-10-14 | Stop reason: HOSPADM

## 2024-10-12 RX ORDER — FERROUS SULFATE 7.5 MG/0.5
15 SYRINGE (EA) ORAL DAILY
Status: DISCONTINUED | OUTPATIENT
Start: 2024-10-12 | End: 2024-10-13

## 2024-10-12 RX ORDER — CARVEDILOL 3.12 MG/1
3.12 TABLET ORAL 2 TIMES DAILY WITH MEALS
Status: DISCONTINUED | OUTPATIENT
Start: 2024-10-12 | End: 2024-10-14 | Stop reason: HOSPADM

## 2024-10-12 RX ORDER — LEVOTHYROXINE SODIUM 75 UG/1
75 TABLET ORAL DAILY
Status: DISCONTINUED | OUTPATIENT
Start: 2024-10-13 | End: 2024-10-14 | Stop reason: HOSPADM

## 2024-10-12 RX ORDER — IPRATROPIUM BROMIDE AND ALBUTEROL SULFATE 2.5; .5 MG/3ML; MG/3ML
1 SOLUTION RESPIRATORY (INHALATION)
Status: COMPLETED | OUTPATIENT
Start: 2024-10-12 | End: 2024-10-12

## 2024-10-12 RX ORDER — ASPIRIN 81 MG/1
81 TABLET, CHEWABLE ORAL DAILY
Status: DISCONTINUED | OUTPATIENT
Start: 2024-10-12 | End: 2024-10-14 | Stop reason: HOSPADM

## 2024-10-12 RX ADMIN — LORAZEPAM 1 MG: 2 INJECTION INTRAMUSCULAR; INTRAVENOUS at 11:49

## 2024-10-12 RX ADMIN — WATER 1000 MG: 1 INJECTION INTRAMUSCULAR; INTRAVENOUS; SUBCUTANEOUS at 11:53

## 2024-10-12 RX ADMIN — BUSPIRONE HYDROCHLORIDE 5 MG: 5 TABLET ORAL at 20:36

## 2024-10-12 RX ADMIN — IPRATROPIUM BROMIDE AND ALBUTEROL SULFATE 1 DOSE: .5; 3 SOLUTION RESPIRATORY (INHALATION) at 11:45

## 2024-10-12 RX ADMIN — CARBIDOPA AND LEVODOPA 0.5 TABLET: 25; 100 TABLET ORAL at 20:36

## 2024-10-12 RX ADMIN — BUMETANIDE 1 MG: 0.25 INJECTION INTRAMUSCULAR; INTRAVENOUS at 16:40

## 2024-10-12 RX ADMIN — INSULIN LISPRO 1 UNITS: 100 INJECTION, SOLUTION INTRAVENOUS; SUBCUTANEOUS at 18:24

## 2024-10-12 RX ADMIN — PIPERACILLIN AND TAZOBACTAM 3375 MG: 3; .375 INJECTION, POWDER, LYOPHILIZED, FOR SOLUTION INTRAVENOUS at 17:30

## 2024-10-12 RX ADMIN — HEPARIN SODIUM 5000 UNITS: 5000 INJECTION INTRAVENOUS; SUBCUTANEOUS at 20:35

## 2024-10-12 RX ADMIN — PRAVASTATIN SODIUM 20 MG: 20 TABLET ORAL at 20:36

## 2024-10-12 RX ADMIN — CARVEDILOL 3.12 MG: 3.12 TABLET, FILM COATED ORAL at 18:03

## 2024-10-12 RX ADMIN — WATER 125 MG: 1 INJECTION INTRAMUSCULAR; INTRAVENOUS; SUBCUTANEOUS at 11:51

## 2024-10-12 ASSESSMENT — PAIN SCALES - GENERAL
PAINLEVEL_OUTOF10: 0

## 2024-10-12 ASSESSMENT — PAIN - FUNCTIONAL ASSESSMENT: PAIN_FUNCTIONAL_ASSESSMENT: NONE - DENIES PAIN

## 2024-10-12 NOTE — PROGRESS NOTES
Rahul Camarillo  1940    10/12/24  1500    TRANSFER - IN REPORT:    Verbal report received from MARGARITO Perez - ED on Rahul Camarillo  being received from ED for routine progression of patient care      Report consisted of patient's Situation, Background, Assessment and   Recommendations(SBAR).     Information from the following report(s) Nurse Handoff Report, Adult Overview, MAR, Recent Results, Cardiac Rhythm SR with BBB, and Event Log was reviewed with the receiving nurse.    Opportunity for questions and clarification was provided.      Assessment completed upon patient's arrival to unit and care assumed.     Bedside report received  Medications reviewed   Plan of care reviewed  White Board updated    NPO for Dinner    Patient placed on Droplet Plus - r/o COVID/Flu    COVID positive - Droplet Plus isolation remains in place     Shift report given to incoming primary nurse, : Itzel Le RN     Bedside report given  Medications reviewed  Plan of care reviewed  White Board updated

## 2024-10-12 NOTE — PROGRESS NOTES
ABG's and changes to NIV d/w Dr. Jerome.       Latest Reference Range & Units 10/12/24 11:59   Martínez Test -   YES   Oxyhemoglobin 95 - 99 % 93.7 (L)   pH, Arterial 7.35 - 7.45   7.30 (L)   pCO2, Arterial 35 - 45 mmHg 47 (H)   pO2, Arterial 80 - 100 mmHg 81   HCO3, Arterial 22 - 26 mmol/L 23   O2 Sat, Arterial 95 - 99 % 95   Methemoglobin, Arterial 0 - 1.4 % 0.3   Carboxyhgb, Arterial 1 - 2 % 0.7 (L)   FIO2 Arterial % 100.0   Site -   Right Radial   (L): Data is abnormally low  (H): Data is abnormally high

## 2024-10-12 NOTE — ED PROVIDER NOTES
EMERGENCY DEPARTMENT HISTORY AND PHYSICAL EXAM    Date: 10/12/2024  Patient Name: Rahul Camarillo    History of Presenting Illness     Chief Complaint   Patient presents with    Respiratory Distress         History Provided By: Patient and EMS    Additional History (Context):   11:35 AM  EDT  Rahul Camarillo is a 84 y.o. male with PMHX of testicular cancer age 28, diabetes, hyperlipidemia, Crohn's disease, dementia, sepsis, malnutrition who presents to the emergency department C/O difficulty breathing.  Patient was brought in by paramedics after sudden onset difficulty breathing.  He has a feeding tube family.  They could have some aspiration.  He says been feeling hot but otherwise unremarkable.  He also has history of heart congestion heart failure.  Paramedics did give him nitro during transport.  He was also given CPAP however removed notes due to distress.  He arrives with facemask hyperventilating.  He has a feeding tube to assist with this malnutrition associated with Crohn's disease.  He takes 3 both by mouth in addition to supplemental bike feeding tube.    Historically he had a DNR however this was eventually removed.  I asked him if he wanted us to put a breathing tube down and he shook his head in a nondescript manner.  Family states he did have a DNR at 1 point however this has been removed and intubation resuscitation is in the current plan.  Son is coming here who is also power of     Social History  Quit smoking in 1976.  No alcohol or drug use.    Family History  See family history of heart disease and strokes and cancer in family    PCP: Jenna Villarreal MD    No current facility-administered medications for this encounter.     Current Outpatient Medications   Medication Sig Dispense Refill    vitamin B-12 (CYANOCOBALAMIN) 100 MCG tablet Take 0.5 tablets by mouth daily      insulin lispro protamine & lispro (HUMALOG MIX 75/25 KWIKPEN) (75-25) 100 UNIT per ML SUPN injection pen Inject

## 2024-10-12 NOTE — ED NOTES
TRANSFER - OUT REPORT:    Verbal report given to Yessica on Rahul Camarillo  being transferred to ICU 4 for routine progression of patient care       Report consisted of patient's Situation, Background, Assessment and   Recommendations(SBAR).     Information from the following report(s) Nurse Handoff Report, ED Encounter Summary, ED SBAR, MAR, and Recent Results was reviewed with the receiving nurse.    Ong Fall Assessment:                           Lines:   Peripheral IV 10/12/24 Left Antecubital (Active)   Site Assessment Clean, dry & intact 10/12/24 1141   Line Status Blood return noted 10/12/24 1141   Phlebitis Assessment No symptoms 10/12/24 1141   Infiltration Assessment 0 10/12/24 1141        Opportunity for questions and clarification was provided.      Patient transported with:  Monitor, O2 @ CPAPlpm, and Registered Nurse

## 2024-10-12 NOTE — PROGRESS NOTES
1845- Bedside shift report received: Patient alert and oriented to self, situation and place, disoriented to time, iv in place left ac - intact, telemetry on running - SR with BBB and 1 AVB, BIPAP on, tolerating well, PEG Tube - clamped, repositioned, no acute distress, call bell in reach, bed alarm on.  NPO status due to BIPAP.  Droplet and contact precaution in place - Covid and ESBL.     1905- Patient assessment performed.     1936- Seen by RT, made aware of sputum culture. FiO2 at 60 %.    2100- Troponin collected and sent to lab.    2157- Troponin resulted and notified NP Gustavo - no new orders received.     2300- Reassessment done, no distress, tolerating BIPAP, repositioned, denies CP, call bell in reach, bed alarm on, .   I/O this shift:  In: 100 [NG/GT:100]  Out: 400 [Urine:400]     0027- Seen by RT, FiO2 at 40% - tolerating BIPAP well, no signs of distress, call bell in reach.     0238- BIPAP off, transitioned to NC 3 L - tolerating well - R 24 O2 Sat 94%., RT assessed client, call bell in reach. HOB elevated.  Tolerated sip of water well.     0300- Reassessment done. Lab collected.    0349- Troponin resulted to ROGER Chen, no orders received, client denies CP.     0500- No distress, tolerating NC on 3 L well, O2 Sat 94 % R 23.     0634- , no distress, resting in bed, stable VS and O2.    0645- Report given to oncoming RN.

## 2024-10-12 NOTE — PROGRESS NOTES
Patient remains on BIPAP.  FIO2 lowered per SPO2.       10/12/24 1302   Oxygen Therapy/Pulse Ox   O2 Device PAP (positive airway pressure)  (12/8, R8, 80%)   FiO2  (S)  80 %

## 2024-10-12 NOTE — PROGRESS NOTES
Patient tolerating CPAP of 12 cmh20 well.  SPO2 has increased from 62% to mid 90's.  Duoneb TX given inline.  CXR obtained.  ABG's completed.  Hypoxemia and acidosis noted.  NIV transitioned from CPAP to BIPAP mode for ventilation.  Patient continues to tolerate well.  Will continue to monitor.

## 2024-10-12 NOTE — H&P
History and Physical    Subjective:     Rahul Camarillo is a 84 y.o. male with a past medical history significant for HTN, CHF (Ef 42%), CKD stage IV, dysphagia/malnutrition on tube feeds, DM-II, BPH, anemia, A-fib, and valve repair who presents to the ED from home in respiratory distress. At the time of my evaluation in the ICU, he was observed to be on a nonrebreather mask, speaking in brief short sentences. Patient states he has had progressively worsening shortness of breath in the last several days.  He has been having shortness of breath on exertion, \"when I walk to the mailbox I couldn't breathe\", he has been unable to lay flat, \"sleeping on recliner chair\", last night he woke up from sleep because he could not breathe.  He had noted some cough with \"a lot of phlegm\", lately. Wife said Last tube feed was \"1 week ago\",, as he is still taking food by mouth. States he tested +ve for covid at home 1 week ago. He denies any chest pain at any point in time.  He denies fevers, chills, nausea, vomit, abdominal pain, and body aches, but endorsed generalized weakness, ED provider reports his O2 sats was 75% when EMS picked him up at home.    In the ED, his trop was 22-->82, his pBNP was 16,851. His CXR shows diffuse interstitial and airspace opacities and pulmonary edema.  ECG shows normal sinus rhythm, with LBBB unchanged from prior.  Chest x-ray shows diffuse interstitial/airspace opacities and pulmonary edema.    We agreed to inpatient ICU admit criteria for acute hypoxic respiratory failure, acute on chronic HFrEF, pulmonary edema.  Estimated LOS: 2-3 midnight.    Past Medical History:   Diagnosis Date    Benign prostatic hyperplasia with urinary obstruction     CHF (congestive heart failure) ([facility])     Dementia (HCC)     early onset    Diabetes (HCC)     Dysphagia     Hypersomnia     Hypertension     Nocturia     OAB (overactive bladder)     Parkinson's disease ([facility]) 02/09/2023    Parkinson's disease ([facility])

## 2024-10-12 NOTE — ED TRIAGE NOTES
Per EMS patient found with oxygen level of 70's. Has feeding tube. Put on CPAP by EMS, sats came up to 72%, hx valve replacement and CHF. 1 nitro adm in route at 1114.

## 2024-10-13 LAB
ANION GAP SERPL CALC-SCNC: 6 MMOL/L (ref 3–18)
BASOPHILS # BLD: 0 K/UL (ref 0–0.1)
BASOPHILS NFR BLD: 0 % (ref 0–2)
BUN SERPL-MCNC: 35 MG/DL (ref 7–18)
BUN/CREAT SERPL: 16 (ref 12–20)
CA-I BLD-MCNC: 8.2 MG/DL (ref 8.5–10.1)
CHLORIDE SERPL-SCNC: 109 MMOL/L (ref 100–111)
CO2 SERPL-SCNC: 26 MMOL/L (ref 21–32)
CREAT SERPL-MCNC: 2.13 MG/DL (ref 0.6–1.3)
DIFFERENTIAL METHOD BLD: ABNORMAL
EKG ATRIAL RATE: 96 BPM
EKG DIAGNOSIS: NORMAL
EKG DIAGNOSIS: NORMAL
EKG P-R INTERVAL: 80 MS
EKG Q-T INTERVAL: 390 MS
EKG Q-T INTERVAL: 580 MS
EKG QRS DURATION: 152 MS
EKG QRS DURATION: 164 MS
EKG QTC CALCULATION (BAZETT): 492 MS
EKG QTC CALCULATION (BAZETT): 550 MS
EKG R AXIS: 29 DEGREES
EKG R AXIS: 35 DEGREES
EKG T AXIS: 197 DEGREES
EKG T AXIS: 225 DEGREES
EKG VENTRICULAR RATE: 54 BPM
EKG VENTRICULAR RATE: 96 BPM
EOSINOPHIL # BLD: 0 K/UL (ref 0–0.4)
EOSINOPHIL NFR BLD: 0 % (ref 0–5)
ERYTHROCYTE [DISTWIDTH] IN BLOOD BY AUTOMATED COUNT: 15.2 % (ref 11.6–14.5)
EST. AVERAGE GLUCOSE BLD GHB EST-MCNC: 140 MG/DL
GLUCOSE BLD STRIP.AUTO-MCNC: 143 MG/DL (ref 70–110)
GLUCOSE BLD STRIP.AUTO-MCNC: 156 MG/DL (ref 70–110)
GLUCOSE BLD STRIP.AUTO-MCNC: 156 MG/DL (ref 70–110)
GLUCOSE BLD STRIP.AUTO-MCNC: 219 MG/DL (ref 70–110)
GLUCOSE BLD STRIP.AUTO-MCNC: 240 MG/DL (ref 70–110)
GLUCOSE BLD STRIP.AUTO-MCNC: 244 MG/DL (ref 70–110)
GLUCOSE SERPL-MCNC: 192 MG/DL (ref 74–99)
HBA1C MFR BLD: 6.5 % (ref 4.2–5.6)
HCT VFR BLD AUTO: 41 % (ref 36–48)
HGB BLD-MCNC: 13.1 G/DL (ref 13–16)
IMM GRANULOCYTES # BLD AUTO: 0 K/UL (ref 0–0.04)
IMM GRANULOCYTES NFR BLD AUTO: 0 % (ref 0–0.5)
LYMPHOCYTES # BLD: 0.2 K/UL (ref 0.9–3.6)
LYMPHOCYTES NFR BLD: 3 % (ref 21–52)
MCH RBC QN AUTO: 30.8 PG (ref 24–34)
MCHC RBC AUTO-ENTMCNC: 32 G/DL (ref 31–37)
MCV RBC AUTO: 96.5 FL (ref 78–100)
MONOCYTES # BLD: 0.1 K/UL (ref 0.05–1.2)
MONOCYTES NFR BLD: 1 % (ref 3–10)
NEUTS SEG # BLD: 6.9 K/UL (ref 1.8–8)
NEUTS SEG NFR BLD: 96 % (ref 40–73)
NRBC # BLD: 0 K/UL (ref 0–0.01)
NRBC BLD-RTO: 0 PER 100 WBC
PERFORMED BY:: ABNORMAL
PLATELET # BLD AUTO: 129 K/UL (ref 135–420)
PMV BLD AUTO: 12.3 FL (ref 9.2–11.8)
POTASSIUM SERPL-SCNC: 5.3 MMOL/L (ref 3.5–5.5)
RBC # BLD AUTO: 4.25 M/UL (ref 4.35–5.65)
RBC MORPH BLD: ABNORMAL
SODIUM SERPL-SCNC: 141 MMOL/L (ref 136–145)
TROPONIN I SERPL HS-MCNC: 125 NG/L (ref 0–78)
TROPONIN I SERPL HS-MCNC: 171 NG/L (ref 0–78)
TROPONIN I SERPL HS-MCNC: 201 NG/L (ref 0–78)
TROPONIN I SERPL HS-MCNC: 226 NG/L (ref 0–78)
WBC # BLD AUTO: 7.2 K/UL (ref 4.6–13.2)

## 2024-10-13 PROCEDURE — 6360000002 HC RX W HCPCS: Performed by: NURSE PRACTITIONER

## 2024-10-13 PROCEDURE — 6370000000 HC RX 637 (ALT 250 FOR IP): Performed by: NURSE PRACTITIONER

## 2024-10-13 PROCEDURE — 82962 GLUCOSE BLOOD TEST: CPT

## 2024-10-13 PROCEDURE — 93005 ELECTROCARDIOGRAM TRACING: CPT | Performed by: NURSE PRACTITIONER

## 2024-10-13 PROCEDURE — 85025 COMPLETE CBC W/AUTO DIFF WBC: CPT

## 2024-10-13 PROCEDURE — 1100000000 HC RM PRIVATE

## 2024-10-13 PROCEDURE — 83036 HEMOGLOBIN GLYCOSYLATED A1C: CPT

## 2024-10-13 PROCEDURE — 2700000000 HC OXYGEN THERAPY PER DAY

## 2024-10-13 PROCEDURE — 94660 CPAP INITIATION&MGMT: CPT

## 2024-10-13 PROCEDURE — 2580000003 HC RX 258: Performed by: NURSE PRACTITIONER

## 2024-10-13 PROCEDURE — 84484 ASSAY OF TROPONIN QUANT: CPT

## 2024-10-13 PROCEDURE — 6360000002 HC RX W HCPCS: Performed by: INTERNAL MEDICINE

## 2024-10-13 PROCEDURE — 94761 N-INVAS EAR/PLS OXIMETRY MLT: CPT

## 2024-10-13 PROCEDURE — 80048 BASIC METABOLIC PNL TOTAL CA: CPT

## 2024-10-13 PROCEDURE — 36415 COLL VENOUS BLD VENIPUNCTURE: CPT

## 2024-10-13 RX ORDER — DEXAMETHASONE SODIUM PHOSPHATE 4 MG/ML
6 INJECTION, SOLUTION INTRA-ARTICULAR; INTRALESIONAL; INTRAMUSCULAR; INTRAVENOUS; SOFT TISSUE EVERY 24 HOURS
Status: DISCONTINUED | OUTPATIENT
Start: 2024-10-13 | End: 2024-10-14 | Stop reason: HOSPADM

## 2024-10-13 RX ORDER — UREA 10 %
250 LOTION (ML) TOPICAL DAILY
Status: DISCONTINUED | OUTPATIENT
Start: 2024-10-13 | End: 2024-10-14 | Stop reason: HOSPADM

## 2024-10-13 RX ORDER — INSULIN LISPRO 100 [IU]/ML
0-4 INJECTION, SOLUTION INTRAVENOUS; SUBCUTANEOUS
Status: DISCONTINUED | OUTPATIENT
Start: 2024-10-14 | End: 2024-10-13

## 2024-10-13 RX ORDER — INSULIN LISPRO 100 [IU]/ML
0-4 INJECTION, SOLUTION INTRAVENOUS; SUBCUTANEOUS
Status: DISCONTINUED | OUTPATIENT
Start: 2024-10-13 | End: 2024-10-14 | Stop reason: HOSPADM

## 2024-10-13 RX ORDER — FERROUS SULFATE 300 MG/5ML
75 LIQUID (ML) ORAL DAILY
Status: DISCONTINUED | OUTPATIENT
Start: 2024-10-13 | End: 2024-10-14 | Stop reason: HOSPADM

## 2024-10-13 RX ADMIN — PIPERACILLIN AND TAZOBACTAM 3375 MG: 3; .375 INJECTION, POWDER, LYOPHILIZED, FOR SOLUTION INTRAVENOUS at 04:15

## 2024-10-13 RX ADMIN — BUSPIRONE HYDROCHLORIDE 5 MG: 5 TABLET ORAL at 21:05

## 2024-10-13 RX ADMIN — INSULIN LISPRO 1 UNITS: 100 INJECTION, SOLUTION INTRAVENOUS; SUBCUTANEOUS at 23:20

## 2024-10-13 RX ADMIN — CARBIDOPA AND LEVODOPA 0.5 TABLET: 25; 100 TABLET ORAL at 14:18

## 2024-10-13 RX ADMIN — CARVEDILOL 3.12 MG: 3.12 TABLET, FILM COATED ORAL at 17:06

## 2024-10-13 RX ADMIN — CARBIDOPA AND LEVODOPA 0.5 TABLET: 25; 100 TABLET ORAL at 21:05

## 2024-10-13 RX ADMIN — CARBIDOPA AND LEVODOPA 0.5 TABLET: 25; 100 TABLET ORAL at 08:38

## 2024-10-13 RX ADMIN — BUSPIRONE HYDROCHLORIDE 5 MG: 5 TABLET ORAL at 08:38

## 2024-10-13 RX ADMIN — CARVEDILOL 3.12 MG: 3.12 TABLET, FILM COATED ORAL at 08:38

## 2024-10-13 RX ADMIN — CYANOCOBALAMIN TAB 500 MCG 250 MCG: 500 TAB at 09:38

## 2024-10-13 RX ADMIN — PRAVASTATIN SODIUM 20 MG: 20 TABLET ORAL at 21:05

## 2024-10-13 RX ADMIN — TAMSULOSIN HYDROCHLORIDE 0.4 MG: 0.4 CAPSULE ORAL at 08:39

## 2024-10-13 RX ADMIN — INSULIN LISPRO 1 UNITS: 100 INJECTION, SOLUTION INTRAVENOUS; SUBCUTANEOUS at 17:05

## 2024-10-13 RX ADMIN — Medication 78 MG: at 09:39

## 2024-10-13 RX ADMIN — DEXAMETHASONE SODIUM PHOSPHATE 6 MG: 4 INJECTION INTRA-ARTICULAR; INTRALESIONAL; INTRAMUSCULAR; INTRAVENOUS; SOFT TISSUE at 09:40

## 2024-10-13 RX ADMIN — BUMETANIDE 1 MG: 0.25 INJECTION INTRAMUSCULAR; INTRAVENOUS at 04:10

## 2024-10-13 RX ADMIN — LEVOTHYROXINE SODIUM 75 MCG: 0.07 TABLET ORAL at 06:33

## 2024-10-13 RX ADMIN — LOSARTAN POTASSIUM 50 MG: 25 TABLET, FILM COATED ORAL at 08:39

## 2024-10-13 RX ADMIN — HEPARIN SODIUM 5000 UNITS: 5000 INJECTION INTRAVENOUS; SUBCUTANEOUS at 21:06

## 2024-10-13 RX ADMIN — AMIODARONE HYDROCHLORIDE 100 MG: 200 TABLET ORAL at 08:38

## 2024-10-13 RX ADMIN — ASPIRIN 81 MG 81 MG: 81 TABLET ORAL at 08:39

## 2024-10-13 RX ADMIN — BUMETANIDE 1 MG: 0.25 INJECTION INTRAMUSCULAR; INTRAVENOUS at 17:07

## 2024-10-13 RX ADMIN — HEPARIN SODIUM 5000 UNITS: 5000 INJECTION INTRAVENOUS; SUBCUTANEOUS at 08:39

## 2024-10-13 ASSESSMENT — PAIN SCALES - GENERAL
PAINLEVEL_OUTOF10: 0

## 2024-10-13 NOTE — PROGRESS NOTES
Rahul Camarillo  1940      Shift report received from off going primary nurse on 10/13/24 ,:  Itzel ESTRADA RN     Bedside report received  Medications reviewed   Plan of care reviewed  White Board updated    Breakfast NPO.      Lunch NPO.     1200 T. Cheyanne, NP at bedside for rounds  Diet updated    Call placed to dietary for tray     1212 Leo, RT at bedside to obtain EKG    Dinner tray provided and set up.  Tolerated well.     Shift report given to incoming primary nurse, : Armando SZYMANSKI RN     Bedside report given  Medications reviewed  Plan of care reviewed  White Board updated

## 2024-10-13 NOTE — FLOWSHEET NOTE
10/12/24 2157   Treatment Team Notification   Reason for Communication Critical results   Type of Critical Result Laboratory   Critical Lab Information Troponin 207   Person Result Received From Lab   Critical Lab Result Type Troponin   Name of Team Member Notified NP Rosa Chen   Treatment Team Role Advanced Practice Nurse   Method of Communication Call   Response No new orders   Notification Time 2157     Client on Telemetry SR 1AVB LBBB - no acute changes, denies CP, continue trending troponin q 6 h.

## 2024-10-13 NOTE — PROGRESS NOTES
Hospitalist Progress Note    Daily Progress Note: 10/13/2024 12:12 PM      Rahul Camarillo                                            MRN: [id number]                                  :1940    Hospital course / Assessment and Plans   Principle Problems:  #Acute hypoxic respiratory failure, with hypercapnia, POA:   #COVID-19  --Pt COVID positive. Per pt recently just got over COVID  --Likely multifactorial, continue to treat underlying medical problems as described below.  --VQ scan to r/o PE pending  --Continue dexamethasone, duonebs     #Acute on chronic HFrEF, POA:   --No longer on Bipap, Currently on O2 2L via NC, RT to monitor, wean and titrate.  --Continue Bumex 1 mg IV BID, monitoring BUN/Cr levels daily and replacing electrolytes as appropriate.  --trend trop 22-->83-->207-->226-->201. Denies chest pain. Likely demand ischemia. Cards consult pending  --Echo of 2/10/23 shows an Ef 42%+ mild AR, Repeat echocardiogram.  --Hx of \"valve repair\", and follows with Dr Arias Pearson at [facility]\".   --cont aspirin 81 mg daily. Cont losartan and  carvedilol  home regimen.     #Elevated Troponin: downtrending,  likely demand ischemia from CHF and COVID  --trend trop 22-->83-->207-->226-->201-->171. Denies chest pain.   --Cards consult pending     #Possible aspiration pneumonia: less likely as patient is afebrile, without leukocytosis  --Discontinue Zosyn  --COVID +; pending Sputum culture.  --O2 as needed to keep sats >92%. Duonebs prn.     #Chronic kidney disease stage IV: Baseline Cr 2.3  --Chronic stable issues with Cr of 2.13 today.BMP daily, while diuresing.      #Hypertension:  --chronic stable issues.  --cont carvedilol.  Monitor vitals.     #Hypothyroid:  --cont levothyroxine.     #Hyperlipidemia:   --Continue pravastatin.     #Hx of Parkinson disease:   --Continue carbidopa levodopa home regimen.     #Hx of Afib currently in SR  #HX TAVR 2022 due to aortic stenosis  --Continue amiodarone, he is not on

## 2024-10-13 NOTE — FLOWSHEET NOTE
10/13/24 0349   Treatment Team Notification   Reason for Communication Critical results   Type of Critical Result Laboratory   Critical Lab Information 226   Person Result Received From Rene   Critical Lab Result Type Troponin   Name of Team Member Notified NP Rosa Chen   Treatment Team Role Attending Provider   Method of Communication Call   Response No new orders   Notification Time 0350     No new orders received, client denies CP, no changes on Tele - SR 1AVB LBBB.

## 2024-10-13 NOTE — PROGRESS NOTES
1900 Bedside and Verbal shift change report given to TRAVON Jacobson RN (oncoming nurse) by TRAVON Casillas RN (offgoing nurse). Report included the following information Nurse Handoff Report, Intake/Output, MAR, Recent Results, Med Rec Status, and Cardiac Rhythm SR with BBB .

## 2024-10-14 ENCOUNTER — APPOINTMENT (OUTPATIENT)
Age: 84
DRG: 177 | End: 2024-10-14
Payer: MEDICARE

## 2024-10-14 ENCOUNTER — HOSPITAL ENCOUNTER (INPATIENT)
Age: 84
Discharge: HOME | DRG: 177 | End: 2024-10-17
Payer: MEDICARE

## 2024-10-14 VITALS
HEIGHT: 72 IN | BODY MASS INDEX: 20.18 KG/M2 | HEART RATE: 62 BPM | WEIGHT: 149 LBS | SYSTOLIC BLOOD PRESSURE: 137 MMHG | OXYGEN SATURATION: 95 % | DIASTOLIC BLOOD PRESSURE: 59 MMHG | RESPIRATION RATE: 18 BRPM | TEMPERATURE: 97.3 F

## 2024-10-14 LAB
ANION GAP SERPL CALC-SCNC: 7 MMOL/L (ref 3–18)
BASOPHILS # BLD: 0 K/UL (ref 0–0.1)
BASOPHILS NFR BLD: 0 % (ref 0–2)
BUN SERPL-MCNC: 58 MG/DL (ref 7–18)
BUN/CREAT SERPL: 22 (ref 12–20)
CA-I BLD-MCNC: 7.8 MG/DL (ref 8.5–10.1)
CHLORIDE SERPL-SCNC: 107 MMOL/L (ref 100–111)
CO2 SERPL-SCNC: 26 MMOL/L (ref 21–32)
CREAT SERPL-MCNC: 2.69 MG/DL (ref 0.6–1.3)
D DIMER PPP FEU-MCNC: 1.28 UG/ML(FEU)
DIFFERENTIAL METHOD BLD: ABNORMAL
ECHO AO ROOT DIAM: 2.9 CM
ECHO AO ROOT INDEX: 1.54 CM/M2
ECHO AR MAX VEL PISA: 3.4 M/S
ECHO AV AREA PEAK VELOCITY: 2.6 CM2
ECHO AV AREA VTI: 2.6 CM2
ECHO AV AREA/BSA PEAK VELOCITY: 1.4 CM2/M2
ECHO AV AREA/BSA VTI: 1.4 CM2/M2
ECHO AV MEAN GRADIENT: 6 MMHG
ECHO AV MEAN VELOCITY: 1.1 M/S
ECHO AV PEAK GRADIENT: 11 MMHG
ECHO AV PEAK VELOCITY: 1.7 M/S
ECHO AV REGURGITANT PHT: 731 MS
ECHO AV VELOCITY RATIO: 0.65
ECHO AV VTI: 37.2 CM
ECHO BSA: 1.85 M2
ECHO EST RA PRESSURE: 3 MMHG
ECHO IVC PROX: 2 CM
ECHO LA AREA 2C: 28.3 CM2
ECHO LA AREA 4C: 30.1 CM2
ECHO LA DIAMETER INDEX: 2.18 CM/M2
ECHO LA DIAMETER: 4.1 CM
ECHO LA MAJOR AXIS: 7.3 CM
ECHO LA MINOR AXIS: 6.9 CM
ECHO LA TO AORTIC ROOT RATIO: 1.41
ECHO LA VOL BP: 98 ML (ref 18–58)
ECHO LA VOL MOD A2C: 96 ML (ref 18–58)
ECHO LA VOL MOD A4C: 95 ML (ref 18–58)
ECHO LA VOL/BSA BIPLANE: 52 ML/M2 (ref 16–34)
ECHO LA VOLUME INDEX MOD A2C: 51 ML/M2 (ref 16–34)
ECHO LA VOLUME INDEX MOD A4C: 51 ML/M2 (ref 16–34)
ECHO LV E' LATERAL VELOCITY: 3.6 CM/S
ECHO LV E' SEPTAL VELOCITY: 3.5 CM/S
ECHO LV EDV A2C: 104 ML
ECHO LV EDV A4C: 137 ML
ECHO LV EDV INDEX A4C: 73 ML/M2
ECHO LV EDV NDEX A2C: 55 ML/M2
ECHO LV EJECTION FRACTION A2C: 44 %
ECHO LV EJECTION FRACTION A4C: 33 %
ECHO LV EJECTION FRACTION BIPLANE: 38 % (ref 55–100)
ECHO LV ESV A2C: 59 ML
ECHO LV ESV A4C: 91 ML
ECHO LV ESV INDEX A2C: 31 ML/M2
ECHO LV ESV INDEX A4C: 48 ML/M2
ECHO LV FRACTIONAL SHORTENING: 19 % (ref 28–44)
ECHO LV GLOBAL LONGITUDINAL STRAIN (GLS): -13.2 %
ECHO LV INTERNAL DIMENSION DIASTOLE INDEX: 2.82 CM/M2
ECHO LV INTERNAL DIMENSION DIASTOLIC: 5.3 CM (ref 4.2–5.9)
ECHO LV INTERNAL DIMENSION SYSTOLIC INDEX: 2.29 CM/M2
ECHO LV INTERNAL DIMENSION SYSTOLIC: 4.3 CM
ECHO LV IVSD: 0.9 CM (ref 0.6–1)
ECHO LV MASS 2D: 200.4 G (ref 88–224)
ECHO LV MASS INDEX 2D: 106.6 G/M2 (ref 49–115)
ECHO LV POSTERIOR WALL DIASTOLIC: 1.1 CM (ref 0.6–1)
ECHO LV RELATIVE WALL THICKNESS RATIO: 0.42
ECHO LVOT AREA: 3.8 CM2
ECHO LVOT AV VTI INDEX: 0.69
ECHO LVOT DIAM: 2.2 CM
ECHO LVOT MEAN GRADIENT: 2 MMHG
ECHO LVOT PEAK GRADIENT: 5 MMHG
ECHO LVOT PEAK VELOCITY: 1.1 M/S
ECHO LVOT STROKE VOLUME INDEX: 51.9 ML/M2
ECHO LVOT SV: 97.6 ML
ECHO LVOT VTI: 25.7 CM
ECHO MV A VELOCITY: 1.32 M/S
ECHO MV AREA VTI: 2.6 CM2
ECHO MV E DECELERATION TIME (DT): 249 MS
ECHO MV E VELOCITY: 1.21 M/S
ECHO MV E/A RATIO: 0.92
ECHO MV E/E' LATERAL: 33.61
ECHO MV E/E' RATIO (AVERAGED): 34.09
ECHO MV E/E' SEPTAL: 34.57
ECHO MV LVOT VTI INDEX: 1.46
ECHO MV MAX VELOCITY: 1.5 M/S
ECHO MV MEAN GRADIENT: 3 MMHG
ECHO MV MEAN VELOCITY: 0.8 M/S
ECHO MV PEAK GRADIENT: 9 MMHG
ECHO MV VTI: 37.5 CM
ECHO RA AREA 4C: 21.4 CM2
ECHO RA END SYSTOLIC VOLUME APICAL 4 CHAMBER INDEX BSA: 35 ML/M2
ECHO RA VOLUME: 66 ML
ECHO RIGHT VENTRICULAR SYSTOLIC PRESSURE (RVSP): 31 MMHG
ECHO RV BASAL DIMENSION: 4.1 CM
ECHO RV LONGITUDINAL DIMENSION: 7.2 CM
ECHO RV MID DIMENSION: 3 CM
ECHO RV TAPSE: 2.2 CM (ref 1.7–?)
ECHO TV REGURGITANT MAX VELOCITY: 2.65 M/S
ECHO TV REGURGITANT PEAK GRADIENT: 28 MMHG
EOSINOPHIL # BLD: 0 K/UL (ref 0–0.4)
EOSINOPHIL NFR BLD: 0 % (ref 0–5)
ERYTHROCYTE [DISTWIDTH] IN BLOOD BY AUTOMATED COUNT: 15.4 % (ref 11.6–14.5)
GLUCOSE BLD STRIP.AUTO-MCNC: 183 MG/DL (ref 70–110)
GLUCOSE BLD STRIP.AUTO-MCNC: 223 MG/DL (ref 70–110)
GLUCOSE SERPL-MCNC: 228 MG/DL (ref 74–99)
HCT VFR BLD AUTO: 35.1 % (ref 36–48)
HGB BLD-MCNC: 11.3 G/DL (ref 13–16)
IMM GRANULOCYTES # BLD AUTO: 0 K/UL (ref 0–0.04)
IMM GRANULOCYTES NFR BLD AUTO: 0 % (ref 0–0.5)
LYMPHOCYTES # BLD: 0.3 K/UL (ref 0.9–3.6)
LYMPHOCYTES NFR BLD: 4 % (ref 21–52)
MCH RBC QN AUTO: 31.2 PG (ref 24–34)
MCHC RBC AUTO-ENTMCNC: 32.2 G/DL (ref 31–37)
MCV RBC AUTO: 97 FL (ref 78–100)
MONOCYTES # BLD: 0.5 K/UL (ref 0.05–1.2)
MONOCYTES NFR BLD: 6 % (ref 3–10)
NEUTS SEG # BLD: 6.6 K/UL (ref 1.8–8)
NEUTS SEG NFR BLD: 90 % (ref 40–73)
NRBC # BLD: 0 K/UL (ref 0–0.01)
NRBC BLD-RTO: 0 PER 100 WBC
PERFORMED BY:: ABNORMAL
PERFORMED BY:: ABNORMAL
PLATELET # BLD AUTO: 131 K/UL (ref 135–420)
PMV BLD AUTO: 12.4 FL (ref 9.2–11.8)
POTASSIUM SERPL-SCNC: 5 MMOL/L (ref 3.5–5.5)
RBC # BLD AUTO: 3.62 M/UL (ref 4.35–5.65)
SODIUM SERPL-SCNC: 140 MMOL/L (ref 136–145)
TROPONIN I SERPL HS-MCNC: 115 NG/L (ref 0–78)
WBC # BLD AUTO: 7.3 K/UL (ref 4.6–13.2)

## 2024-10-14 PROCEDURE — 85379 FIBRIN DEGRADATION QUANT: CPT

## 2024-10-14 PROCEDURE — 94761 N-INVAS EAR/PLS OXIMETRY MLT: CPT

## 2024-10-14 PROCEDURE — 3430000000 HC RX DIAGNOSTIC RADIOPHARMACEUTICAL: Performed by: NURSE PRACTITIONER

## 2024-10-14 PROCEDURE — 6360000002 HC RX W HCPCS: Performed by: INTERNAL MEDICINE

## 2024-10-14 PROCEDURE — 84484 ASSAY OF TROPONIN QUANT: CPT

## 2024-10-14 PROCEDURE — 6360000002 HC RX W HCPCS: Performed by: NURSE PRACTITIONER

## 2024-10-14 PROCEDURE — 82962 GLUCOSE BLOOD TEST: CPT

## 2024-10-14 PROCEDURE — 2580000003 HC RX 258: Performed by: INTERNAL MEDICINE

## 2024-10-14 PROCEDURE — 36415 COLL VENOUS BLD VENIPUNCTURE: CPT

## 2024-10-14 PROCEDURE — A9540 TC99M MAA: HCPCS | Performed by: NURSE PRACTITIONER

## 2024-10-14 PROCEDURE — 2700000000 HC OXYGEN THERAPY PER DAY

## 2024-10-14 PROCEDURE — 6370000000 HC RX 637 (ALT 250 FOR IP): Performed by: NURSE PRACTITIONER

## 2024-10-14 PROCEDURE — 85025 COMPLETE CBC W/AUTO DIFF WBC: CPT

## 2024-10-14 PROCEDURE — 80048 BASIC METABOLIC PNL TOTAL CA: CPT

## 2024-10-14 PROCEDURE — 93306 TTE W/DOPPLER COMPLETE: CPT

## 2024-10-14 RX ORDER — SODIUM CHLORIDE 9 MG/ML
INJECTION, SOLUTION INTRAVENOUS CONTINUOUS
Status: DISCONTINUED | OUTPATIENT
Start: 2024-10-14 | End: 2024-10-14 | Stop reason: HOSPADM

## 2024-10-14 RX ORDER — DEXAMETHASONE 6 MG/1
6 TABLET ORAL
Qty: 7 TABLET | Refills: 0 | Status: SHIPPED | OUTPATIENT
Start: 2024-10-14 | End: 2024-10-21

## 2024-10-14 RX ADMIN — KIT FOR THE PREPARATION OF TECHNETIUM TC 99M ALBUMIN AGGREGATED 5 MILLICURIE: 2.5 INJECTION, POWDER, FOR SOLUTION INTRAVENOUS at 13:09

## 2024-10-14 RX ADMIN — Medication 78 MG: at 09:21

## 2024-10-14 RX ADMIN — CARBIDOPA AND LEVODOPA 0.5 TABLET: 25; 100 TABLET ORAL at 14:19

## 2024-10-14 RX ADMIN — LOSARTAN POTASSIUM 50 MG: 25 TABLET, FILM COATED ORAL at 09:35

## 2024-10-14 RX ADMIN — BUSPIRONE HYDROCHLORIDE 5 MG: 5 TABLET ORAL at 09:20

## 2024-10-14 RX ADMIN — BUMETANIDE 1 MG: 0.25 INJECTION INTRAMUSCULAR; INTRAVENOUS at 05:06

## 2024-10-14 RX ADMIN — DEXAMETHASONE SODIUM PHOSPHATE 6 MG: 4 INJECTION INTRA-ARTICULAR; INTRALESIONAL; INTRAMUSCULAR; INTRAVENOUS; SOFT TISSUE at 09:21

## 2024-10-14 RX ADMIN — LEVOTHYROXINE SODIUM 75 MCG: 0.07 TABLET ORAL at 06:36

## 2024-10-14 RX ADMIN — CARBIDOPA AND LEVODOPA 0.5 TABLET: 25; 100 TABLET ORAL at 09:20

## 2024-10-14 RX ADMIN — HEPARIN SODIUM 5000 UNITS: 5000 INJECTION INTRAVENOUS; SUBCUTANEOUS at 09:21

## 2024-10-14 RX ADMIN — SODIUM CHLORIDE: 9 INJECTION, SOLUTION INTRAVENOUS at 09:17

## 2024-10-14 RX ADMIN — CYANOCOBALAMIN TAB 500 MCG 250 MCG: 500 TAB at 09:33

## 2024-10-14 RX ADMIN — ASPIRIN 81 MG 81 MG: 81 TABLET ORAL at 09:20

## 2024-10-14 RX ADMIN — CARVEDILOL 3.12 MG: 3.12 TABLET, FILM COATED ORAL at 09:19

## 2024-10-14 RX ADMIN — AMIODARONE HYDROCHLORIDE 100 MG: 200 TABLET ORAL at 09:18

## 2024-10-14 RX ADMIN — INSULIN LISPRO 1 UNITS: 100 INJECTION, SOLUTION INTRAVENOUS; SUBCUTANEOUS at 12:54

## 2024-10-14 RX ADMIN — TAMSULOSIN HYDROCHLORIDE 0.4 MG: 0.4 CAPSULE ORAL at 09:33

## 2024-10-14 ASSESSMENT — PAIN SCALES - GENERAL
PAINLEVEL_OUTOF10: 0
PAINLEVEL_OUTOF10: 0

## 2024-10-14 NOTE — PROGRESS NOTES
Chart review completed and ST consulted with nursing and MD concerning patient's PO status. Patient has had PEG tube for some time, however, continues to eat by mouth. Patient denies worsening swallowing symptoms. Per chart review, patient is afebrile and therefore his onset of symptoms believed to be related to COVID diagnosis. No further ST interventions indicated at this time. Re-consult as needed.     Thank you,  Meera Moya MA, CCC-SLP

## 2024-10-14 NOTE — CARE COORDINATION
10/14/24 0900   Service Assessment   Patient Orientation Alert and Oriented   Cognition Alert   History Provided By Patient   Primary Caregiver Self   Accompanied By/Relationship Alone in room   Support Systems Family Members   Patient's Healthcare Decision Maker is: Legal Next of Kin   PCP Verified by CM No   Prior Functional Level Independent in ADLs/IADLs   Current Functional Level Independent in ADLs/IADLs   Can patient return to prior living arrangement Yes   Ability to make needs known: Good   Family able to assist with home care needs: Yes   Would you like for me to discuss the discharge plan with any other family members/significant others, and if so, who? Yes  (Wife Margaret)   Financial Resources Medicare   Community Resources None   CM/SW Referral Disease Management Education     Patient lives at home with wife, is independent of ADLs, drives occasionally, wife mostly drives, has WC, walker and cane but does not need to use.  Wants HH at DC, CM following for DC needs.

## 2024-10-14 NOTE — DISCHARGE SUMMARY
140   CO2 26     Recent Labs     10/14/24  0400   WBC 7.3   RBC 3.62*   HCT 35.1*   MCV 97.0   MCH 31.2   MCHC 32.2   RDW 15.4*         Last 3 CBC:   Recent Labs     10/12/24  1137 10/13/24  0300 10/14/24  0400   WBC 12.2 7.2 7.3   RBC 4.60 4.25* 3.62*   HGB 14.1 13.1 11.3*   HCT 45.3 41.0 35.1*   MCV 98.5 96.5 97.0   MCH 30.7 30.8 31.2   MCHC 31.1 32.0 32.2   RDW 15.5* 15.2* 15.4*    129* 131*   MPV 12.7* 12.3* 12.4*      Last 3 BMP:   Recent Labs     10/12/24  1137 10/13/24  0300 10/14/24  0400    141 140   K 4.8 5.3 5.0    109 107   CO2 24 26 26   BUN 29* 35* 58*   CREATININE 2.37* 2.13* 2.69*   GLUCOSE 260* 192* 228*   CALCIUM 8.5 8.2* 7.8*      Last 3 CMP:   Recent Labs     10/12/24  1137 10/13/24  0300 10/14/24  0400    141 140   K 4.8 5.3 5.0    109 107   CO2 24 26 26   BUN 29* 35* 58*   CREATININE 2.37* 2.13* 2.69*   GLUCOSE 260* 192* 228*   CALCIUM 8.5 8.2* 7.8*   BILITOT 0.7  --   --    ALKPHOS 106  --   --    AST 37  --   --    ALT 22  --   --             Imaging results impression only:  XR CHEST PORTABLE    Result Date: 10/12/2024  1. Diffuse interstitial and air space opacities with right pleural effusion. Favor pulmonary edema Electronically signed by Kike Ramos         Time : 45 min - spent on Chart and recent lab / Image review, medical exam, Discussion with patient and family, charting and discussion / orders with the nurse and staff.     Judy Raphael MD   Hospitalist

## 2024-10-14 NOTE — PLAN OF CARE
Problem: Chronic Conditions and Co-morbidities  Goal: Patient's chronic conditions and co-morbidity symptoms are monitored and maintained or improved  10/13/2024 0715 by Yessica Casillas RN  Outcome: Progressing  10/12/2024 1913 by Itzel Le RN  Outcome: Progressing  Flowsheets (Taken 10/12/2024 1913)  Care Plan - Patient's Chronic Conditions and Co-Morbidity Symptoms are Monitored and Maintained or Improved:   Monitor and assess patient's chronic conditions and comorbid symptoms for stability, deterioration, or improvement   Collaborate with multidisciplinary team to address chronic and comorbid conditions and prevent exacerbation or deterioration     Problem: Discharge Planning  Goal: Discharge to home or other facility with appropriate resources  10/13/2024 0715 by Yessica Casillas RN  Outcome: Progressing  10/12/2024 1913 by Itzel Le RN  Outcome: Progressing  Flowsheets (Taken 10/12/2024 1913)  Discharge to home or other facility with appropriate resources:   Identify barriers to discharge with patient and caregiver   Arrange for needed discharge resources and transportation as appropriate   Identify discharge learning needs (meds, wound care, etc)   Arrange for interpreters to assist at discharge as needed     Problem: Safety - Adult  Goal: Free from fall injury  10/13/2024 0715 by Yessica Casillas RN  Outcome: Progressing  10/12/2024 1913 by Itzel Le RN  Outcome: Progressing  Flowsheets (Taken 10/12/2024 1913)  Free From Fall Injury:   Instruct family/caregiver on patient safety   Based on caregiver fall risk screen, instruct family/caregiver to ask for assistance with transferring infant if caregiver noted to have fall risk factors     Problem: Skin/Tissue Integrity  Goal: Absence of new skin breakdown  Description: 1.  Monitor for areas of redness and/or skin breakdown  2.  Assess vascular access sites hourly  3.  Every 4-6 hours minimum:  Change oxygen saturation probe 
  Problem: Chronic Conditions and Co-morbidities  Goal: Patient's chronic conditions and co-morbidity symptoms are monitored and maintained or improved  10/13/2024 2258 by Armando Jacobson RN  Outcome: Progressing  10/13/2024 2255 by Armando Jacobson RN  Outcome: Progressing     Problem: Discharge Planning  Goal: Discharge to home or other facility with appropriate resources  10/13/2024 2258 by Armando Jacobson RN  Outcome: Progressing  Flowsheets (Taken 10/13/2024 1930)  Discharge to home or other facility with appropriate resources: Identify barriers to discharge with patient and caregiver  10/13/2024 2255 by Armando Jacobson RN  Outcome: Progressing  Flowsheets (Taken 10/13/2024 1930)  Discharge to home or other facility with appropriate resources: Identify barriers to discharge with patient and caregiver     Problem: Safety - Adult  Goal: Free from fall injury  10/13/2024 2258 by Armando Jacobson RN  Outcome: Progressing  Flowsheets (Taken 10/12/2024 1913 by Itzel Le, RN)  Free From Fall Injury:   Instruct family/caregiver on patient safety   Based on caregiver fall risk screen, instruct family/caregiver to ask for assistance with transferring infant if caregiver noted to have fall risk factors  10/13/2024 2255 by Armando Jacobson RN  Outcome: Progressing  Flowsheets (Taken 10/12/2024 1913 by Itzel Le, RN)  Free From Fall Injury:   Instruct family/caregiver on patient safety   Based on caregiver fall risk screen, instruct family/caregiver to ask for assistance with transferring infant if caregiver noted to have fall risk factors  10/13/2024 2239 by Armando Jacobson RN  Outcome: Progressing  10/13/2024 2238 by Armando Jacobson RN  Outcome: Progressing     Problem: Skin/Tissue Integrity  Goal: Absence of new skin breakdown  Description: 1.  Monitor for areas of redness and/or skin breakdown  2.  Assess vascular access sites hourly  3.  Every 4-6 hours 
  Problem: Chronic Conditions and Co-morbidities  Goal: Patient's chronic conditions and co-morbidity symptoms are monitored and maintained or improved  10/14/2024 0815 by Yessica Casillas RN  Outcome: Progressing  10/13/2024 2258 by Armando Jacobson RN  Outcome: Progressing  10/13/2024 2255 by Armando Jacobson RN  Outcome: Progressing     Problem: Discharge Planning  Goal: Discharge to home or other facility with appropriate resources  10/14/2024 0815 by Yessica Casillas RN  Outcome: Progressing  10/13/2024 2258 by Armando Jacobson RN  Outcome: Progressing  Flowsheets (Taken 10/13/2024 1930)  Discharge to home or other facility with appropriate resources: Identify barriers to discharge with patient and caregiver  10/13/2024 2255 by Armando Jacobson RN  Outcome: Progressing  Flowsheets (Taken 10/13/2024 1930)  Discharge to home or other facility with appropriate resources: Identify barriers to discharge with patient and caregiver     Problem: Safety - Adult  Goal: Free from fall injury  10/14/2024 0815 by Yessica Casillas RN  Outcome: Progressing  10/13/2024 2258 by Armando Jacobson RN  Outcome: Progressing  Flowsheets (Taken 10/12/2024 1913 by Itzel Le, RN)  Free From Fall Injury:   Instruct family/caregiver on patient safety   Based on caregiver fall risk screen, instruct family/caregiver to ask for assistance with transferring infant if caregiver noted to have fall risk factors  10/13/2024 2255 by Armando Jacobson RN  Outcome: Progressing  Flowsheets (Taken 10/12/2024 1913 by Itzel Le, RN)  Free From Fall Injury:   Instruct family/caregiver on patient safety   Based on caregiver fall risk screen, instruct family/caregiver to ask for assistance with transferring infant if caregiver noted to have fall risk factors  10/13/2024 2239 by Armando Jacobson RN  Outcome: Progressing  10/13/2024 2238 by Armando Jacobson RN  Outcome: Progressing   
  Problem: Chronic Conditions and Co-morbidities  Goal: Patient's chronic conditions and co-morbidity symptoms are monitored and maintained or improved  Outcome: Progressing     Problem: Discharge Planning  Goal: Discharge to home or other facility with appropriate resources  Outcome: Progressing     Problem: Safety - Adult  Goal: Free from fall injury  Outcome: Progressing     
site  4.  Every 4-6 hours:  If on nasal continuous positive airway pressure, respiratory therapy assess nares and determine need for appliance change or resting period.  Outcome: Progressing     Problem: Neurosensory - Adult  Goal: Achieves stable or improved neurological status  Outcome: Progressing  Flowsheets (Taken 10/12/2024 1913)  Achieves stable or improved neurological status:   Assess for and report changes in neurological status   Initiate measures to prevent increased intracranial pressure   Maintain blood pressure and fluid volume within ordered parameters to optimize cerebral perfusion and minimize risk of hemorrhage   Monitor temperature, glucose, and sodium. Initiate appropriate interventions as ordered     Problem: Respiratory - Adult  Goal: Achieves optimal ventilation and oxygenation  Outcome: Progressing  Flowsheets (Taken 10/12/2024 1913)  Achieves optimal ventilation and oxygenation:   Assess for changes in respiratory status   Assess for changes in mentation and behavior   Oxygen supplementation based on oxygen saturation or arterial blood gases   Initiate smoking cessation protocol as indicated   Encourage broncho-pulmonary hygiene including cough, deep breathe, incentive spirometry   Respiratory therapy support as indicated     Problem: Cardiovascular - Adult  Goal: Maintains optimal cardiac output and hemodynamic stability  Outcome: Progressing  Flowsheets (Taken 10/12/2024 1913)  Maintains optimal cardiac output and hemodynamic stability:   Monitor blood pressure and heart rate   Monitor urine output and notify Licensed Independent Practitioner for values outside of normal range   Assess for signs of decreased cardiac output     Problem: Skin/Tissue Integrity - Adult  Goal: Skin integrity remains intact  Outcome: Progressing  Flowsheets (Taken 10/12/2024 1913)  Skin Integrity Remains Intact: Monitor for areas of redness and/or skin breakdown     Problem: Musculoskeletal - Adult  Goal:

## 2024-10-14 NOTE — PROGRESS NOTES
Rahul Camarillo  1940      Shift report received from off going primary nurse on 10/14/24 : Armando Jacobson, RN     Bedside report received  Medications reviewed   Plan of care reviewed  White Board updated      Breakfast tray provided and set up. Tolerated well.     Essence Barragan NP - cardiology rounded on patient   Dr. Raphael rounded on patient, update on plan of care     1041 Stat D Dimer drawn by Phlebotomy    D Dimer results proceed VQ Scan     1119 Echo tech at bedside to do Echo    1258 Nuc Med tech transported patient to VQ Scan via wheelchair     1330 Patient returned from nuc med via wheelchair  Telemonitor reapplied     Lunch tray provided and set up. Tolerated well.    1550 Updated provider - Mendy PIZARRO NP  on VQ Scan results - low probability of pulmonary embolism    Ok to proceed with discharge    Patient discharged via wheelchair

## 2024-10-14 NOTE — CONSULTS
now on 2 L nasal cannula  Primary managing  CXR as above  Continue dexamethasone and DuoNebs    Acute on chronic combined systolic and diastolic heart failure: Remains on supplemental oxygen.  Good response to diuresis net negative 1.4 L.  Previous echo with reduced EF 37%, global hypokinesis and grade 2 diastolic dysfunction  Continue IV diuresis with Bumex 1 mg twice daily  GDMT limited due to renal function, advance as tolerates  Continue BB    Elevated D-dimer  VQ scan pending  CTA deferred due to elevated creatinine  Continue SQ heparin    Paroxysmal atrial fibrillation  Continue amiodarone  Currently on no OAC, likely due to risk for falls and bleeding  Recommend outpatient workup for Watchman, will defer to primary cardiologist    Aortic stenosis  S/p TAVR (2022) with [facility]   Repeat echo pending    Chronic kidney disease stage IV:   Creatinine on arrival 2.13, now 2.69  Baseline creatinine ~2.1  Hold losartan  Avoiding nephrotoxic agent    Hypertension  Chronic, controlled  Continue BB    Hyperlipidemia  Lipid profile (8/2024) , HDL 71, LDL 56, TG 38  Continue statin therapy  LDL at goal     Hypothyroidism  TSH 2.49  Continue levothyroxine    Diabetes mellitus type 2  A1c 6.5 (10/13/24)  Primary managing-started on SSI    History of Parkinson  History of dysphagia    Thank you for involving us in the care of this patient. The patient will need a 2 week follow up at [facility] upon discharge.    Please do not hesitate to call me or Dr. Hall if additional questions arise.    Essence Barragan, APRN - CNP  10/14/2024

## 2024-10-29 ENCOUNTER — TELEPHONE (OUTPATIENT)
Age: 84
End: 2024-10-29

## 2024-10-29 PROBLEM — I50.42 CHRONIC COMBINED SYSTOLIC AND DIASTOLIC CHF (CONGESTIVE HEART FAILURE) (HCC): Status: ACTIVE | Noted: 2024-10-29

## 2024-10-29 PROBLEM — I50.42 HYPERTENSIVE HEART AND KIDNEY DISEASE WITH CHRONIC COMBINED SYSTOLIC AND DIASTOLIC CONGESTIVE HEART FAILURE AND STAGE 4 CHRONIC KIDNEY DISEASE (HCC): Status: ACTIVE | Noted: 2024-10-29

## 2024-10-29 PROBLEM — Z95.2 S/P TAVR (TRANSCATHETER AORTIC VALVE REPLACEMENT): Status: ACTIVE | Noted: 2024-10-29

## 2024-10-29 PROBLEM — I48.0 PAROXYSMAL A-FIB (HCC): Status: ACTIVE | Noted: 2024-10-29

## 2024-10-29 PROBLEM — N18.4 STAGE 4 CHRONIC KIDNEY DISEASE (HCC): Status: ACTIVE | Noted: 2024-10-29

## 2024-10-29 PROBLEM — I13.0 HYPERTENSIVE HEART AND KIDNEY DISEASE WITH CHRONIC COMBINED SYSTOLIC AND DIASTOLIC CONGESTIVE HEART FAILURE AND STAGE 4 CHRONIC KIDNEY DISEASE (HCC): Status: ACTIVE | Noted: 2024-10-29

## 2024-10-29 PROBLEM — N18.4 HYPERTENSIVE HEART AND KIDNEY DISEASE WITH CHRONIC COMBINED SYSTOLIC AND DIASTOLIC CONGESTIVE HEART FAILURE AND STAGE 4 CHRONIC KIDNEY DISEASE (HCC): Status: ACTIVE | Noted: 2024-10-29

## 2024-11-18 ENCOUNTER — OFFICE VISIT (OUTPATIENT)
Age: 84
End: 2024-11-18
Payer: MEDICARE

## 2024-11-18 VITALS
OXYGEN SATURATION: 97 % | WEIGHT: 149.2 LBS | TEMPERATURE: 98.1 F | SYSTOLIC BLOOD PRESSURE: 153 MMHG | HEIGHT: 72 IN | DIASTOLIC BLOOD PRESSURE: 69 MMHG | HEART RATE: 66 BPM | BODY MASS INDEX: 20.21 KG/M2

## 2024-11-18 DIAGNOSIS — I50.42 CHRONIC COMBINED SYSTOLIC AND DIASTOLIC CHF (CONGESTIVE HEART FAILURE) (HCC): ICD-10-CM

## 2024-11-18 DIAGNOSIS — I25.10 CORONARY ARTERY DISEASE INVOLVING NATIVE CORONARY ARTERY OF NATIVE HEART WITHOUT ANGINA PECTORIS: ICD-10-CM

## 2024-11-18 DIAGNOSIS — I35.9 AORTIC VALVE DISEASE: ICD-10-CM

## 2024-11-18 DIAGNOSIS — N18.4 STAGE 4 CHRONIC KIDNEY DISEASE (HCC): ICD-10-CM

## 2024-11-18 DIAGNOSIS — J84.9 INTERSTITIAL LUNG DISEASE (HCC): ICD-10-CM

## 2024-11-18 DIAGNOSIS — I48.0 PAROXYSMAL ATRIAL FIBRILLATION (HCC): ICD-10-CM

## 2024-11-18 DIAGNOSIS — R06.02 EXERTIONAL SHORTNESS OF BREATH: Primary | ICD-10-CM

## 2024-11-18 DIAGNOSIS — R01.1 SYSTOLIC MURMUR: ICD-10-CM

## 2024-11-18 DIAGNOSIS — I10 PRIMARY HYPERTENSION: ICD-10-CM

## 2024-11-18 DIAGNOSIS — R94.31 ABNORMAL ECG: ICD-10-CM

## 2024-11-18 PROCEDURE — 99215 OFFICE O/P EST HI 40 MIN: CPT | Performed by: INTERNAL MEDICINE

## 2024-11-18 PROCEDURE — 1126F AMNT PAIN NOTED NONE PRSNT: CPT | Performed by: INTERNAL MEDICINE

## 2024-11-18 PROCEDURE — 1160F RVW MEDS BY RX/DR IN RCRD: CPT | Performed by: INTERNAL MEDICINE

## 2024-11-18 PROCEDURE — 1036F TOBACCO NON-USER: CPT | Performed by: INTERNAL MEDICINE

## 2024-11-18 PROCEDURE — G8484 FLU IMMUNIZE NO ADMIN: HCPCS | Performed by: INTERNAL MEDICINE

## 2024-11-18 PROCEDURE — G8427 DOCREV CUR MEDS BY ELIG CLIN: HCPCS | Performed by: INTERNAL MEDICINE

## 2024-11-18 PROCEDURE — G8420 CALC BMI NORM PARAMETERS: HCPCS | Performed by: INTERNAL MEDICINE

## 2024-11-18 PROCEDURE — 1159F MED LIST DOCD IN RCRD: CPT | Performed by: INTERNAL MEDICINE

## 2024-11-18 PROCEDURE — 1123F ACP DISCUSS/DSCN MKR DOCD: CPT | Performed by: INTERNAL MEDICINE

## 2024-11-18 RX ORDER — AMLODIPINE BESYLATE 5 MG/1
TABLET ORAL
COMMUNITY
Start: 2024-10-11 | End: 2024-11-18

## 2024-11-18 RX ORDER — LEVOTHYROXINE SODIUM 75 UG/1
TABLET ORAL
COMMUNITY
Start: 2024-09-17

## 2024-11-18 RX ORDER — INSULIN LISPRO 100 [IU]/ML
INJECTION, SUSPENSION SUBCUTANEOUS
COMMUNITY
Start: 2024-10-26

## 2024-11-18 RX ORDER — MULTIVITAMIN,THERAPEUTIC
1 TABLET ORAL DAILY
COMMUNITY

## 2024-11-18 RX ORDER — AMOXICILLIN 500 MG/1
CAPSULE ORAL
COMMUNITY
Start: 2024-08-15 | End: 2024-11-18

## 2024-11-18 RX ORDER — DOXYCYCLINE 100 MG/1
CAPSULE ORAL
COMMUNITY
Start: 2024-08-24 | End: 2024-11-18

## 2024-11-18 RX ORDER — INSULIN DEGLUDEC 200 U/ML
INJECTION, SOLUTION SUBCUTANEOUS
COMMUNITY
Start: 2024-10-11 | End: 2024-11-18

## 2024-11-18 ASSESSMENT — ENCOUNTER SYMPTOMS
GASTROINTESTINAL NEGATIVE: 1
SHORTNESS OF BREATH: 1
ALLERGIC/IMMUNOLOGIC NEGATIVE: 1
EYES NEGATIVE: 1

## 2024-11-18 ASSESSMENT — PATIENT HEALTH QUESTIONNAIRE - PHQ9
1. LITTLE INTEREST OR PLEASURE IN DOING THINGS: NOT AT ALL
SUM OF ALL RESPONSES TO PHQ QUESTIONS 1-9: 0
SUM OF ALL RESPONSES TO PHQ QUESTIONS 1-9: 0
2. FEELING DOWN, DEPRESSED OR HOPELESS: NOT AT ALL
SUM OF ALL RESPONSES TO PHQ QUESTIONS 1-9: 0
SUM OF ALL RESPONSES TO PHQ9 QUESTIONS 1 & 2: 0
SUM OF ALL RESPONSES TO PHQ QUESTIONS 1-9: 0

## 2024-11-18 NOTE — PROGRESS NOTES
1. \"Have you been to the ER, urgent care clinic since your last visit?  Hospitalized since your last visit?\" Reviewed by Dr. Arias Pearson    2. \"Have you seen or consulted any other health care providers outside of the [facility] since your last visit?\" Reviewed by Dr. Arias Pearson  
(HCC)  10. Interstitial lung disease (HCC)    No results found for any visits on 11/18/24.     Return in about 7 months (around 6/18/2025) for Follow up with echo.    On this date 11/18/2024 I have spent 41 minutes reviewing previous notes, test results and face to face with the patient discussing the diagnosis and importance of compliance with the treatment plan as well as documenting on the day of the visit.    The patient (or guardian, if applicable) and other individuals in attendance with the patient were advised that Artificial Intelligence will be utilized during this visit to record, process the conversation to generate a clinical note and to support improvement of the AI technology. The patient (or guardian, if applicable) and other individuals in attendance at the appointment consented to the use of AI, including the recording.       An electronic signature was used to authenticate this note.    --Arias Pearson MD

## 2024-12-16 ENCOUNTER — HOSPITAL ENCOUNTER (OUTPATIENT)
Age: 84
Setting detail: SPECIMEN
Discharge: HOME | End: 2024-12-19
Payer: MEDICARE

## 2024-12-16 ENCOUNTER — TRANSCRIBE ORDERS (OUTPATIENT)
Age: 84
End: 2024-12-16

## 2024-12-16 DIAGNOSIS — R63.6 UNDERWEIGHT: ICD-10-CM

## 2024-12-16 DIAGNOSIS — D64.9 ANEMIA, UNSPECIFIED TYPE: ICD-10-CM

## 2024-12-16 DIAGNOSIS — N25.81 SECONDARY HYPERPARATHYROIDISM OF RENAL ORIGIN (HCC): ICD-10-CM

## 2024-12-16 DIAGNOSIS — N18.4 CHRONIC KIDNEY DISEASE, STAGE IV (SEVERE) (HCC): ICD-10-CM

## 2024-12-16 DIAGNOSIS — N18.4 CHRONIC KIDNEY DISEASE, STAGE IV (SEVERE) (HCC): Primary | ICD-10-CM

## 2024-12-16 LAB
ALBUMIN SERPL-MCNC: 3.1 G/DL (ref 3.4–5)
ANION GAP SERPL CALC-SCNC: 5 MMOL/L (ref 3–18)
BUN SERPL-MCNC: 49 MG/DL (ref 7–18)
BUN/CREAT SERPL: 16 (ref 12–20)
CA-I BLD-MCNC: 8.4 MG/DL (ref 8.5–10.1)
CHLORIDE SERPL-SCNC: 108 MMOL/L (ref 100–111)
CO2 SERPL-SCNC: 31 MMOL/L (ref 21–32)
CREAT SERPL-MCNC: 2.98 MG/DL (ref 0.6–1.3)
ERYTHROCYTE [DISTWIDTH] IN BLOOD BY AUTOMATED COUNT: 13.8 % (ref 11.6–14.5)
GLUCOSE SERPL-MCNC: 142 MG/DL (ref 74–99)
HCT VFR BLD AUTO: 41.4 % (ref 36–48)
HGB BLD-MCNC: 12.7 G/DL (ref 13–16)
MAGNESIUM SERPL-MCNC: 2.5 MG/DL (ref 1.6–2.6)
MCH RBC QN AUTO: 30.2 PG (ref 24–34)
MCHC RBC AUTO-ENTMCNC: 30.7 G/DL (ref 31–37)
MCV RBC AUTO: 98.6 FL (ref 78–100)
NRBC # BLD: 0 K/UL (ref 0–0.01)
NRBC BLD-RTO: 0 PER 100 WBC
PHOSPHATE SERPL-MCNC: 3.8 MG/DL (ref 2.5–4.9)
PLATELET # BLD AUTO: 211 K/UL (ref 135–420)
PMV BLD AUTO: 12.4 FL (ref 9.2–11.8)
POTASSIUM SERPL-SCNC: 4.4 MMOL/L (ref 3.5–5.5)
RBC # BLD AUTO: 4.2 M/UL (ref 4.35–5.65)
SODIUM SERPL-SCNC: 144 MMOL/L (ref 136–145)
WBC # BLD AUTO: 5.8 K/UL (ref 4.6–13.2)

## 2024-12-16 PROCEDURE — 80069 RENAL FUNCTION PANEL: CPT

## 2024-12-16 PROCEDURE — 36415 COLL VENOUS BLD VENIPUNCTURE: CPT

## 2024-12-16 PROCEDURE — 82306 VITAMIN D 25 HYDROXY: CPT

## 2024-12-16 PROCEDURE — 85027 COMPLETE CBC AUTOMATED: CPT

## 2024-12-16 PROCEDURE — 83735 ASSAY OF MAGNESIUM: CPT

## 2024-12-16 PROCEDURE — 83970 ASSAY OF PARATHORMONE: CPT

## 2024-12-17 LAB
25(OH)D3 SERPL-MCNC: 47.8 NG/ML (ref 30–100)
CA-I BLD-MCNC: 8.7 MG/DL (ref 8.5–10.1)
PTH-INTACT SERPL-MCNC: 158.5 PG/ML (ref 18.4–88)

## 2025-01-27 ENCOUNTER — HOSPITAL ENCOUNTER (EMERGENCY)
Age: 85
Discharge: ANOTHER HOSPITAL | End: 2025-01-28
Attending: EMERGENCY MEDICINE
Payer: MEDICARE

## 2025-01-27 ENCOUNTER — APPOINTMENT (OUTPATIENT)
Age: 85
End: 2025-01-27
Payer: MEDICARE

## 2025-01-27 DIAGNOSIS — I50.43 ACUTE ON CHRONIC COMBINED SYSTOLIC AND DIASTOLIC CHF (CONGESTIVE HEART FAILURE) (HCC): ICD-10-CM

## 2025-01-27 DIAGNOSIS — N39.0 URINARY TRACT INFECTION WITH HEMATURIA, SITE UNSPECIFIED: ICD-10-CM

## 2025-01-27 DIAGNOSIS — J96.21 ACUTE ON CHRONIC RESPIRATORY FAILURE WITH HYPOXIA: Primary | ICD-10-CM

## 2025-01-27 DIAGNOSIS — R31.9 URINARY TRACT INFECTION WITH HEMATURIA, SITE UNSPECIFIED: ICD-10-CM

## 2025-01-27 DIAGNOSIS — G93.40 ACUTE ENCEPHALOPATHY: ICD-10-CM

## 2025-01-27 DIAGNOSIS — J90 BILATERAL PLEURAL EFFUSION: ICD-10-CM

## 2025-01-27 LAB
ALBUMIN SERPL-MCNC: 2.6 G/DL (ref 3.4–5)
ALBUMIN/GLOB SERPL: 0.5 (ref 0.8–1.7)
ALP SERPL-CCNC: 99 U/L (ref 45–117)
ALT SERPL-CCNC: 13 U/L (ref 16–61)
ANION GAP SERPL CALC-SCNC: 6 MMOL/L (ref 3–18)
APPEARANCE UR: ABNORMAL
ARTERIAL PATENCY WRIST A: YES
AST SERPL W P-5'-P-CCNC: 13 U/L (ref 10–38)
BACTERIA URNS QL MICRO: ABNORMAL /HPF
BASE EXCESS BLDA CALC-SCNC: 7.3 MMOL/L (ref 0–3)
BASOPHILS # BLD: 0.02 K/UL (ref 0–0.1)
BASOPHILS NFR BLD: 0.2 % (ref 0–2)
BDY SITE: ABNORMAL
BILIRUB SERPL-MCNC: 0.4 MG/DL (ref 0.2–1)
BILIRUB UR QL: NEGATIVE
BNP SERPL-MCNC: ABNORMAL PG/ML (ref 0–1800)
BUN SERPL-MCNC: 77 MG/DL (ref 7–18)
BUN/CREAT SERPL: 27 (ref 12–20)
CA-I BLD-MCNC: 8.8 MG/DL (ref 8.5–10.1)
CHLORIDE SERPL-SCNC: 99 MMOL/L (ref 100–111)
CO2 SERPL-SCNC: 33 MMOL/L (ref 21–32)
COHGB MFR BLD: 0.8 % (ref 1–2)
COLOR UR: YELLOW
CREAT SERPL-MCNC: 2.88 MG/DL (ref 0.6–1.3)
DIFFERENTIAL METHOD BLD: ABNORMAL
EOSINOPHIL # BLD: 0.04 K/UL (ref 0–0.4)
EOSINOPHIL NFR BLD: 0.4 % (ref 0–5)
EPITH CASTS URNS QL MICRO: ABNORMAL /LPF (ref 0–20)
ERYTHROCYTE [DISTWIDTH] IN BLOOD BY AUTOMATED COUNT: 14.6 % (ref 11.6–14.5)
FIO2 ON VENT: 36 %
FLUAV RNA SPEC QL NAA+PROBE: NOT DETECTED
FLUBV RNA SPEC QL NAA+PROBE: NOT DETECTED
GAS FLOW.O2 O2 DELIVERY SYS: 4 L/MIN
GLOBULIN SER CALC-MCNC: 5.2 G/DL (ref 2–4)
GLUCOSE SERPL-MCNC: 225 MG/DL (ref 74–99)
GLUCOSE UR STRIP.AUTO-MCNC: NEGATIVE MG/DL
HCO3 BLDA-SCNC: 32 MMOL/L (ref 22–26)
HCT VFR BLD AUTO: 34.4 % (ref 36–48)
HGB BLD-MCNC: 10.6 G/DL (ref 13–16)
HGB UR QL STRIP: ABNORMAL
IMM GRANULOCYTES # BLD AUTO: 0.03 K/UL (ref 0–0.04)
IMM GRANULOCYTES NFR BLD AUTO: 0.3 % (ref 0–0.5)
KETONES UR QL STRIP.AUTO: NEGATIVE MG/DL
LACTATE SERPL-SCNC: 1.6 MMOL/L (ref 0.4–2)
LEUKOCYTE ESTERASE UR QL STRIP.AUTO: ABNORMAL
LYMPHOCYTES # BLD: 0.34 K/UL (ref 0.9–3.6)
LYMPHOCYTES NFR BLD: 3.4 % (ref 21–52)
MCH RBC QN AUTO: 30.5 PG (ref 24–34)
MCHC RBC AUTO-ENTMCNC: 30.8 G/DL (ref 31–37)
MCV RBC AUTO: 99.1 FL (ref 78–100)
METHGB MFR BLD: 0.3 % (ref 0–1.4)
MONOCYTES # BLD: 0.54 K/UL (ref 0.05–1.2)
MONOCYTES NFR BLD: 5.3 % (ref 3–10)
NEUTS SEG # BLD: 9.13 K/UL (ref 1.8–8)
NEUTS SEG NFR BLD: 90.4 % (ref 40–73)
NITRITE UR QL STRIP.AUTO: NEGATIVE
NRBC # BLD: 0 K/UL (ref 0–0.01)
NRBC BLD-RTO: 0 PER 100 WBC
OXYHGB MFR BLD: 90.3 % (ref 95–99)
PCO2 BLDA: 45 MMHG (ref 35–45)
PERFORMED BY:: ABNORMAL
PH BLDA: 7.47 (ref 7.35–7.45)
PH UR STRIP: 6.5 (ref 5–8)
PLATELET # BLD AUTO: 248 K/UL (ref 135–420)
PMV BLD AUTO: 13.2 FL (ref 9.2–11.8)
PO2 BLDA: 61 MMHG (ref 80–100)
POTASSIUM SERPL-SCNC: 4.6 MMOL/L (ref 3.5–5.5)
PROCALCITONIN SERPL-MCNC: 0.05 NG/ML
PROT SERPL-MCNC: 7.8 G/DL (ref 6.4–8.2)
PROT UR STRIP-MCNC: ABNORMAL MG/DL
RBC # BLD AUTO: 3.47 M/UL (ref 4.35–5.65)
RBC #/AREA URNS HPF: ABNORMAL /HPF (ref 0–2)
RBC MORPH BLD: ABNORMAL
RSV RNA SPEC NAA+PROBE: NOT DETECTED
SAO2 % BLD: 91 % (ref 95–99)
SAO2% DEVICE SAO2% SENSOR NAME: ABNORMAL
SARS-COV-2 RNA RESP QL NAA+PROBE: NOT DETECTED
SODIUM SERPL-SCNC: 138 MMOL/L (ref 136–145)
SP GR UR REFRACTOMETRY: 1.01 (ref 1–1.03)
SPECIMEN SITE: ABNORMAL
SPECIMEN SOURCE: NORMAL
TROPONIN I SERPL HS-MCNC: 43 NG/L (ref 0–78)
UROBILINOGEN UR QL STRIP.AUTO: 0.2 EU/DL (ref 0.2–1)
WBC # BLD AUTO: 10.1 K/UL (ref 4.6–13.2)
WBC URNS QL MICRO: >100 /HPF (ref 0–4)

## 2025-01-27 PROCEDURE — 83605 ASSAY OF LACTIC ACID: CPT

## 2025-01-27 PROCEDURE — 84484 ASSAY OF TROPONIN QUANT: CPT

## 2025-01-27 PROCEDURE — 84145 PROCALCITONIN (PCT): CPT

## 2025-01-27 PROCEDURE — 87631 RESP VIRUS 3-5 TARGETS: CPT

## 2025-01-27 PROCEDURE — 93005 ELECTROCARDIOGRAM TRACING: CPT | Performed by: EMERGENCY MEDICINE

## 2025-01-27 PROCEDURE — 96374 THER/PROPH/DIAG INJ IV PUSH: CPT

## 2025-01-27 PROCEDURE — 36600 WITHDRAWAL OF ARTERIAL BLOOD: CPT

## 2025-01-27 PROCEDURE — 87635 SARS-COV-2 COVID-19 AMP PRB: CPT

## 2025-01-27 PROCEDURE — 81001 URINALYSIS AUTO W/SCOPE: CPT

## 2025-01-27 PROCEDURE — 99285 EMERGENCY DEPT VISIT HI MDM: CPT

## 2025-01-27 PROCEDURE — 36415 COLL VENOUS BLD VENIPUNCTURE: CPT

## 2025-01-27 PROCEDURE — 83880 ASSAY OF NATRIURETIC PEPTIDE: CPT

## 2025-01-27 PROCEDURE — 87086 URINE CULTURE/COLONY COUNT: CPT

## 2025-01-27 PROCEDURE — 2500000003 HC RX 250 WO HCPCS: Performed by: EMERGENCY MEDICINE

## 2025-01-27 PROCEDURE — 6370000000 HC RX 637 (ALT 250 FOR IP): Performed by: EMERGENCY MEDICINE

## 2025-01-27 PROCEDURE — 85025 COMPLETE CBC W/AUTO DIFF WBC: CPT

## 2025-01-27 PROCEDURE — 96375 TX/PRO/DX INJ NEW DRUG ADDON: CPT

## 2025-01-27 PROCEDURE — 80053 COMPREHEN METABOLIC PANEL: CPT

## 2025-01-27 PROCEDURE — 71045 X-RAY EXAM CHEST 1 VIEW: CPT

## 2025-01-27 PROCEDURE — 87040 BLOOD CULTURE FOR BACTERIA: CPT

## 2025-01-27 PROCEDURE — 94640 AIRWAY INHALATION TREATMENT: CPT

## 2025-01-27 PROCEDURE — 82803 BLOOD GASES ANY COMBINATION: CPT

## 2025-01-27 PROCEDURE — 6360000002 HC RX W HCPCS: Performed by: EMERGENCY MEDICINE

## 2025-01-27 RX ORDER — LIDOCAINE 4 G/G
1 PATCH TOPICAL DAILY
Status: ON HOLD | COMMUNITY

## 2025-01-27 RX ORDER — IPRATROPIUM BROMIDE AND ALBUTEROL SULFATE 2.5; .5 MG/3ML; MG/3ML
1 SOLUTION RESPIRATORY (INHALATION)
Status: COMPLETED | OUTPATIENT
Start: 2025-01-27 | End: 2025-01-27

## 2025-01-27 RX ORDER — BUMETANIDE 0.25 MG/ML
1 INJECTION, SOLUTION INTRAMUSCULAR; INTRAVENOUS ONCE
Status: COMPLETED | OUTPATIENT
Start: 2025-01-27 | End: 2025-01-27

## 2025-01-27 RX ORDER — DOXAZOSIN 2 MG/1
2 TABLET ORAL NIGHTLY
Status: ON HOLD | COMMUNITY

## 2025-01-27 RX ORDER — LOPERAMIDE HYDROCHLORIDE 2 MG/1
2 CAPSULE ORAL 4 TIMES DAILY PRN
Status: ON HOLD | COMMUNITY

## 2025-01-27 RX ORDER — FERROUS SULFATE 325(65) MG
325 TABLET ORAL
Status: ON HOLD | COMMUNITY

## 2025-01-27 RX ORDER — INSULIN DEGLUDEC 200 U/ML
16 INJECTION, SOLUTION SUBCUTANEOUS
Status: ON HOLD | COMMUNITY

## 2025-01-27 RX ORDER — IPRATROPIUM BROMIDE AND ALBUTEROL SULFATE 2.5; .5 MG/3ML; MG/3ML
1 SOLUTION RESPIRATORY (INHALATION) EVERY 4 HOURS
Status: ON HOLD | COMMUNITY

## 2025-01-27 RX ADMIN — WATER 80 MG: 1 INJECTION INTRAMUSCULAR; INTRAVENOUS; SUBCUTANEOUS at 20:39

## 2025-01-27 RX ADMIN — BUMETANIDE 1 MG: 0.25 INJECTION INTRAMUSCULAR; INTRAVENOUS at 20:42

## 2025-01-27 RX ADMIN — IPRATROPIUM BROMIDE AND ALBUTEROL SULFATE 1 DOSE: .5; 2.5 SOLUTION RESPIRATORY (INHALATION) at 20:05

## 2025-01-27 ASSESSMENT — PAIN SCALES - GENERAL: PAINLEVEL_OUTOF10: 0

## 2025-01-27 ASSESSMENT — PAIN - FUNCTIONAL ASSESSMENT: PAIN_FUNCTIONAL_ASSESSMENT: 0-10

## 2025-01-28 ENCOUNTER — APPOINTMENT (OUTPATIENT)
Age: 85
End: 2025-01-28
Payer: MEDICARE

## 2025-01-28 ENCOUNTER — HOSPITAL ENCOUNTER (INPATIENT)
Facility: HOSPITAL | Age: 85
LOS: 10 days | End: 2025-02-07
Attending: FAMILY MEDICINE | Admitting: STUDENT IN AN ORGANIZED HEALTH CARE EDUCATION/TRAINING PROGRAM
Payer: MEDICARE

## 2025-01-28 ENCOUNTER — TELEPHONE (OUTPATIENT)
Age: 85
End: 2025-01-28

## 2025-01-28 VITALS
TEMPERATURE: 97.7 F | HEIGHT: 72 IN | WEIGHT: 152 LBS | SYSTOLIC BLOOD PRESSURE: 116 MMHG | BODY MASS INDEX: 20.59 KG/M2 | DIASTOLIC BLOOD PRESSURE: 42 MMHG | HEART RATE: 78 BPM | OXYGEN SATURATION: 95 % | RESPIRATION RATE: 26 BRPM

## 2025-01-28 PROBLEM — J96.21 ACUTE ON CHRONIC RESPIRATORY FAILURE WITH HYPOXIA: Status: ACTIVE | Noted: 2025-01-28

## 2025-01-28 PROBLEM — I50.43 ACUTE ON CHRONIC HEART FAILURE WITH REDUCED EJECTION FRACTION AND DIASTOLIC DYSFUNCTION (HCC): Status: ACTIVE | Noted: 2024-10-29

## 2025-01-28 LAB
ALBUMIN SERPL-MCNC: 2.6 G/DL (ref 3.4–5)
ALBUMIN/GLOB SERPL: 0.5 (ref 0.8–1.7)
ALP SERPL-CCNC: 82 U/L (ref 45–117)
ALT SERPL-CCNC: 15 U/L (ref 16–61)
ANION GAP SERPL CALC-SCNC: 8 MMOL/L (ref 3–18)
AST SERPL-CCNC: 11 U/L (ref 10–38)
BASOPHILS # BLD: 0.01 K/UL (ref 0–0.1)
BASOPHILS NFR BLD: 0.2 % (ref 0–2)
BILIRUB SERPL-MCNC: 1.2 MG/DL (ref 0.2–1)
BUN SERPL-MCNC: 80 MG/DL (ref 7–18)
BUN/CREAT SERPL: 25 (ref 12–20)
CALCIUM SERPL-MCNC: 8.4 MG/DL (ref 8.5–10.1)
CHLORIDE SERPL-SCNC: 101 MMOL/L (ref 100–111)
CO2 SERPL-SCNC: 30 MMOL/L (ref 21–32)
CREAT SERPL-MCNC: 3.15 MG/DL (ref 0.6–1.3)
DIFFERENTIAL METHOD BLD: ABNORMAL
EKG ATRIAL RATE: 81 BPM
EKG DIAGNOSIS: NORMAL
EKG P AXIS: 70 DEGREES
EKG P-R INTERVAL: 182 MS
EKG Q-T INTERVAL: 470 MS
EKG QRS DURATION: 168 MS
EKG QTC CALCULATION (BAZETT): 545 MS
EKG R AXIS: 13 DEGREES
EKG T AXIS: 192 DEGREES
EKG VENTRICULAR RATE: 81 BPM
EOSINOPHIL # BLD: 0 K/UL (ref 0–0.4)
EOSINOPHIL NFR BLD: 0 % (ref 0–5)
ERYTHROCYTE [DISTWIDTH] IN BLOOD BY AUTOMATED COUNT: 14.6 % (ref 11.6–14.5)
FERRITIN SERPL-MCNC: 79 NG/ML (ref 8–388)
FOLATE SERPL-MCNC: 17.4 NG/ML (ref 3.1–17.5)
GLOBULIN SER CALC-MCNC: 5.1 G/DL (ref 2–4)
GLUCOSE BLD STRIP.AUTO-MCNC: 193 MG/DL (ref 70–110)
GLUCOSE BLD STRIP.AUTO-MCNC: 197 MG/DL (ref 70–110)
GLUCOSE BLD STRIP.AUTO-MCNC: 328 MG/DL (ref 70–110)
GLUCOSE SERPL-MCNC: 222 MG/DL (ref 74–99)
HCT VFR BLD AUTO: 32 % (ref 36–48)
HGB BLD-MCNC: 9.7 G/DL (ref 13–16)
IMM GRANULOCYTES # BLD AUTO: 0.02 K/UL (ref 0–0.04)
IMM GRANULOCYTES NFR BLD AUTO: 0.3 % (ref 0–0.5)
IRON SATN MFR SERPL: 9 % (ref 20–50)
IRON SERPL-MCNC: 23 UG/DL (ref 50–175)
LYMPHOCYTES # BLD: 0.19 K/UL (ref 0.9–3.6)
LYMPHOCYTES NFR BLD: 3.1 % (ref 21–52)
MCH RBC QN AUTO: 30.4 PG (ref 24–34)
MCHC RBC AUTO-ENTMCNC: 30.3 G/DL (ref 31–37)
MCV RBC AUTO: 100.3 FL (ref 78–100)
MONOCYTES # BLD: 0.1 K/UL (ref 0.05–1.2)
MONOCYTES NFR BLD: 1.6 % (ref 3–10)
NEUTS SEG # BLD: 5.75 K/UL (ref 1.8–8)
NEUTS SEG NFR BLD: 94.8 % (ref 40–73)
NRBC # BLD: 0 K/UL (ref 0–0.01)
NRBC BLD-RTO: 0 PER 100 WBC
PLATELET # BLD AUTO: 262 K/UL (ref 135–420)
PMV BLD AUTO: 13.3 FL (ref 9.2–11.8)
POTASSIUM SERPL-SCNC: 5 MMOL/L (ref 3.5–5.5)
PROCALCITONIN SERPL-MCNC: 0.08 NG/ML
PROT SERPL-MCNC: 7.7 G/DL (ref 6.4–8.2)
RBC # BLD AUTO: 3.19 M/UL (ref 4.35–5.65)
SODIUM SERPL-SCNC: 139 MMOL/L (ref 136–145)
TIBC SERPL-MCNC: 252 UG/DL (ref 250–450)
TROPONIN I SERPL HS-MCNC: 66 NG/L (ref 0–78)
TSH SERPL DL<=0.05 MIU/L-ACNC: 5.25 UIU/ML (ref 0.36–3.74)
VIT B12 SERPL-MCNC: >2000 PG/ML (ref 211–911)
WBC # BLD AUTO: 6.1 K/UL (ref 4.6–13.2)

## 2025-01-28 PROCEDURE — 83550 IRON BINDING TEST: CPT

## 2025-01-28 PROCEDURE — 96376 TX/PRO/DX INJ SAME DRUG ADON: CPT

## 2025-01-28 PROCEDURE — 6370000000 HC RX 637 (ALT 250 FOR IP): Performed by: EMERGENCY MEDICINE

## 2025-01-28 PROCEDURE — 6370000000 HC RX 637 (ALT 250 FOR IP): Performed by: PHYSICIAN ASSISTANT

## 2025-01-28 PROCEDURE — 80053 COMPREHEN METABOLIC PANEL: CPT

## 2025-01-28 PROCEDURE — 1100000003 HC PRIVATE W/ TELEMETRY

## 2025-01-28 PROCEDURE — 5A0935A ASSISTANCE WITH RESPIRATORY VENTILATION, LESS THAN 24 CONSECUTIVE HOURS, HIGH NASAL FLOW/VELOCITY: ICD-10-PCS | Performed by: STUDENT IN AN ORGANIZED HEALTH CARE EDUCATION/TRAINING PROGRAM

## 2025-01-28 PROCEDURE — 70450 CT HEAD/BRAIN W/O DYE: CPT

## 2025-01-28 PROCEDURE — 84145 PROCALCITONIN (PCT): CPT

## 2025-01-28 PROCEDURE — 84443 ASSAY THYROID STIM HORMONE: CPT

## 2025-01-28 PROCEDURE — 82728 ASSAY OF FERRITIN: CPT

## 2025-01-28 PROCEDURE — 2500000003 HC RX 250 WO HCPCS: Performed by: EMERGENCY MEDICINE

## 2025-01-28 PROCEDURE — 2500000003 HC RX 250 WO HCPCS: Performed by: PHYSICIAN ASSISTANT

## 2025-01-28 PROCEDURE — 36415 COLL VENOUS BLD VENIPUNCTURE: CPT

## 2025-01-28 PROCEDURE — 99223 1ST HOSP IP/OBS HIGH 75: CPT | Performed by: PHYSICIAN ASSISTANT

## 2025-01-28 PROCEDURE — 6360000002 HC RX W HCPCS: Performed by: EMERGENCY MEDICINE

## 2025-01-28 PROCEDURE — 96372 THER/PROPH/DIAG INJ SC/IM: CPT

## 2025-01-28 PROCEDURE — 84484 ASSAY OF TROPONIN QUANT: CPT

## 2025-01-28 PROCEDURE — 6360000002 HC RX W HCPCS: Performed by: PHYSICIAN ASSISTANT

## 2025-01-28 PROCEDURE — 85025 COMPLETE CBC W/AUTO DIFF WBC: CPT

## 2025-01-28 PROCEDURE — 96375 TX/PRO/DX INJ NEW DRUG ADDON: CPT

## 2025-01-28 PROCEDURE — 83540 ASSAY OF IRON: CPT

## 2025-01-28 PROCEDURE — 94640 AIRWAY INHALATION TREATMENT: CPT

## 2025-01-28 PROCEDURE — 82746 ASSAY OF FOLIC ACID SERUM: CPT

## 2025-01-28 PROCEDURE — 6360000002 HC RX W HCPCS: Performed by: FAMILY MEDICINE

## 2025-01-28 PROCEDURE — 82607 VITAMIN B-12: CPT

## 2025-01-28 PROCEDURE — 82962 GLUCOSE BLOOD TEST: CPT

## 2025-01-28 RX ORDER — LEVOFLOXACIN 5 MG/ML
250 INJECTION, SOLUTION INTRAVENOUS
Status: DISCONTINUED | OUTPATIENT
Start: 2025-01-28 | End: 2025-01-28

## 2025-01-28 RX ORDER — HALOPERIDOL 5 MG/ML
2 INJECTION INTRAMUSCULAR
Status: COMPLETED | OUTPATIENT
Start: 2025-01-28 | End: 2025-01-28

## 2025-01-28 RX ORDER — LEVOFLOXACIN 5 MG/ML
250 INJECTION, SOLUTION INTRAVENOUS EVERY 24 HOURS
Status: DISCONTINUED | OUTPATIENT
Start: 2025-01-29 | End: 2025-01-29

## 2025-01-28 RX ORDER — LORAZEPAM 2 MG/ML
0.5 INJECTION INTRAMUSCULAR ONCE
Status: DISCONTINUED | OUTPATIENT
Start: 2025-01-28 | End: 2025-01-28

## 2025-01-28 RX ORDER — SODIUM CHLORIDE 0.9 % (FLUSH) 0.9 %
5-40 SYRINGE (ML) INJECTION PRN
Status: DISCONTINUED | OUTPATIENT
Start: 2025-01-28 | End: 2025-02-07 | Stop reason: HOSPADM

## 2025-01-28 RX ORDER — IPRATROPIUM BROMIDE AND ALBUTEROL SULFATE 2.5; .5 MG/3ML; MG/3ML
1 SOLUTION RESPIRATORY (INHALATION)
Status: COMPLETED | OUTPATIENT
Start: 2025-01-28 | End: 2025-01-28

## 2025-01-28 RX ORDER — BUSPIRONE HYDROCHLORIDE 10 MG/1
10 TABLET ORAL 2 TIMES DAILY
Status: DISCONTINUED | OUTPATIENT
Start: 2025-01-28 | End: 2025-02-07

## 2025-01-28 RX ORDER — FUROSEMIDE 10 MG/ML
20 INJECTION INTRAMUSCULAR; INTRAVENOUS ONCE
Status: COMPLETED | OUTPATIENT
Start: 2025-01-28 | End: 2025-01-28

## 2025-01-28 RX ORDER — SODIUM CHLORIDE 0.9 % (FLUSH) 0.9 %
5-40 SYRINGE (ML) INJECTION EVERY 12 HOURS SCHEDULED
Status: DISCONTINUED | OUTPATIENT
Start: 2025-01-28 | End: 2025-02-07

## 2025-01-28 RX ORDER — CARBIDOPA AND LEVODOPA 25; 100 MG/1; MG/1
1 TABLET ORAL 2 TIMES DAILY
Status: DISCONTINUED | OUTPATIENT
Start: 2025-01-28 | End: 2025-02-07

## 2025-01-28 RX ORDER — LORAZEPAM 2 MG/ML
0.5 INJECTION INTRAMUSCULAR ONCE
Status: COMPLETED | OUTPATIENT
Start: 2025-01-28 | End: 2025-01-28

## 2025-01-28 RX ORDER — BISACODYL 10 MG
10 SUPPOSITORY, RECTAL RECTAL DAILY PRN
Status: DISCONTINUED | OUTPATIENT
Start: 2025-01-28 | End: 2025-02-07 | Stop reason: HOSPADM

## 2025-01-28 RX ORDER — PRAVASTATIN SODIUM 20 MG
20 TABLET ORAL NIGHTLY
Status: DISCONTINUED | OUTPATIENT
Start: 2025-01-28 | End: 2025-02-07

## 2025-01-28 RX ORDER — POLYETHYLENE GLYCOL 3350 17 G/17G
17 POWDER, FOR SOLUTION ORAL DAILY PRN
Status: DISCONTINUED | OUTPATIENT
Start: 2025-01-28 | End: 2025-02-07 | Stop reason: HOSPADM

## 2025-01-28 RX ORDER — BUMETANIDE 0.25 MG/ML
1 INJECTION, SOLUTION INTRAMUSCULAR; INTRAVENOUS 2 TIMES DAILY
Status: DISCONTINUED | OUTPATIENT
Start: 2025-01-28 | End: 2025-02-06

## 2025-01-28 RX ORDER — ACETAMINOPHEN 325 MG/1
650 TABLET ORAL EVERY 6 HOURS PRN
Status: DISCONTINUED | OUTPATIENT
Start: 2025-01-28 | End: 2025-02-07 | Stop reason: HOSPADM

## 2025-01-28 RX ORDER — ONDANSETRON 4 MG/1
4 TABLET, ORALLY DISINTEGRATING ORAL EVERY 8 HOURS PRN
Status: DISCONTINUED | OUTPATIENT
Start: 2025-01-28 | End: 2025-01-28

## 2025-01-28 RX ORDER — SODIUM CHLORIDE 9 MG/ML
INJECTION, SOLUTION INTRAVENOUS PRN
Status: DISCONTINUED | OUTPATIENT
Start: 2025-01-28 | End: 2025-02-07 | Stop reason: HOSPADM

## 2025-01-28 RX ORDER — INSULIN LISPRO 100 [IU]/ML
0-8 INJECTION, SOLUTION INTRAVENOUS; SUBCUTANEOUS
Status: DISCONTINUED | OUTPATIENT
Start: 2025-01-28 | End: 2025-01-29

## 2025-01-28 RX ORDER — ONDANSETRON 2 MG/ML
4 INJECTION INTRAMUSCULAR; INTRAVENOUS EVERY 6 HOURS PRN
Status: DISCONTINUED | OUTPATIENT
Start: 2025-01-28 | End: 2025-01-28

## 2025-01-28 RX ORDER — ENOXAPARIN SODIUM 100 MG/ML
30 INJECTION SUBCUTANEOUS DAILY
Status: DISCONTINUED | OUTPATIENT
Start: 2025-01-28 | End: 2025-01-30 | Stop reason: ALTCHOICE

## 2025-01-28 RX ORDER — ACETAMINOPHEN 650 MG/1
650 SUPPOSITORY RECTAL EVERY 6 HOURS PRN
Status: DISCONTINUED | OUTPATIENT
Start: 2025-01-28 | End: 2025-02-07 | Stop reason: HOSPADM

## 2025-01-28 RX ORDER — DEXTROSE MONOHYDRATE 100 MG/ML
INJECTION, SOLUTION INTRAVENOUS CONTINUOUS PRN
Status: DISCONTINUED | OUTPATIENT
Start: 2025-01-28 | End: 2025-02-07

## 2025-01-28 RX ORDER — BUMETANIDE 0.25 MG/ML
1 INJECTION, SOLUTION INTRAMUSCULAR; INTRAVENOUS ONCE
Status: COMPLETED | OUTPATIENT
Start: 2025-01-28 | End: 2025-01-28

## 2025-01-28 RX ORDER — HALOPERIDOL 5 MG/ML
2 INJECTION INTRAMUSCULAR
Status: DISCONTINUED | OUTPATIENT
Start: 2025-01-28 | End: 2025-01-28

## 2025-01-28 RX ADMIN — SODIUM CHLORIDE, PRESERVATIVE FREE 10 ML: 5 INJECTION INTRAVENOUS at 23:23

## 2025-01-28 RX ADMIN — INSULIN LISPRO 2 UNITS: 100 INJECTION, SOLUTION INTRAVENOUS; SUBCUTANEOUS at 18:18

## 2025-01-28 RX ADMIN — CARBIDOPA AND LEVODOPA 1 TABLET: 25; 100 TABLET ORAL at 23:22

## 2025-01-28 RX ADMIN — LORAZEPAM 0.5 MG: 2 INJECTION INTRAMUSCULAR; INTRAVENOUS at 01:18

## 2025-01-28 RX ADMIN — BUMETANIDE 1 MG: 0.25 INJECTION INTRAMUSCULAR; INTRAVENOUS at 04:25

## 2025-01-28 RX ADMIN — BUSPIRONE HYDROCHLORIDE 10 MG: 10 TABLET ORAL at 23:22

## 2025-01-28 RX ADMIN — BUMETANIDE 1 MG: 0.25 INJECTION INTRAMUSCULAR; INTRAVENOUS at 18:18

## 2025-01-28 RX ADMIN — FUROSEMIDE 20 MG: 10 INJECTION, SOLUTION INTRAMUSCULAR; INTRAVENOUS at 06:09

## 2025-01-28 RX ADMIN — ENOXAPARIN SODIUM 30 MG: 100 INJECTION SUBCUTANEOUS at 13:10

## 2025-01-28 RX ADMIN — INSULIN LISPRO 6 UNITS: 100 INJECTION, SOLUTION INTRAVENOUS; SUBCUTANEOUS at 13:10

## 2025-01-28 RX ADMIN — IPRATROPIUM BROMIDE AND ALBUTEROL SULFATE 1 DOSE: .5; 2.5 SOLUTION RESPIRATORY (INHALATION) at 06:07

## 2025-01-28 RX ADMIN — LEVOFLOXACIN 250 MG: 5 INJECTION, SOLUTION INTRAVENOUS at 15:09

## 2025-01-28 RX ADMIN — PRAVASTATIN SODIUM 20 MG: 20 TABLET ORAL at 23:22

## 2025-01-28 RX ADMIN — INSULIN LISPRO 2 UNITS: 100 INJECTION, SOLUTION INTRAVENOUS; SUBCUTANEOUS at 23:22

## 2025-01-28 RX ADMIN — HALOPERIDOL LACTATE 2 MG: 5 INJECTION, SOLUTION INTRAMUSCULAR at 01:17

## 2025-01-28 RX ADMIN — BUMETANIDE 1 MG: 0.25 INJECTION INTRAMUSCULAR; INTRAVENOUS at 13:10

## 2025-01-28 RX ADMIN — WATER 1000 MG: 1 INJECTION INTRAMUSCULAR; INTRAVENOUS; SUBCUTANEOUS at 01:24

## 2025-01-28 RX ADMIN — HALOPERIDOL LACTATE 2 MG: 5 INJECTION, SOLUTION INTRAMUSCULAR at 05:54

## 2025-01-28 ASSESSMENT — PAIN SCALES - GENERAL
PAINLEVEL_OUTOF10: 0
PAINLEVEL_OUTOF10: 0

## 2025-01-28 NOTE — PROGRESS NOTES
[facility] Renal Dosing Services    Pharmacist Renal Dosing Note for Rahul Camarillo   Physician/Prescriber:  Vandana Abbott    Previous Regimen Levofloxacin 250 mg EVERY 24 hours   Serum Creatinine No results found for: \"HEMA\", \"CREAPOC\"   Creatinine Clearance Estimated Creatinine Clearance: 18 mL/min (A) (based on SCr of 2.88 mg/dL (H)).   BUN Lab Results   Component Value Date/Time    BUN 77 01/27/2025 07:46 PM           The following medication: Levofloxacin 250 mg EVERY 24 hours was automatically dose-adjusted per [facility] P&T Committee Protocol, with respect to renal function.      Dosage changed to:  Levofloxacin 250 mg EVERY 48 hours    Additional notes:    Pharmacy to continue to monitor patient daily.   Will make dosage adjustments based upon changing renal function.  Signed Alberto Kwok RPH.

## 2025-01-28 NOTE — TELEPHONE ENCOUNTER
Patient's wife called and stated that her  the patient was at [facility] and admitted for chf.  She identified the patient by full name and .   She was wanting Dr. Pearson to go and see him, but I explained that he does not see pt at any hospital except [facility]. He only goes to [facility] when he is on call.    Dr. Pearson notified, and he said to let the wife know that he is in good hands with his partners over at [facility].  I called her back, no answer, but I did leave a message for her about the above message and also reminded her that he needs a 2 wk f/u at our office when he gets out.

## 2025-01-28 NOTE — ED NOTES
TRANSFER - OUT REPORT:    Verbal report given to Jeanne deisy Camarillo  being transferred to [facility] room 367 for urgent transfer       Report consisted of patient's Situation, Background, Assessment and   Recommendations(SBAR).     Information from the following report(s) Nurse Handoff Report, ED Encounter Summary, ED SBAR, Intake/Output, Recent Results, and Med Rec Status was reviewed with the receiving nurse.    [place] Fall Assessment:    Presents to emergency department  because of falls (Syncope, seizure, or loss of consciousness): No  Age > 70: Yes  Altered Mental Status, Intoxication with alcohol or substance confusion (Disorientation, impaired judgment, poor safety awaremess, or inability to follow instructions): Yes  Impaired Mobility: Ambulates or transfers with assistive devices or assistance; Unable to ambulate or transer.: Yes  Nursing Judgement: Yes          Lines:   Peripheral IV 01/27/25 Left Forearm (Active)        Opportunity for questions and clarification was provided.      Patient transported with:  Monitor and O2 @ 4lpm  [employer] staff

## 2025-01-28 NOTE — PROGRESS NOTES
Review of Systems    Physical Exam  Skin:           Dual skin assessment completed with Jeanne Marie RN.     4 Eyes Skin Assessment     NAME:  Rahul Camarillo  YOB: 1940  MEDICAL RECORD NUMBER:  [id number]    The patient is being assessed for  Admission    I agree that at least one RN has performed a thorough Head to Toe Skin Assessment on the patient. ALL assessment sites listed below have been assessed.      Areas assessed by both nurses:    Head, Face, Ears, Shoulders, Back, Chest, Arms, Elbows, Hands, Sacrum. Buttock, Coccyx, Ischium, Legs. Feet and Heels, and Under Medical Devices         Does the Patient have a Wound? Yes wound(s) were present on assessment. LDA wound assessment was Initiated and completed by RN       Larry Prevention initiated by RN: Yes  Wound Care Orders initiated by RN: No    Pressure Injury (Stage 3,4, Unstageable, DTI, NWPT, and Complex wounds) if present, place Wound referral order by RN under : No    New Ostomies, if present place, Ostomy referral order under : No     Nurse 1 eSignature: Electronically signed by Anabel Bennett RN on 1/28/25 at 12:15 PM EST    **SHARE this note so that the co-signing nurse can place an eSignature**    Nurse 2 eSignature: Electronically signed by Jeanne Marie RN on 1/28/25 at 12:15 PM EST    1815: Peg tube feedings initiated. Patient is on Glucerna running at 20ml/hr with 90ml free water flushes Q6hrs. Patient has a goal feed of 50ml/hr. Patient tube feeding is being increased 10ml Q6hrs until goal rate is achieved and tolerated.     1910: Bedside and Verbal shift change report given to MARGARITO Rios (oncoming nurse) by Jeanne/MARGARITO Sanders (offgoing nurse). Report included the following information Nurse Handoff Report, Adult Overview, MAR, and Recent Results.

## 2025-01-28 NOTE — ED NOTES
Patient wife came to desk stating patient is pulling at blood pressure cuff and cardiac monitor cords and can't get comfortable. Patient confusion worsening. Dr. Muller aware and wants to see patient before anxiety medications given.

## 2025-01-28 NOTE — ED NOTES
Patient pulling at wires and taking off gown. Unable to get comfortable. Patient 02 sats in 80's. Dr. Muller made aware and orders given for Haldol. Dr. Muller at bedside. Pulse ox probe changed.

## 2025-01-28 NOTE — RT PROTOCOL NOTE
Received call from  that patient family is requesting bipap.  not sure if patient wears at home or why one is being asked. RT informed  that currently there is no order and that the nurse should talk to family about why one is being requested. If patient uses bipap at home and order is placed by doctor then RT can provide one for night time use

## 2025-01-28 NOTE — ED NOTES
Attempted to call report to [facility] for bed 367. Marcy RN advised that they are still in report and unable to take patient information at this time. Marcy suggests calling back in 15 minutes.

## 2025-01-28 NOTE — ED PROVIDER NOTES
chronic respiratory failure with hypoxia    2. Acute on chronic combined systolic and diastolic CHF (congestive heart failure) (HCC)    3. Bilateral pleural effusion    4. Acute encephalopathy    5. Urinary tract infection with hematuria, site unspecified         DISPOSITION/PLAN   DISPOSITION Decision To Transfer 01/27/2025 11:15:41 PM   DISPOSITION CONDITION Stable           Transferred     PATIENT REFERRED TO:  No follow-up provider specified.     DISCHARGE MEDICATIONS:  Current Discharge Medication List             Details   vitamin D (CHOLECALCIFEROL) 25 MCG (1000 UT) TABS tablet Take 1 tablet by mouth daily      cyanocobalamin 1000 MCG tablet Take 1 tablet by mouth daily      doxazosin (CARDURA) 2 MG tablet Take 1 tablet by mouth nightly      ferrous sulfate (IRON 325) 325 (65 Fe) MG tablet Take 1 tablet by mouth daily (with breakfast)      lidocaine 4 % external patch Place 1 patch onto the skin daily      loperamide (IMODIUM) 2 MG capsule Take 1 capsule by mouth 4 times daily as needed for Diarrhea      Insulin Degludec (TRESIBA FLEXTOUCH) 200 UNIT/ML SOPN Inject 16 Units into the skin nightly      Multiple Vitamins-Minerals (ONCOVITE) TABS Take 1 tablet by mouth daily      levothyroxine (SYNTHROID) 75 MCG tablet       SITagliptin (JANUVIA) 25 MG tablet Take 1 tablet by mouth daily      aspirin 81 MG chewable tablet EC tablet not chewable      bumetanide (BUMEX) 1 MG tablet Take 1 tablet by mouth daily MWF take only half of tablet, all other days take 1 tablet      busPIRone (BUSPAR) 10 MG tablet Take 1 tablet by mouth in the morning and at bedtime ceived the following from [facility]: Outside name: busPIRone (BUSPAR) 10 mg tablet      carbidopa-levodopa (SINEMET)  MG per tablet Take 1 tablet by mouth 2 times daily      pravastatin (PRAVACHOL) 20 MG tablet Take 1 tablet by mouth      testosterone cypionate (DEPOTESTOTERONE CYPIONATE) 200 MG/ML injection Inject 1 mL into the muscle.

## 2025-01-28 NOTE — H&P
History and Physical          Subjective     HPI: Rahul Camarillo is a 84 y.o. male with a PMHx of HF EF 20-25%, dementia, dysphagia s/p PEG, BPH, HTN, parkinsons, DM, hypoxia on 2L NC, CKD4, PAF who presented to [facility] ED from [facility] due to AMS and hypoxia. Per EMS, O2 sats were 80% on his baseline 2L NC and he appeared to be in respiratory distress. Per ER notes, patient was recently admitted at [facility] in [place] for respiratory failure and aspiration pneumonia. He had a thoracentesis on 1/8/25, transudative effusion per pulmonology. Hypoxia felt to be 2/2 HF and aspiration PNA. He was discharged on 2L NC. He was cleared for a oral diet, mech soft. Patient's wife stated he is full code. He recently had his bumex decreased per his nephrologist.     In the ED, RR 30s, HR 70s, BP stable, SpO2 80s, improved on 6L NC. Labs with Cr 2.88 (at baseline), BUN 77, bicarb 33, , BNP 81631, trop 43, alb 2.6, WBC 10.1, hgb 10.6, procal <0.05. Flu/covid/RSV negative. UA with large LE, >100 WBC, 4+ bacteria. ABG pH 7.47m pCo2 45, pO2 61, hco3 32. CXR with bilateral pleural effusions and significant bilateral pulm infiltrates. CTH negative for acute process. .       PMHx:  Past Medical History:   Diagnosis Date    Benign prostatic hyperplasia with urinary obstruction     CHF (congestive heart failure) (HCC)     Dementia (HCC)     early onset    Diabetes (HCC)     Dysphagia     Hypersomnia     Hypertension     Nocturia     OAB (overactive bladder)     Parkinson's disease (HCC) 02/09/2023    Parkinson's disease (HCC)     Testicular cancer (HCC)     TIA (transient ischemic attack)     Urge incontinence     Urinary incontinence, urge        PSurgHx:  Past Surgical History:   Procedure Laterality Date    CYST REMOVAL  1960-1970s    cyst removed from right breast    GASTROSTOMY TUBE PLACEMENT      OTHER SURGICAL HISTORY  01/2019    Heart Blockage: 30% [facility]: Dr. Arias Shaver     Negative /hpf       Imaging Reviewed:  CT HEAD WO CONTRAST    Result Date: 1/28/2025  INDICATION: AMS EXAM:  HEAD CT WITHOUT CONTRAST COMPARISON: April 5, 2020 TECHNIQUE:  Routine noncontrast axial head CT was performed.  Sagittal and coronal reconstructions were generated.  CT dose reduction was achieved through use of a standardized protocol tailored for this examination and automatic exposure control for dose modulation. FINDINGS: Ventricles: Midline, no hydrocephalus. Intracranial Hemorrhage: None. Brain Parenchyma/Brainstem: Normal for age. Basal Cisterns: Normal. Paranasal Sinuses: Visualized sinuses are clear. Additional Comments: N/A.     No acute process. Electronically signed by Rico Manjarrez    XR CHEST PORTABLE    Result Date: 1/27/2025  EXAM:  XR CHEST PORTABLE INDICATION: hypoxia COMPARISON: 10/12/2024 TECHNIQUE: portable chest AP view FINDINGS: The cardiac silhouette is within normal limits. The pulmonary vasculature is within normal limits. Bilateral pleural effusions are noted. Significant bilateral pulmonary infiltrates, progressive compared to the prior exam. The visualized bones and upper abdomen are age-appropriate.     Bilateral pleural effusions and significant bilateral pulmonary infiltrates. Electronically signed by MICHAEL QUIÑONEZ          Assessment/Plan     Acute on chronic hypoxic respiratory failure: due to a/c HF. Patient recently started on long term O2 therapy after discharge from [facility]  - wean O2 as tolerated, initially on 6L, was able to wean to 3L during examination. SpO2 remained >94% while he was sleeping.  - no need for thoracentesis at this time. Risks outweigh benefits. No respiratory distress and oxygen requirement is decreasing. C/w diuresis.    Acute on chronic HFrEF with diastolic dysfunction: BNP 28k, bilateral effusions and edema on CXR. TTE 10/14/24 EF 20%, grade 2 ddx, severe global hypokinesis, bioprostetic aortic valve, mild mod MR, mild TR. Recently has

## 2025-01-28 NOTE — ED NOTES
Transfer center called and states none of [facility] have beds. Dr. Muller made aware and is talking with transfer center to see what hospital does have beds.

## 2025-01-28 NOTE — PLAN OF CARE
Problem: Chronic Conditions and Co-morbidities  Goal: Patient's chronic conditions and co-morbidity symptoms are monitored and maintained or improved  1/28/2025 1546 by Anabel Bennett, RN  Outcome: Progressing  Flowsheets (Taken 1/28/2025 1540)  Care Plan - Patient's Chronic Conditions and Co-Morbidity Symptoms are Monitored and Maintained or Improved:   Monitor and assess patient's chronic conditions and comorbid symptoms for stability, deterioration, or improvement   Collaborate with multidisciplinary team to address chronic and comorbid conditions and prevent exacerbation or deterioration  Note: Nurse will monitor patient's chronic conditions and comorbidities and treat using prescribed medications as needed      Problem: Discharge Planning  Goal: Discharge to home or other facility with appropriate resources  1/28/2025 1546 by Anabel Bennett, RN  Outcome: Progressing  Flowsheets (Taken 1/28/2025 1540)  Discharge to home or other facility with appropriate resources:   Identify barriers to discharge with patient and caregiver   Identify discharge learning needs (meds, wound care, etc)   Arrange for needed discharge resources and transportation as appropriate  Note: Patient is to be discharged to home. Will discuss with treatment team and case management to determine any barriers to discharge      Problem: Safety - Adult  Goal: Free from fall injury  1/28/2025 1546 by Anabel Bennett, RN  Outcome: Progressing  Flowsheets (Taken 1/28/2025 1540)  Free From Fall Injury: Instruct family/caregiver on patient safety  Note: Patient will remain free from fall during admission. Nurse will use interventions such as gripper socks, bed alarm on, bed in lowest position, room free of clutter, and educate patient to call when assistance is needed.      Problem: Pain  Goal: Verbalizes/displays adequate comfort level or baseline comfort level  Outcome: Progressing  Flowsheets (Taken 1/28/2025

## 2025-01-28 NOTE — ED TRIAGE NOTES
Patient arrived via EMS from [facility]. EMS states they were called because patient had increased altered mental status. EMS also states his 02 sats were 80% on his normal 2 L NC.

## 2025-01-28 NOTE — PROGRESS NOTES
Advance Care Planning   Healthcare Decision Maker:    Primary Decision Maker: Margaret Camarillo - Spouse - [phone]    Click here to complete Healthcare Decision Makers including selection of the Healthcare Decision Maker Relationship (ie \"Primary\").    Spiritual Health History and Assessment/Progress Note  [facility]    Spiritual/Emotional Needs, Family Care,  ,  ,      Name: Rahul Camarillo MRN: [id number]    Age: 84 y.o.     Sex: male   Language: English   Faith: Scientologist   Acute on chronic respiratory failure with hypoxia     Date: 1/28/2025            Total Time Calculated: 8 min              Spiritual Assessment began in [facility]        Referral/Consult From: Rounding   Encounter Overview/Reason: Spiritual/Emotional Needs, Family Care  Service Provided For: Patient and family together    [place], Belief, Meaning:   Patient identifies as spiritual and has beliefs or practices that help with coping during difficult times  Family/Friends identify as spiritual and have beliefs or practices that help with coping during difficult times      Importance and Influence:  Patient has spiritual/personal beliefs that influence decisions regarding their health  Family/Friends have spiritual/personal beliefs that influence decisions regarding the patient's health    Community:  Patient feels well-supported. Support system includes: Spouse/Partner, Children, and Extended family  Family/Friends feel well-supported. Support system includes: Spouse/Partner, Children, and Extended family    Assessment and Plan of Care:     Patient Interventions include: Facilitated expression of thoughts and feelings, Affirmed coping skills/support systems, and Provided sacramental/Judaism ritual  Family/Friends Interventions include: Facilitated expression of thoughts and feelings, Affirmed coping skills/support systems, and Provided sacramental/Judaism ritual    Patient Plan of Care: Spiritual Care available upon

## 2025-01-29 ENCOUNTER — APPOINTMENT (OUTPATIENT)
Facility: HOSPITAL | Age: 85
End: 2025-01-29
Attending: FAMILY MEDICINE
Payer: MEDICARE

## 2025-01-29 PROBLEM — J96.21 ACUTE ON CHRONIC HYPOXIC RESPIRATORY FAILURE: Status: ACTIVE | Noted: 2025-01-29

## 2025-01-29 PROBLEM — Z51.5 ENCOUNTER FOR PALLIATIVE CARE: Status: RESOLVED | Noted: 2025-01-29 | Resolved: 2025-01-29

## 2025-01-29 PROBLEM — N18.4 STAGE 4 CHRONIC KIDNEY DISEASE (HCC): Status: RESOLVED | Noted: 2024-10-29 | Resolved: 2025-01-29

## 2025-01-29 PROBLEM — I50.9 ACUTE ON CHRONIC CONGESTIVE HEART FAILURE (HCC): Status: RESOLVED | Noted: 2025-01-29 | Resolved: 2025-01-29

## 2025-01-29 PROBLEM — N18.4 ACUTE RENAL FAILURE WITH ACUTE TUBULAR NECROSIS SUPERIMPOSED ON STAGE 4 CHRONIC KIDNEY DISEASE (HCC): Status: ACTIVE | Noted: 2025-01-29

## 2025-01-29 PROBLEM — N17.0 ACUTE RENAL FAILURE WITH ACUTE TUBULAR NECROSIS SUPERIMPOSED ON STAGE 4 CHRONIC KIDNEY DISEASE (HCC): Status: ACTIVE | Noted: 2025-01-29

## 2025-01-29 PROBLEM — G20.A1 PARKINSON'S DISEASE (HCC): Status: ACTIVE | Noted: 2025-01-29

## 2025-01-29 PROBLEM — J96.21 ACUTE ON CHRONIC HYPOXIC RESPIRATORY FAILURE: Status: RESOLVED | Noted: 2025-01-29 | Resolved: 2025-01-29

## 2025-01-29 PROBLEM — E03.8 OTHER SPECIFIED HYPOTHYROIDISM: Status: ACTIVE | Noted: 2025-01-29

## 2025-01-29 PROBLEM — Z51.5 ENCOUNTER FOR PALLIATIVE CARE: Status: ACTIVE | Noted: 2025-01-29

## 2025-01-29 PROBLEM — R13.10 DYSPHAGIA: Status: ACTIVE | Noted: 2025-01-29

## 2025-01-29 PROBLEM — E44.0 MODERATE PROTEIN-CALORIE MALNUTRITION (HCC): Status: ACTIVE | Noted: 2023-02-14

## 2025-01-29 PROBLEM — Z71.89 GOALS OF CARE, COUNSELING/DISCUSSION: Status: RESOLVED | Noted: 2025-01-29 | Resolved: 2025-01-29

## 2025-01-29 PROBLEM — Z71.89 GOALS OF CARE, COUNSELING/DISCUSSION: Status: ACTIVE | Noted: 2025-01-29

## 2025-01-29 PROBLEM — I50.9 ACUTE ON CHRONIC CONGESTIVE HEART FAILURE (HCC): Status: ACTIVE | Noted: 2025-01-29

## 2025-01-29 LAB
ANION GAP SERPL CALC-SCNC: 7 MMOL/L (ref 3–18)
BACTERIA SPEC CULT: NORMAL
BASOPHILS # BLD: 0.01 K/UL (ref 0–0.1)
BASOPHILS NFR BLD: 0.1 % (ref 0–2)
BUN SERPL-MCNC: 93 MG/DL (ref 7–18)
BUN/CREAT SERPL: 28 (ref 12–20)
CALCIUM SERPL-MCNC: 8.1 MG/DL (ref 8.5–10.1)
CHLORIDE SERPL-SCNC: 103 MMOL/L (ref 100–111)
CO2 SERPL-SCNC: 30 MMOL/L (ref 21–32)
COLONY COUNT, CNT: NORMAL
CREAT SERPL-MCNC: 3.37 MG/DL (ref 0.6–1.3)
DIFFERENTIAL METHOD BLD: ABNORMAL
EOSINOPHIL # BLD: 0 K/UL (ref 0–0.4)
EOSINOPHIL NFR BLD: 0 % (ref 0–5)
ERYTHROCYTE [DISTWIDTH] IN BLOOD BY AUTOMATED COUNT: 14.8 % (ref 11.6–14.5)
EST. AVERAGE GLUCOSE BLD GHB EST-MCNC: 160 MG/DL
GLUCOSE BLD STRIP.AUTO-MCNC: 247 MG/DL (ref 70–110)
GLUCOSE BLD STRIP.AUTO-MCNC: 265 MG/DL (ref 70–110)
GLUCOSE BLD STRIP.AUTO-MCNC: 278 MG/DL (ref 70–110)
GLUCOSE SERPL-MCNC: 205 MG/DL (ref 74–99)
HBA1C MFR BLD: 7.2 % (ref 4.2–5.6)
HCT VFR BLD AUTO: 31.7 % (ref 36–48)
HGB BLD-MCNC: 9.7 G/DL (ref 13–16)
IMM GRANULOCYTES # BLD AUTO: 0.05 K/UL (ref 0–0.04)
IMM GRANULOCYTES NFR BLD AUTO: 0.5 % (ref 0–0.5)
LYMPHOCYTES # BLD: 0.29 K/UL (ref 0.9–3.6)
LYMPHOCYTES NFR BLD: 2.8 % (ref 21–52)
Lab: NORMAL
MCH RBC QN AUTO: 30.7 PG (ref 24–34)
MCHC RBC AUTO-ENTMCNC: 30.6 G/DL (ref 31–37)
MCV RBC AUTO: 100.3 FL (ref 78–100)
MONOCYTES # BLD: 0.72 K/UL (ref 0.05–1.2)
MONOCYTES NFR BLD: 6.9 % (ref 3–10)
NEUTS SEG # BLD: 9.39 K/UL (ref 1.8–8)
NEUTS SEG NFR BLD: 89.7 % (ref 40–73)
NRBC # BLD: 0 K/UL (ref 0–0.01)
NRBC BLD-RTO: 0 PER 100 WBC
PLATELET # BLD AUTO: 261 K/UL (ref 135–420)
PMV BLD AUTO: 13.2 FL (ref 9.2–11.8)
POTASSIUM SERPL-SCNC: 5.4 MMOL/L (ref 3.5–5.5)
RBC # BLD AUTO: 3.16 M/UL (ref 4.35–5.65)
SODIUM SERPL-SCNC: 140 MMOL/L (ref 136–145)
TROPONIN I SERPL HS-MCNC: 60 NG/L (ref 0–78)
WBC # BLD AUTO: 10.5 K/UL (ref 4.6–13.2)

## 2025-01-29 PROCEDURE — 97116 GAIT TRAINING THERAPY: CPT

## 2025-01-29 PROCEDURE — 74230 X-RAY XM SWLNG FUNCJ C+: CPT

## 2025-01-29 PROCEDURE — 85025 COMPLETE CBC W/AUTO DIFF WBC: CPT

## 2025-01-29 PROCEDURE — 99232 SBSQ HOSP IP/OBS MODERATE 35: CPT | Performed by: STUDENT IN AN ORGANIZED HEALTH CARE EDUCATION/TRAINING PROGRAM

## 2025-01-29 PROCEDURE — 97162 PT EVAL MOD COMPLEX 30 MIN: CPT

## 2025-01-29 PROCEDURE — 6360000002 HC RX W HCPCS: Performed by: STUDENT IN AN ORGANIZED HEALTH CARE EDUCATION/TRAINING PROGRAM

## 2025-01-29 PROCEDURE — 2500000003 HC RX 250 WO HCPCS: Performed by: STUDENT IN AN ORGANIZED HEALTH CARE EDUCATION/TRAINING PROGRAM

## 2025-01-29 PROCEDURE — 71045 X-RAY EXAM CHEST 1 VIEW: CPT

## 2025-01-29 PROCEDURE — 1100000003 HC PRIVATE W/ TELEMETRY

## 2025-01-29 PROCEDURE — 2500000003 HC RX 250 WO HCPCS: Performed by: PHYSICIAN ASSISTANT

## 2025-01-29 PROCEDURE — 6370000000 HC RX 637 (ALT 250 FOR IP): Performed by: INTERNAL MEDICINE

## 2025-01-29 PROCEDURE — 84484 ASSAY OF TROPONIN QUANT: CPT

## 2025-01-29 PROCEDURE — 83036 HEMOGLOBIN GLYCOSYLATED A1C: CPT

## 2025-01-29 PROCEDURE — 99223 1ST HOSP IP/OBS HIGH 75: CPT | Performed by: NURSE PRACTITIONER

## 2025-01-29 PROCEDURE — 36415 COLL VENOUS BLD VENIPUNCTURE: CPT

## 2025-01-29 PROCEDURE — 6370000000 HC RX 637 (ALT 250 FOR IP): Performed by: STUDENT IN AN ORGANIZED HEALTH CARE EDUCATION/TRAINING PROGRAM

## 2025-01-29 PROCEDURE — 97166 OT EVAL MOD COMPLEX 45 MIN: CPT

## 2025-01-29 PROCEDURE — 2580000003 HC RX 258: Performed by: STUDENT IN AN ORGANIZED HEALTH CARE EDUCATION/TRAINING PROGRAM

## 2025-01-29 PROCEDURE — 82962 GLUCOSE BLOOD TEST: CPT

## 2025-01-29 PROCEDURE — 6370000000 HC RX 637 (ALT 250 FOR IP): Performed by: PHYSICIAN ASSISTANT

## 2025-01-29 PROCEDURE — 80048 BASIC METABOLIC PNL TOTAL CA: CPT

## 2025-01-29 PROCEDURE — 97535 SELF CARE MNGMENT TRAINING: CPT

## 2025-01-29 PROCEDURE — 94761 N-INVAS EAR/PLS OXIMETRY MLT: CPT

## 2025-01-29 PROCEDURE — 6370000000 HC RX 637 (ALT 250 FOR IP)

## 2025-01-29 PROCEDURE — 6360000002 HC RX W HCPCS: Performed by: PHYSICIAN ASSISTANT

## 2025-01-29 PROCEDURE — 93005 ELECTROCARDIOGRAM TRACING: CPT | Performed by: STUDENT IN AN ORGANIZED HEALTH CARE EDUCATION/TRAINING PROGRAM

## 2025-01-29 RX ORDER — INSULIN LISPRO 100 [IU]/ML
0-8 INJECTION, SOLUTION INTRAVENOUS; SUBCUTANEOUS EVERY 6 HOURS SCHEDULED
Status: DISCONTINUED | OUTPATIENT
Start: 2025-01-30 | End: 2025-02-07

## 2025-01-29 RX ORDER — MULTIVIT-MIN/FERROUS GLUCONATE 9 MG/15 ML
15 LIQUID (ML) ORAL DAILY
Status: DISCONTINUED | OUTPATIENT
Start: 2025-01-29 | End: 2025-01-31

## 2025-01-29 RX ORDER — OXYCODONE HYDROCHLORIDE 5 MG/1
5 TABLET ORAL EVERY 4 HOURS PRN
Status: DISCONTINUED | OUTPATIENT
Start: 2025-01-29 | End: 2025-02-07

## 2025-01-29 RX ORDER — INSULIN GLARGINE 100 [IU]/ML
15 INJECTION, SOLUTION SUBCUTANEOUS NIGHTLY
Status: DISCONTINUED | OUTPATIENT
Start: 2025-01-29 | End: 2025-01-30

## 2025-01-29 RX ORDER — METOLAZONE 2.5 MG/1
2.5 TABLET ORAL DAILY
Status: DISCONTINUED | OUTPATIENT
Start: 2025-01-29 | End: 2025-02-07 | Stop reason: HOSPADM

## 2025-01-29 RX ORDER — NITROGLYCERIN 0.4 MG/1
TABLET SUBLINGUAL
Status: COMPLETED
Start: 2025-01-29 | End: 2025-01-29

## 2025-01-29 RX ORDER — METRONIDAZOLE 500 MG/100ML
500 INJECTION, SOLUTION INTRAVENOUS EVERY 8 HOURS
Status: DISCONTINUED | OUTPATIENT
Start: 2025-01-29 | End: 2025-01-29

## 2025-01-29 RX ADMIN — SODIUM CHLORIDE, PRESERVATIVE FREE 10 ML: 5 INJECTION INTRAVENOUS at 08:24

## 2025-01-29 RX ADMIN — SODIUM CHLORIDE 3000 MG: 900 INJECTION INTRAVENOUS at 17:55

## 2025-01-29 RX ADMIN — CARBIDOPA AND LEVODOPA 1 TABLET: 25; 100 TABLET ORAL at 08:24

## 2025-01-29 RX ADMIN — CARBIDOPA AND LEVODOPA 1 TABLET: 25; 100 TABLET ORAL at 22:33

## 2025-01-29 RX ADMIN — PRAVASTATIN SODIUM 20 MG: 20 TABLET ORAL at 22:28

## 2025-01-29 RX ADMIN — ENOXAPARIN SODIUM 30 MG: 100 INJECTION SUBCUTANEOUS at 08:23

## 2025-01-29 RX ADMIN — OXYCODONE HYDROCHLORIDE 5 MG: 5 TABLET ORAL at 17:57

## 2025-01-29 RX ADMIN — METOLAZONE 2.5 MG: 2.5 TABLET ORAL at 17:37

## 2025-01-29 RX ADMIN — INSULIN LISPRO 2 UNITS: 100 INJECTION, SOLUTION INTRAVENOUS; SUBCUTANEOUS at 17:55

## 2025-01-29 RX ADMIN — BARIUM SULFATE 15 ML: 400 SUSPENSION ORAL at 10:30

## 2025-01-29 RX ADMIN — OXYCODONE HYDROCHLORIDE 5 MG: 5 TABLET ORAL at 22:28

## 2025-01-29 RX ADMIN — Medication 15 ML: at 16:14

## 2025-01-29 RX ADMIN — BUMETANIDE 1 MG: 0.25 INJECTION INTRAMUSCULAR; INTRAVENOUS at 08:23

## 2025-01-29 RX ADMIN — SODIUM CHLORIDE, PRESERVATIVE FREE 10 ML: 5 INJECTION INTRAVENOUS at 22:33

## 2025-01-29 RX ADMIN — NITROGLYCERIN: 0.4 TABLET SUBLINGUAL at 16:21

## 2025-01-29 RX ADMIN — INSULIN GLARGINE 15 UNITS: 100 INJECTION, SOLUTION SUBCUTANEOUS at 22:27

## 2025-01-29 RX ADMIN — BUSPIRONE HYDROCHLORIDE 10 MG: 10 TABLET ORAL at 08:23

## 2025-01-29 RX ADMIN — LEVOTHYROXINE SODIUM 75 MCG: 0.07 TABLET ORAL at 07:10

## 2025-01-29 RX ADMIN — BUMETANIDE 1 MG: 0.25 INJECTION INTRAMUSCULAR; INTRAVENOUS at 17:32

## 2025-01-29 RX ADMIN — LEVOFLOXACIN 250 MG: 5 INJECTION, SOLUTION INTRAVENOUS at 16:24

## 2025-01-29 RX ADMIN — INSULIN LISPRO 4 UNITS: 100 INJECTION, SOLUTION INTRAVENOUS; SUBCUTANEOUS at 07:13

## 2025-01-29 RX ADMIN — INSULIN LISPRO 4 UNITS: 100 INJECTION, SOLUTION INTRAVENOUS; SUBCUTANEOUS at 13:53

## 2025-01-29 RX ADMIN — BUSPIRONE HYDROCHLORIDE 10 MG: 10 TABLET ORAL at 22:28

## 2025-01-29 RX ADMIN — BARIUM SULFATE 15 ML: 400 PASTE ORAL at 10:30

## 2025-01-29 RX ADMIN — METRONIDAZOLE 500 MG: 500 INJECTION, SOLUTION INTRAVENOUS at 17:40

## 2025-01-29 ASSESSMENT — PAIN - FUNCTIONAL ASSESSMENT
PAIN_FUNCTIONAL_ASSESSMENT: PREVENTS OR INTERFERES SOME ACTIVE ACTIVITIES AND ADLS

## 2025-01-29 ASSESSMENT — PAIN DESCRIPTION - LOCATION
LOCATION: CHEST
LOCATION: CHEST
LOCATION: SHOULDER

## 2025-01-29 ASSESSMENT — PAIN DESCRIPTION - DESCRIPTORS
DESCRIPTORS: ACHING

## 2025-01-29 ASSESSMENT — PAIN SCALES - GENERAL
PAINLEVEL_OUTOF10: 0
PAINLEVEL_OUTOF10: 7
PAINLEVEL_OUTOF10: 8
PAINLEVEL_OUTOF10: 2
PAINLEVEL_OUTOF10: 0
PAINLEVEL_OUTOF10: 6
PAINLEVEL_OUTOF10: 5
PAINLEVEL_OUTOF10: 0
PAINLEVEL_OUTOF10: 6

## 2025-01-29 ASSESSMENT — PAIN DESCRIPTION - DIRECTION
RADIATING_TOWARDS: LEFT SHOULDER
RADIATING_TOWARDS: CHEST WALL

## 2025-01-29 ASSESSMENT — PAIN DESCRIPTION - PAIN TYPE
TYPE: ACUTE PAIN
TYPE: ACUTE PAIN

## 2025-01-29 ASSESSMENT — PAIN DESCRIPTION - ORIENTATION
ORIENTATION: LEFT
ORIENTATION: LEFT;UPPER
ORIENTATION: ANTERIOR

## 2025-01-29 ASSESSMENT — PAIN DESCRIPTION - FREQUENCY: FREQUENCY: CONTINUOUS

## 2025-01-29 ASSESSMENT — PAIN DESCRIPTION - ONSET
ONSET: SUDDEN
ONSET: PROGRESSIVE

## 2025-01-29 NOTE — PLAN OF CARE
Problem: Discharge Planning  Goal: Discharge to home or other facility with appropriate resources  1/29/2025 1306 by Waqar Mcclelland RN  Outcome: Progressing  Flowsheets (Taken 1/29/2025 1306)  Discharge to home or other facility with appropriate resources: Identify barriers to discharge with patient and caregiver  Note: Patient with chronic dysphagia  Requesting water   Patient failed MBS  Patient and wife educated on the importance of not eating or drinking when discharged causing recurrent aspiration pneumonia     Problem: Safety - Adult  Goal: Free from fall injury  1/29/2025 1306 by Waqar Mcclelland RN  Outcome: Progressing  Flowsheets (Taken 1/29/2025 1306)  Free From Fall Injury: Instruct family/caregiver on patient safety  Note: Fall and safety precautions continued  Call bell within reach  Patient encouraged to call for assistance     Problem: Nutrition Deficit:  Goal: Optimize nutritional status  1/29/2025 1306 by Waqar Mcclelland RN  Outcome: Progressing  Flowsheets  Taken 1/29/2025 1306 by Waqar Mcclelland RN  Nutrient intake appropriate for improving, restoring, or maintaining nutritional needs:   Assess nutritional status and recommend course of action   Recommend, monitor, and adjust tube feedings and TPN/PPN based on assessed needs  Taken 1/29/2025 0813 by Sarahy Pompa RD  Nutrient intake appropriate for improving, restoring, or maintaining nutritional needs:   Assess nutritional status and recommend course of action   Monitor oral intake, labs, and treatment plans   Recommend appropriate diets, oral nutritional supplements, and vitamin/mineral supplements  Note: Patient on tube feeding  Advanced to goal 60ml/hr  Will monitor for residuals     Problem: Skin/Tissue Integrity  Goal: Skin integrity remains intact  Description: 1.  Monitor for areas of redness and/or skin breakdown  2.  Assess vascular access sites hourly  3.  Every 4-6 hours minimum:  Change oxygen saturation probe site  4.  Every 4-6 hours:  If

## 2025-01-29 NOTE — PROGRESS NOTES
1430: Patient complained of chest pain. Vital signs are stable. No visible signs of distress. EKG done. Will update MD and primary RN.    1440: Spoke with MD and updated him. MD will round on patient shortly.

## 2025-01-29 NOTE — ACP (ADVANCE CARE PLANNING)
Advance Care Planning     Palliative Team Advance Care Planning (ACP) Conversation    Date of Conversation: 01/29/25    Individuals present for the conversation: Patient and Spouse Margaret Camarillo      ACP documents on file prior to discussion:  -Power of  for Healthcare    Previously completed document/s not on file:  AMD  document was completed previously but it is not available for review. This writer requested a copy of the document for patient's chart. Spouse shared there was an updated AMD that was completed with a .     Healthcare Decision Maker:    Primary Decision Maker: Margaret Camarillo - Spouse - [phone]     Conversation Summary:  Rahul Camarillo is a 84 y.o. male with a past medical history of heart failure with an EF of 20 to 25%, dementia, dysphagia status post PEG, BPH, hypertension, Parkinson's, diabetes, chronic respiratory failure on 2 L of nasal cannula, chronic kidney disease stage IV. Patient was admitted via transfer from [facility] via the ED transfer to [facility] with Acute episode of CHF and acute on chronic hypoxic respiratory failure.   Patient was seen sitting up in recliner, alert but frail looking (BMI -20.54). Hard of hearing and oriented x 2-3. Maintained on PEG tube feeding but patient was insisting on taking foods and fluids orally.      Palliative team had a lengthy conversation with patient and spouse concerning PEG tube, aspiration risks and need to remain NPO. Patient continued to ask for fluids even during our conversation.  Goals of care were addressed including CPR and intubation in a person with advanced age and debility. Discussed the benefits and burdens of intubation and CPR in the event of respiratory decline or cardiopulmonary arrest. Mrs. Camarillo became teary as she shared stories of the positive life events they have had verses him not recalling who she is. She added I don't want him to think I don't want him here with me.  Palliative Team

## 2025-01-29 NOTE — NURSE NAVIGATOR
HF Navigator attempted education this day; however, pt unable to be seen due to:    [  ] lethargy/confusion  [  ] off floor for testing   [  ] RN care  [  ] Pain   [ x ] Other- resting with eyes close.    Will follow up next day as indicated.    Vicenta Crawley RN  1/29/2025

## 2025-01-29 NOTE — PLAN OF CARE
Problem: Chronic Conditions and Co-morbidities  Goal: Patient's chronic conditions and co-morbidity symptoms are monitored and maintained or improved  1/28/2025 1546 by Anabel Bennett, RN  Outcome: Progressing  Flowsheets (Taken 1/28/2025 1540)  Care Plan - Patient's Chronic Conditions and Co-Morbidity Symptoms are Monitored and Maintained or Improved:   Monitor and assess patient's chronic conditions and comorbid symptoms for stability, deterioration, or improvement   Collaborate with multidisciplinary team to address chronic and comorbid conditions and prevent exacerbation or deterioration  Note: Nurse will monitor patient's chronic conditions and comorbidities and treat using prescribed medications as needed   1/28/2025 1540 by Anabel Bennett, RN  Outcome: Progressing  Flowsheets  Taken 1/28/2025 1540  Care Plan - Patient's Chronic Conditions and Co-Morbidity Symptoms are Monitored and Maintained or Improved:   Monitor and assess patient's chronic conditions and comorbid symptoms for stability, deterioration, or improvement   Collaborate with multidisciplinary team to address chronic and comorbid conditions and prevent exacerbation or deterioration  Taken 1/28/2025 1215  Care Plan - Patient's Chronic Conditions and Co-Morbidity Symptoms are Monitored and Maintained or Improved:   Monitor and assess patient's chronic conditions and comorbid symptoms for stability, deterioration, or improvement   Collaborate with multidisciplinary team to address chronic and comorbid conditions and prevent exacerbation or deterioration  Note: Nurse will monitor patient's chronic conditions and comorbidities and treat using prescribed medications as needed       Problem: Discharge Planning  Goal: Discharge to home or other facility with appropriate resources  1/29/2025 0225 by Gabriel Solomon, RN  Outcome: Progressing  1/28/2025 1546 by Anabel Bennett, RN  Outcome: Progressing  Flowsheets (Taken 1/28/2025

## 2025-01-29 NOTE — CONSULTS
Consult Note      Consult requested by: Chaz Shah MD    ADMIT DATE: 1/28/2025    CONSULT DATE: January 29, 2025           Admission diagnosis: Acute on chronic respiratory failure with hypoxia   Reason for Nephrology Consultation: SADIQ      Assessment:   1. Acute Kidney Injury on CKD 4: prerenal in setting of CHF/cardiorenal syndrome   2. Chronic kidney disease 4 : d/t suspected HTN nephrosclerosis /cardiorenal syndrome   3. Ac hypoxic on chronic  respiratory failure : CHF / PNA , sees pulmonary and had recent CT , no Significant finding other than chronic rt lower lobe scarring   4. Hypertension: Borderline low blood pressures   5. HFrEF 20-25% EF  , mod Mitral regurgitation   6  Anemia  7  sec hyperpara   8.  Parkinson's disease:   9.  Hypothyroidism: On levothyroxine  9.  History of atrial fibrillation on amiodarone    Plan:  1) strict I/o charting , check frequent bladder scans  2) Bumex 1 mg BID IV , add metolazone 2.5 mg daily , fluid restrict 1200 ml/day  3) monitor electrolytes and renal functions daily  4) no NSAIDs, nephrotoxins   5) renally dose med for egfr < 30   6) renal ultrasound     Please call with questions,    Maurisio Morrell MD FASN  Cell [phone]  Pager: [phone]  Fax   [phone]       HPI: Rahul Camarillo is a 84 y.o. male White (non-) white male with past medical history of hypertension, type 2 diabetes, aortic stenosis status post TAVR in January 2022, chronic kidney disease, congestive heart failure, Parkinson's disease presented to the emergency room with shortness of breath.    Initial chest x-ray suggestive of Pulm edema. Labs done this morning showed a creatinine of 2.88 --> increased to 3.37, after diuresis ,urine output not recorded ,  nephrology consultation is requested for further evaluation and management.      Patient seen comfortably sitting, significant debility and recent weight loss present.  Appears clinically euvolumic  without any edema.

## 2025-01-29 NOTE — PLAN OF CARE
Activity Raw Score: 18    Current research shows that an AM-PAC score of 18 or greater is associated with a discharge to the patient's home setting.    This [facility] score should be considered in conjunction with interdisciplinary team recommendations to determine the most appropriate discharge setting. Patient's social support, diagnosis, medical stability, and prior level of function should also be taken into consideration.     SUBJECTIVE:   Patient stated, “Its january”    OBJECTIVE DATA SUMMARY:     Past Medical History:   Diagnosis Date    Benign prostatic hyperplasia with urinary obstruction     CHF (congestive heart failure) (HCC)     Dementia (HCC)     early onset    Diabetes (HCC)     Dysphagia     Hypersomnia     Hypertension     Nocturia     OAB (overactive bladder)     Parkinson's disease ([facility]) 02/09/2023    Parkinson's disease ([facility])     Testicular cancer ([facility])     TIA (transient ischemic attack)     Urge incontinence     Urinary incontinence, urge      Past Surgical History:   Procedure Laterality Date    CYST REMOVAL  1960-1970s    cyst removed from right breast    GASTROSTOMY TUBE PLACEMENT      OTHER SURGICAL HISTORY  01/2019    Heart Blockage: 30% [facility]: Dr. Arias Shaver    OTHER SURGICAL HISTORY      reemoval of testcle    OTHER SURGICAL HISTORY  08/2018    Surgery: Blockage Lower bowels, [facility]    UROLOGICAL SURGERY      Testicles removed     Home Situation:   Social/Functional History  Lives With: Spouse  Type of Home: Assisted living  Home Layout: One level  Home Access: Elevator  Bathroom Shower/Tub: Walk-in shower  Bathroom Toilet: Handicap height  Bathroom Equipment: Built-in shower seat  Home Equipment: Cane, Wheelchair - Manual  [x]  Right hand dominant   []  Left hand dominant    Cognitive/Behavioral Status:  Orientation  Orientation Level: Oriented to place;Oriented to person;Oriented to time  Cognition  Overall Cognitive Status: Exceptions  Arousal/Alertness:  activated    COMMUNICATION/EDUCATION:   Patient Education  Education Given To: Patient;Family  Education Provided: Plan of Care;Role of Therapy;Energy Conservation;Fall Prevention Strategies;Transfer Training;ADL Adaptive Strategies;Equipment;Precautions  Education Method: Demonstration;Verbal;Teach Back  Barriers to Learning: Hearing;Cognition  Education Outcome: Verbalized understanding;Continued education needed    Thank you for this referral.  Andrey Perez OTR/L  Minutes: 22    Eval Complexity: Decision Making: Medium Complexity

## 2025-01-29 NOTE — PROGRESS NOTES
Speech Pathology:     Orders received and chart reviewed. Per chart review: pt with PEG in place, but has been eating mech soft with thin liquids and is now admitted with asp PNA. Will order MBS to further assess oropharyngeal swallow fxn and determine LRD.     Thank you for this referral.    Chely Garza M.S., CCC-SLP/L  Speech-Language Pathologist

## 2025-01-29 NOTE — CONSULTS
Palliative Medicine  Patient Name: Rahul Camarillo  YOB: 1940  MRN: [id number]  Age: 84 y.o.  Gender: male    Date of Initial Consult: 1/29/2025   Date of Service: 1/29/2025  Time: 2:23 PM  Provider: FIDELIA Padilla NP  Hospital Day: 2  Admit Date: 1/28/2025  Referring Provider: MARCELLA Reynolds       Reasons for Consultation:  Goals of Care    HISTORY OF PRESENT ILLNESS (HPI):   Rahul Camarillo is a 84 y.o. male with a past medical history of heart failure with an EF of 20 to 25%, dementia, dysphagia status post PEG, BPH, hypertension, Parkinson's, diabetes, chronic respiratory failure on 2 L of nasal cannula, chronic kidney disease stage IV, who was admitted on 1/28/2025 from [facility] with a diagnosis of acute on chronic hypoxic respiratory failure and acute on chronic heart failure with reduced EF complicated UTI.  Patient presented to the ER with hypoxia he was noted to have O2 sats 80% on his baseline 2 L via nasal cannula.  Palliative medicine is consulted for support and goals of care discussions.    January 29, 2025 patient is sitting up in recliner he is alert a bit hard of hearing oriented x 2-3.  Can participate in most of the conversation.  He denies pain or shortness of breath.        PALLIATIVE DIAGNOSES:    Encounter for palliative care/advance care plan discussions  Goals of care  Acute on chronic hypoxic respiratory failure  Acute on chronic congestive heart failure  Dysphagia  UTI  Parkinson's disease    ASSESSMENT AND PLAN:   Encounter for palliative care/advance care plan discussions    January 29, 2025 patient seen along with Ms. Joseph RN.  He is sitting up in the recliner he is alert a bit hard of hearing oriented x 2-3 can participate in most of the conversations.  His wife also at bedside.  His speech is a bit difficult to understand due to his Parkinson's.  We introduced our team and purpose for visit.  We note speech therapy has already seen and spoken to

## 2025-01-29 NOTE — CARE COORDINATION
01/29/25 0946   Service Assessment   Patient Orientation Unable to Assess  (Patient went to procedure)   Cognition Other (see comment)  (Patient at procedure and is AMS)   History Provided By Significant Other   Primary Caregiver Spouse   Accompanied By/Relationship Margaret Camarillo (Spouse)   Support Systems Spouse/Significant Other   Patient's Healthcare Decision Maker is: Named in Scanned ACP Document   PCP Verified by CM Yes   Last Visit to PCP Within last 3 months   Prior Functional Level Assistance with the following:   Current Functional Level Assistance with the following:   Can patient return to prior living arrangement Unknown at present  (TBD)   Ability to make needs known: Unable   Family able to assist with home care needs: Yes   Would you like for me to discuss the discharge plan with any other family members/significant others, and if so, who? Yes   Financial Resources Medicare   Community Resources Assisted Living   CM/SW Referral Other (see comment)  (Transition Plan)   Social/Functional History   Lives With Spouse   Type of Home House   Home Layout One level   Home Access Stairs to enter with rails   Entrance Stairs - Number of Steps 3   Entrance Stairs - Rails Both   Bathroom Shower/Tub Walk-in shower   Bathroom Toilet Standard   Bathroom Equipment Grab bars in shower   Bathroom Accessibility Accessible   Home Equipment Walker - Standard;Wheelchair - Manual   Receives Help From Family   Prior Level of Assist for ADLs Needs assistance   Toileting Needs assistance   Prior Level of Assist for Homemaking Needs assistance   Homemaking Responsibilities No   Ambulation Assistance Needs assistance   Prior Level of Assist for Transfers Needs assistance   Active  No   Patient's  Info n/a   Mode of Transportation Family   Education Unknown   Occupation Retired   Type of Occupation n/a   Discharge Planning   Type of Residence Skilled Nursing Facility   Living Arrangements Spouse/Significant Other

## 2025-01-29 NOTE — PROGRESS NOTES
[facility] Hospitalist [facility]  Progress Note    Patient: Rahul Camarillo Age: 84 y.o. : 1940 MR#: [id number] SSN: [id number]  Date: 2025                 DVT Prophylaxis:  [x]Lovenox  []Hep SQ  []SCDs  []Coumadin   []On Heparin gtt []PO anticoagulant    Anticipated discharge: 2025 to home, pending clinical course    Subjective:     The patient is new to me today.  He was seen and examined at the bedside in follow-up for multiple issues including acute on chronic heart failure with reduced ejection fraction, acute on chronic kidney disease stage IV, acute on chronic respiratory failure, hypothyroidism and moderate protein calorie malnutrition.  Patient complained of left-sided chest pain which is aggravated by cough, by inspiration and expiration.  He also complained of dryness in the mouth and asked me to clean his mouth.  Upon inspection, patient was noted to have pink-colored, creamy food particles in his mouth.  The patient's oral cavity was gently swabbed and the secretions were cleaned.  The patient also asked for something to drink because of a dry throat but I explained to him that he is at high risk of aspiration and that it is recommended that he stay NPO.    After examining him, I called his wife Ms. Margaret Camarillo twice on the phone but could not reach her.  A voicemail was left.      Objective:   VS: BP (!) 112/58   Pulse 85   Temp 97.8 °F (36.6 °C) (Oral)   Resp (!) 35   Ht 1.829 m (6' 0.01\")   Wt 68.7 kg (151 lb 7.3 oz)   SpO2 97%   BMI 20.54 kg/m²    Tmax/24hrs: Temp (24hrs), Av.6 °F (36.4 °C), Min:97.2 °F (36.2 °C), Max:97.9 °F (36.6 °C)  No intake or output data in the 24 hours ending 25     PHYSICAL EXAM  General Appearance: Appears chronically ill, looks poorly nourished  HENT: normocephalic/atraumatic, moist mucus membranes  Neck: No JVD, supple  Lungs: Inspiratory crackles auscultated bilaterally  CV: RRR,   Abdomen: soft,

## 2025-01-29 NOTE — PLAN OF CARE
Problem: SLP Adult - Impaired Swallowing  Goal: By Discharge: Advance to least restrictive diet without signs or symptoms of aspiration for planned discharge setting.  See evaluation for individualized goals.  Description: Patient will:  1. Tolerate PO trials with 0 s/s overt distress in 4/5 trials  2. Utilize compensatory swallow strategies/maneuvers (decrease bite/sip, size/rate, alt. liq/sol) with min cues in 4/5 trials  3. Perform oral-motor/laryngeal exercises to increase oropharyngeal swallow function with min cues  4. Complete an objective swallow study (i.e., MBSS) to assess swallow integrity, r/o aspiration, and determine of safest LRD, min A as indicated/ordered by MD - met 1/29/25    Recommend:   NPO with PEG  Ice chips okay following oral care for swallow stim/comfort  Strict aspiration precautions (HOB >30 degrees at all times, Oral care TID)  MBS to further assess oropharyngeal swallow fxn - met 1/29/25    Outcome: Progressing  SPEECH PATHOLOGY MODIFIED BARIUM SWALLOW STUDY & TREATMENT    Patient: Rahul Camarillo (84 y.o. male)  Date: 1/29/2025  Primary Diagnosis: Acute on chronic respiratory failure with hypoxia [J96.21]       Precautions: Aspiration    ASSESSMENT :  Based on the objective data described below, the patient presents with severe pharyngeal dysphagia. Pt with repeated silent aspiration during the swallows with puree and honey thick liquids via tsp. 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 Above stated pharyngeal residue resulted in further silent aspiration events. Pt unable to clear airway compromise with cued cough. Other deficits include severely reduced laryngeal elevation/excursion and impaired  passes below the vocal folds, and no effort is made to eject  Pharyngeal Symmetry: Not assessed  Pharyngeal-Esophageal Segment: Decreased relaxation of upper esophageal segment  Pharyngeal Dysfunction: Crico-pharyngeal dysfunction;Decreased tongue base retraction;Decreased strength;Decreased elevation/closure;Decreased pharyngeal wall constriction  Findings  Oral Phase Severity: Mild  Pharyngeal Phase Severity: Severe    8-point Penetration-Aspiration Scale: Score 8    PAIN:  Intensity Pre-treatment: 0/10   Intensity Post-treatment: 0/10    COMMUNICATION/EDUCATION:   [x]  Post diagnostic testing education including oropharyngeal anatomy/physiology, MBS results, diet recommendations and compensatory strategies/positioning provided via demonstration, verbalization and teach back of comprehension   []  Video feedback utilized   []  Handout regarding diet recommendations and thickener instructions provided.  [x]  Patient/family have participated as able in goal setting and plan of care  []  Patient/family agree to work toward stated goals and plan of care  []  Patient understands intent and goals of therapy but is neutral about his/her participation  []  Patient unable to participate in goal setting/plan of care secondary to cognition, hearing/vision deficits; education ongoing with interdisciplinary staff    Thank you for this referral.    Carlyn Blue M.S. CCC-SLP  Speech Language Pathologist

## 2025-01-29 NOTE — CONSULTS
Comprehensive Nutrition Assessment    Type and Reason for Visit:  Initial, Consult, NPO/clear liquid, Nutrition support    Nutrition Recommendations/Plan:   Oral diet per SLP rec'd  Modify tube feeding regimen:   Formula: Glucerna 1.5 (Diabetic)  Start at 40 ml/hr  Advance as tolerated by 10 ml q 4 hours  Goal Rate: 60 ml/hr x 20 hours (for anticipation of holding tube feeds for standard nursing care)  Water Flushes: 30 mL q 1 hours (720 mL total)  Modular(s): None  Goal Regimen Provides (with modulars):  1800 kcal, 99g protein, 1632 ml free water, 100% RDIs  Recommend/order MVI supplementation daily  Daily weights  Continue to monitor tolerance of EN, weight, labs, and plan of care during admission.       Malnutrition Assessment:  Malnutrition Status:  Moderate malnutrition (01/29/25 1026)    Context:  Chronic Illness     Findings of the 6 clinical characteristics of malnutrition:  Energy Intake:  Mild decrease in energy intake (decreased po intake x2 weeks/PEG dependent)  Weight Loss:  No weight loss     Body Fat Loss:  Mild body fat loss Buccal region, Orbital   Muscle Mass Loss:  Severe muscle mass loss Temples (temporalis), Clavicles (pectoralis & deltoids), Hand (interosseous)  Fluid Accumulation:  No fluid accumulation     Strength:  Not Performed    Nutrition History and Allergies:      Past Medical History:   Diagnosis Date    Benign prostatic hyperplasia with urinary obstruction     CHF (congestive heart failure) ([facility])     Dementia ([facility])     early onset    Diabetes ([facility])     Dysphagia     Hypersomnia     Hypertension     Nocturia     OAB (overactive bladder)     Parkinson's disease ([facility]) 02/09/2023    Parkinson's disease ([facility])     Testicular cancer ([facility])     TIA (transient ischemic attack)     Urge incontinence     Urinary incontinence, urge    PTA: Per pt/spouse decline in ability to consume foods orally. Prior hospital admissions mid January pt became PEG dependent. Pt  lbs, with gradual

## 2025-01-29 NOTE — PLAN OF CARE
Problem: Physical Therapy - Adult  Goal: By Discharge: Performs mobility at highest level of function for planned discharge setting.  See evaluation for individualized goals.  Description: Physical Therapy Goals:  Initiated 1/29/2025 to be met within 7-10 days.    1.  Patient will move from supine to sit and sit to supine , scoot up and down, and roll side to side in bed with modified independence.    2.  Patient will transfer from bed to chair and chair to bed with modified independence using the least restrictive device.  3.  Patient will perform sit to stand with modified independence.  4.  Patient will ambulate with modified independence for 50 feet with the least restrictive device.       PLOF: Pt lives in senior living apartment with Wife, elevator access.  Pt was [place] for mobility with use of rollator     Outcome: Progressing     PHYSICAL THERAPY EVALUATION    Patient: Rahul Camarillo (84 y.o. male)  Date: 1/29/2025  Primary Diagnosis: Acute on chronic respiratory failure with hypoxia [J96.21]       Precautions: General Precautions, Fall Risk, Aspiration Risk,  ,  ,  ,  ,  ,  ,      ASSESSMENT :  Pt resting in bed upon entering room, agreeable to PT evaluation.  Pt is on 3L supplemental O2 through [place].  Pt was able to perform supine to sit transfer with Ailin and use of bed rails.  Pt with good sitting balance with gross b/l LE strength 4-/5.  STS with RW and Ailin and ambulate to recliner ~ 5ft away with CGA/Ailin and assistance with management of lines and PEG tube. Pt with O2 sats staying in mid 90's throughout treatment session.  Pt positioned comfortably up in recliner with all needs met and call bell by his side.  Pt would continue to benefit from skilled PT services to address deficits listed below and optimize functional mobility.       DEFICITS/IMPAIRMENTS:    , Body Structures, Functions, Activity Limitations Requiring Skilled Therapeutic Intervention: Decreased functional mobility ;Decreased ADL  Dementia ([facility])     early onset    Diabetes ([facility])     Dysphagia     Hypersomnia     Hypertension     Nocturia     OAB (overactive bladder)     Parkinson's disease ([facility]) 02/09/2023    Parkinson's disease (HCC)     Testicular cancer (HCC)     TIA (transient ischemic attack)     Urge incontinence     Urinary incontinence, urge      Past Surgical History:   Procedure Laterality Date    CYST REMOVAL  1960-1970s    cyst removed from right breast    GASTROSTOMY TUBE PLACEMENT      OTHER SURGICAL HISTORY  01/2019    Heart Blockage: 30% [facility]: Dr. Arias Shaver    OTHER SURGICAL HISTORY      reemoval of testcle    OTHER SURGICAL HISTORY  08/2018    Surgery: Blockage Lower bowels, [facility]    UROLOGICAL SURGERY      Testicles removed       Home Situation:  Social/Functional History  Lives With: Spouse  Type of Home: Assisted living  Home Layout: One level  Home Access: Elevator  Bathroom Shower/Tub: Walk-in shower  Bathroom Toilet: Handicap height  Bathroom Equipment: Built-in shower seat  Home Equipment: Cane, Wheelchair - Manual  Critical Behavior:  Orientation  Orientation Level: Oriented to place;Oriented to person;Oriented to time  Cognition  Overall Cognitive Status: Exceptions  Arousal/Alertness: Appears intact  Following Commands: Follows multistep commands with increased time  Attention Span: Appears intact  Safety Judgement: Decreased awareness of need for assistance  Initiation: Requires cues for some  Sequencing: Requires cues for some    Strength:    Strength: Generally decreased, functional    Tone & Sensation:   Tone: Normal  Sensation: Intact    Range Of Motion:  AROM: Generally decreased, functional  PROM: Generally decreased, functional    Functional Mobility:  Bed Mobility:     Bed Mobility Training  Bed Mobility Training: Yes  Supine to Sit: Additional time;Contact-guard assistance (use of rail)  Scooting: Stand-by assistance  Transfers:     Transfer Training  Transfer Training:

## 2025-01-30 LAB
ANION GAP SERPL CALC-SCNC: 5 MMOL/L (ref 3–18)
BASOPHILS # BLD: 0.01 K/UL (ref 0–0.1)
BASOPHILS NFR BLD: 0.1 % (ref 0–2)
BUN SERPL-MCNC: 113 MG/DL (ref 7–18)
BUN/CREAT SERPL: 30 (ref 12–20)
CALCIUM SERPL-MCNC: 7.8 MG/DL (ref 8.5–10.1)
CHLORIDE SERPL-SCNC: 101 MMOL/L (ref 100–111)
CO2 SERPL-SCNC: 33 MMOL/L (ref 21–32)
CREAT SERPL-MCNC: 3.71 MG/DL (ref 0.6–1.3)
DIFFERENTIAL METHOD BLD: ABNORMAL
EOSINOPHIL # BLD: 0 K/UL (ref 0–0.4)
EOSINOPHIL NFR BLD: 0 % (ref 0–5)
ERYTHROCYTE [DISTWIDTH] IN BLOOD BY AUTOMATED COUNT: 15.4 % (ref 11.6–14.5)
GLUCOSE BLD STRIP.AUTO-MCNC: 165 MG/DL (ref 70–110)
GLUCOSE BLD STRIP.AUTO-MCNC: 213 MG/DL (ref 70–110)
GLUCOSE BLD STRIP.AUTO-MCNC: 235 MG/DL (ref 70–110)
GLUCOSE BLD STRIP.AUTO-MCNC: 239 MG/DL (ref 70–110)
GLUCOSE BLD STRIP.AUTO-MCNC: 245 MG/DL (ref 70–110)
GLUCOSE BLD STRIP.AUTO-MCNC: 273 MG/DL (ref 70–110)
GLUCOSE BLD STRIP.AUTO-MCNC: 314 MG/DL (ref 70–110)
GLUCOSE SERPL-MCNC: 286 MG/DL (ref 74–99)
HCT VFR BLD AUTO: 34.8 % (ref 36–48)
HGB BLD-MCNC: 10.4 G/DL (ref 13–16)
IMM GRANULOCYTES # BLD AUTO: 0.08 K/UL (ref 0–0.04)
IMM GRANULOCYTES NFR BLD AUTO: 0.6 % (ref 0–0.5)
LYMPHOCYTES # BLD: 0.36 K/UL (ref 0.9–3.6)
LYMPHOCYTES NFR BLD: 2.5 % (ref 21–52)
MCH RBC QN AUTO: 30.1 PG (ref 24–34)
MCHC RBC AUTO-ENTMCNC: 29.9 G/DL (ref 31–37)
MCV RBC AUTO: 100.6 FL (ref 78–100)
MONOCYTES # BLD: 0.94 K/UL (ref 0.05–1.2)
MONOCYTES NFR BLD: 6.5 % (ref 3–10)
NEUTS SEG # BLD: 13.15 K/UL (ref 1.8–8)
NEUTS SEG NFR BLD: 90.3 % (ref 40–73)
NRBC # BLD: 0 K/UL (ref 0–0.01)
NRBC BLD-RTO: 0 PER 100 WBC
PLATELET # BLD AUTO: 248 K/UL (ref 135–420)
PMV BLD AUTO: 12.9 FL (ref 9.2–11.8)
POTASSIUM SERPL-SCNC: 5 MMOL/L (ref 3.5–5.5)
RBC # BLD AUTO: 3.46 M/UL (ref 4.35–5.65)
SODIUM SERPL-SCNC: 139 MMOL/L (ref 136–145)
WBC # BLD AUTO: 14.5 K/UL (ref 4.6–13.2)

## 2025-01-30 PROCEDURE — 6370000000 HC RX 637 (ALT 250 FOR IP): Performed by: STUDENT IN AN ORGANIZED HEALTH CARE EDUCATION/TRAINING PROGRAM

## 2025-01-30 PROCEDURE — 1100000003 HC PRIVATE W/ TELEMETRY

## 2025-01-30 PROCEDURE — 6370000000 HC RX 637 (ALT 250 FOR IP): Performed by: PHYSICIAN ASSISTANT

## 2025-01-30 PROCEDURE — 6370000000 HC RX 637 (ALT 250 FOR IP): Performed by: INTERNAL MEDICINE

## 2025-01-30 PROCEDURE — 6360000002 HC RX W HCPCS: Performed by: STUDENT IN AN ORGANIZED HEALTH CARE EDUCATION/TRAINING PROGRAM

## 2025-01-30 PROCEDURE — 36415 COLL VENOUS BLD VENIPUNCTURE: CPT

## 2025-01-30 PROCEDURE — 80048 BASIC METABOLIC PNL TOTAL CA: CPT

## 2025-01-30 PROCEDURE — 94761 N-INVAS EAR/PLS OXIMETRY MLT: CPT

## 2025-01-30 PROCEDURE — 6360000002 HC RX W HCPCS: Performed by: PHYSICIAN ASSISTANT

## 2025-01-30 PROCEDURE — 92526 ORAL FUNCTION THERAPY: CPT

## 2025-01-30 PROCEDURE — 82962 GLUCOSE BLOOD TEST: CPT

## 2025-01-30 PROCEDURE — 99232 SBSQ HOSP IP/OBS MODERATE 35: CPT | Performed by: STUDENT IN AN ORGANIZED HEALTH CARE EDUCATION/TRAINING PROGRAM

## 2025-01-30 PROCEDURE — 2500000003 HC RX 250 WO HCPCS: Performed by: PHYSICIAN ASSISTANT

## 2025-01-30 PROCEDURE — 85025 COMPLETE CBC W/AUTO DIFF WBC: CPT

## 2025-01-30 PROCEDURE — 2580000003 HC RX 258: Performed by: STUDENT IN AN ORGANIZED HEALTH CARE EDUCATION/TRAINING PROGRAM

## 2025-01-30 PROCEDURE — 2700000000 HC OXYGEN THERAPY PER DAY

## 2025-01-30 RX ORDER — INSULIN GLARGINE 100 [IU]/ML
18 INJECTION, SOLUTION SUBCUTANEOUS NIGHTLY
Status: DISCONTINUED | OUTPATIENT
Start: 2025-01-30 | End: 2025-02-01

## 2025-01-30 RX ORDER — INSULIN LISPRO 100 [IU]/ML
3 INJECTION, SOLUTION INTRAVENOUS; SUBCUTANEOUS EVERY 6 HOURS
Status: DISCONTINUED | OUTPATIENT
Start: 2025-01-30 | End: 2025-01-31

## 2025-01-30 RX ORDER — HEPARIN SODIUM 5000 [USP'U]/ML
5000 INJECTION, SOLUTION INTRAVENOUS; SUBCUTANEOUS EVERY 8 HOURS SCHEDULED
Status: DISCONTINUED | OUTPATIENT
Start: 2025-01-31 | End: 2025-02-07

## 2025-01-30 RX ADMIN — INSULIN GLARGINE 18 UNITS: 100 INJECTION, SOLUTION SUBCUTANEOUS at 21:54

## 2025-01-30 RX ADMIN — CARBIDOPA AND LEVODOPA 1 TABLET: 25; 100 TABLET ORAL at 21:54

## 2025-01-30 RX ADMIN — SODIUM CHLORIDE, PRESERVATIVE FREE 10 ML: 5 INJECTION INTRAVENOUS at 09:22

## 2025-01-30 RX ADMIN — INSULIN LISPRO 8 UNITS: 100 INJECTION, SOLUTION INTRAVENOUS; SUBCUTANEOUS at 07:03

## 2025-01-30 RX ADMIN — Medication 15 ML: at 09:21

## 2025-01-30 RX ADMIN — BUMETANIDE 1 MG: 0.25 INJECTION INTRAMUSCULAR; INTRAVENOUS at 18:37

## 2025-01-30 RX ADMIN — INSULIN LISPRO 2 UNITS: 100 INJECTION, SOLUTION INTRAVENOUS; SUBCUTANEOUS at 18:47

## 2025-01-30 RX ADMIN — BUSPIRONE HYDROCHLORIDE 10 MG: 10 TABLET ORAL at 21:54

## 2025-01-30 RX ADMIN — INSULIN LISPRO 3 UNITS: 100 INJECTION, SOLUTION INTRAVENOUS; SUBCUTANEOUS at 16:51

## 2025-01-30 RX ADMIN — SODIUM CHLORIDE, PRESERVATIVE FREE 10 ML: 5 INJECTION INTRAVENOUS at 21:55

## 2025-01-30 RX ADMIN — LEVOTHYROXINE SODIUM 75 MCG: 0.07 TABLET ORAL at 07:03

## 2025-01-30 RX ADMIN — METOLAZONE 2.5 MG: 2.5 TABLET ORAL at 09:21

## 2025-01-30 RX ADMIN — CARBIDOPA AND LEVODOPA 1 TABLET: 25; 100 TABLET ORAL at 09:21

## 2025-01-30 RX ADMIN — ENOXAPARIN SODIUM 30 MG: 100 INJECTION SUBCUTANEOUS at 09:22

## 2025-01-30 RX ADMIN — SODIUM CHLORIDE 3000 MG: 900 INJECTION INTRAVENOUS at 18:32

## 2025-01-30 RX ADMIN — BUSPIRONE HYDROCHLORIDE 10 MG: 10 TABLET ORAL at 09:21

## 2025-01-30 RX ADMIN — INSULIN LISPRO 3 UNITS: 100 INJECTION, SOLUTION INTRAVENOUS; SUBCUTANEOUS at 21:54

## 2025-01-30 RX ADMIN — PRAVASTATIN SODIUM 20 MG: 20 TABLET ORAL at 21:54

## 2025-01-30 RX ADMIN — BUMETANIDE 1 MG: 0.25 INJECTION INTRAMUSCULAR; INTRAVENOUS at 09:21

## 2025-01-30 ASSESSMENT — PAIN SCALES - GENERAL
PAINLEVEL_OUTOF10: 0

## 2025-01-30 NOTE — PLAN OF CARE
Problem: Chronic Conditions and Co-morbidities  Goal: Patient's chronic conditions and co-morbidity symptoms are monitored and maintained or improved  1/30/2025 1109 by Anabel Bennett, RN  Outcome: Progressing  Flowsheets (Taken 1/30/2025 0847)  Care Plan - Patient's Chronic Conditions and Co-Morbidity Symptoms are Monitored and Maintained or Improved:   Monitor and assess patient's chronic conditions and comorbid symptoms for stability, deterioration, or improvement   Collaborate with multidisciplinary team to address chronic and comorbid conditions and prevent exacerbation or deterioration  Note:   Nurse will monitor patient's chronic conditions and comorbidities and treat using prescribed medications as needed       Problem: Discharge Planning  Goal: Discharge to home or other facility with appropriate resources  Outcome: Progressing  Flowsheets (Taken 1/30/2025 0847)  Discharge to home or other facility with appropriate resources:   Arrange for needed discharge resources and transportation as appropriate   Identify barriers to discharge with patient and caregiver  Note: Patient with chronic dysphagia  Requesting water        Problem: Safety - Adult  Goal: Free from fall injury  1/30/2025 1109 by Anabel Bennett, RN  Outcome: Progressing  Flowsheets (Taken 1/29/2025 1306 by Waqar Mcclelland, RN)  Free From Fall Injury: Instruct family/caregiver on patient safety  Note: Patient will remain free from fall during admission. Nurse will use interventions such as gripper socks, bed alarm on, bed in lowest position, room free of clutter, and educate patient to call when assistance is needed.      Problem: Pain  Goal: Verbalizes/displays adequate comfort level or baseline comfort level  1/30/2025 1109 by Anabel Bennett, RN  Outcome: Progressing  Flowsheets (Taken 1/29/2025 1945 by Minal Antunez, RN)  Verbalizes/displays adequate comfort level or baseline comfort level:   Assess pain using

## 2025-01-30 NOTE — PROGRESS NOTES
[facility]  Progress Note    Patient: Rahul Camarillo Age: 84 y.o. : 1940 MR#: [id number] SSN: [id number]  Date: 2025                 DVT Prophylaxis:  [x]Lovenox  []Hep SQ  []SCDs  []Coumadin   []On Heparin gtt []PO anticoagulant    Anticipated discharge: 2025 to home, pending clinical course    Subjective:     The patient was seen and examined at the bedside today.  He appeared tired but was in no acute distress.  His chest pain is resolved.  He said that his mouth feels dry and asked me to swab his mouth with a weight swab, which I did.  He was on 3 L of supplemental oxygen.        Objective:   VS: BP (!) 120/53   Pulse 67   Temp 97.2 °F (36.2 °C) (Axillary)   Resp 22   Ht 1.829 m (6' 0.01\")   Wt 69.6 kg (153 lb 7 oz)   SpO2 95%   BMI 20.81 kg/m²    Tmax/24hrs: Temp (24hrs), Av.8 °F (36.6 °C), Min:97.2 °F (36.2 °C), Max:98.7 °F (37.1 °C)    Intake/Output Summary (Last 24 hours) at 2025 1501  Last data filed at 2025 0810  Gross per 24 hour   Intake 1234.08 ml   Output 800 ml   Net 434.08 ml        PHYSICAL EXAM  General Appearance: Appears chronically ill, looks poorly nourished  HENT: normocephalic/atraumatic, moist mucus membranes  Neck: No JVD, supple  Lungs: Inspiratory crackles auscultated bilaterally  CV: Rate and rhythm regular  Abdomen: soft, nontender; PEG tube present  : no suprapubic tenderness   Extremities: Moves extremities spontaneously; poor muscle mass and loss of subcutaneous fat  Neuro: Awake and oriented  Skin: Warm and dry  Psych: appropriate mood and affect    Current Facility-Administered Medications   Medication Dose Route Frequency    [START ON 2025] heparin (porcine) injection 5,000 Units  5,000 Units SubCUTAneous 3 times per day    CENTRUM/CERTA-VINICIUS with minerals oral solution 15 mL  15 mL Oral Daily    insulin glargine (LANTUS) injection vial 15 Units  15 Units SubCUTAneous Nightly     CLINICAL INDICATION/HISTORY: dysphagia, hx of PEG, asp PNA As above. COMPARISON: None. TECHNIQUE: Video speech swallow performed in conjunction with speech pathologist.  Varying consistencies of barium were administered under lateral fluoroscopic observation. Fluoroscopy time: 1.0 Number of images: 2 FINDINGS: Delayed oral phase of swallowing with puree consistency. Premature leakage of contrast to the valleculae/pyriform sinuses across all trialed consistencies. Silent aspiration across all trialed consistencies (honey and puree). Residual pooling of contrast in the valleculae and piriform sinuses across all trialed consistencies.     Silent aspiration across all trialed consistencies (honey and puree). Please see speech pathology report for further findings/recommendations. Electronically signed by Grant Manzanares    CT HEAD WO CONTRAST    Result Date: 1/28/2025  INDICATION: AMS EXAM:  HEAD CT WITHOUT CONTRAST COMPARISON: April 5, 2020 TECHNIQUE:  Routine noncontrast axial head CT was performed.  Sagittal and coronal reconstructions were generated.  CT dose reduction was achieved through use of a standardized protocol tailored for this examination and automatic exposure control for dose modulation. FINDINGS: Ventricles: Midline, no hydrocephalus. Intracranial Hemorrhage: None. Brain Parenchyma/Brainstem: Normal for age. Basal Cisterns: Normal. Paranasal Sinuses: Visualized sinuses are clear. Additional Comments: N/A.     No acute process. Electronically signed by Rico Manjarrez    XR CHEST PORTABLE    Result Date: 1/27/2025  EXAM:  XR CHEST PORTABLE INDICATION: hypoxia COMPARISON: 10/12/2024 TECHNIQUE: portable chest AP view FINDINGS: The cardiac silhouette is within normal limits. The pulmonary vasculature is within normal limits. Bilateral pleural effusions are noted. Significant bilateral pulmonary infiltrates, progressive compared to the prior exam. The visualized bones and upper abdomen are age-appropriate.

## 2025-01-30 NOTE — NURSE NAVIGATOR
HF Navigator attempted education this day; however, pt unable to be seen due to:    [  ] lethargy/confusion  [  ] off floor for testing   [x  ] RN care  [  ] Pain   [  ] Other    Will follow up next day as indicated.    Vicenta Crawley RN  1/30/2025

## 2025-01-30 NOTE — PROGRESS NOTES
4 Eyes Skin Assessment     NAME:  Rahul Camarillo  YOB: 1940  MEDICAL RECORD NUMBER:  [id number]    The patient is being assessed for  Shift Handoff    I agree that at least one RN has performed a thorough Head to Toe Skin Assessment on the patient. ALL assessment sites listed below have been assessed.      Areas assessed by both nurses:    Head, Face, Ears, Shoulders, Back, Chest, Arms, Elbows, Hands, Sacrum. Buttock, Coccyx, Ischium, and Legs. Feet and Heels        Does the Patient have a Wound? Yes wound(s) were present on assessment. LDA wound assessment was Initiated and completed by RN       Larry Prevention initiated by RN: Yes  Wound Care Orders initiated by RN: Yes    Pressure Injury (Stage 3,4, Unstageable, DTI, NWPT, and Complex wounds) if present, place Wound referral order by RN under : No    New Ostomies, if present place, Ostomy referral order under : No     Nurse 1 eSignature: Electronically signed by KATELYN IRBY RN on 1/30/25 at 9:32 AM EST    **SHARE this note so that the co-signing nurse can place an eSignature**    Nurse 2 eSignature: Electronically signed by Anabel Bennett RN on 1/30/25 at 7:30 AM EST

## 2025-01-30 NOTE — PROGRESS NOTES
Pharmacist Review and Automatic Dose Adjustment of Prophylactic Enoxaparin    *Review reason for admission/hospital problem list*    The reviewing pharmacist has made an adjustment to the ordered enoxaparin dose or converted to UFH per the approved [facility] protocol and table as identified below.        Rahul Camarillo is a 84 y.o. male.     Recent Labs     01/28/25  1510 01/29/25  0328 01/30/25  0530   CREATININE 3.15* 3.37* 3.71*       Estimated Creatinine Clearance: 14 mL/min (A) (based on SCr of 3.71 mg/dL (H)).    Height:   Ht Readings from Last 1 Encounters:   01/29/25 1.829 m (6' 0.01\")     Weight:  Wt Readings from Last 1 Encounters:   01/30/25 69.6 kg (153 lb 7 oz)               Plan: Based upon the patient's weight and renal function, the ordered enoxaparin dose of 30 mg daily has been changed/converted to heparin 5000 units TID      Thank you,  Chandler Chambers formerly [facility]

## 2025-01-30 NOTE — PROGRESS NOTES
In Patient Progress note      Admit Date: 1/28/2025      Impression:     1. Acute Kidney Injury on CKD 4: prerenal in setting of CHF/cardiorenal syndrome , renal USG negative for obst  2. Chronic kidney disease 4 : d/t suspected HTN nephrosclerosis /cardiorenal syndrome   3. Ac hypoxic on chronic  respiratory failure : CHF / PNA , sees pulmonary and had recent CT , no Significant finding   other than chronic rt lower lobe scarring   4. Hypertension: Borderline low blood pressures   5. HFrEF 20-25% EF  , mod Mitral regurgitation   6  Anemia  7  sec hyperpara   8.  Parkinson's disease:   9.  Hypothyroidism: On levothyroxine  9.  History of atrial fibrillation on amiodarone     Plan:  1) strict I/o charting , check frequent bladder scans  2) continue Bumex 1 mg BID IV , metolazone 2.5 mg daily , fluid restrict 1200 ml/day  3) monitor electrolytes and renal functions daily  4) no NSAIDs, nephrotoxins   5) renally dose med for egfr < 30   6) consider palliative care consult    Very guarded prognosis   Discussed plan with patient and his daughter, primary team   Please call with questions,     Maurisio Morrell MD FASN  Cell [phone]  Pager: [phone]  Fax   [phone]        Subjective:     - No acute over night events.  - respiratory - stable  - hemodynamics - stable, no pressrs  - UOP-ok  - Nutrition -ok    Objective:     BP (!) 120/53   Pulse 67   Temp 97.2 °F (36.2 °C) (Axillary)   Resp 22   Ht 1.829 m (6' 0.01\")   Wt 69.6 kg (153 lb 7 oz)   SpO2 95%   BMI 20.81 kg/m²       Intake/Output Summary (Last 24 hours) at 1/30/2025 1443  Last data filed at 1/30/2025 0810  Gross per 24 hour   Intake 1234.08 ml   Output 800 ml   Net 434.08 ml       Physical Exam:     Gen NAD  HENT mmm  RS AEBE   CVS s1s2 wnl no JVD  Ext edema +  Skin no rashes  Neuro oriented X 3    Data Review:    Recent Labs     01/30/25  0530   WBC 14.5*   RBC 3.46*   HCT 34.8*   .6*   MCH 30.1   MCHC 29.9*   RDW 15.4*   MPV 12.9*      Recent Labs     01/27/25  1946 01/28/25  1510 01/29/25  0328 01/30/25  0530   BUN 77* 80* 93* 113*   K 4.6 5.0 5.4 5.0    139 140 139   CL 99* 101 103 101   CO2 33* 30 30 33*       Maurisio Morrell MD

## 2025-01-30 NOTE — CONSULTS
Room 367: Focused wound assess    Sacrum, stage 2 pressure injury, white base, POA.  Silicone heart dressing in use.   Right upper buttock, deep tissue pressure injury, purple intact discoloration, POA.  Silicone heart dressing in use.   Blanchable redness across sacrum & buttocks with scar tissue also visible.       Right heel, blanchable redness, silicone heel dressing & heel boot in use.      Left heel, blanchable redness, silicone heel dressing & heel boot in use.  Right elbow skin tear has silicone dressing in use.   Skin care per unit based skin care protocol by unit nursing staff.  Unit nursing staff to continue heel prevention boots, use while in bed. Velcro should be on the loosest section.  Wound Care Orders Sacrum & heels & right elbow: Unit staff nurses to apply Optifoam Gentle Silicone dressing, change every 2 days & prn soilage.  Skin Care Orders for incontinence: Unit nursing staff may use 3M Cavilon No Sting Barrier Film skin protectant BID.   Will turn over care to unit nursing staff at this time & sign off.   Mendy BOYCEN, RN, CWOCN, CFCN

## 2025-01-30 NOTE — PROGRESS NOTES
0730: Bedside and Verbal shift change report given to MARGARITO Chaney (oncoming nurse) by MARGARITO Fontaine (offgoing nurse). Report included the following information Nurse Handoff Report, Adult Overview, Intake/Output, MAR, and Recent Results.       1430: Patient wanted to sit in chair and when nursing tried to assist patient he did not tolerate transfer well. Patient was not able to sit on side of the bed without assistance and was very weak. Patient was then readjusted in bed and was comfortable.

## 2025-01-30 NOTE — PLAN OF CARE
Problem: Chronic Conditions and Co-morbidities  Goal: Patient's chronic conditions and co-morbidity symptoms are monitored and maintained or improved  Outcome: Progressing     Problem: Safety - Adult  Goal: Free from fall injury  Outcome: Progressing     Problem: Pain  Goal: Verbalizes/displays adequate comfort level or baseline comfort level  Outcome: Progressing  Flowsheets (Taken 1/29/2025 1945)  Verbalizes/displays adequate comfort level or baseline comfort level:   Assess pain using appropriate pain scale   Encourage patient to monitor pain and request assistance   Administer analgesics based on type and severity of pain and evaluate response   Implement non-pharmacological measures as appropriate and evaluate response

## 2025-01-30 NOTE — PLAN OF CARE
Problem: SLP Adult - Impaired Swallowing  Goal: By Discharge: Advance to least restrictive diet without signs or symptoms of aspiration for planned discharge setting.  See evaluation for individualized goals.  Description: Patient will:  1. Tolerate PO trials with 0 s/s overt distress in 4/5 trials  2. Utilize compensatory swallow strategies/maneuvers (decrease bite/sip, size/rate, alt. liq/sol) with min cues in 4/5 trials  3. Perform oral-motor/laryngeal exercises to increase oropharyngeal swallow function with min cues  4. Complete an objective swallow study (i.e., MBSS) to assess swallow integrity, r/o aspiration, and determine of safest LRD, min A as indicated/ordered by MD - met 1/29/25    Recommend:   NPO with PEG  Ice chips okay following oral care for swallow stim/comfort  Strict aspiration precautions (HOB >30 degrees at all times, Oral care TID)  MBS to further assess oropharyngeal swallow fxn - met 1/29/25    Outcome: Progressing  SPEECH LANGUAGE PATHOLOGY DYSPHAGIA TREATMENT    Patient: Rahul Camarillo (84 y.o. male)  Date: 1/30/2025  Diagnosis: Acute on chronic respiratory failure with hypoxia [J96.21] Acute on chronic respiratory failure with hypoxia      Precautions: Standard, aspiration  PLOF: As per H&P      ASSESSMENT:  Pt seen for follow up dysphagia management. Pt alert and willing to participate in planned tx activities. Oral care completed via toothette with intermittent throat clearing. Pt completed ice chip trials x5 with effortful swallows (mod cues) and dry/weak cough s/p trials. Education provided on importance of oral care, ice chips in moderation for swallow stim/comfort, and utilizing PEG as primary means of nutrition/hydration. Pt verbalized understanding, however needs reinforcement. Pt continues to demo inadequate sensory/motor awareness/strength for safe and adequate nutritional intake. Recommend NPO with PEG, aspiration precautions, oral care TID, and meds via PEG.     Progression  toward goals:  []         Improving appropriately and progressing toward goals  [x]         Improving slowly and progressing toward goals  []         Not making progress toward goals and plan of care will be adjusted     PLAN:  Recommendations and Planned Interventions:  Recommendations  Requires SLP Intervention: Yes  Recommendations: NPO;Dysphagia treatment  D/C Recommendations: Ongoing speech therapy is recommended during this hospitalization;Ongoing speech therapy is recommended at next level of care  Diet Solids Recommendation: NPO  Liquid Consistency Recommendation: NPO  Recommended Form of Meds: Via alternative means of nutrition  Therapeutic Interventions: Patient/Family education;Oral care;Pharyngeal exercises  Patient Education: Pt educated on MBS results, NPO status, and importance of oral care  Patient Education Response: Verbalizes understanding    Patient continues to benefit from skilled intervention to address the above impairments. Continue treatment per established plan of care.    Frequency/Duration: Patient will be followed by speech-language pathology 1-2 times per day/3-5 days per week to address goals.    Discharge Recommendations: D/C Recommendations: Ongoing speech therapy is recommended during this hospitalization, Ongoing speech therapy is recommended at next level of care     SUBJECTIVE:   Patient stated “yeah I remember”.    OBJECTIVE:     Orientation:  Person and Place    DYSPHAGIA DIAGNOSIS:  Severe pharyngeal dysphagia    PAIN:  Intensity Pre-treatment: 0/10   Intensity Post-treatment: 0/10    After treatment:   []              Patient left in no apparent distress sitting up in chair  [x]              Patient left in no apparent distress in bed  [x]              Call bell left within reach  [x]              Nursing notified  []              Family present  []              Caregiver present  []              Bed alarm activated      COMMUNICATION/EDUCATION:   [x]          Aspiration

## 2025-01-31 LAB
ANION GAP SERPL CALC-SCNC: 4 MMOL/L (ref 3–18)
BUN SERPL-MCNC: 123 MG/DL (ref 7–18)
BUN/CREAT SERPL: 34 (ref 12–20)
CALCIUM SERPL-MCNC: 8.3 MG/DL (ref 8.5–10.1)
CHLORIDE SERPL-SCNC: 106 MMOL/L (ref 100–111)
CO2 SERPL-SCNC: 33 MMOL/L (ref 21–32)
CREAT SERPL-MCNC: 3.63 MG/DL (ref 0.6–1.3)
EKG ATRIAL RATE: 77 BPM
EKG DIAGNOSIS: NORMAL
EKG P AXIS: 157 DEGREES
EKG P-R INTERVAL: 238 MS
EKG Q-T INTERVAL: 474 MS
EKG QRS DURATION: 168 MS
EKG QTC CALCULATION (BAZETT): 536 MS
EKG R AXIS: 21 DEGREES
EKG T AXIS: 202 DEGREES
EKG VENTRICULAR RATE: 77 BPM
ERYTHROCYTE [DISTWIDTH] IN BLOOD BY AUTOMATED COUNT: 15.2 % (ref 11.6–14.5)
GLUCOSE BLD STRIP.AUTO-MCNC: 108 MG/DL (ref 70–110)
GLUCOSE BLD STRIP.AUTO-MCNC: 196 MG/DL (ref 70–110)
GLUCOSE BLD STRIP.AUTO-MCNC: 229 MG/DL (ref 70–110)
GLUCOSE BLD STRIP.AUTO-MCNC: 245 MG/DL (ref 70–110)
GLUCOSE BLD STRIP.AUTO-MCNC: 293 MG/DL (ref 70–110)
GLUCOSE BLD STRIP.AUTO-MCNC: 338 MG/DL (ref 70–110)
GLUCOSE BLD STRIP.AUTO-MCNC: 363 MG/DL (ref 70–110)
GLUCOSE BLD STRIP.AUTO-MCNC: 69 MG/DL (ref 70–110)
GLUCOSE BLD STRIP.AUTO-MCNC: 79 MG/DL (ref 70–110)
GLUCOSE SERPL-MCNC: 55 MG/DL (ref 74–99)
HCT VFR BLD AUTO: 34.7 % (ref 36–48)
HGB BLD-MCNC: 10.4 G/DL (ref 13–16)
MAGNESIUM SERPL-MCNC: 3.6 MG/DL (ref 1.6–2.6)
MCH RBC QN AUTO: 30.1 PG (ref 24–34)
MCHC RBC AUTO-ENTMCNC: 30 G/DL (ref 31–37)
MCV RBC AUTO: 100.6 FL (ref 78–100)
NRBC # BLD: 0 K/UL (ref 0–0.01)
NRBC BLD-RTO: 0 PER 100 WBC
PHOSPHATE SERPL-MCNC: 5.3 MG/DL (ref 2.5–4.9)
PLATELET # BLD AUTO: 238 K/UL (ref 135–420)
PMV BLD AUTO: 13 FL (ref 9.2–11.8)
POTASSIUM SERPL-SCNC: 4.4 MMOL/L (ref 3.5–5.5)
RBC # BLD AUTO: 3.45 M/UL (ref 4.35–5.65)
SODIUM SERPL-SCNC: 143 MMOL/L (ref 136–145)
WBC # BLD AUTO: 13.2 K/UL (ref 4.6–13.2)

## 2025-01-31 PROCEDURE — 2580000003 HC RX 258: Performed by: PHYSICIAN ASSISTANT

## 2025-01-31 PROCEDURE — 36415 COLL VENOUS BLD VENIPUNCTURE: CPT

## 2025-01-31 PROCEDURE — 84100 ASSAY OF PHOSPHORUS: CPT

## 2025-01-31 PROCEDURE — 82962 GLUCOSE BLOOD TEST: CPT

## 2025-01-31 PROCEDURE — 80048 BASIC METABOLIC PNL TOTAL CA: CPT

## 2025-01-31 PROCEDURE — 2700000000 HC OXYGEN THERAPY PER DAY

## 2025-01-31 PROCEDURE — 83735 ASSAY OF MAGNESIUM: CPT

## 2025-01-31 PROCEDURE — 6370000000 HC RX 637 (ALT 250 FOR IP): Performed by: PHYSICIAN ASSISTANT

## 2025-01-31 PROCEDURE — 94761 N-INVAS EAR/PLS OXIMETRY MLT: CPT

## 2025-01-31 PROCEDURE — 6360000002 HC RX W HCPCS: Performed by: STUDENT IN AN ORGANIZED HEALTH CARE EDUCATION/TRAINING PROGRAM

## 2025-01-31 PROCEDURE — 6370000000 HC RX 637 (ALT 250 FOR IP): Performed by: STUDENT IN AN ORGANIZED HEALTH CARE EDUCATION/TRAINING PROGRAM

## 2025-01-31 PROCEDURE — 6360000002 HC RX W HCPCS: Performed by: PHYSICIAN ASSISTANT

## 2025-01-31 PROCEDURE — 1100000003 HC PRIVATE W/ TELEMETRY

## 2025-01-31 PROCEDURE — 2500000003 HC RX 250 WO HCPCS: Performed by: PHYSICIAN ASSISTANT

## 2025-01-31 PROCEDURE — 6370000000 HC RX 637 (ALT 250 FOR IP): Performed by: INTERNAL MEDICINE

## 2025-01-31 PROCEDURE — 85027 COMPLETE CBC AUTOMATED: CPT

## 2025-01-31 PROCEDURE — 93010 ELECTROCARDIOGRAM REPORT: CPT | Performed by: INTERNAL MEDICINE

## 2025-01-31 PROCEDURE — 99232 SBSQ HOSP IP/OBS MODERATE 35: CPT | Performed by: STUDENT IN AN ORGANIZED HEALTH CARE EDUCATION/TRAINING PROGRAM

## 2025-01-31 PROCEDURE — 2580000003 HC RX 258: Performed by: STUDENT IN AN ORGANIZED HEALTH CARE EDUCATION/TRAINING PROGRAM

## 2025-01-31 RX ORDER — NEPHROCAP 1 MG
1 CAP ORAL DAILY
Status: DISCONTINUED | OUTPATIENT
Start: 2025-01-31 | End: 2025-02-07

## 2025-01-31 RX ORDER — INSULIN LISPRO 100 [IU]/ML
3 INJECTION, SOLUTION INTRAVENOUS; SUBCUTANEOUS EVERY 6 HOURS
Status: DISCONTINUED | OUTPATIENT
Start: 2025-01-31 | End: 2025-02-07

## 2025-01-31 RX ADMIN — CARBIDOPA AND LEVODOPA 1 TABLET: 25; 100 TABLET ORAL at 09:30

## 2025-01-31 RX ADMIN — PRAVASTATIN SODIUM 20 MG: 20 TABLET ORAL at 21:45

## 2025-01-31 RX ADMIN — INSULIN LISPRO 2 UNITS: 100 INJECTION, SOLUTION INTRAVENOUS; SUBCUTANEOUS at 00:39

## 2025-01-31 RX ADMIN — METOLAZONE 2.5 MG: 2.5 TABLET ORAL at 09:52

## 2025-01-31 RX ADMIN — CARBIDOPA AND LEVODOPA 1 TABLET: 25; 100 TABLET ORAL at 21:46

## 2025-01-31 RX ADMIN — SODIUM CHLORIDE 3000 MG: 900 INJECTION INTRAVENOUS at 18:21

## 2025-01-31 RX ADMIN — OXYCODONE HYDROCHLORIDE 5 MG: 5 TABLET ORAL at 03:32

## 2025-01-31 RX ADMIN — HEPARIN SODIUM 5000 UNITS: 5000 INJECTION INTRAVENOUS; SUBCUTANEOUS at 21:46

## 2025-01-31 RX ADMIN — BUMETANIDE 1 MG: 0.25 INJECTION INTRAMUSCULAR; INTRAVENOUS at 18:16

## 2025-01-31 RX ADMIN — SODIUM CHLORIDE, PRESERVATIVE FREE 10 ML: 5 INJECTION INTRAVENOUS at 09:53

## 2025-01-31 RX ADMIN — HEPARIN SODIUM 5000 UNITS: 5000 INJECTION INTRAVENOUS; SUBCUTANEOUS at 13:34

## 2025-01-31 RX ADMIN — BUSPIRONE HYDROCHLORIDE 10 MG: 10 TABLET ORAL at 09:52

## 2025-01-31 RX ADMIN — BUMETANIDE 1 MG: 0.25 INJECTION INTRAMUSCULAR; INTRAVENOUS at 09:54

## 2025-01-31 RX ADMIN — DEXTROSE 125 ML: 10 SOLUTION INTRAVENOUS at 06:35

## 2025-01-31 RX ADMIN — INSULIN GLARGINE 18 UNITS: 100 INJECTION, SOLUTION SUBCUTANEOUS at 21:46

## 2025-01-31 RX ADMIN — INSULIN LISPRO 3 UNITS: 100 INJECTION, SOLUTION INTRAVENOUS; SUBCUTANEOUS at 18:16

## 2025-01-31 RX ADMIN — SODIUM CHLORIDE, PRESERVATIVE FREE 10 ML: 5 INJECTION INTRAVENOUS at 22:05

## 2025-01-31 RX ADMIN — Medication 15 ML: at 09:53

## 2025-01-31 RX ADMIN — LEVOTHYROXINE SODIUM 75 MCG: 0.07 TABLET ORAL at 06:12

## 2025-01-31 RX ADMIN — HEPARIN SODIUM 5000 UNITS: 5000 INJECTION INTRAVENOUS; SUBCUTANEOUS at 06:12

## 2025-01-31 RX ADMIN — BUSPIRONE HYDROCHLORIDE 10 MG: 10 TABLET ORAL at 21:46

## 2025-01-31 RX ADMIN — Medication 1 MG: at 12:43

## 2025-01-31 RX ADMIN — INSULIN LISPRO 6 UNITS: 100 INJECTION, SOLUTION INTRAVENOUS; SUBCUTANEOUS at 18:16

## 2025-01-31 ASSESSMENT — PAIN SCALES - GENERAL
PAINLEVEL_OUTOF10: 0

## 2025-01-31 NOTE — PROGRESS NOTES
Patient's spouse reports that patient would like to get put of bed and sit on the chair as his back and neck hurt despite repositioning.  As per the notes, patient is weak and did not tolerate well  sitting on the side of the bed. This nurse explained to the reason and was given pain medication. Patient repositioned on the side every 2 hours but many time he kept turning himself to the back. Education offered

## 2025-01-31 NOTE — PROGRESS NOTES
PT attempt. Patients family having discussion with attending doctor. Will continue to follow up.   Stephie Davidson PT, DPT

## 2025-01-31 NOTE — PROGRESS NOTES
Bedside and Verbal shift change report given to MARGARITO Delaney (oncoming nurse) by NEVIN Luna RN (offgoing nurse). Report included the following information Nurse Handoff Report, Index, Adult Overview, Intake/Output, MAR, Cardiac Rhythm Sinus Rich, Neuro Assessment, and Event Log.

## 2025-01-31 NOTE — PLAN OF CARE
Problem: Chronic Conditions and Co-morbidities  Goal: Patient's chronic conditions and co-morbidity symptoms are monitored and maintained or improved  1/31/2025 0055 by Cheryl Croft RN  Outcome: Progressing  Flowsheets (Taken 1/30/2025 0847 by Anabel Bennett, RN)  Care Plan - Patient's Chronic Conditions and Co-Morbidity Symptoms are Monitored and Maintained or Improved:   Monitor and assess patient's chronic conditions and comorbid symptoms for stability, deterioration, or improvement   Collaborate with multidisciplinary team to address chronic and comorbid conditions and prevent exacerbation or deterioration  1/30/2025 1109 by Anabel Bennett RN  Outcome: Progressing  Flowsheets (Taken 1/30/2025 0847)  Care Plan - Patient's Chronic Conditions and Co-Morbidity Symptoms are Monitored and Maintained or Improved:   Monitor and assess patient's chronic conditions and comorbid symptoms for stability, deterioration, or improvement   Collaborate with multidisciplinary team to address chronic and comorbid conditions and prevent exacerbation or deterioration  Note:   Nurse will monitor patient's chronic conditions and comorbidities and treat using prescribed medications as needed       Problem: Discharge Planning  Goal: Discharge to home or other facility with appropriate resources  1/31/2025 0055 by Cheryl Croft RN  Outcome: Progressing  Flowsheets (Taken 1/30/2025 0847 by Anabel Bennett RN)  Discharge to home or other facility with appropriate resources:   Arrange for needed discharge resources and transportation as appropriate   Identify barriers to discharge with patient and caregiver  1/30/2025 1109 by Anabel Bennett RN  Outcome: Progressing  Flowsheets (Taken 1/30/2025 0847)  Discharge to home or other facility with appropriate resources:   Arrange for needed discharge resources and transportation as appropriate   Identify barriers to discharge with patient and

## 2025-01-31 NOTE — PROGRESS NOTES
1940: 4 Eyes Skin Assessment     NAME:  Rahul Camarillo  YOB: 1940  MEDICAL RECORD NUMBER:  [id number]    The patient is being assessed for  Shift Handoff    I agree that at least one RN has performed a thorough Head to Toe Skin Assessment on the patient. ALL assessment sites listed below have been assessed.      Areas assessed by both nurses:    Head, Face, Ears, Shoulders, Back, Chest, Arms, Elbows, Hands, Sacrum. Buttock, Coccyx, Ischium, Legs. Feet and Heels, and Under Medical Devices         Does the Patient have a Wound? Yes wound(s) were present on assessment. LDA wound assessment was Initiated and completed by RN       Larry Prevention initiated by RN: Yes  Wound Care Orders initiated by RN: Yes    Pressure Injury (Stage 3,4, Unstageable, DTI, NWPT, and Complex wounds) if present, place Wound referral order by RN under : No    New Ostomies, if present place, Ostomy referral order under : No     Nurse 1 eSignature: Electronically signed by Anabel Bennett RN on 1/30/25 at 7:40 PM EST    **SHARE this note so that the co-signing nurse can place an eSignature**    Nurse 2 eSignature: Electronically signed by Cheryl Croft RN on 1/31/25 at 4:57 AM EST

## 2025-01-31 NOTE — ACP (ADVANCE CARE PLANNING)
Advance Care Planning     Palliative Team Advance Care Planning (ACP) Conversation    Date of Conversation: 01/31/25     ACP documents on file prior to discussion:  -Power of  for Healthcare    Healthcare Decision Maker:    Primary Decision Maker: Margaret Camarillo - Spouse - [phone]     Conversation Summary:    Palliative team visited patient for follow up. He is currently resting quietly in bed, eyes closed. Respirations are shallow, non labored, oxygen in use. Wife and preacher at the bedside. Dr. Morrell in talking with wife.    Offered support to the wife, she expressed her frustration to our team. She shared that she has not heard from any of the patient's doctors. Questions why treatments are not being done here that were done at previous hospitals. Bedside RN made aware and sent perfect serve message to Dr. Toribio.   Wife also has questions regarding discharge, case management spoke with her 1/29/25. Our team explained that patient is not medically ready for discharge at this time.    Palliative team remains available to provide support to Mr Camarillo and his family during this hospital stay.    Resuscitation Status:   Code Status: Full Code     Documentation Completed:  -No new documents completed.    Saida Renee RN  Palliative Medicine Inpatient RN  Palliative [facility] Line: [phone]

## 2025-01-31 NOTE — PROGRESS NOTES
..........................4 Eyes Skin Assessment     NAME:  Rahul Camarillo  YOB: 1940  MEDICAL RECORD NUMBER:  [id number]    The patient is being assessed for  Shift Handoff    I agree that at least one RN has performed a thorough Head to Toe Skin Assessment on the patient. ALL assessment sites listed below have been assessed.      Areas assessed by both nurses:    Head, Face, Ears, Shoulders, Back, Chest, Arms, Elbows, Hands, Sacrum. Buttock, Coccyx, Ischium, Legs. Feet and Heels, and Under Medical Devices         Does the Patient have a Wound? Yes wound(s) were present on assessment. LDA wound assessment was Initiated and completed by MARGARITO Juarez Prevention initiated by RN: Yes  Wound Care Orders initiated by RN: yes    Pressure Injury (Stage 3,4, Unstageable, DTI, NWPT, and Complex wounds) if present, place Wound referral order by RN under : No    New Ostomies, if present place, Ostomy referral order under : No     Nurse 1 eSignature: Electronically signed by Cheryl Croft RN on 1/31/25 at 4:58 AM EST    **SHARE this note so that the co-signing nurse can place an eSignature**    Nurse 2 eSignature: Electronically signed by Elaina Dunn RN on 1/31/25 at 7:45 AM EST

## 2025-01-31 NOTE — PROGRESS NOTES
In Patient Progress note      Admit Date: 1/28/2025      Impression:     1. Acute Kidney Injury on CKD 4: prerenal in setting of CHF/cardiorenal syndrome , renal USG negative for obst  2. Chronic kidney disease 4 : d/t suspected HTN nephrosclerosis /cardiorenal syndrome   3. Ac hypoxic on chronic  respiratory failure : CHF / PNA , sees pulmonary and had recent CT , no Significant finding   other than chronic rt lower lobe scarring   4. Hypertension: Borderline low blood pressures   5. HFrEF 20-25% EF  , mod Mitral regurgitation   6  Anemia  7  sec hyperpara   8.  Parkinson's disease:   9.  Hypothyroidism: On levothyroxine  9.  History of atrial fibrillation on amiodarone  10  lethargy      Plan:  1) strict I/o charting , check frequent bladder scans  2) continue Bumex 1 mg BID IV , metolazone 2.5 mg daily , fluid restrict 1200 ml/day  3) monitor electrolytes and renal functions daily  4) no NSAIDs, nephrotoxins   5) renally dose med for egfr < 30   6) consider palliative care consult    Very guarded prognosis   Discussed poor prognosis with patient and his wife   Please call with questions,     Maurisio Morrell MD FASN  Cell [phone]  Pager: [phone]  Fax   [phone]        Subjective:     - No acute over night events.  - respiratory - stable  - hemodynamics - stable, no pressrs  - UOP-ok  - Nutrition -ok    Objective:     BP (!) 142/59   Pulse 68   Temp 97 °F (36.1 °C) (Axillary)   Resp 22   Ht 1.829 m (6' 0.01\")   Wt 69.6 kg (153 lb 7 oz)   SpO2 93%   BMI 20.81 kg/m²       Intake/Output Summary (Last 24 hours) at 1/31/2025 1406  Last data filed at 1/31/2025 0635  Gross per 24 hour   Intake --   Output 900 ml   Net -900 ml       Physical Exam:     Gen NAD  HENT mmm  RS AEBE   CVS s1s2 wnl no JVD  Ext edema +  Skin no rashes  Neuro oriented X 3    Data Review:    Recent Labs     01/31/25  0354   WBC 13.2   RBC 3.45*   HCT 34.7*   .6*   MCH 30.1   MCHC 30.0*   RDW 15.2*   MPV 13.0*     Recent

## 2025-01-31 NOTE — PROGRESS NOTES
Comprehensive Nutrition Assessment    Type and Reason for Visit:  Reassess, Consult, NPO/clear liquid, Nutrition support    Nutrition Recommendations/Plan:   Modify tube feeding regimen:   Formula: Nepro (Renal)  Goal Rate: 55 ml/hr x 20 hours (for anticipation of holding tube feeds for standard nursing care)  Water Flushes: 30 mL q 1 hours (720 mL total)  Modular(s): None  Goal Regimen Provides (with modulars):  1980 kcal, 89g protein, 1523 ml free water, 100% RDIs  Switch MVI to Virt-Cap daily supplementation  Daily weights  Continue to monitor tolerance of EN, weight, labs, and plan of care during admission.     Malnutrition Assessment:  Malnutrition Status:  Moderate malnutrition (01/29/25 1026)    Context:  Chronic Illness     Findings of the 6 clinical characteristics of malnutrition:  Energy Intake:  Mild decrease in energy intake (decreased po intake x2 weeks/PEG dependent)  Weight Loss:  No weight loss     Body Fat Loss:  Mild body fat loss Buccal region, Orbital   Muscle Mass Loss:  Severe muscle mass loss Temples (temporalis), Clavicles (pectoralis & deltoids), Hand (interosseous)  Fluid Accumulation:  No fluid accumulation     Strength:  Not Performed    Nutrition Assessment:    Admitted for acute on chronic respiratory failure with hypoxia; aspiration pneumonia, CKD4. S/p PEG placement 1 year ago per pt's spouse. SLP following, per last eval (1/30) continue NPO with PEG. Pt seen for follow up; awake and oriented; spouse (Margaret) present at time of RD visit. Spouse expressed concern the tf was not enough to meet needs. Discussed pump at goal rate; which is calculated to meet estimated protein-energy needs. Per TF pump: 3092 mL x72 hours. Provides 90% of estimated kcal/needs and 100% of protein needs. Plan to change formula to Nepro. Bed weight obtained 65.0 kg (143 lbs); if weights correct -8 lbs loss since admission, daily weights for trending. Pt/spouse spouse had no nutrition related

## 2025-02-01 LAB
GLUCOSE BLD STRIP.AUTO-MCNC: 154 MG/DL (ref 70–110)
GLUCOSE BLD STRIP.AUTO-MCNC: 209 MG/DL (ref 70–110)
GLUCOSE BLD STRIP.AUTO-MCNC: 270 MG/DL (ref 70–110)
GLUCOSE BLD STRIP.AUTO-MCNC: 294 MG/DL (ref 70–110)
GLUCOSE BLD STRIP.AUTO-MCNC: 319 MG/DL (ref 70–110)

## 2025-02-01 PROCEDURE — 82962 GLUCOSE BLOOD TEST: CPT

## 2025-02-01 PROCEDURE — 2500000003 HC RX 250 WO HCPCS: Performed by: STUDENT IN AN ORGANIZED HEALTH CARE EDUCATION/TRAINING PROGRAM

## 2025-02-01 PROCEDURE — 99232 SBSQ HOSP IP/OBS MODERATE 35: CPT | Performed by: STUDENT IN AN ORGANIZED HEALTH CARE EDUCATION/TRAINING PROGRAM

## 2025-02-01 PROCEDURE — 6360000002 HC RX W HCPCS: Performed by: STUDENT IN AN ORGANIZED HEALTH CARE EDUCATION/TRAINING PROGRAM

## 2025-02-01 PROCEDURE — 2700000000 HC OXYGEN THERAPY PER DAY

## 2025-02-01 PROCEDURE — 2580000003 HC RX 258: Performed by: STUDENT IN AN ORGANIZED HEALTH CARE EDUCATION/TRAINING PROGRAM

## 2025-02-01 PROCEDURE — 6370000000 HC RX 637 (ALT 250 FOR IP): Performed by: STUDENT IN AN ORGANIZED HEALTH CARE EDUCATION/TRAINING PROGRAM

## 2025-02-01 PROCEDURE — 2500000003 HC RX 250 WO HCPCS: Performed by: PHYSICIAN ASSISTANT

## 2025-02-01 PROCEDURE — 6370000000 HC RX 637 (ALT 250 FOR IP): Performed by: INTERNAL MEDICINE

## 2025-02-01 PROCEDURE — 94761 N-INVAS EAR/PLS OXIMETRY MLT: CPT

## 2025-02-01 PROCEDURE — 6360000002 HC RX W HCPCS: Performed by: PHYSICIAN ASSISTANT

## 2025-02-01 PROCEDURE — 1100000003 HC PRIVATE W/ TELEMETRY

## 2025-02-01 PROCEDURE — 6370000000 HC RX 637 (ALT 250 FOR IP): Performed by: PHYSICIAN ASSISTANT

## 2025-02-01 RX ORDER — INSULIN GLARGINE 100 [IU]/ML
20 INJECTION, SOLUTION SUBCUTANEOUS NIGHTLY
Status: DISCONTINUED | OUTPATIENT
Start: 2025-02-01 | End: 2025-02-07

## 2025-02-01 RX ORDER — BUMETANIDE 1 MG/1
1 TABLET ORAL 2 TIMES DAILY
Status: DISCONTINUED | OUTPATIENT
Start: 2025-02-01 | End: 2025-02-03

## 2025-02-01 RX ADMIN — BUMETANIDE 1 MG: 1 TABLET ORAL at 16:49

## 2025-02-01 RX ADMIN — BUMETANIDE 1 MG: 0.25 INJECTION INTRAMUSCULAR; INTRAVENOUS at 08:04

## 2025-02-01 RX ADMIN — SODIUM CHLORIDE 3000 MG: 900 INJECTION INTRAVENOUS at 17:26

## 2025-02-01 RX ADMIN — INSULIN LISPRO 6 UNITS: 100 INJECTION, SOLUTION INTRAVENOUS; SUBCUTANEOUS at 01:25

## 2025-02-01 RX ADMIN — SODIUM CHLORIDE, PRESERVATIVE FREE 10 ML: 5 INJECTION INTRAVENOUS at 23:00

## 2025-02-01 RX ADMIN — BUSPIRONE HYDROCHLORIDE 10 MG: 10 TABLET ORAL at 08:05

## 2025-02-01 RX ADMIN — INSULIN LISPRO 4 UNITS: 100 INJECTION, SOLUTION INTRAVENOUS; SUBCUTANEOUS at 06:45

## 2025-02-01 RX ADMIN — CARBIDOPA AND LEVODOPA 1 TABLET: 25; 100 TABLET ORAL at 08:05

## 2025-02-01 RX ADMIN — SODIUM CHLORIDE, PRESERVATIVE FREE 10 ML: 5 INJECTION INTRAVENOUS at 08:06

## 2025-02-01 RX ADMIN — INSULIN LISPRO 4 UNITS: 100 INJECTION, SOLUTION INTRAVENOUS; SUBCUTANEOUS at 12:43

## 2025-02-01 RX ADMIN — HYDROMORPHONE HYDROCHLORIDE 0.5 MG: 1 INJECTION, SOLUTION INTRAMUSCULAR; INTRAVENOUS; SUBCUTANEOUS at 01:26

## 2025-02-01 RX ADMIN — LEVOTHYROXINE SODIUM 75 MCG: 0.07 TABLET ORAL at 06:46

## 2025-02-01 RX ADMIN — HEPARIN SODIUM 5000 UNITS: 5000 INJECTION INTRAVENOUS; SUBCUTANEOUS at 06:48

## 2025-02-01 RX ADMIN — INSULIN LISPRO 3 UNITS: 100 INJECTION, SOLUTION INTRAVENOUS; SUBCUTANEOUS at 12:43

## 2025-02-01 RX ADMIN — INSULIN LISPRO 2 UNITS: 100 INJECTION, SOLUTION INTRAVENOUS; SUBCUTANEOUS at 18:21

## 2025-02-01 RX ADMIN — HEPARIN SODIUM 5000 UNITS: 5000 INJECTION INTRAVENOUS; SUBCUTANEOUS at 15:17

## 2025-02-01 RX ADMIN — Medication 1 MG: at 08:06

## 2025-02-01 RX ADMIN — INSULIN LISPRO 3 UNITS: 100 INJECTION, SOLUTION INTRAVENOUS; SUBCUTANEOUS at 06:45

## 2025-02-01 RX ADMIN — SODIUM BICARBONATE 1 DOSE: 650 TABLET ORAL at 22:41

## 2025-02-01 RX ADMIN — METOLAZONE 2.5 MG: 2.5 TABLET ORAL at 08:05

## 2025-02-01 RX ADMIN — HEPARIN SODIUM 5000 UNITS: 5000 INJECTION INTRAVENOUS; SUBCUTANEOUS at 22:25

## 2025-02-01 ASSESSMENT — PAIN DESCRIPTION - DESCRIPTORS: DESCRIPTORS: ACHING

## 2025-02-01 ASSESSMENT — PAIN DESCRIPTION - PAIN TYPE: TYPE: ACUTE PAIN

## 2025-02-01 ASSESSMENT — PAIN SCALES - GENERAL
PAINLEVEL_OUTOF10: 0
PAINLEVEL_OUTOF10: 0
PAINLEVEL_OUTOF10: 7

## 2025-02-01 ASSESSMENT — PAIN - FUNCTIONAL ASSESSMENT: PAIN_FUNCTIONAL_ASSESSMENT: PREVENTS OR INTERFERES SOME ACTIVE ACTIVITIES AND ADLS

## 2025-02-01 ASSESSMENT — PAIN DESCRIPTION - LOCATION: LOCATION: ABDOMEN

## 2025-02-01 ASSESSMENT — PAIN DESCRIPTION - FREQUENCY: FREQUENCY: INTERMITTENT

## 2025-02-01 ASSESSMENT — PAIN DESCRIPTION - ONSET: ONSET: PROGRESSIVE

## 2025-02-01 NOTE — PROGRESS NOTES
4 Eyes Skin Assessment     NAME:  Rahul Camarillo  YOB: 1940  MEDICAL RECORD NUMBER:  [id number]    The patient is being assessed for  Shift Handoff    I agree that at least one RN has performed a thorough Head to Toe Skin Assessment on the patient. ALL assessment sites listed below have been assessed.      Areas assessed by both nurses:    Head, Face, Ears, Shoulders, Back, Chest, Arms, Elbows, Hands, Sacrum. Buttock, Coccyx, Ischium, Legs. Feet and Heels, and Under Medical Devices         Does the Patient have a Wound? Yes wound(s) were present on assessment. LDA wound assessment was Initiated and completed by RN       Larry Prevention initiated by RN: No  Wound Care Orders initiated by RN: No    Pressure Injury (Stage 3,4, Unstageable, DTI, NWPT, and Complex wounds) if present, place Wound referral order by RN under : No    New Ostomies, if present place, Ostomy referral order under : No     Nurse 1 eSignature: Electronically signed by Elaina Dunn RN on 1/31/25 at 5:46 PM EST    **SHARE this note so that the co-signing nurse can place an eSignature**    Nurse 2 eSignature: {Esignature:[id number]}

## 2025-02-01 NOTE — PROGRESS NOTES
In Patient Progress note      Admit Date: 1/28/2025      Impression:     1. Acute Kidney Injury on CKD 4: prerenal in setting of CHF/cardiorenal syndrome , renal USG negative for obst  2. Chronic kidney disease 4 : d/t HTN nephrosclerosis /cardiorenal syndrome   3. Ac hypoxic on chronic  respiratory failure : CHF / PNA , sees pulmonary and had recent CT , no Significant finding   other than chronic rt lower lobe scarring   4. Hypertension: Borderline low blood pressures   5. HFrEF 20-25% EF  , mod Mitral regurgitation   6  Anemia  7  sec hyperpara   8.  Parkinson's disease:   9.  Hypothyroidism: On levothyroxine  9.  History of atrial fibrillation on amiodarone  10  lethargy      Plan:  1) strict I/o charting , check frequent bladder scans  2) switch to Bumex 1 mg BID  , fluid restrict 1200 ml/day  Hold metolazone   3) monitor electrolytes and renal functions daily  4) no NSAIDs, nephrotoxins   5) renally dose med for egfr < 30   6) palliative care consult    Very guarded prognosis   Discussed poor prognosis   Please call with questions,     Maurisio Morrell MD FASN  Cell [phone]  Pager: [phone]  Fax   [phone]        Subjective:     - No acute over night events.  - respiratory - stable  - hemodynamics - stable, no pressrs  - UOP-ok  - Nutrition -ok    Objective:     BP (!) 136/56   Pulse 52   Temp 97.9 °F (36.6 °C) (Oral)   Resp 20   Ht 1.829 m (6' 0.01\")   Wt 69.6 kg (153 lb 7 oz)   SpO2 99%   BMI 20.81 kg/m²       Intake/Output Summary (Last 24 hours) at 2/1/2025 0956  Last data filed at 2/1/2025 0806  Gross per 24 hour   Intake 410 ml   Output 1300 ml   Net -890 ml       Physical Exam:     Gen NAD  HENT mmm  RS AEBE   CVS s1s2 wnl no JVD  Ext edema +  Skin no rashes  Neuro oriented X 3    Data Review:    Recent Labs     01/31/25  0354   WBC 13.2   RBC 3.45*   HCT 34.7*   .6*   MCH 30.1   MCHC 30.0*   RDW 15.2*   MPV 13.0*     Recent Labs     01/30/25  0530 01/31/25  0354   *  123*   K 5.0 4.4    143    106   CO2 33* 33*   PHOS  --  5.3*       Maurisio Morrell MD

## 2025-02-01 NOTE — PLAN OF CARE
Problem: Safety - Adult  Goal: Free from fall injury  Outcome: Progressing     Problem: Pain  Goal: Verbalizes/displays adequate comfort level or baseline comfort level  Outcome: Progressing     Problem: Nutrition Deficit:  Goal: Optimize nutritional status  Outcome: Progressing  Flowsheets (Taken 1/31/2025 1039 by Sarahy Pompa RD)  Nutrient intake appropriate for improving, restoring, or maintaining nutritional needs:   Assess nutritional status and recommend course of action   Monitor oral intake, labs, and treatment plans   Recommend appropriate diets, oral nutritional supplements, and vitamin/mineral supplements     Problem: Skin/Tissue Integrity  Goal: Skin integrity remains intact  Description: 1.  Monitor for areas of redness and/or skin breakdown  2.  Assess vascular access sites hourly  3.  Every 4-6 hours minimum:  Change oxygen saturation probe site  4.  Every 4-6 hours:  If on nasal continuous positive airway pressure, respiratory therapy assess nares and determine need for appliance change or resting period  Outcome: Progressing     Problem: ABCDS Injury Assessment  Goal: Absence of physical injury  Outcome: Progressing

## 2025-02-01 NOTE — PROGRESS NOTES
pulmonary vasculature is within normal limits. Bilateral pleural effusions are noted. Significant bilateral pulmonary infiltrates, progressive compared to the prior exam. The visualized bones and upper abdomen are age-appropriate.     Bilateral pleural effusions and significant bilateral pulmonary infiltrates. Electronically signed by MICHAEL QUIÑONEZ      Assessment/Plan:     Heart failure with reduced ejection fraction  2D echo from January 7, 2025 reported an EF of 25-30 % with diastolic dysfunction.  The patient has acute on chronic, combined systolic and diastolic CHF.  Continue bumetanide and metolazone for diuresis  Monitor intake and output  Measure weight on a daily basis  Currently remains full code full care.  Guarded prognosis  Difficult to say exact way that this will tell given patient does have CKD 4, along with cardiorenal syndrome however if patient can get fluid off may be able to the very least be able to go home.  Family asked about utility and potential transfer to outside facilities.  Explained that although it was unlikely for them to do anything different from what we offered here, could not speak for them specifically.  Transfers would have to be patient initiated    2.  Acute on chronic kidney disease stage IV  Acute kidney injury is likely prerenal secondary to cardiorenal syndrome.  The patient also has underlying stage IV chronic kidney disease.  Continue management of underlying etiology  Monitor intake and output  Minimize use of nephrotoxins including NSAIDs and contrast.  Nephrology input appreciated-discussed with Dr. Morrell    3.  Aspiration pneumonia  Versus pneumonitis; continue Unasyn  Maintain aspiration precautions  Check respiratory cultures if the patient is able to expectorate    4.  Acute UTI  Continue patient on Unasyn and complete a 5 to 7-day course of antibiotics, today being day #2.  Of note, the patient has a history of ampicillin sensitive E faecalis UTI.  Levofloxacin  has been discontinued as the patient has a prolonged QTc interval.  Urine cultures are polymicrobial and suggestive of contamination    5.  Acute on chronic respiratory failure with hypoxia  Likely secondary to CHF exacerbation, and possible aspiration pneumonia/pneumonitis  Continue management of underlying etiology  Continue supplemental oxygen and titrate FiO2 to keep oxygen saturation greater than 88%    6.  Parkinson disease  Continue patient on carbidopa/levodopa    7.  Hypothyroidism  Continue levothyroxine    8.  Type 2 diabetes mellitus  Glucose levels are not at goal.  The dose of Lantus has been increased.  The patient has also been started on scheduled lispro.  Continue tube feeds    Shlomo Toribio MD   1/31/2025   [facility]

## 2025-02-01 NOTE — PLAN OF CARE
Problem: Chronic Conditions and Co-morbidities  Goal: Patient's chronic conditions and co-morbidity symptoms are monitored and maintained or improved  Outcome: Progressing  Flowsheets (Taken 1/30/2025 0847 by Anabel Bennett, RN)  Care Plan - Patient's Chronic Conditions and Co-Morbidity Symptoms are Monitored and Maintained or Improved:   Monitor and assess patient's chronic conditions and comorbid symptoms for stability, deterioration, or improvement   Collaborate with multidisciplinary team to address chronic and comorbid conditions and prevent exacerbation or deterioration     Problem: Safety - Adult  Goal: Free from fall injury  1/31/2025 2315 by Gabriel Solomon RN  Outcome: Progressing     Problem: Pain  Goal: Verbalizes/displays adequate comfort level or baseline comfort level  1/31/2025 2315 by Gabriel Solomon RN  Outcome: Progressing     Problem: Nutrition Deficit:  Goal: Optimize nutritional status  2/1/2025 0941 by Brittni Del Toro, RN  Outcome: Progressing  Flowsheets (Taken 1/31/2025 1039 by Sarahy Pompa RD)  Nutrient intake appropriate for improving, restoring, or maintaining nutritional needs:   Assess nutritional status and recommend course of action   Monitor oral intake, labs, and treatment plans   Recommend appropriate diets, oral nutritional supplements, and vitamin/mineral supplements  1/31/2025 2315 by Gabriel Solomon RN  Outcome: Progressing  Flowsheets (Taken 1/31/2025 1039 by Sarahy Pompa RD)  Nutrient intake appropriate for improving, restoring, or maintaining nutritional needs:   Assess nutritional status and recommend course of action   Monitor oral intake, labs, and treatment plans   Recommend appropriate diets, oral nutritional supplements, and vitamin/mineral supplements     Problem: Skin/Tissue Integrity  Goal: Skin integrity remains intact  Description: 1.  Monitor for areas of redness and/or skin breakdown  2.  Assess vascular access sites hourly  3.  Every 4-6 hours  minimum:  Change oxygen saturation probe site  4.  Every 4-6 hours:  If on nasal continuous positive airway pressure, respiratory therapy assess nares and determine need for appliance change or resting period  2/1/2025 0941 by Brittni Del Toro, RN  Outcome: Progressing  Flowsheets (Taken 1/31/2025 0055 by Cheryl Croft, RN)  Skin Integrity Remains Intact: Monitor for areas of redness and/or skin breakdown  1/31/2025 2315 by Gabriel Solomon, RN  Outcome: Progressing     Problem: ABCDS Injury Assessment  Goal: Absence of physical injury  1/31/2025 2315 by Gabriel Solomon, RN  Outcome: Progressing

## 2025-02-02 LAB
BACTERIA SPEC CULT: NORMAL
BACTERIA SPEC CULT: NORMAL
GLUCOSE BLD STRIP.AUTO-MCNC: 141 MG/DL (ref 70–110)
GLUCOSE BLD STRIP.AUTO-MCNC: 143 MG/DL (ref 70–110)
GLUCOSE BLD STRIP.AUTO-MCNC: 223 MG/DL (ref 70–110)
Lab: NORMAL
Lab: NORMAL

## 2025-02-02 PROCEDURE — 99232 SBSQ HOSP IP/OBS MODERATE 35: CPT | Performed by: STUDENT IN AN ORGANIZED HEALTH CARE EDUCATION/TRAINING PROGRAM

## 2025-02-02 PROCEDURE — 6370000000 HC RX 637 (ALT 250 FOR IP): Performed by: INTERNAL MEDICINE

## 2025-02-02 PROCEDURE — 6370000000 HC RX 637 (ALT 250 FOR IP): Performed by: PHYSICIAN ASSISTANT

## 2025-02-02 PROCEDURE — 82962 GLUCOSE BLOOD TEST: CPT

## 2025-02-02 PROCEDURE — 6370000000 HC RX 637 (ALT 250 FOR IP): Performed by: STUDENT IN AN ORGANIZED HEALTH CARE EDUCATION/TRAINING PROGRAM

## 2025-02-02 PROCEDURE — 2700000000 HC OXYGEN THERAPY PER DAY

## 2025-02-02 PROCEDURE — 2500000003 HC RX 250 WO HCPCS: Performed by: STUDENT IN AN ORGANIZED HEALTH CARE EDUCATION/TRAINING PROGRAM

## 2025-02-02 PROCEDURE — 6360000002 HC RX W HCPCS: Performed by: STUDENT IN AN ORGANIZED HEALTH CARE EDUCATION/TRAINING PROGRAM

## 2025-02-02 PROCEDURE — 1100000003 HC PRIVATE W/ TELEMETRY

## 2025-02-02 PROCEDURE — 2500000003 HC RX 250 WO HCPCS: Performed by: PHYSICIAN ASSISTANT

## 2025-02-02 PROCEDURE — 94761 N-INVAS EAR/PLS OXIMETRY MLT: CPT

## 2025-02-02 PROCEDURE — 2580000003 HC RX 258: Performed by: STUDENT IN AN ORGANIZED HEALTH CARE EDUCATION/TRAINING PROGRAM

## 2025-02-02 RX ADMIN — SODIUM CHLORIDE 3000 MG: 900 INJECTION INTRAVENOUS at 19:10

## 2025-02-02 RX ADMIN — BUMETANIDE 1 MG: 1 TABLET ORAL at 19:04

## 2025-02-02 RX ADMIN — HEPARIN SODIUM 5000 UNITS: 5000 INJECTION INTRAVENOUS; SUBCUTANEOUS at 14:21

## 2025-02-02 RX ADMIN — PRAVASTATIN SODIUM 20 MG: 20 TABLET ORAL at 22:13

## 2025-02-02 RX ADMIN — HEPARIN SODIUM 5000 UNITS: 5000 INJECTION INTRAVENOUS; SUBCUTANEOUS at 05:43

## 2025-02-02 RX ADMIN — INSULIN LISPRO 3 UNITS: 100 INJECTION, SOLUTION INTRAVENOUS; SUBCUTANEOUS at 12:59

## 2025-02-02 RX ADMIN — SODIUM CHLORIDE, PRESERVATIVE FREE 10 ML: 5 INJECTION INTRAVENOUS at 22:13

## 2025-02-02 RX ADMIN — INSULIN LISPRO 3 UNITS: 100 INJECTION, SOLUTION INTRAVENOUS; SUBCUTANEOUS at 05:43

## 2025-02-02 RX ADMIN — SODIUM BICARBONATE 1 DOSE: 650 TABLET ORAL at 12:28

## 2025-02-02 RX ADMIN — INSULIN GLARGINE 20 UNITS: 100 INJECTION, SOLUTION SUBCUTANEOUS at 22:12

## 2025-02-02 RX ADMIN — BUSPIRONE HYDROCHLORIDE 10 MG: 10 TABLET ORAL at 22:13

## 2025-02-02 RX ADMIN — INSULIN LISPRO 2 UNITS: 100 INJECTION, SOLUTION INTRAVENOUS; SUBCUTANEOUS at 05:43

## 2025-02-02 RX ADMIN — SODIUM CHLORIDE, PRESERVATIVE FREE 10 ML: 5 INJECTION INTRAVENOUS at 10:16

## 2025-02-02 RX ADMIN — CARBIDOPA AND LEVODOPA 1 TABLET: 25; 100 TABLET ORAL at 22:12

## 2025-02-02 RX ADMIN — HEPARIN SODIUM 5000 UNITS: 5000 INJECTION INTRAVENOUS; SUBCUTANEOUS at 22:13

## 2025-02-02 RX ADMIN — INSULIN LISPRO 3 UNITS: 100 INJECTION, SOLUTION INTRAVENOUS; SUBCUTANEOUS at 19:04

## 2025-02-02 ASSESSMENT — PAIN SCALES - GENERAL
PAINLEVEL_OUTOF10: 0

## 2025-02-02 NOTE — PROGRESS NOTES
[facility] Hospitalist Group  Progress Note    Patient: Rahul Camarillo Age: 84 y.o. : 1940 MR#: [id number] SSN: [id number]  Date: 2025                 DVT Prophylaxis:  [x]Lovenox  []Hep SQ  []SCDs  []Coumadin   []On Heparin gtt []PO anticoagulant    Anticipated discharge: February 3, 2025 to home, pending clinical course    Subjective:     The patient was seen and examined at the bedside today.  Appeared to be in a primarily similar state from how he was yesterday and the day before.    Objective:   VS: /60   Pulse 96   Temp 97.2 °F (36.2 °C) (Axillary)   Resp 19   Ht 1.829 m (6' 0.01\")   Wt 64.1 kg (141 lb 5 oz)   SpO2 98%   BMI 19.16 kg/m²    Tmax/24hrs: Temp (24hrs), Av.5 °F (36.4 °C), Min:97.2 °F (36.2 °C), Max:97.7 °F (36.5 °C)    Intake/Output Summary (Last 24 hours) at 2025 1606  Last data filed at 2025 0630  Gross per 24 hour   Intake --   Output 650 ml   Net -650 ml        PHYSICAL EXAM  General Appearance: Appears chronically ill, looks poorly nourished  HENT: normocephalic/atraumatic, moist mucus membranes  Neck: No JVD, supple  Lungs: Inspiratory crackles auscultated bilaterally  CV: Rate and rhythm regular  Abdomen: soft, nontender; PEG tube present  : no suprapubic tenderness   Extremities: Moves extremities spontaneously; poor muscle mass and loss of subcutaneous fat  Neuro: Awake and oriented  Skin: Warm and dry  Psych: appropriate mood and affect    Current Facility-Administered Medications   Medication Dose Route Frequency    bumetanide (BUMEX) tablet 1 mg  1 mg Oral BID    insulin glargine (LANTUS) injection vial 20 Units  20 Units SubCUTAneous Nightly    Virt-Caps 1 mg  1 capsule Oral Daily    insulin lispro (HUMALOG,ADMELOG) injection vial 3 Units  3 Units SubCUTAneous Q6H    heparin (porcine) injection 5,000 Units  5,000 Units SubCUTAneous 3 times per day    oxyCODONE (ROXICODONE) immediate release tablet 5 mg  5 mg Oral Q4H  Range    POC Glucose 143 (H) 70 - 110 mg/dL       Imaging:  FL MODIFIED BARIUM SWALLOW W VIDEO    Result Date: 1/29/2025  EXAM: MODIFIED SPEECH SWALLOW CLINICAL INDICATION/HISTORY: dysphagia, hx of PEG, asp PNA As above. COMPARISON: None. TECHNIQUE: Video speech swallow performed in conjunction with speech pathologist.  Varying consistencies of barium were administered under lateral fluoroscopic observation. Fluoroscopy time: 1.0 Number of images: 2 FINDINGS: Delayed oral phase of swallowing with puree consistency. Premature leakage of contrast to the valleculae/pyriform sinuses across all trialed consistencies. Silent aspiration across all trialed consistencies (honey and puree). Residual pooling of contrast in the valleculae and piriform sinuses across all trialed consistencies.     Silent aspiration across all trialed consistencies (honey and puree). Please see speech pathology report for further findings/recommendations. Electronically signed by Grant Manzanares    CT HEAD WO CONTRAST    Result Date: 1/28/2025  INDICATION: AMS EXAM:  HEAD CT WITHOUT CONTRAST COMPARISON: April 5, 2020 TECHNIQUE:  Routine noncontrast axial head CT was performed.  Sagittal and coronal reconstructions were generated.  CT dose reduction was achieved through use of a standardized protocol tailored for this examination and automatic exposure control for dose modulation. FINDINGS: Ventricles: Midline, no hydrocephalus. Intracranial Hemorrhage: None. Brain Parenchyma/Brainstem: Normal for age. Basal Cisterns: Normal. Paranasal Sinuses: Visualized sinuses are clear. Additional Comments: N/A.     No acute process. Electronically signed by Rico Manjarrez    XR CHEST PORTABLE    Result Date: 1/27/2025  EXAM:  XR CHEST PORTABLE INDICATION: hypoxia COMPARISON: 10/12/2024 TECHNIQUE: portable chest AP view FINDINGS: The cardiac silhouette is within normal limits. The pulmonary vasculature is within normal limits. Bilateral pleural effusions are

## 2025-02-02 NOTE — PROGRESS NOTES
Bedside report received on pt, he ia alert and oriented to self, tube feed on hold per day shift due to clogged tube.

## 2025-02-02 NOTE — PLAN OF CARE
Problem: Chronic Conditions and Co-morbidities  Goal: Patient's chronic conditions and co-morbidity symptoms are monitored and maintained or improved  2/2/2025 1156 by Jenn García RN  Outcome: Progressing  Flowsheets (Taken 2/1/2025 2000 by Cheryl Raymundo, RN)  Care Plan - Patient's Chronic Conditions and Co-Morbidity Symptoms are Monitored and Maintained or Improved:   Monitor and assess patient's chronic conditions and comorbid symptoms for stability, deterioration, or improvement   Collaborate with multidisciplinary team to address chronic and comorbid conditions and prevent exacerbation or deterioration   Update acute care plan with appropriate goals if chronic or comorbid symptoms are exacerbated and prevent overall improvement and discharge  Note: Assessing patients respiratory status     Problem: Discharge Planning  Goal: Discharge to home or other facility with appropriate resources  2/2/2025 1156 by Jenn García RN  Outcome: Progressing  Flowsheets (Taken 2/2/2025 1156)  Discharge to home or other facility with appropriate resources:   Identify barriers to discharge with patient and caregiver   Arrange for needed discharge resources and transportation as appropriate  Note: Working with team to initiate discharge planning     Problem: Safety - Adult  Goal: Free from fall injury  2/2/2025 1156 by Jenn García RN  Outcome: Progressing  Flowsheets (Taken 2/2/2025 0358 by Cheryl Raymundo, RN)  Free From Fall Injury:   Instruct family/caregiver on patient safety   Based on caregiver fall risk screen, instruct family/caregiver to ask for assistance with transferring infant if caregiver noted to have fall risk factors  Note: Patient bed at lowest position and call bel within reach      Problem: Pain  Goal: Verbalizes/displays adequate comfort level or baseline comfort level  2/2/2025 1156 by Jenn García RN  Outcome: Progressing  Flowsheets (Taken 2/2/2025 1156)  Verbalizes/displays adequate

## 2025-02-02 NOTE — PROGRESS NOTES
Chart reviewed  Good urine output and bp controlled  Stable on 2 lit NC O2  No labs today , plan to f/u labs tomorrow  PO Bumex and hold metolazone  Volume status has improved    Please call with questions    Maurisio Morrell MD FASN  Cell [phone]  Pager: [phone]  Fax   [phone]

## 2025-02-02 NOTE — PLAN OF CARE
Problem: Safety - Adult  Goal: Free from fall injury  Outcome: Progressing  Flowsheets (Taken 2/2/2025 0358)  Free From Fall Injury:   Instruct family/caregiver on patient safety   Based on caregiver fall risk screen, instruct family/caregiver to ask for assistance with transferring infant if caregiver noted to have fall risk factors     Problem: Discharge Planning  Goal: Discharge to home or other facility with appropriate resources  Flowsheets (Taken 2/2/2025 0358)  Discharge to home or other facility with appropriate resources: Identify barriers to discharge with patient and caregiver     Problem: Chronic Conditions and Co-morbidities  Goal: Patient's chronic conditions and co-morbidity symptoms are monitored and maintained or improved  Outcome: Progressing  Care Plan - Patient's Chronic Conditions and Co-Morbidity Symptoms are Monitored and Maintained or Improved:   Monitor and assess patient's chronic conditions and comorbid symptoms for stability, deterioration, or improvement   Collaborate with multidisciplinary team to address chronic and comorbid conditions and prevent exacerbation or deterioration     Problem: Pain  Goal: Verbalizes/displays adequate comfort level or baseline comfort level  Outcome: Progressing  Flowsheets (Taken 2/2/2025 0353)  Verbalizes/displays adequate comfort level or baseline comfort level:   Assess pain using appropriate pain scale   Encourage patient to monitor pain and request assistance   Administer analgesics based on type and severity of pain and evaluate response

## 2025-02-02 NOTE — PROGRESS NOTES
[facility] Hospitalist Group  Progress Note    Patient: Rahul Camarillo Age: 84 y.o. : 1940 MR#: [id number] SSN: [id number]  Date: 2025                 DVT Prophylaxis:  [x]Lovenox  []Hep SQ  []SCDs  []Coumadin   []On Heparin gtt []PO anticoagulant    Anticipated discharge: February 3, 2025 to home, pending clinical course    Subjective:     The patient was seen and examined at the bedside today.  He appeared tired but was in no acute distress.      Objective:   VS: BP (!) 128/50   Pulse 63   Temp 97.5 °F (36.4 °C) (Axillary)   Resp 18   Ht 1.829 m (6' 0.01\")   Wt 69.6 kg (153 lb 7 oz)   SpO2 100%   BMI 20.81 kg/m²    Tmax/24hrs: Temp (24hrs), Av.8 °F (36.6 °C), Min:97.5 °F (36.4 °C), Max:97.9 °F (36.6 °C)    Intake/Output Summary (Last 24 hours) at 2025  Last data filed at 2025 1600  Gross per 24 hour   Intake 1030 ml   Output 1300 ml   Net -270 ml        PHYSICAL EXAM  General Appearance: Appears chronically ill, looks poorly nourished  HENT: normocephalic/atraumatic, moist mucus membranes  Neck: No JVD, supple  Lungs: Inspiratory crackles auscultated bilaterally  CV: Rate and rhythm regular  Abdomen: soft, nontender; PEG tube present  : no suprapubic tenderness   Extremities: Moves extremities spontaneously; poor muscle mass and loss of subcutaneous fat  Neuro: Awake and oriented  Skin: Warm and dry  Psych: appropriate mood and affect    Current Facility-Administered Medications   Medication Dose Route Frequency    bumetanide (BUMEX) tablet 1 mg  1 mg Oral BID    Viokace tube flush  1 Dose PEG Tube Once    Virt-Caps 1 mg  1 capsule Oral Daily    insulin lispro (HUMALOG,ADMELOG) injection vial 3 Units  3 Units SubCUTAneous Q6H    heparin (porcine) injection 5,000 Units  5,000 Units SubCUTAneous 3 times per day    insulin glargine (LANTUS) injection vial 18 Units  18 Units SubCUTAneous Nightly    oxyCODONE (ROXICODONE) immediate release tablet 5 mg  5  oxygen saturation greater than 88%    6.  Parkinson disease  Continue patient on carbidopa/levodopa    7.  Hypothyroidism  Continue levothyroxine    8.  Type 2 diabetes mellitus  Glucose levels are not at goal.  The dose of Lantus has been increased.  Continue tube feeds    Shlomo Toribio MD   2/1/2025   [facility] Hospital[facility]

## 2025-02-02 NOTE — PROGRESS NOTES
POC glucose 153, pt scheduled for 20 units of Lantus, Dr. Esparza informed, with with hx of hypoglycemia, tube feeding on hold d/t clogged peg tube. Viokace ordered and administered. RN advised to hold med

## 2025-02-03 LAB
ANION GAP SERPL CALC-SCNC: 4 MMOL/L (ref 3–18)
BASOPHILS # BLD: 0.02 K/UL (ref 0–0.1)
BASOPHILS NFR BLD: 0.2 % (ref 0–2)
BUN SERPL-MCNC: 159 MG/DL (ref 7–18)
BUN/CREAT SERPL: 42 (ref 12–20)
CALCIUM SERPL-MCNC: 8.6 MG/DL (ref 8.5–10.1)
CHLORIDE SERPL-SCNC: 102 MMOL/L (ref 100–111)
CO2 SERPL-SCNC: 41 MMOL/L (ref 21–32)
CREAT SERPL-MCNC: 3.83 MG/DL (ref 0.6–1.3)
DIFFERENTIAL METHOD BLD: ABNORMAL
EOSINOPHIL # BLD: 0.1 K/UL (ref 0–0.4)
EOSINOPHIL NFR BLD: 1.1 % (ref 0–5)
ERYTHROCYTE [DISTWIDTH] IN BLOOD BY AUTOMATED COUNT: 14.6 % (ref 11.6–14.5)
GLUCOSE BLD STRIP.AUTO-MCNC: 169 MG/DL (ref 70–110)
GLUCOSE BLD STRIP.AUTO-MCNC: 226 MG/DL (ref 70–110)
GLUCOSE BLD STRIP.AUTO-MCNC: 248 MG/DL (ref 70–110)
GLUCOSE BLD STRIP.AUTO-MCNC: 265 MG/DL (ref 70–110)
GLUCOSE BLD STRIP.AUTO-MCNC: 277 MG/DL (ref 70–110)
GLUCOSE SERPL-MCNC: 195 MG/DL (ref 74–99)
HCT VFR BLD AUTO: 36 % (ref 36–48)
HGB BLD-MCNC: 10.6 G/DL (ref 13–16)
IMM GRANULOCYTES # BLD AUTO: 0.04 K/UL (ref 0–0.04)
IMM GRANULOCYTES NFR BLD AUTO: 0.5 % (ref 0–0.5)
LYMPHOCYTES # BLD: 0.39 K/UL (ref 0.9–3.6)
LYMPHOCYTES NFR BLD: 4.5 % (ref 21–52)
MCH RBC QN AUTO: 30.1 PG (ref 24–34)
MCHC RBC AUTO-ENTMCNC: 29.4 G/DL (ref 31–37)
MCV RBC AUTO: 102.3 FL (ref 78–100)
MONOCYTES # BLD: 1.14 K/UL (ref 0.05–1.2)
MONOCYTES NFR BLD: 13.1 % (ref 3–10)
NEUTS SEG # BLD: 7.01 K/UL (ref 1.8–8)
NEUTS SEG NFR BLD: 80.6 % (ref 40–73)
NRBC # BLD: 0 K/UL (ref 0–0.01)
NRBC BLD-RTO: 0 PER 100 WBC
PLATELET # BLD AUTO: 223 K/UL (ref 135–420)
PMV BLD AUTO: 13.2 FL (ref 9.2–11.8)
POTASSIUM SERPL-SCNC: 4 MMOL/L (ref 3.5–5.5)
RBC # BLD AUTO: 3.52 M/UL (ref 4.35–5.65)
SODIUM SERPL-SCNC: 147 MMOL/L (ref 136–145)
WBC # BLD AUTO: 8.7 K/UL (ref 4.6–13.2)

## 2025-02-03 PROCEDURE — 6370000000 HC RX 637 (ALT 250 FOR IP): Performed by: STUDENT IN AN ORGANIZED HEALTH CARE EDUCATION/TRAINING PROGRAM

## 2025-02-03 PROCEDURE — 2580000003 HC RX 258: Performed by: INTERNAL MEDICINE

## 2025-02-03 PROCEDURE — 36415 COLL VENOUS BLD VENIPUNCTURE: CPT

## 2025-02-03 PROCEDURE — 2580000003 HC RX 258: Performed by: STUDENT IN AN ORGANIZED HEALTH CARE EDUCATION/TRAINING PROGRAM

## 2025-02-03 PROCEDURE — 6360000002 HC RX W HCPCS: Performed by: STUDENT IN AN ORGANIZED HEALTH CARE EDUCATION/TRAINING PROGRAM

## 2025-02-03 PROCEDURE — 94640 AIRWAY INHALATION TREATMENT: CPT

## 2025-02-03 PROCEDURE — 2700000000 HC OXYGEN THERAPY PER DAY

## 2025-02-03 PROCEDURE — 6360000002 HC RX W HCPCS: Performed by: INTERNAL MEDICINE

## 2025-02-03 PROCEDURE — 97530 THERAPEUTIC ACTIVITIES: CPT

## 2025-02-03 PROCEDURE — 1100000003 HC PRIVATE W/ TELEMETRY

## 2025-02-03 PROCEDURE — 2500000003 HC RX 250 WO HCPCS: Performed by: PHYSICIAN ASSISTANT

## 2025-02-03 PROCEDURE — 80048 BASIC METABOLIC PNL TOTAL CA: CPT

## 2025-02-03 PROCEDURE — 92526 ORAL FUNCTION THERAPY: CPT

## 2025-02-03 PROCEDURE — 82962 GLUCOSE BLOOD TEST: CPT

## 2025-02-03 PROCEDURE — 2500000003 HC RX 250 WO HCPCS: Performed by: INTERNAL MEDICINE

## 2025-02-03 PROCEDURE — 6370000000 HC RX 637 (ALT 250 FOR IP): Performed by: PHYSICIAN ASSISTANT

## 2025-02-03 PROCEDURE — 97110 THERAPEUTIC EXERCISES: CPT

## 2025-02-03 PROCEDURE — 99232 SBSQ HOSP IP/OBS MODERATE 35: CPT | Performed by: STUDENT IN AN ORGANIZED HEALTH CARE EDUCATION/TRAINING PROGRAM

## 2025-02-03 PROCEDURE — 85025 COMPLETE CBC W/AUTO DIFF WBC: CPT

## 2025-02-03 PROCEDURE — 6370000000 HC RX 637 (ALT 250 FOR IP): Performed by: INTERNAL MEDICINE

## 2025-02-03 RX ORDER — ALBUTEROL SULFATE 0.83 MG/ML
2.5 SOLUTION RESPIRATORY (INHALATION) EVERY 4 HOURS PRN
Status: DISCONTINUED | OUTPATIENT
Start: 2025-02-03 | End: 2025-02-07

## 2025-02-03 RX ORDER — SODIUM CHLORIDE 450 MG/100ML
INJECTION, SOLUTION INTRAVENOUS CONTINUOUS
Status: DISPENSED | OUTPATIENT
Start: 2025-02-03 | End: 2025-02-04

## 2025-02-03 RX ORDER — ACETAZOLAMIDE 500 MG/5ML
500 INJECTION, POWDER, LYOPHILIZED, FOR SOLUTION INTRAVENOUS ONCE
Status: DISCONTINUED | OUTPATIENT
Start: 2025-02-03 | End: 2025-02-03

## 2025-02-03 RX ADMIN — INSULIN LISPRO 3 UNITS: 100 INJECTION, SOLUTION INTRAVENOUS; SUBCUTANEOUS at 06:55

## 2025-02-03 RX ADMIN — CARBIDOPA AND LEVODOPA 1 TABLET: 25; 100 TABLET ORAL at 09:26

## 2025-02-03 RX ADMIN — LEVOTHYROXINE SODIUM 75 MCG: 0.07 TABLET ORAL at 06:52

## 2025-02-03 RX ADMIN — INSULIN LISPRO 2 UNITS: 100 INJECTION, SOLUTION INTRAVENOUS; SUBCUTANEOUS at 00:28

## 2025-02-03 RX ADMIN — HEPARIN SODIUM 5000 UNITS: 5000 INJECTION INTRAVENOUS; SUBCUTANEOUS at 14:03

## 2025-02-03 RX ADMIN — INSULIN LISPRO 2 UNITS: 100 INJECTION, SOLUTION INTRAVENOUS; SUBCUTANEOUS at 06:54

## 2025-02-03 RX ADMIN — SODIUM CHLORIDE 3000 MG: 900 INJECTION INTRAVENOUS at 17:47

## 2025-02-03 RX ADMIN — CARBIDOPA AND LEVODOPA 1 TABLET: 25; 100 TABLET ORAL at 20:38

## 2025-02-03 RX ADMIN — INSULIN GLARGINE 20 UNITS: 100 INJECTION, SOLUTION SUBCUTANEOUS at 20:38

## 2025-02-03 RX ADMIN — ALBUTEROL SULFATE 2.5 MG: 2.5 SOLUTION RESPIRATORY (INHALATION) at 14:12

## 2025-02-03 RX ADMIN — SODIUM CHLORIDE, PRESERVATIVE FREE 10 ML: 5 INJECTION INTRAVENOUS at 20:54

## 2025-02-03 RX ADMIN — SODIUM CHLORIDE, PRESERVATIVE FREE 10 ML: 5 INJECTION INTRAVENOUS at 09:28

## 2025-02-03 RX ADMIN — INSULIN LISPRO 4 UNITS: 100 INJECTION, SOLUTION INTRAVENOUS; SUBCUTANEOUS at 17:47

## 2025-02-03 RX ADMIN — ACETAZOLAMIDE SODIUM 500 MG: 500 INJECTION, POWDER, LYOPHILIZED, FOR SOLUTION INTRAVENOUS at 18:24

## 2025-02-03 RX ADMIN — BUSPIRONE HYDROCHLORIDE 10 MG: 10 TABLET ORAL at 09:28

## 2025-02-03 RX ADMIN — BUSPIRONE HYDROCHLORIDE 10 MG: 10 TABLET ORAL at 20:38

## 2025-02-03 RX ADMIN — PRAVASTATIN SODIUM 20 MG: 20 TABLET ORAL at 20:38

## 2025-02-03 RX ADMIN — BUMETANIDE 1 MG: 1 TABLET ORAL at 09:26

## 2025-02-03 RX ADMIN — SODIUM CHLORIDE: 4.5 INJECTION, SOLUTION INTRAVENOUS at 15:35

## 2025-02-03 RX ADMIN — HEPARIN SODIUM 5000 UNITS: 5000 INJECTION INTRAVENOUS; SUBCUTANEOUS at 06:52

## 2025-02-03 RX ADMIN — Medication 1 MG: at 09:27

## 2025-02-03 RX ADMIN — INSULIN LISPRO 3 UNITS: 100 INJECTION, SOLUTION INTRAVENOUS; SUBCUTANEOUS at 00:28

## 2025-02-03 RX ADMIN — INSULIN LISPRO 3 UNITS: 100 INJECTION, SOLUTION INTRAVENOUS; SUBCUTANEOUS at 17:48

## 2025-02-03 RX ADMIN — INSULIN LISPRO 3 UNITS: 100 INJECTION, SOLUTION INTRAVENOUS; SUBCUTANEOUS at 12:44

## 2025-02-03 ASSESSMENT — PAIN SCALES - GENERAL
PAINLEVEL_OUTOF10: 0

## 2025-02-03 NOTE — PLAN OF CARE
Problem: Physical Therapy - Adult  Goal: By Discharge: Performs mobility at highest level of function for planned discharge setting.  See evaluation for individualized goals.  Description: Physical Therapy Goals:  Initiated 1/29/2025 to be met within 7-10 days.    1.  Patient will move from supine to sit and sit to supine , scoot up and down, and roll side to side in bed with modified independence.    2.  Patient will transfer from bed to chair and chair to bed with modified independence using the least restrictive device.  3.  Patient will perform sit to stand with modified independence.  4.  Patient will ambulate with modified independence for 50 feet with the least restrictive device.       PLOF: Pt lives in senior living apartment with Wife, elevator access.  Pt was [place] for mobility with use of rollator     Outcome: Progressing   PHYSICAL THERAPY TREATMENT    Patient: Rahul Camarillo (84 y.o. male)  Date: 2/3/2025  Diagnosis: Acute on chronic respiratory failure with hypoxia [J96.21] Acute on chronic respiratory failure with hypoxia      Precautions: Fall Risk, Contact Precautions, NPO    ASSESSMENT:  Pt is in bed and agreeable to PT treatment session.  Pt was very tired during session requiring frequent rest breaks however participated well.  Pt performed LE exercises as noted below.  Pt required min A for supine to sit transfer and demonstrated fair static sitting balance.  Pt stood with min/mod A and performed a stand-step transfer with mod A to the recliner.  Pt was left sitting up in the recliner with his wife present, needs in reach and nursing notified.    Recommend for next PT session:  Further progression of gait with existing device    Progression toward goals:   []      Improving appropriately and progressing toward goals  [x]      Improving slowly and progressing toward goals  []      Not making progress toward goals and plan of care will be adjusted     PLAN:  Patient continues to benefit from

## 2025-02-03 NOTE — PLAN OF CARE
Problem: SLP Adult - Impaired Swallowing  Goal: By Discharge: Advance to least restrictive diet without signs or symptoms of aspiration for planned discharge setting.  See evaluation for individualized goals.  Description: Patient will:  1. Tolerate PO trials with 0 s/s overt distress in 4/5 trials  2. Utilize compensatory swallow strategies/maneuvers (decrease bite/sip, size/rate, alt. liq/sol) with min cues in 4/5 trials  3. Perform oral-motor/laryngeal exercises to increase oropharyngeal swallow function with min cues  4. Complete an objective swallow study (i.e., MBSS) to assess swallow integrity, r/o aspiration, and determine of safest LRD, min A as indicated/ordered by MD - met 1/29/25    Recommend:   NPO with PEG  Ice chips okay following oral care for swallow stim/comfort  Strict aspiration precautions (HOB >30 degrees at all times, Oral care TID)  MBS to further assess oropharyngeal swallow fxn - met 1/29/25    Outcome: Progressing  SPEECH LANGUAGE PATHOLOGY DYSPHAGIA TREATMENT    Patient: Rahul Camarillo (84 y.o. male)  Date: 2/3/2025  Diagnosis: Acute on chronic respiratory failure with hypoxia [J96.21] Acute on chronic respiratory failure with hypoxia      Precautions: Standard, aspiration  PLOF: As per H&P      ASSESSMENT:  Pt seen for follow up dysphagia management. Pt sitting upright in chair with severely congested/wet vocal quality/throat clearing likely due to Pt inability to manage his own secretions. Throat clearing worsened with ice chi trials x1. Majority of session spent discussing dysphagia prognosis with Pt's wife, severity of pharyngeal dysphagia likely not improving due to progressive nature of Parkinson's Disease, risks of aspiration with PO, importance of oral care, s/s of aspiration, and PEG a primary means of nutrition/hydration vs comfort feeds- Pt's wife verbalized comprehension, however is struggling with making decisions regarding care. Answered all questions to be best of this  safety; compensatory techniques provided via demonstration, verbalization and teach back of comprehension  []         Patient/family have participated as able in goal setting and plan of care.  [x]          Patient/family agree to work toward stated goals and plan of care.  []          Patient understands intent and goals of therapy, neutral about participation.  []          Patient unable to participate in goal setting/plan of care secondary to cognition, hearing/vision deficits; education ongoing with interdisciplinary staff   []          Handout regarding diet recommendations and thickener instructions provided.  [x]         Posted safety precautions in patient's room.    Thank you for this referral.    Carlyn Blue M.S. CCC-SLP  Speech Language Pathologist

## 2025-02-03 NOTE — PROGRESS NOTES
Bedside and Verbal shift change report given to MARGARITO Luna (oncoming nurse) by MARGARITO Barajas (offgoing nurse). Report included the following information Nurse Handoff Report and Adult Overview.

## 2025-02-03 NOTE — PROGRESS NOTES
In Patient Progress note      Admit Date: 1/28/2025      Impression:     1. Acute Kidney Injury on CKD 4: prerenal in setting of CHF/cardiorenal syndrome , renal USG negative for obst  2. Chronic kidney disease 4 : d/t HTN nephrosclerosis /cardiorenal syndrome   3. Ac hypoxic on chronic  respiratory failure : CHF / PNA , sees pulmonary and had recent CT , no Significant finding   other than chronic rt lower lobe scarring   4. Hypertension: Borderline low blood pressures   5. HFrEF 20-25% EF  , mod Mitral regurgitation   6  Anemia  7  sec hyperpara   8.  Parkinson's disease:   9.  Hypothyroidism: On levothyroxine  9.  History of atrial fibrillation on amiodarone  10  lethargy   11 metabolic alkalosis      Plan:  1) strict I/o charting , check frequent bladder scans  2) hold bumex and metolazone , give dose of diamox   3) d5w @ 50 cc/hrs X 12 hrs   4) monitor electrolytes and renal functions daily  5) no NSAIDs, nephrotoxins   6) renally dose med for egfr < 30     Very guarded prognosis   Discussed at length with patientsn wife   Please call with questions,     Maurisio Morrell MD FASN  Cell [phone]  Pager: [phone]  Fax   [phone]        Subjective:     - confused this AM   - respiratory - stable  - hemodynamics - stable, no pressrs  - UOP-ok  - Nutrition -ok    Objective:     BP (!) 131/50   Pulse 60   Temp 97.2 °F (36.2 °C) (Axillary)   Resp 16   Ht 1.829 m (6' 0.01\")   Wt 63.8 kg (140 lb 10.5 oz)   SpO2 99%   BMI 19.07 kg/m²       Intake/Output Summary (Last 24 hours) at 2/3/2025 1522  Last data filed at 2/3/2025 0928  Gross per 24 hour   Intake 945 ml   Output 650 ml   Net 295 ml       Physical Exam:     Gen NAD  HENT mmm  RS AEBE   CVS s1s2 wnl no JVD  Ext edema +  Skin no rashes  Neuro oriented X 3    Data Review:    Recent Labs     02/03/25  0421   WBC 8.7   RBC 3.52*   HCT 36.0   .3*   MCH 30.1   MCHC 29.4*   RDW 14.6*   MPV 13.2*     Recent Labs     02/03/25  0421   *   K 4.0

## 2025-02-03 NOTE — PROGRESS NOTES
4 Eyes Skin Assessment     NAME:  Rahul Camarillo  YOB: 1940  MEDICAL RECORD NUMBER:  [id number]    The patient is being assessed for  Shift Handoff    I agree that at least one RN has performed a thorough Head to Toe Skin Assessment on the patient. ALL assessment sites listed below have been assessed.      Areas assessed by both nurses:    Head, Face, Ears, Shoulders, Back, Chest, Arms, Elbows, Hands, Sacrum. Buttock, Coccyx, Ischium, and Legs. Feet and Heels        Does the Patient have a Wound? Yes wound(s) were present on assessment. LDA wound assessment was Initiated and completed by RN       Larry Prevention initiated by RN: Yes  Wound Care Orders initiated by RN: No  Note: Already established.  Pressure Injury (Stage 3,4, Unstageable, DTI, NWPT, and Complex wounds) if present, place Wound referral order by RN under : No    New Ostomies, if present place, Ostomy referral order under : No     Nurse 1 eSignature: Electronically signed by Cheryl Raymundo RN on 2/3/25 at 8:38 AM EST    **SHARE this note so that the co-signing nurse can place an eSignature**    Nurse 2 eSignature: {Esignature:[id number]}

## 2025-02-03 NOTE — PLAN OF CARE
Problem: Chronic Conditions and Co-morbidities  Goal: Patient's chronic conditions and co-morbidity symptoms are monitored and maintained or improved  Outcome: Progressing     Problem: Discharge Planning  Goal: Discharge to home or other facility with appropriate resources  Outcome: Progressing     Problem: Safety - Adult  Goal: Free from fall injury  Outcome: Progressing  Flowsheets (Taken 2/3/2025 1331)  Free From Fall Injury:   Instruct family/caregiver on patient safety   Based on caregiver fall risk screen, instruct family/caregiver to ask for assistance with transferring infant if caregiver noted to have fall risk factors  Note: Encouraged pt and significant other to use the call bell for assistance. Call bell within pt's reach     Problem: Pain  Goal: Verbalizes/displays adequate comfort level or baseline comfort level  Outcome: Progressing  Flowsheets (Taken 2/3/2025 1331)  Verbalizes/displays adequate comfort level or baseline comfort level:   Encourage patient to monitor pain and request assistance   Assess pain using appropriate pain scale   Implement non-pharmacological measures as appropriate and evaluate response   Consider cultural and social influences on pain and pain management  Note: Encouraged pt to verbalize pain. Pain assessed using the pain scale     Problem: Nutrition Deficit:  Goal: Optimize nutritional status  Outcome: Progressing  Flowsheets (Taken 2/3/2025 1331)  Nutrient intake appropriate for improving, restoring, or maintaining nutritional needs:   Assess nutritional status and recommend course of action   Monitor oral intake, labs, and treatment plans   Order, calculate, and assess calorie counts as needed   Recommend, monitor, and adjust tube feedings and TPN/PPN based on assessed needs  Note: Administered tube feeds as ordered by the MD.     Problem: Skin/Tissue Integrity  Goal: Skin integrity remains intact  Description: 1.  Monitor for areas of redness and/or skin breakdown  2.

## 2025-02-03 NOTE — NURSE NAVIGATOR
Met with patient wife provided education on living with heart failure, reinforcing low sodium diet, fluid restriction, heart failure zones, will continue to follow. Pt is very sleepy.    Vicenta Crawley RN  2/3/2025

## 2025-02-03 NOTE — PROGRESS NOTES
4 Eyes Skin Assessment     NAME:  Rahul Camarillo  YOB: 1940  MEDICAL RECORD NUMBER:  [id number]    The patient is being assessed for  Shift Handoff    I agree that at least one RN has performed a thorough Head to Toe Skin Assessment on the patient. ALL assessment sites listed below have been assessed.      Areas assessed by both nurses:    Head, Face, Ears, Shoulders, Back, Chest, Arms, Elbows, Hands, Sacrum. Buttock, Coccyx, Ischium, Legs. Feet and Heels, and Under Medical Devices         Does the Patient have a Wound? Yes wound(s) were present on assessment. LDA wound assessment was Initiated and completed by RN       Larry Prevention initiated by RN: Yes  Wound Care Orders initiated by RN: No    Pressure Injury (Stage 3,4, Unstageable, DTI, NWPT, and Complex wounds) if present, place Wound referral order by RN under : No    New Ostomies, if present place, Ostomy referral order under : No     Nurse 1 eSignature: Electronically signed by MELANIE KAUFFMAN RN on 2/2/25 at 7:35 PM EST    **SHARE this note so that the co-signing nurse can place an eSignature**    Nurse 2 eSignature: Electronically signed by Cheryl Raymundo RN on 2/3/25 at 8:37 AM EST

## 2025-02-04 LAB
ANION GAP SERPL CALC-SCNC: 4 MMOL/L (ref 3–18)
BASOPHILS # BLD: 0.01 K/UL (ref 0–0.1)
BASOPHILS NFR BLD: 0.1 % (ref 0–2)
BUN SERPL-MCNC: 156 MG/DL (ref 7–18)
BUN/CREAT SERPL: 46 (ref 12–20)
CALCIUM SERPL-MCNC: 8.8 MG/DL (ref 8.5–10.1)
CHLORIDE SERPL-SCNC: 102 MMOL/L (ref 100–111)
CO2 SERPL-SCNC: 40 MMOL/L (ref 21–32)
CREAT SERPL-MCNC: 3.36 MG/DL (ref 0.6–1.3)
DIFFERENTIAL METHOD BLD: ABNORMAL
EOSINOPHIL # BLD: 0.15 K/UL (ref 0–0.4)
EOSINOPHIL NFR BLD: 1.8 % (ref 0–5)
ERYTHROCYTE [DISTWIDTH] IN BLOOD BY AUTOMATED COUNT: 14.6 % (ref 11.6–14.5)
GLUCOSE BLD STRIP.AUTO-MCNC: 133 MG/DL (ref 70–110)
GLUCOSE BLD STRIP.AUTO-MCNC: 180 MG/DL (ref 70–110)
GLUCOSE BLD STRIP.AUTO-MCNC: 187 MG/DL (ref 70–110)
GLUCOSE BLD STRIP.AUTO-MCNC: 222 MG/DL (ref 70–110)
GLUCOSE BLD STRIP.AUTO-MCNC: 253 MG/DL (ref 70–110)
GLUCOSE BLD STRIP.AUTO-MCNC: 283 MG/DL (ref 70–110)
GLUCOSE SERPL-MCNC: 234 MG/DL (ref 74–99)
HCT VFR BLD AUTO: 34.9 % (ref 36–48)
HGB BLD-MCNC: 10.1 G/DL (ref 13–16)
IMM GRANULOCYTES # BLD AUTO: 0.04 K/UL (ref 0–0.04)
IMM GRANULOCYTES NFR BLD AUTO: 0.5 % (ref 0–0.5)
LYMPHOCYTES # BLD: 0.42 K/UL (ref 0.9–3.6)
LYMPHOCYTES NFR BLD: 5 % (ref 21–52)
MAGNESIUM SERPL-MCNC: 3.6 MG/DL (ref 1.6–2.6)
MCH RBC QN AUTO: 30.1 PG (ref 24–34)
MCHC RBC AUTO-ENTMCNC: 28.9 G/DL (ref 31–37)
MCV RBC AUTO: 104.2 FL (ref 78–100)
MONOCYTES # BLD: 0.98 K/UL (ref 0.05–1.2)
MONOCYTES NFR BLD: 11.7 % (ref 3–10)
NEUTS SEG # BLD: 6.8 K/UL (ref 1.8–8)
NEUTS SEG NFR BLD: 80.9 % (ref 40–73)
NRBC # BLD: 0 K/UL (ref 0–0.01)
NRBC BLD-RTO: 0 PER 100 WBC
PLATELET # BLD AUTO: 196 K/UL (ref 135–420)
PLATELET COMMENT: ABNORMAL
PMV BLD AUTO: 12.9 FL (ref 9.2–11.8)
POTASSIUM SERPL-SCNC: 3.4 MMOL/L (ref 3.5–5.5)
RBC # BLD AUTO: 3.35 M/UL (ref 4.35–5.65)
RBC MORPH BLD: ABNORMAL
RBC MORPH BLD: ABNORMAL
SODIUM SERPL-SCNC: 146 MMOL/L (ref 136–145)
WBC # BLD AUTO: 8.4 K/UL (ref 4.6–13.2)

## 2025-02-04 PROCEDURE — 85025 COMPLETE CBC W/AUTO DIFF WBC: CPT

## 2025-02-04 PROCEDURE — 2580000003 HC RX 258: Performed by: STUDENT IN AN ORGANIZED HEALTH CARE EDUCATION/TRAINING PROGRAM

## 2025-02-04 PROCEDURE — 80048 BASIC METABOLIC PNL TOTAL CA: CPT

## 2025-02-04 PROCEDURE — 6360000002 HC RX W HCPCS: Performed by: INTERNAL MEDICINE

## 2025-02-04 PROCEDURE — 6360000002 HC RX W HCPCS: Performed by: STUDENT IN AN ORGANIZED HEALTH CARE EDUCATION/TRAINING PROGRAM

## 2025-02-04 PROCEDURE — 6370000000 HC RX 637 (ALT 250 FOR IP): Performed by: STUDENT IN AN ORGANIZED HEALTH CARE EDUCATION/TRAINING PROGRAM

## 2025-02-04 PROCEDURE — 36415 COLL VENOUS BLD VENIPUNCTURE: CPT

## 2025-02-04 PROCEDURE — 6370000000 HC RX 637 (ALT 250 FOR IP): Performed by: PHYSICIAN ASSISTANT

## 2025-02-04 PROCEDURE — 99231 SBSQ HOSP IP/OBS SF/LOW 25: CPT | Performed by: STUDENT IN AN ORGANIZED HEALTH CARE EDUCATION/TRAINING PROGRAM

## 2025-02-04 PROCEDURE — 94761 N-INVAS EAR/PLS OXIMETRY MLT: CPT

## 2025-02-04 PROCEDURE — 2500000003 HC RX 250 WO HCPCS: Performed by: INTERNAL MEDICINE

## 2025-02-04 PROCEDURE — 97530 THERAPEUTIC ACTIVITIES: CPT

## 2025-02-04 PROCEDURE — 2700000000 HC OXYGEN THERAPY PER DAY

## 2025-02-04 PROCEDURE — 1100000003 HC PRIVATE W/ TELEMETRY

## 2025-02-04 PROCEDURE — 2500000003 HC RX 250 WO HCPCS: Performed by: PHYSICIAN ASSISTANT

## 2025-02-04 PROCEDURE — 97535 SELF CARE MNGMENT TRAINING: CPT

## 2025-02-04 PROCEDURE — 82962 GLUCOSE BLOOD TEST: CPT

## 2025-02-04 PROCEDURE — 83735 ASSAY OF MAGNESIUM: CPT

## 2025-02-04 PROCEDURE — 2580000003 HC RX 258: Performed by: INTERNAL MEDICINE

## 2025-02-04 RX ORDER — SODIUM CHLORIDE 450 MG/100ML
INJECTION, SOLUTION INTRAVENOUS CONTINUOUS
Status: DISPENSED | OUTPATIENT
Start: 2025-02-04 | End: 2025-02-04

## 2025-02-04 RX ADMIN — INSULIN LISPRO 3 UNITS: 100 INJECTION, SOLUTION INTRAVENOUS; SUBCUTANEOUS at 18:22

## 2025-02-04 RX ADMIN — Medication 1 MG: at 08:07

## 2025-02-04 RX ADMIN — ACETAZOLAMIDE SODIUM 500 MG: 500 INJECTION, POWDER, LYOPHILIZED, FOR SOLUTION INTRAVENOUS at 11:33

## 2025-02-04 RX ADMIN — INSULIN GLARGINE 20 UNITS: 100 INJECTION, SOLUTION SUBCUTANEOUS at 20:53

## 2025-02-04 RX ADMIN — INSULIN LISPRO 2 UNITS: 100 INJECTION, SOLUTION INTRAVENOUS; SUBCUTANEOUS at 18:23

## 2025-02-04 RX ADMIN — HEPARIN SODIUM 5000 UNITS: 5000 INJECTION INTRAVENOUS; SUBCUTANEOUS at 06:11

## 2025-02-04 RX ADMIN — INSULIN LISPRO 4 UNITS: 100 INJECTION, SOLUTION INTRAVENOUS; SUBCUTANEOUS at 01:15

## 2025-02-04 RX ADMIN — LEVOTHYROXINE SODIUM 75 MCG: 0.07 TABLET ORAL at 06:11

## 2025-02-04 RX ADMIN — HEPARIN SODIUM 5000 UNITS: 5000 INJECTION INTRAVENOUS; SUBCUTANEOUS at 14:09

## 2025-02-04 RX ADMIN — INSULIN LISPRO 3 UNITS: 100 INJECTION, SOLUTION INTRAVENOUS; SUBCUTANEOUS at 01:17

## 2025-02-04 RX ADMIN — SODIUM CHLORIDE, PRESERVATIVE FREE 10 ML: 5 INJECTION INTRAVENOUS at 20:52

## 2025-02-04 RX ADMIN — CARBIDOPA AND LEVODOPA 1 TABLET: 25; 100 TABLET ORAL at 08:30

## 2025-02-04 RX ADMIN — SODIUM CHLORIDE 3000 MG: 900 INJECTION INTRAVENOUS at 17:30

## 2025-02-04 RX ADMIN — INSULIN LISPRO 3 UNITS: 100 INJECTION, SOLUTION INTRAVENOUS; SUBCUTANEOUS at 11:38

## 2025-02-04 RX ADMIN — HEPARIN SODIUM 5000 UNITS: 5000 INJECTION INTRAVENOUS; SUBCUTANEOUS at 21:19

## 2025-02-04 RX ADMIN — BUSPIRONE HYDROCHLORIDE 10 MG: 10 TABLET ORAL at 08:07

## 2025-02-04 RX ADMIN — INSULIN LISPRO 4 UNITS: 100 INJECTION, SOLUTION INTRAVENOUS; SUBCUTANEOUS at 06:11

## 2025-02-04 RX ADMIN — CARBIDOPA AND LEVODOPA 1 TABLET: 25; 100 TABLET ORAL at 20:54

## 2025-02-04 RX ADMIN — PRAVASTATIN SODIUM 20 MG: 20 TABLET ORAL at 20:54

## 2025-02-04 RX ADMIN — SODIUM CHLORIDE: 4.5 INJECTION, SOLUTION INTRAVENOUS at 11:41

## 2025-02-04 RX ADMIN — HEPARIN SODIUM 5000 UNITS: 5000 INJECTION INTRAVENOUS; SUBCUTANEOUS at 01:04

## 2025-02-04 RX ADMIN — SODIUM CHLORIDE, PRESERVATIVE FREE 10 ML: 5 INJECTION INTRAVENOUS at 08:07

## 2025-02-04 RX ADMIN — BUSPIRONE HYDROCHLORIDE 10 MG: 10 TABLET ORAL at 20:53

## 2025-02-04 RX ADMIN — INSULIN LISPRO 3 UNITS: 100 INJECTION, SOLUTION INTRAVENOUS; SUBCUTANEOUS at 06:17

## 2025-02-04 ASSESSMENT — PAIN SCALES - GENERAL
PAINLEVEL_OUTOF10: 0

## 2025-02-04 NOTE — PLAN OF CARE
Problem: Safety - Adult  Goal: Free from fall injury  Outcome: Progressing  Flowsheets (Taken 2/4/2025 0000)  Free From Fall Injury:   Instruct family/caregiver on patient safety   Based on caregiver fall risk screen, instruct family/caregiver to ask for assistance with transferring infant if caregiver noted to have fall risk factors  Note: Pt./Family Educated on call bell when in need of help and assistance.  Pt/Family. Verbalized understanding. Bed alarm on.      Problem: Pain  Goal: Verbalizes/displays adequate comfort level or baseline comfort level  Outcome: Progressing  Flowsheets (Taken 2/4/2025 0831)  Verbalizes/displays adequate comfort level or baseline comfort level:   Administer analgesics based on type and severity of pain and evaluate response   Encourage patient to monitor pain and request assistance   Implement non-pharmacological measures as appropriate and evaluate response  Note: Pt/family. Educated on pain medication availability.  Pt./family  Verbalized understanding.  Denies pain.      Problem: Skin/Tissue Integrity  Goal: Skin integrity remains intact  Description: 1.  Monitor for areas of redness and/or skin breakdown  2.  Assess vascular access sites hourly  3.  Every 4-6 hours minimum:  Change oxygen saturation probe site  4.  Every 4-6 hours:  If on nasal continuous positive airway pressure, respiratory therapy assess nares and determine need for appliance change or resting period  Outcome: Progressing  Flowsheets (Taken 2/4/2025 0831)  Skin Integrity Remains Intact:   Every 4-6 hours minimum: Change oxygen saturation probe site   Assess vascular access sites hourly   Monitor for areas of redness and/or skin breakdown  Note: Patient q2 turn. Mepilex at the sacrum to protect pressure injury

## 2025-02-04 NOTE — PROGRESS NOTES
[facility]ist [facility]  Progress Note    Patient: Rahul Camarillo Age: 84 y.o. : 1940 MR#: [id number] SSN: [id number]  Date: 2/3/2025                 DVT Prophylaxis:  [x]Lovenox  []Hep SQ  []SCDs  []Coumadin   []On Heparin gtt []PO anticoagulant    Anticipated discharge: February 3, 2025 to home, pending clinical course    Subjective:     The patient was seen and examined at the bedside today.  Appeared to be in a primarily similar state from how he was yesterday and the day before.    Objective:   VS: BP (!) 131/50   Pulse 63   Temp 97.3 °F (36.3 °C) (Axillary)   Resp 22   Ht 1.829 m (6' 0.01\")   Wt 63.8 kg (140 lb 10.5 oz)   SpO2 100%   BMI 19.07 kg/m²    Tmax/24hrs: Temp (24hrs), Av.5 °F (36.4 °C), Min:97.2 °F (36.2 °C), Max:98.1 °F (36.7 °C)    Intake/Output Summary (Last 24 hours) at 2/3/2025 1954  Last data filed at 2/3/2025 1900  Gross per 24 hour   Intake 2236.56 ml   Output 1950 ml   Net 286.56 ml        PHYSICAL EXAM  General Appearance: Appears chronically ill, looks poorly nourished  HENT: normocephalic/atraumatic, moist mucus membranes  Neck: No JVD, supple  Lungs: Inspiratory crackles auscultated bilaterally, difficulty clearing airway  CV: Rate and rhythm regular  Abdomen: soft, nontender; PEG tube present  : no suprapubic tenderness   Extremities: Moves extremities spontaneously; poor muscle mass and loss of subcutaneous fat  Neuro: Awake and oriented  Skin: Warm and dry  Psych: appropriate mood and affect    Current Facility-Administered Medications   Medication Dose Route Frequency    albuterol (PROVENTIL) (2.5 MG/3ML) 0.083% nebulizer solution 2.5 mg  2.5 mg Nebulization Q4H PRN    0.45 % sodium chloride infusion   IntraVENous Continuous    insulin glargine (LANTUS) injection vial 20 Units  20 Units SubCUTAneous Nightly    Virt-Caps 1 mg  1 capsule Oral Daily    insulin lispro (HUMALOG,ADMELOG) injection vial 3 Units  3 Units SubCUTAneous Q6H

## 2025-02-04 NOTE — PLAN OF CARE
Problem: Occupational Therapy - Adult  Goal: By Discharge: Performs self-care activities at highest level of function for planned discharge setting.  See evaluation for individualized goals.  Description: Occupational Therapy Goals:  Initiated 1/29/2025 to be met within 7-10 days.    1.  Patient will perform bed mobility in preparation for ADLs with modified independence.   2.  Patient will perform grooming with supervision/set-up standing > 3 minutes, F+ balance.  3.  Patient will perform lower body dressing  with supervision/set-up.  4.  Patient will perform toilet transfers with supervision/set-up  5.  Patient will perform all aspects of toileting with supervision/set-up.  6.  Patient will participate in upper extremity therapeutic exercise/activities with supervision/set-up for 8-10 minutes to increase strength/endurance for ADLs.    7.  Patient will utilize energy conservation techniques during functional activities with verbal cues.        PLOF: Prior to SNF stay, pt was independent with self-care and functional mobility. Pt from SNF and currently has been using wheelchair for functional mobility in preparation for ADLs and getting assist for selfcare tasks.   Outcome: Progressing   OCCUPATIONAL THERAPY TREATMENT    Patient: Rahul Camarillo (84 y.o. male)  Date: 2/4/2025  Diagnosis: Acute on chronic respiratory failure with hypoxia [J96.21] Acute on chronic respiratory failure with hypoxia      Precautions: Fall Risk, Contact Precautions, NPO,  ,  ,  ,  ,  ,  ,      Chart, occupational therapy assessment, plan of care, and goals were reviewed.  ASSESSMENT:  Pt presents in bed with wife present in room. Pt is with decreased function this date requiring increased A for all bed mobility and pt requires constant support for EOB sitting. Pt's feeding tube was found disconnected soiling pt's skin and chux under pt. Pt requires max A for cleaning skin and changing hospital gown. Pt shows sign that he would like to  assistance  Scooting: Moderate assistance    Balance:  Balance  Sitting: Impaired  Sitting - Static: Poor (constant support) (poor +)    ADL Intervention:  Feeding: NPO  Grooming: Maximum assistance (to wash hands)    UE Dressing: Maximum assistance (to don/ doff hospital gown at bed level)    Pain:  Intensity Pre-treatment: 0/10   Intensity Post-treatment: 0/10  Scale: Numeric Rating Scale    Activity Tolerance:     Pt is limited to fatigue ad drowsiness  Please refer to the flowsheet for vital signs taken during this treatment.  After treatment:   []  Patient left in no apparent distress sitting up in chair  [x]  Patient left in no apparent distress in bed  [x]  Call bell left within reach  []  Nursing notified  [x]  Caregiver present  [x]  Bed alarm activated    COMMUNICATION/EDUCATION:   Patient Education  Education Given To: Patient  Education Provided: Role of Therapy;Plan of Care;Fall Prevention Strategies;Energy Conservation  Education Method: Verbal;Teach Back  Barriers to Learning: Hearing;Cognition  Education Outcome: Continued education needed      Thank you for this referral.  WILLIAM Tipton  Minutes: 31

## 2025-02-04 NOTE — PROGRESS NOTES
Patient's spouse requested RN to page MD. This RN paged the dr and also relay this message to MD face to face about patient being transferring to [facility].

## 2025-02-04 NOTE — PROGRESS NOTES
In Patient Progress note      Admit Date: 1/28/2025      Impression:     1. Acute Kidney Injury on CKD 4: prerenal in setting of CHF/cardiorenal syndrome , renal USG negative for obst  2. Chronic kidney disease 4 : d/t HTN nephrosclerosis /cardiorenal syndrome   3. Ac hypoxic on chronic  respiratory failure : CHF / PNA , sees pulmonary and had recent CT , no Significant finding   other than chronic rt lower lobe scarring   4. Hypertension: Borderline low blood pressures   5. HFrEF 20-25% EF  , mod Mitral regurgitation   6  Anemia  7  sec hyperpara   8.  Parkinson's disease:   9.  Hypothyroidism: On levothyroxine  9.  History of atrial fibrillation on amiodarone  10  lethargy   11 metabolic alkalosis      Plan:  1) strict I/o charting , check frequent bladder scans  2) continue to hold bumex and metolazone , give dose of diamox for alkalosis   3) continue 1/2 NS  @ 50 cc/hrs X 12 hrs   4) monitor electrolytes and renal functions daily  5) no NSAIDs, nephrotoxins   6) renally dose med for egfr < 30     Very guarded prognosis   Discussed at length with patientsn wife   Please call with questions,     Maurisio Morrell MD FASN  Cell [phone]  Pager: [phone]  Fax   [phone]        Subjective:     - confused this AM   - respiratory - stable  - hemodynamics - stable, no pressrs  - UOP-ok  - Nutrition -ok    Objective:     BP (!) 131/54   Pulse 90   Temp 98.3 °F (36.8 °C) (Oral)   Resp 19   Ht 1.829 m (6' 0.01\")   Wt 63.8 kg (140 lb 10.5 oz)   SpO2 94%   BMI 19.07 kg/m²       Intake/Output Summary (Last 24 hours) at 2/4/2025 1244  Last data filed at 2/4/2025 0807  Gross per 24 hour   Intake 1321.56 ml   Output 1300 ml   Net 21.56 ml       Physical Exam:     Gen NAD  HENT mmm  RS AEBE   CVS s1s2 wnl no JVD  Ext edema +  Skin no rashes  Neuro oriented X 3    Data Review:    Recent Labs     02/04/25  0554   WBC 8.4   RBC 3.35*   HCT 34.9*   .2*   MCH 30.1   MCHC 28.9*   RDW 14.6*   MPV 12.9*     Recent

## 2025-02-04 NOTE — PROGRESS NOTES
[facility] Hospitalist Group  Progress Note    Patient: Rahul Camarillo Age: 84 y.o. : 1940 MR#: [id number] SSN: [id number]  Date: 2025                 DVT Prophylaxis:  [x]Lovenox  []Hep SQ  []SCDs  []Coumadin   []On Heparin gtt []PO anticoagulant    Anticipated discharge: February 3, 2025 to home, pending clinical course    Subjective:     The patient was seen and examined at the bedside today.  Appeared to be in a primarily similar state from how he was yesterday and the day before.    Objective:   VS: /69   Pulse 54   Temp 97.5 °F (36.4 °C) (Oral)   Resp 19   Ht 1.829 m (6' 0.01\")   Wt 63.8 kg (140 lb 10.5 oz)   SpO2 100%   BMI 19.07 kg/m²    Tmax/24hrs: Temp (24hrs), Av.9 °F (36.6 °C), Min:97.2 °F (36.2 °C), Max:98.4 °F (36.9 °C)    Intake/Output Summary (Last 24 hours) at 2025 1835  Last data filed at 2025 0807  Gross per 24 hour   Intake 1321.56 ml   Output --   Net 1321.56 ml        PHYSICAL EXAM  General Appearance: Appears chronically ill, looks poorly nourished  HENT: normocephalic/atraumatic, moist mucus membranes  Neck: No JVD, supple  Lungs: Inspiratory crackles auscultated bilaterally, difficulty clearing airway  CV: Rate and rhythm regular  Abdomen: soft, nontender; PEG tube present  : no suprapubic tenderness   Extremities: Moves extremities spontaneously; poor muscle mass and loss of subcutaneous fat  Neuro: Awake and oriented  Skin: Warm and dry  Psych: appropriate mood and affect    Current Facility-Administered Medications   Medication Dose Route Frequency    0.45 % sodium chloride infusion   IntraVENous Continuous    albuterol (PROVENTIL) (2.5 MG/3ML) 0.083% nebulizer solution 2.5 mg  2.5 mg Nebulization Q4H PRN    insulin glargine (LANTUS) injection vial 20 Units  20 Units SubCUTAneous Nightly    Virt-Caps 1 mg  1 capsule Oral Daily    insulin lispro (HUMALOG,ADMELOG) injection vial 3 Units  3 Units SubCUTAneous Q6H    heparin  within normal limits. Bilateral pleural effusions are noted. Significant bilateral pulmonary infiltrates, progressive compared to the prior exam. The visualized bones and upper abdomen are age-appropriate.     Bilateral pleural effusions and significant bilateral pulmonary infiltrates. Electronically signed by MICHAEL QUIÑONEZ      Assessment/Plan:     Heart failure with reduced ejection fraction  2D echo from January 7, 2025 reported an EF of 25-30 % with diastolic dysfunction.  The patient has acute on chronic, combined systolic and diastolic CHF.  Monitor intake and output  Measure weight on a daily basis  Currently remains full code full care.  Guarded prognosis  Hold diuretics today per nephrology      2.  Acute on chronic kidney disease stage IV  Acute kidney injury is likely prerenal secondary to cardiorenal syndrome.  The patient also has underlying stage IV chronic kidney disease.  Continue management of underlying etiology  Monitor intake and output  Minimize use of nephrotoxins including NSAIDs and contrast.  Nephrology input appreciated-discussed with Dr. Morrell    3.  Aspiration pneumonia  Versus pneumonitis; continue Unasyn  Maintain aspiration precautions      4.  Acute UTI  Continue patient on Unasyn and complete a 5 to 7-day course of antibiotics.  Of note, the patient has a history of ampicillin sensitive E faecalis UTI.    Urine cultures are polymicrobial and suggestive of contamination    5.  Acute on chronic respiratory failure with hypoxia  Likely secondary to CHF exacerbation, and possible aspiration pneumonia/pneumonitis  Continue management of underlying etiology  Continue supplemental oxygen and titrate FiO2 to keep oxygen saturation greater than 88%    6.  Parkinson disease  Continue patient on carbidopa/levodopa    7.  Hypothyroidism  Continue levothyroxine    8.  Type 2 diabetes mellitus  Glucose levels are not at goal.  The dose of Lantus has been increased.  Continue tube feeds    Reached

## 2025-02-05 LAB
ANION GAP SERPL CALC-SCNC: 2 MMOL/L (ref 3–18)
BASOPHILS # BLD: 0.02 K/UL (ref 0–0.1)
BASOPHILS NFR BLD: 0.3 % (ref 0–2)
BUN SERPL-MCNC: 136 MG/DL (ref 7–18)
BUN/CREAT SERPL: 46 (ref 12–20)
CALCIUM SERPL-MCNC: 8.6 MG/DL (ref 8.5–10.1)
CHLORIDE SERPL-SCNC: 104 MMOL/L (ref 100–111)
CO2 SERPL-SCNC: 41 MMOL/L (ref 21–32)
CREAT SERPL-MCNC: 2.96 MG/DL (ref 0.6–1.3)
DIFFERENTIAL METHOD BLD: ABNORMAL
EOSINOPHIL # BLD: 0.1 K/UL (ref 0–0.4)
EOSINOPHIL NFR BLD: 1.3 % (ref 0–5)
ERYTHROCYTE [DISTWIDTH] IN BLOOD BY AUTOMATED COUNT: 14.3 % (ref 11.6–14.5)
GLUCOSE BLD STRIP.AUTO-MCNC: 142 MG/DL (ref 70–110)
GLUCOSE BLD STRIP.AUTO-MCNC: 143 MG/DL (ref 70–110)
GLUCOSE BLD STRIP.AUTO-MCNC: 305 MG/DL (ref 70–110)
GLUCOSE BLD STRIP.AUTO-MCNC: 322 MG/DL (ref 70–110)
GLUCOSE BLD STRIP.AUTO-MCNC: 325 MG/DL (ref 70–110)
GLUCOSE BLD STRIP.AUTO-MCNC: 79 MG/DL (ref 70–110)
GLUCOSE SERPL-MCNC: 131 MG/DL (ref 74–99)
HCT VFR BLD AUTO: 35.7 % (ref 36–48)
HGB BLD-MCNC: 10.8 G/DL (ref 13–16)
IMM GRANULOCYTES # BLD AUTO: 0.04 K/UL (ref 0–0.04)
IMM GRANULOCYTES NFR BLD AUTO: 0.5 % (ref 0–0.5)
LYMPHOCYTES # BLD: 0.41 K/UL (ref 0.9–3.6)
LYMPHOCYTES NFR BLD: 5.1 % (ref 21–52)
MAGNESIUM SERPL-MCNC: 3.7 MG/DL (ref 1.6–2.6)
MCH RBC QN AUTO: 29.9 PG (ref 24–34)
MCHC RBC AUTO-ENTMCNC: 30.3 G/DL (ref 31–37)
MCV RBC AUTO: 98.9 FL (ref 78–100)
MONOCYTES # BLD: 0.85 K/UL (ref 0.05–1.2)
MONOCYTES NFR BLD: 10.7 % (ref 3–10)
NEUTS SEG # BLD: 6.55 K/UL (ref 1.8–8)
NEUTS SEG NFR BLD: 82.1 % (ref 40–73)
NRBC # BLD: 0 K/UL (ref 0–0.01)
NRBC BLD-RTO: 0 PER 100 WBC
PH BLDV: 7.39 (ref 7.32–7.42)
PLATELET # BLD AUTO: 202 K/UL (ref 135–420)
PMV BLD AUTO: 12.8 FL (ref 9.2–11.8)
POTASSIUM SERPL-SCNC: 3.3 MMOL/L (ref 3.5–5.5)
RBC # BLD AUTO: 3.61 M/UL (ref 4.35–5.65)
SODIUM SERPL-SCNC: 147 MMOL/L (ref 136–145)
WBC # BLD AUTO: 8 K/UL (ref 4.6–13.2)

## 2025-02-05 PROCEDURE — 6360000002 HC RX W HCPCS: Performed by: STUDENT IN AN ORGANIZED HEALTH CARE EDUCATION/TRAINING PROGRAM

## 2025-02-05 PROCEDURE — 6370000000 HC RX 637 (ALT 250 FOR IP): Performed by: STUDENT IN AN ORGANIZED HEALTH CARE EDUCATION/TRAINING PROGRAM

## 2025-02-05 PROCEDURE — 82962 GLUCOSE BLOOD TEST: CPT

## 2025-02-05 PROCEDURE — 6370000000 HC RX 637 (ALT 250 FOR IP): Performed by: PHYSICIAN ASSISTANT

## 2025-02-05 PROCEDURE — 92526 ORAL FUNCTION THERAPY: CPT

## 2025-02-05 PROCEDURE — 83735 ASSAY OF MAGNESIUM: CPT

## 2025-02-05 PROCEDURE — 2500000003 HC RX 250 WO HCPCS: Performed by: PHYSICIAN ASSISTANT

## 2025-02-05 PROCEDURE — 36415 COLL VENOUS BLD VENIPUNCTURE: CPT

## 2025-02-05 PROCEDURE — 6360000002 HC RX W HCPCS: Performed by: INTERNAL MEDICINE

## 2025-02-05 PROCEDURE — 82800 BLOOD PH: CPT

## 2025-02-05 PROCEDURE — 2500000003 HC RX 250 WO HCPCS: Performed by: INTERNAL MEDICINE

## 2025-02-05 PROCEDURE — 80048 BASIC METABOLIC PNL TOTAL CA: CPT

## 2025-02-05 PROCEDURE — 1100000003 HC PRIVATE W/ TELEMETRY

## 2025-02-05 PROCEDURE — 99232 SBSQ HOSP IP/OBS MODERATE 35: CPT | Performed by: STUDENT IN AN ORGANIZED HEALTH CARE EDUCATION/TRAINING PROGRAM

## 2025-02-05 PROCEDURE — 85025 COMPLETE CBC W/AUTO DIFF WBC: CPT

## 2025-02-05 PROCEDURE — 2580000003 HC RX 258: Performed by: STUDENT IN AN ORGANIZED HEALTH CARE EDUCATION/TRAINING PROGRAM

## 2025-02-05 RX ORDER — POTASSIUM CHLORIDE, DEXTROSE MONOHYDRATE 150; 5 MG/100ML; G/100ML
INJECTION, SOLUTION INTRAVENOUS CONTINUOUS
Status: DISCONTINUED | OUTPATIENT
Start: 2025-02-05 | End: 2025-02-05

## 2025-02-05 RX ADMIN — POTASSIUM BICARBONATE 40 MEQ: 782 TABLET, EFFERVESCENT ORAL at 05:19

## 2025-02-05 RX ADMIN — SODIUM CHLORIDE, PRESERVATIVE FREE 10 ML: 5 INJECTION INTRAVENOUS at 08:12

## 2025-02-05 RX ADMIN — CARBIDOPA AND LEVODOPA 1 TABLET: 25; 100 TABLET ORAL at 21:52

## 2025-02-05 RX ADMIN — SODIUM CHLORIDE, PRESERVATIVE FREE 10 ML: 5 INJECTION INTRAVENOUS at 21:52

## 2025-02-05 RX ADMIN — INSULIN GLARGINE 20 UNITS: 100 INJECTION, SOLUTION SUBCUTANEOUS at 21:51

## 2025-02-05 RX ADMIN — INSULIN LISPRO 6 UNITS: 100 INJECTION, SOLUTION INTRAVENOUS; SUBCUTANEOUS at 18:12

## 2025-02-05 RX ADMIN — BUSPIRONE HYDROCHLORIDE 10 MG: 10 TABLET ORAL at 21:52

## 2025-02-05 RX ADMIN — ACETAZOLAMIDE SODIUM 500 MG: 500 INJECTION, POWDER, LYOPHILIZED, FOR SOLUTION INTRAVENOUS at 10:09

## 2025-02-05 RX ADMIN — PRAVASTATIN SODIUM 20 MG: 20 TABLET ORAL at 21:52

## 2025-02-05 RX ADMIN — INSULIN LISPRO 3 UNITS: 100 INJECTION, SOLUTION INTRAVENOUS; SUBCUTANEOUS at 18:12

## 2025-02-05 RX ADMIN — HEPARIN SODIUM 5000 UNITS: 5000 INJECTION INTRAVENOUS; SUBCUTANEOUS at 05:44

## 2025-02-05 RX ADMIN — Medication 1 MG: at 08:12

## 2025-02-05 RX ADMIN — POTASSIUM CHLORIDE AND DEXTROSE MONOHYDRATE: 150; 5 INJECTION, SOLUTION INTRAVENOUS at 10:19

## 2025-02-05 RX ADMIN — LEVOTHYROXINE SODIUM 75 MCG: 0.07 TABLET ORAL at 05:44

## 2025-02-05 RX ADMIN — INSULIN LISPRO 3 UNITS: 100 INJECTION, SOLUTION INTRAVENOUS; SUBCUTANEOUS at 13:32

## 2025-02-05 RX ADMIN — INSULIN LISPRO 6 UNITS: 100 INJECTION, SOLUTION INTRAVENOUS; SUBCUTANEOUS at 13:31

## 2025-02-05 RX ADMIN — HEPARIN SODIUM 5000 UNITS: 5000 INJECTION INTRAVENOUS; SUBCUTANEOUS at 13:32

## 2025-02-05 RX ADMIN — INSULIN LISPRO 3 UNITS: 100 INJECTION, SOLUTION INTRAVENOUS; SUBCUTANEOUS at 01:17

## 2025-02-05 RX ADMIN — BUSPIRONE HYDROCHLORIDE 10 MG: 10 TABLET ORAL at 08:11

## 2025-02-05 RX ADMIN — HEPARIN SODIUM 5000 UNITS: 5000 INJECTION INTRAVENOUS; SUBCUTANEOUS at 21:52

## 2025-02-05 RX ADMIN — CARBIDOPA AND LEVODOPA 1 TABLET: 25; 100 TABLET ORAL at 08:11

## 2025-02-05 RX ADMIN — POTASSIUM CHLORIDE: 2 INJECTION, SOLUTION, CONCENTRATE INTRAVENOUS at 21:50

## 2025-02-05 ASSESSMENT — PAIN SCALES - GENERAL
PAINLEVEL_OUTOF10: 0
PAINLEVEL_OUTOF10: 0

## 2025-02-05 NOTE — PLAN OF CARE
Problem: SLP Adult - Impaired Swallowing  Goal: By Discharge: Advance to least restrictive diet without signs or symptoms of aspiration for planned discharge setting.  See evaluation for individualized goals.  Description: Patient will:  1. Tolerate PO trials with 0 s/s overt distress in 4/5 trials- discontinue  2. Utilize compensatory swallow strategies/maneuvers (decrease bite/sip, size/rate, alt. liq/sol) with min cues in 4/5 trials- discontinue   3. Perform oral-motor/laryngeal exercises to increase oropharyngeal swallow function with min cues- discontinue  4. Complete an objective swallow study (i.e., MBSS) to assess swallow integrity, r/o aspiration, and determine of safest LRD, min A as indicated/ordered by MD - met 1/29/25    Recommend:   NPO with PEG  Ice chips okay following oral care for swallow stim/comfort  Strict aspiration precautions (HOB >30 degrees at all times, Oral care TID)  MBS to further assess oropharyngeal swallow fxn - met 1/29/25    Outcome: Completed  SPEECH LANGUAGE PATHOLOGY DYSPHAGIA TREATMENT & DISCHARGE    Patient: Rahul Camarillo (84 y.o. male)  Date: 2/5/2025  Diagnosis: Acute on chronic respiratory failure with hypoxia [J96.21] Acute on chronic respiratory failure with hypoxia      Precautions: Aspiration  PLOF: As per H&P     ASSESSMENT:  Pt seen for follow up dysphagia management. Pt continues to present with severe pharyngeal dysphagia with congested/wet vocal quality and difficulty managing his own secretions. Oral care completed via toothette and suctioning with worsening SOB and congested vocal quality. Education provided on why PO trials are not being completed at this time due to pt's extremely high risk of aspiration. Further education provided on risks of aspiration, severity of dysphagia, importance of oral care, and utilizing PEG as primary means of nutrition/hydration. Recommend NPO with PEG, aspiration precautions, oral care TID, and meds via PEG. At this time,

## 2025-02-05 NOTE — PROGRESS NOTES
0730: Bedside and Verbal shift change report given to MARGARITO Angel (oncoming nurse) by MARGARITO Kelly (offgoing nurse). Report included the following information Nurse Handoff Report, Intake/Output, Recent Results, Cardiac Rhythm tach, Neuro Assessment, and Event Log.

## 2025-02-05 NOTE — PROGRESS NOTES
Glucose    Collection Time: 02/04/25  8:47 PM   Result Value Ref Range    POC Glucose 222 (H) 70 - 110 mg/dL   POCT Glucose    Collection Time: 02/05/25  1:10 AM   Result Value Ref Range    POC Glucose 142 (H) 70 - 110 mg/dL   CBC with Auto Differential    Collection Time: 02/05/25  1:33 AM   Result Value Ref Range    WBC 8.0 4.6 - 13.2 K/uL    RBC 3.61 (L) 4.35 - 5.65 M/uL    Hemoglobin 10.8 (L) 13.0 - 16.0 g/dL    Hematocrit 35.7 (L) 36.0 - 48.0 %    MCV 98.9 78.0 - 100.0 FL    MCH 29.9 24.0 - 34.0 PG    MCHC 30.3 (L) 31.0 - 37.0 g/dL    RDW 14.3 11.6 - 14.5 %    Platelets 202 135 - 420 K/uL    MPV 12.8 (H) 9.2 - 11.8 FL    Nucleated RBCs 0.0 0  WBC    nRBC 0.00 0.00 - 0.01 K/uL    Neutrophils % 82.1 (H) 40.0 - 73.0 %    Lymphocytes % 5.1 (L) 21.0 - 52.0 %    Monocytes % 10.7 (H) 3.0 - 10.0 %    Eosinophils % 1.3 0.0 - 5.0 %    Basophils % 0.3 0.0 - 2.0 %    Immature Granulocytes % 0.5 0.0 - 0.5 %    Neutrophils Absolute 6.55 1.80 - 8.00 K/UL    Lymphocytes Absolute 0.41 (L) 0.90 - 3.60 K/UL    Monocytes Absolute 0.85 0.05 - 1.20 K/UL    Eosinophils Absolute 0.10 0.00 - 0.40 K/UL    Basophils Absolute 0.02 0.00 - 0.10 K/UL    Immature Granulocytes Absolute 0.04 0.00 - 0.04 K/UL    Differential Type AUTOMATED     Basic Metabolic Panel w/ Reflex to MG    Collection Time: 02/05/25  1:33 AM   Result Value Ref Range    Sodium 147 (H) 136 - 145 mmol/L    Potassium 3.3 (L) 3.5 - 5.5 mmol/L    Chloride 104 100 - 111 mmol/L    CO2 41 (HH) 21 - 32 mmol/L    Anion Gap 2 (L) 3.0 - 18 mmol/L    Glucose 131 (H) 74 - 99 mg/dL     (H) 7.0 - 18 MG/DL    Creatinine 2.96 (H) 0.6 - 1.3 MG/DL    BUN/Creatinine Ratio 46 (H) 12 - 20      Est, Glom Filt Rate 20 (L) >60 ml/min/1.73m2    Calcium 8.6 8.5 - 10.1 MG/DL   Magnesium    Collection Time: 02/05/25  1:33 AM   Result Value Ref Range    Magnesium 3.7 (H) 1.6 - 2.6 mg/dL   POCT Glucose    Collection Time: 02/05/25  5:39 AM   Result Value Ref Range    POC Glucose 79 70 -

## 2025-02-05 NOTE — FLOWSHEET NOTE
02/05/25 0319   Treatment Team Notification   Reason for Communication Critical results   Type of Critical Result Laboratory   Critical Lab Information CO2: 41   Person Result Received From Laboratory   Critical Lab Result Type Carbon dioxide   Name of Team Member Notified Dr Saldaña   Treatment Team Role Attending Provider   Method of Communication Secure Message   Response No new orders   Notification Time 0320   Deterioration Index RN Interventions Performed  Charge RN Notified     Primary nurse, Leticia made aware. Secured message MD.

## 2025-02-05 NOTE — PROGRESS NOTES
In Patient Progress note      Admit Date: 1/28/2025      Impression:     1. Acute Kidney Injury on CKD 4: prerenal in setting of CHF/cardiorenal syndrome , renal USG negative for obst  2. Chronic kidney disease 4 : d/t HTN nephrosclerosis /cardiorenal syndrome   3. Ac hypoxic on chronic  respiratory failure : CHF / PNA , sees pulmonary and had recent CT , no Significant finding   other than chronic rt lower lobe scarring   4. Hypertension: Borderline low blood pressures   5. HFrEF 20-25% EF  , mod Mitral regurgitation   6  Anemia  7  sec hyperpara   8. Parkinson's disease:   9. Hypothyroidism: On levothyroxine  9. History of atrial fibrillation on amiodarone  10 lethargy   11 metabolic alkalosis   12 hypokalemia      Plan:  1) strict I/o charting , check frequent bladder scans  2) continue to hold bumex and metolazone ,   give dose of diamox 500 mg IV X 1 for alkalosis   3) D5W + 20 meq KCL @ 100 cc/hrs   4) monitor electrolytes and renal functions daily  5) no NSAIDs, nephrotoxins   6) renally dose med for egfr < 30     guarded prognosis   Please call with questions,     Maurisio Morrell MD FASN  Cell [phone]  Pager: [phone]  Fax   [phone]        Subjective:     - more awake this morning   - respiratory - stable  - hemodynamics - stable, no pressrs  - UOP-ok  - Nutrition -ok    Objective:     /61   Pulse 96   Temp 98.4 °F (36.9 °C) (Axillary)   Resp 22   Ht 1.829 m (6' 0.01\")   Wt 63.8 kg (140 lb 10.5 oz)   SpO2 95%   BMI 19.07 kg/m²       Intake/Output Summary (Last 24 hours) at 2/5/2025 1326  Last data filed at 2/5/2025 0700  Gross per 24 hour   Intake 1890.42 ml   Output 2500 ml   Net -609.58 ml       Physical Exam:     Gen NAD  HENT mmm  RS AEBE   CVS s1s2 wnl no JVD  Ext edema +  Skin no rashes  Neuro oriented X 3    Data Review:    Recent Labs     02/05/25  0133   WBC 8.0   RBC 3.61*   HCT 35.7*   MCV 98.9   MCH 29.9   MCHC 30.3*   RDW 14.3   MPV 12.8*     Recent Labs

## 2025-02-05 NOTE — PROGRESS NOTES
Physical Therapy  Pt not seen for skilled PT due to:    []  Nausea/vomiting  []  Eating  []  Pain  [x]  Pt lethargic x 2 1300 and 1402  []  Off Unit  Other:     Will f/u later as schedule allows. Thank you.  Stella Lozano PT, DPT

## 2025-02-06 ENCOUNTER — ANESTHESIA EVENT (OUTPATIENT)
Facility: HOSPITAL | Age: 85
End: 2025-02-06
Payer: MEDICARE

## 2025-02-06 ENCOUNTER — APPOINTMENT (OUTPATIENT)
Facility: HOSPITAL | Age: 85
End: 2025-02-06
Attending: FAMILY MEDICINE
Payer: MEDICARE

## 2025-02-06 ENCOUNTER — ANESTHESIA (OUTPATIENT)
Facility: HOSPITAL | Age: 85
End: 2025-02-06
Payer: MEDICARE

## 2025-02-06 LAB
ANION GAP BLD CALC-SCNC: ABNORMAL MMOL/L (ref 10–20)
ANION GAP SERPL CALC-SCNC: 1 MMOL/L (ref 3–18)
ANION GAP SERPL CALC-SCNC: 1 MMOL/L (ref 3–18)
ARTERIAL PATENCY WRIST A: POSITIVE
B PERT DNA SPEC QL NAA+PROBE: NOT DETECTED
BASE EXCESS BLD CALC-SCNC: 11.8 MMOL/L
BASOPHILS # BLD: 0.02 K/UL (ref 0–0.1)
BASOPHILS # BLD: 0.02 K/UL (ref 0–0.1)
BASOPHILS NFR BLD: 0.2 % (ref 0–2)
BASOPHILS NFR BLD: 0.2 % (ref 0–2)
BDY SITE: ABNORMAL
BODY TEMPERATURE: 99.1
BORDETELLA PARAPERTUSSIS BY PCR: NOT DETECTED
BUN SERPL-MCNC: 123 MG/DL (ref 7–18)
BUN SERPL-MCNC: 126 MG/DL (ref 7–18)
BUN/CREAT SERPL: 41 (ref 12–20)
BUN/CREAT SERPL: 43 (ref 12–20)
C PNEUM DNA SPEC QL NAA+PROBE: NOT DETECTED
CA-I BLD-MCNC: 1.2 MMOL/L (ref 1.15–1.33)
CALCIUM SERPL-MCNC: 8.6 MG/DL (ref 8.5–10.1)
CALCIUM SERPL-MCNC: 8.7 MG/DL (ref 8.5–10.1)
CHLORIDE BLD-SCNC: 97 MMOL/L (ref 98–107)
CHLORIDE SERPL-SCNC: 99 MMOL/L (ref 100–111)
CHLORIDE SERPL-SCNC: 99 MMOL/L (ref 100–111)
CO2 BLD-SCNC: 39 MMOL/L (ref 22–29)
CO2 SERPL-SCNC: 37 MMOL/L (ref 21–32)
CO2 SERPL-SCNC: 37 MMOL/L (ref 21–32)
CREAT BLD-MCNC: 2.93 MG/DL (ref 0.6–1.3)
CREAT SERPL-MCNC: 2.91 MG/DL (ref 0.6–1.3)
CREAT SERPL-MCNC: 3 MG/DL (ref 0.6–1.3)
DIFFERENTIAL METHOD BLD: ABNORMAL
DIFFERENTIAL METHOD BLD: ABNORMAL
EKG ATRIAL RATE: 102 BPM
EKG DIAGNOSIS: NORMAL
EKG Q-T INTERVAL: 444 MS
EKG QRS DURATION: 180 MS
EKG QTC CALCULATION (BAZETT): 549 MS
EKG R AXIS: 24 DEGREES
EKG T AXIS: 195 DEGREES
EKG VENTRICULAR RATE: 92 BPM
EOSINOPHIL # BLD: 0.08 K/UL (ref 0–0.4)
EOSINOPHIL # BLD: 0.17 K/UL (ref 0–0.4)
EOSINOPHIL NFR BLD: 0.8 % (ref 0–5)
EOSINOPHIL NFR BLD: 1.5 % (ref 0–5)
ERYTHROCYTE [DISTWIDTH] IN BLOOD BY AUTOMATED COUNT: 14.3 % (ref 11.6–14.5)
ERYTHROCYTE [DISTWIDTH] IN BLOOD BY AUTOMATED COUNT: 14.3 % (ref 11.6–14.5)
FIO2 ON VENT: 28 %
FLUAV SUBTYP SPEC NAA+PROBE: NOT DETECTED
FLUBV RNA SPEC QL NAA+PROBE: NOT DETECTED
GAS FLOW.O2 O2 DELIVERY SYS: ABNORMAL
GLUCOSE BLD STRIP.AUTO-MCNC: 149 MG/DL (ref 70–110)
GLUCOSE BLD STRIP.AUTO-MCNC: 171 MG/DL (ref 70–110)
GLUCOSE BLD STRIP.AUTO-MCNC: 182 MG/DL (ref 70–110)
GLUCOSE BLD STRIP.AUTO-MCNC: 186 MG/DL (ref 70–110)
GLUCOSE BLD STRIP.AUTO-MCNC: 195 MG/DL (ref 70–110)
GLUCOSE BLD STRIP.AUTO-MCNC: 206 MG/DL (ref 70–110)
GLUCOSE BLD STRIP.AUTO-MCNC: 234 MG/DL (ref 70–110)
GLUCOSE BLD STRIP.AUTO-MCNC: 238 MG/DL (ref 70–110)
GLUCOSE BLD STRIP.AUTO-MCNC: 302 MG/DL (ref 70–110)
GLUCOSE BLD-MCNC: 151 MG/DL (ref 74–99)
GLUCOSE SERPL-MCNC: 164 MG/DL (ref 74–99)
GLUCOSE SERPL-MCNC: 211 MG/DL (ref 74–99)
HADV DNA SPEC QL NAA+PROBE: NOT DETECTED
HCO3 BLD-SCNC: 39.7 MMOL/L (ref 21–28)
HCOV 229E RNA SPEC QL NAA+PROBE: NOT DETECTED
HCOV HKU1 RNA SPEC QL NAA+PROBE: NOT DETECTED
HCOV NL63 RNA SPEC QL NAA+PROBE: NOT DETECTED
HCOV OC43 RNA SPEC QL NAA+PROBE: NOT DETECTED
HCT VFR BLD AUTO: 33.1 % (ref 36–48)
HCT VFR BLD AUTO: 35.4 % (ref 36–48)
HGB BLD-MCNC: 10.2 G/DL (ref 13–16)
HGB BLD-MCNC: 9.7 G/DL (ref 13–16)
HMPV RNA SPEC QL NAA+PROBE: NOT DETECTED
HPIV1 RNA SPEC QL NAA+PROBE: NOT DETECTED
HPIV2 RNA SPEC QL NAA+PROBE: NOT DETECTED
HPIV3 RNA SPEC QL NAA+PROBE: NOT DETECTED
HPIV4 RNA SPEC QL NAA+PROBE: NOT DETECTED
IMM GRANULOCYTES # BLD AUTO: 0.07 K/UL (ref 0–0.04)
IMM GRANULOCYTES # BLD AUTO: 0.09 K/UL (ref 0–0.04)
IMM GRANULOCYTES NFR BLD AUTO: 0.7 % (ref 0–0.5)
IMM GRANULOCYTES NFR BLD AUTO: 0.8 % (ref 0–0.5)
LACTATE BLD-SCNC: 0.88 MMOL/L (ref 0.4–2)
LYMPHOCYTES # BLD: 0.44 K/UL (ref 0.9–3.6)
LYMPHOCYTES # BLD: 0.52 K/UL (ref 0.9–3.6)
LYMPHOCYTES NFR BLD: 4.6 % (ref 21–52)
LYMPHOCYTES NFR BLD: 4.6 % (ref 21–52)
M PNEUMO DNA SPEC QL NAA+PROBE: NOT DETECTED
MCH RBC QN AUTO: 29.7 PG (ref 24–34)
MCH RBC QN AUTO: 29.9 PG (ref 24–34)
MCHC RBC AUTO-ENTMCNC: 28.8 G/DL (ref 31–37)
MCHC RBC AUTO-ENTMCNC: 29.3 G/DL (ref 31–37)
MCV RBC AUTO: 102.2 FL (ref 78–100)
MCV RBC AUTO: 103.2 FL (ref 78–100)
MONOCYTES # BLD: 0.86 K/UL (ref 0.05–1.2)
MONOCYTES # BLD: 1.22 K/UL (ref 0.05–1.2)
MONOCYTES NFR BLD: 10.8 % (ref 3–10)
MONOCYTES NFR BLD: 9 % (ref 3–10)
NEUTS SEG # BLD: 8.13 K/UL (ref 1.8–8)
NEUTS SEG # BLD: 9.23 K/UL (ref 1.8–8)
NEUTS SEG NFR BLD: 82.1 % (ref 40–73)
NEUTS SEG NFR BLD: 84.7 % (ref 40–73)
NRBC # BLD: 0 K/UL (ref 0–0.01)
NRBC # BLD: 0.02 K/UL (ref 0–0.01)
NRBC BLD-RTO: 0 PER 100 WBC
NRBC BLD-RTO: 0.2 PER 100 WBC
PCO2 BLD: 71 MMHG (ref 35–48)
PH BLD: 7.36 (ref 7.35–7.45)
PLATELET # BLD AUTO: 177 K/UL (ref 135–420)
PLATELET # BLD AUTO: 189 K/UL (ref 135–420)
PLATELET COMMENT: ABNORMAL
PMV BLD AUTO: 12.2 FL (ref 9.2–11.8)
PMV BLD AUTO: 12.8 FL (ref 9.2–11.8)
PO2 BLD: 69 MMHG (ref 83–108)
POTASSIUM BLD-SCNC: 3.6 MMOL/L (ref 3.5–5.1)
POTASSIUM SERPL-SCNC: 3.6 MMOL/L (ref 3.5–5.5)
POTASSIUM SERPL-SCNC: 3.8 MMOL/L (ref 3.5–5.5)
RBC # BLD AUTO: 3.24 M/UL (ref 4.35–5.65)
RBC # BLD AUTO: 3.43 M/UL (ref 4.35–5.65)
RBC MORPH BLD: ABNORMAL
RESPIRATORY RATE, POC: 30 (ref 5–40)
RSV RNA SPEC QL NAA+PROBE: NOT DETECTED
RV+EV RNA SPEC QL NAA+PROBE: NOT DETECTED
SAO2 % BLD: 91 % (ref 94–98)
SARS-COV-2 RNA RESP QL NAA+PROBE: NOT DETECTED
SERVICE CMNT-IMP: ABNORMAL
SODIUM BLD-SCNC: 141 MMOL/L (ref 136–145)
SODIUM SERPL-SCNC: 137 MMOL/L (ref 136–145)
SODIUM SERPL-SCNC: 137 MMOL/L (ref 136–145)
SPECIMEN SITE: ABNORMAL
WBC # BLD AUTO: 11.3 K/UL (ref 4.6–13.2)
WBC # BLD AUTO: 9.6 K/UL (ref 4.6–13.2)

## 2025-02-06 PROCEDURE — 2500000003 HC RX 250 WO HCPCS: Performed by: STUDENT IN AN ORGANIZED HEALTH CARE EDUCATION/TRAINING PROGRAM

## 2025-02-06 PROCEDURE — 82962 GLUCOSE BLOOD TEST: CPT

## 2025-02-06 PROCEDURE — 6370000000 HC RX 637 (ALT 250 FOR IP): Performed by: PHYSICIAN ASSISTANT

## 2025-02-06 PROCEDURE — 93010 ELECTROCARDIOGRAM REPORT: CPT | Performed by: INTERNAL MEDICINE

## 2025-02-06 PROCEDURE — 2500000003 HC RX 250 WO HCPCS: Performed by: PHYSICIAN ASSISTANT

## 2025-02-06 PROCEDURE — 2700000000 HC OXYGEN THERAPY PER DAY

## 2025-02-06 PROCEDURE — 0202U NFCT DS 22 TRGT SARS-COV-2: CPT

## 2025-02-06 PROCEDURE — 5A1935Z RESPIRATORY VENTILATION, LESS THAN 24 CONSECUTIVE HOURS: ICD-10-PCS | Performed by: STUDENT IN AN ORGANIZED HEALTH CARE EDUCATION/TRAINING PROGRAM

## 2025-02-06 PROCEDURE — 85025 COMPLETE CBC W/AUTO DIFF WBC: CPT

## 2025-02-06 PROCEDURE — 6370000000 HC RX 637 (ALT 250 FOR IP): Performed by: STUDENT IN AN ORGANIZED HEALTH CARE EDUCATION/TRAINING PROGRAM

## 2025-02-06 PROCEDURE — 36415 COLL VENOUS BLD VENIPUNCTURE: CPT

## 2025-02-06 PROCEDURE — 31500 INSERT EMERGENCY AIRWAY: CPT

## 2025-02-06 PROCEDURE — 71045 X-RAY EXAM CHEST 1 VIEW: CPT

## 2025-02-06 PROCEDURE — 83605 ASSAY OF LACTIC ACID: CPT

## 2025-02-06 PROCEDURE — 99233 SBSQ HOSP IP/OBS HIGH 50: CPT | Performed by: STUDENT IN AN ORGANIZED HEALTH CARE EDUCATION/TRAINING PROGRAM

## 2025-02-06 PROCEDURE — 94002 VENT MGMT INPAT INIT DAY: CPT

## 2025-02-06 PROCEDURE — 82947 ASSAY GLUCOSE BLOOD QUANT: CPT

## 2025-02-06 PROCEDURE — 84295 ASSAY OF SERUM SODIUM: CPT

## 2025-02-06 PROCEDURE — 82803 BLOOD GASES ANY COMBINATION: CPT

## 2025-02-06 PROCEDURE — 31500 INSERT EMERGENCY AIRWAY: CPT | Performed by: NURSE ANESTHETIST, CERTIFIED REGISTERED

## 2025-02-06 PROCEDURE — 3E033XZ INTRODUCTION OF VASOPRESSOR INTO PERIPHERAL VEIN, PERCUTANEOUS APPROACH: ICD-10-PCS | Performed by: STUDENT IN AN ORGANIZED HEALTH CARE EDUCATION/TRAINING PROGRAM

## 2025-02-06 PROCEDURE — 0BH17EZ INSERTION OF ENDOTRACHEAL AIRWAY INTO TRACHEA, VIA NATURAL OR ARTIFICIAL OPENING: ICD-10-PCS | Performed by: STUDENT IN AN ORGANIZED HEALTH CARE EDUCATION/TRAINING PROGRAM

## 2025-02-06 PROCEDURE — 82330 ASSAY OF CALCIUM: CPT

## 2025-02-06 PROCEDURE — 99291 CRITICAL CARE FIRST HOUR: CPT | Performed by: PHYSICIAN ASSISTANT

## 2025-02-06 PROCEDURE — 6360000002 HC RX W HCPCS: Performed by: STUDENT IN AN ORGANIZED HEALTH CARE EDUCATION/TRAINING PROGRAM

## 2025-02-06 PROCEDURE — 2580000003 HC RX 258: Performed by: PHYSICIAN ASSISTANT

## 2025-02-06 PROCEDURE — 80048 BASIC METABOLIC PNL TOTAL CA: CPT

## 2025-02-06 PROCEDURE — 85014 HEMATOCRIT: CPT

## 2025-02-06 PROCEDURE — 6360000002 HC RX W HCPCS

## 2025-02-06 PROCEDURE — 2709999900 US THORACENTESIS

## 2025-02-06 PROCEDURE — 94761 N-INVAS EAR/PLS OXIMETRY MLT: CPT

## 2025-02-06 PROCEDURE — 84132 ASSAY OF SERUM POTASSIUM: CPT

## 2025-02-06 PROCEDURE — 93005 ELECTROCARDIOGRAM TRACING: CPT

## 2025-02-06 PROCEDURE — 99291 CRITICAL CARE FIRST HOUR: CPT | Performed by: STUDENT IN AN ORGANIZED HEALTH CARE EDUCATION/TRAINING PROGRAM

## 2025-02-06 PROCEDURE — 2000000000 HC ICU R&B

## 2025-02-06 RX ORDER — CHLORHEXIDINE GLUCONATE ORAL RINSE 1.2 MG/ML
15 SOLUTION DENTAL 2 TIMES DAILY
Status: DISCONTINUED | OUTPATIENT
Start: 2025-02-06 | End: 2025-02-07

## 2025-02-06 RX ORDER — NOREPINEPHRINE BITARTRATE 0.06 MG/ML
1-100 INJECTION, SOLUTION INTRAVENOUS CONTINUOUS
Status: DISCONTINUED | OUTPATIENT
Start: 2025-02-06 | End: 2025-02-07 | Stop reason: HOSPADM

## 2025-02-06 RX ORDER — FUROSEMIDE 10 MG/ML
80 INJECTION INTRAMUSCULAR; INTRAVENOUS ONCE
Status: COMPLETED | OUTPATIENT
Start: 2025-02-06 | End: 2025-02-06

## 2025-02-06 RX ORDER — BUMETANIDE 0.25 MG/ML
2 INJECTION, SOLUTION INTRAMUSCULAR; INTRAVENOUS 2 TIMES DAILY
Status: DISCONTINUED | OUTPATIENT
Start: 2025-02-06 | End: 2025-02-07 | Stop reason: HOSPADM

## 2025-02-06 RX ORDER — ACETAZOLAMIDE 500 MG/5ML
500 INJECTION, POWDER, LYOPHILIZED, FOR SOLUTION INTRAVENOUS ONCE
Status: COMPLETED | OUTPATIENT
Start: 2025-02-06 | End: 2025-02-06

## 2025-02-06 RX ADMIN — Medication 5 MCG/MIN: at 21:36

## 2025-02-06 RX ADMIN — CHLORHEXIDINE GLUCONATE 15 ML: 1.2 SOLUTION ORAL at 21:43

## 2025-02-06 RX ADMIN — INSULIN LISPRO 2 UNITS: 100 INJECTION, SOLUTION INTRAVENOUS; SUBCUTANEOUS at 05:38

## 2025-02-06 RX ADMIN — INSULIN LISPRO 3 UNITS: 100 INJECTION, SOLUTION INTRAVENOUS; SUBCUTANEOUS at 01:01

## 2025-02-06 RX ADMIN — INSULIN LISPRO 3 UNITS: 100 INJECTION, SOLUTION INTRAVENOUS; SUBCUTANEOUS at 16:53

## 2025-02-06 RX ADMIN — HEPARIN SODIUM 5000 UNITS: 5000 INJECTION INTRAVENOUS; SUBCUTANEOUS at 05:38

## 2025-02-06 RX ADMIN — FUROSEMIDE 80 MG: 10 INJECTION, SOLUTION INTRAMUSCULAR; INTRAVENOUS at 07:58

## 2025-02-06 RX ADMIN — INSULIN LISPRO 2 UNITS: 100 INJECTION, SOLUTION INTRAVENOUS; SUBCUTANEOUS at 23:57

## 2025-02-06 RX ADMIN — INSULIN LISPRO 6 UNITS: 100 INJECTION, SOLUTION INTRAVENOUS; SUBCUTANEOUS at 01:01

## 2025-02-06 RX ADMIN — INSULIN LISPRO 3 UNITS: 100 INJECTION, SOLUTION INTRAVENOUS; SUBCUTANEOUS at 05:38

## 2025-02-06 RX ADMIN — ACETAZOLAMIDE SODIUM 500 MG: 500 INJECTION, POWDER, LYOPHILIZED, FOR SOLUTION INTRAVENOUS at 11:28

## 2025-02-06 RX ADMIN — INSULIN LISPRO 2 UNITS: 100 INJECTION, SOLUTION INTRAVENOUS; SUBCUTANEOUS at 16:53

## 2025-02-06 RX ADMIN — SODIUM CHLORIDE, PRESERVATIVE FREE 10 ML: 5 INJECTION INTRAVENOUS at 07:58

## 2025-02-06 RX ADMIN — SODIUM CHLORIDE, PRESERVATIVE FREE 10 ML: 5 INJECTION INTRAVENOUS at 21:00

## 2025-02-06 RX ADMIN — LEVOTHYROXINE SODIUM 75 MCG: 0.07 TABLET ORAL at 05:55

## 2025-02-06 RX ADMIN — INSULIN GLARGINE 20 UNITS: 100 INJECTION, SOLUTION SUBCUTANEOUS at 21:43

## 2025-02-06 RX ADMIN — INSULIN LISPRO 3 UNITS: 100 INJECTION, SOLUTION INTRAVENOUS; SUBCUTANEOUS at 13:00

## 2025-02-06 RX ADMIN — Medication 1 MG: at 08:34

## 2025-02-06 RX ADMIN — CARBIDOPA AND LEVODOPA 1 TABLET: 25; 100 TABLET ORAL at 08:34

## 2025-02-06 RX ADMIN — BUSPIRONE HYDROCHLORIDE 10 MG: 10 TABLET ORAL at 08:34

## 2025-02-06 RX ADMIN — INSULIN LISPRO 3 UNITS: 100 INJECTION, SOLUTION INTRAVENOUS; SUBCUTANEOUS at 23:57

## 2025-02-06 RX ADMIN — HEPARIN SODIUM 5000 UNITS: 5000 INJECTION INTRAVENOUS; SUBCUTANEOUS at 21:43

## 2025-02-06 RX ADMIN — FAMOTIDINE 20 MG: 10 INJECTION, SOLUTION INTRAVENOUS at 21:43

## 2025-02-06 ASSESSMENT — PAIN SCALES - GENERAL
PAINLEVEL_OUTOF10: 0

## 2025-02-06 ASSESSMENT — PULMONARY FUNCTION TESTS: PIF_VALUE: 32

## 2025-02-06 NOTE — PROGRESS NOTES
Iván Morris [facility] Hospitalist Group  Progress Note    Patient: Rahul Camarillo Age: 84 y.o. : 1940 MR#: [id number] SSN: [id number]  Date/Time: 2025    Subjective:   Subjective   RRT x 2  First secondary to lethargy greater than previously noted and possible soft but not fully hypotensive pressures.  Second secondary to momentary bradycardia asymptomatic and self resolving.  Otherwise patient appears to be in same state of health as I had seen him yesterday      Assessment/Plan:   Acute on chronic HFrEF  Acute on chronic CKD 4  Aspiration pneumonia  Acute UTI  Acute on chronic respiratory failure with hypoxia  Parkinson's disease  Hypothyroidism  Type 2 diabetes mellitus  Metabolic alkalosis  Hypernatremia  Hypokalemia  Hypomagnesemia  Anemia      Plan  Patient given dose of Lasix in the morning during RRT  Evaluation shows rather complex picture, cardiorenal syndrome versus prerenal azotemia secondary to other causes.  Leaning more towards diuresing, agree with Bumex IV as ordered by nephrology    As stated by nephrology, patient overall poor long-term dialysis candidate    Lester again not able to take due to being full    Large left pleural effusion, have ordered for possible thoracentesis given mild increase in respiratory rate.      Disposition: Expected location: [facility]    Expected discharge date: 2025      Case discussed with:  [x]Patient  []Family  [x]Nursing  [x]Case Management  DVT Prophylaxis:  [x]Lovenox  []Hep SQ  [x]SCDs  []Coumadin   []On Heparin gtt   [] DOAC    Objective:   Objective:  General Appearance:  In no acute distress, not in pain and ill-appearing.    Vital signs: (most recent): Blood pressure (!) 141/48, pulse 76, temperature 98.3 °F (36.8 °C), temperature source Oral, resp. rate 24, height 1.829 m (6' 0.01\"), weight 63.8 kg (140 lb 10.5 oz), SpO2 96%.  No fever.    HEENT: Normal HEENT exam.    Lungs:  Normal effort and normal respiratory rate.  (Very poor  0.5 %    Neutrophils Absolute 9.23 (H) 1.80 - 8.00 K/UL    Lymphocytes Absolute 0.52 (L) 0.90 - 3.60 K/UL    Monocytes Absolute 1.22 (H) 0.05 - 1.20 K/UL    Eosinophils Absolute 0.17 0.00 - 0.40 K/UL    Basophils Absolute 0.02 0.00 - 0.10 K/UL    Immature Granulocytes Absolute 0.09 (H) 0.00 - 0.04 K/UL    Differential Type AUTOMATED     Basic Metabolic Panel    Collection Time: 02/06/25  7:52 AM   Result Value Ref Range    Sodium 137 136 - 145 mmol/L    Potassium 3.8 3.5 - 5.5 mmol/L    Chloride 99 (L) 100 - 111 mmol/L    CO2 37 (H) 21 - 32 mmol/L    Anion Gap 1 (L) 3.0 - 18 mmol/L    Glucose 164 (H) 74 - 99 mg/dL     (H) 7.0 - 18 MG/DL    Creatinine 2.91 (H) 0.6 - 1.3 MG/DL    BUN/Creatinine Ratio 43 (H) 12 - 20      Est, Glom Filt Rate 20 (L) >60 ml/min/1.73m2    Calcium 8.6 8.5 - 10.1 MG/DL   POCT Blood Gas & Electrolytes    Collection Time: 02/06/25  8:00 AM   Result Value Ref Range    POC pH 7.36 7.35 - 7.45      POC pCO2 71.0 (H) 35.0 - 48.0 MMHG    POC PO2 69 (L) 83 - 108 MMHG    POC Ionized Calcium 1.20 1.15 - 1.33 mmol/L    Base Excess 11.8 mmol/L    POC HCO3 39.7 (H) 21 - 28 MMOL/L    POC TCO2 39 (H) 22 - 29 MMOL/L    POC O2 SAT 91 (L) 94 - 98 %    Pt Temp 99.1      Source ARTERIAL      Site RIGHT RADIAL      Martínez Test Positive      DEVICE NASAL CANNULA      FIO2 Arterial 28 %    Respiratory Rate 30      Performed by: Giselle Moura     POC Sodium 141 136 - 145 mmol/L    POC Potassium 3.6 3.5 - 5.1 mmol/L    POC Glucose 151 (H) 74 - 99 mg/dL    POC Creatinine 2.93 (H) 0.6 - 1.3 mg/dL    POC Lactic Acid 0.88 0.40 - 2.00 mmol/L    POC Chloride 97 (L) 98 - 107 mmol/L    Anion Gap, POC Cannot be calculated  Cannot be calculated   10 - 20 mmol/L    eGFR, POC 20 (L) >60 ml/min/1.73m2   Respiratory Panel, Molecular, with COVID-19 (Restricted: peds pts or suitable admitted adults)    Collection Time: 02/06/25  8:10 AM    Specimen: Nasopharyngeal   Result Value Ref Range    Adenovirus by PCR Not

## 2025-02-06 NOTE — NURSE NAVIGATOR
HF Navigator attempted education this day; however, pt unable to be seen due to:    [ x ] lethargy/confusion  [  ] off floor for testing   [  ] RN care  [  ] Pain   [  ] Other    Will follow up next day as indicated.    Vicenta Crawley, MARGARITO  2/6/2025

## 2025-02-06 NOTE — PROCEDURES
RADIOLOGY POST THORACENTESIS NOTE     February 6, 2025       3:07 PM     :  MARCELLA Luna    Assistant:  None.    Attending: Dr. Hood    Procedure:  US-guided left thoracentesis     Pre-operative Diagnosis: Pleural effusion    Post-operative Diagnosis: same    Procedure Details: Informed consent obtained. Under ultrasound guidance, the largest pocket of fluid collection was identified. Percutaneous access was achieved using a posterior intercostal approach with a one step needle. 1% lidocaine was utilized for local anesthesia. The 5 Croatian Yueh sheathed needle connected to manually controlled vacuum bottle.     1.5L of clear, yellow fluid removed.    Images saved in PACS.         Complications:  No immediate.      Specimens: none    Estimated blood Loss: Minimal               Condition: Stable.    Impression:  Successful left thoracentesis.    Plan: Post procedure chest xray pending.

## 2025-02-06 NOTE — PROGRESS NOTES
0730: Bedside and Verbal shift change report given to Jeanne/MARGARITO Owens (oncoming nurse) by MARGARITO Mcintyre (offgoing nurse). Report included the following information Nurse Handoff Report, Index, Adult Overview, Intake/Output, and Cardiac Rhythm Atrial fib .     0735: Patient was lethargic and difficult to wake up with decreased blood pressure and heart rate during shift report the rapid response team was called.     0800: Rapid response ended.  1 IV push of lasix administered as ordered. Repeat CBC, BMP drawn by lab.    4 Eyes Skin Assessment     NAME:  Rahul Camarillo  YOB: 1940  MEDICAL RECORD NUMBER:  [id number]    The patient is being assessed for  Shift Handoff    I agree that at least one RN has performed a thorough Head to Toe Skin Assessment on the patient. ALL assessment sites listed below have been assessed.      Areas assessed by both nurses:    Head, Face, Ears, Shoulders, Back, Chest, Arms, Elbows, Hands, and Sacrum. Buttock, Coccyx, Ischium        Does the Patient have a Wound? Yes wound(s) were present on assessment. LDA wound assessment was Initiated and completed by RN       Larry Prevention initiated by RN: Yes  Wound Care Orders initiated by RN: No    Pressure Injury (Stage 3,4, Unstageable, DTI, NWPT, and Complex wounds) if present, place Wound referral order by RN under : No    New Ostomies, if present place, Ostomy referral order under : No     Nurse 1 eSignature: Electronically signed by Roman Nair RN on 2/6/25 at 4:13 PM EST    **SHARE this note so that the co-signing nurse can place an eSignature**    Nurse 2 eSignature: Electronically signed by Jeanne Marie RN on 2/6/25 at 8:33 PM EST     1030: Patient heart rate decreased to 40s and low BP rapid response was called. CT, X-ray ordered per MD.    1500: Patient left the floor via transport for thoracentesis, they were able to pull 1.5 L of clear yellow fluid    1600: Patient returned to the floor

## 2025-02-06 NOTE — CARE COORDINATION
Discharge Barrier: nephro consult. Waiting for bed at [facility].   Planned Disposition: TBD.   Anticipated Discharge Date: 2/9/25  Current LOS/ GMLOS: 9/4.6    Patient not medially stable for discharge. RTT x2. CM will continue to follow for any needs.     Zahraa He, MSN, RN  Care Manager    [place] [id number]  Office: [phone] Fax: [phone]

## 2025-02-06 NOTE — PROGRESS NOTES
Progress Note      Patient: Rahul Camarillo               Sex: male          DOA: 1/28/2025  YOB: 1940      Age:  84 y.o.        LOS:  LOS: 9 days   MRN: [id number]                    CSN: [id number]      Impression:    Acute Kidney Injury on CKD 4: prerenal in setting of CHF/cardiorenal syndrome , renal USG negative for obst  2. Chronic kidney disease 4 : d/t HTN nephrosclerosis /cardiorenal syndrome   3. Ac hypoxic on chronic  respiratory failure : CHF / PNA , sees pulmonary and had recent CT , no Significant finding   other than chronic rt lower lobe scarring   4. Hypertension: Borderline low blood pressures   5. HFrEF 20-25% EF  , mod Mitral regurgitation   6  Anemia  7  sec hyperpara   8. Parkinson's disease:   9. Hypothyroidism: On levothyroxine  9. History of atrial fibrillation on amiodarone  10 lethargy   11 metabolic alkalosis   12 hypokalemia      Plan:  1) strict I/o charting , check frequent bladder scans  2)ABG reviewed, pt with hypercapnic respiratory failure+ metabolix alkalosis  3) Stop IVF, although he has prerenal azotemia suspect this is CRS and will need to diurese him, lasix 80mg IV given this AM during RRT, Will give additional dose of diamox today and resume bumex at 2mg IV this afternoon, Unfortunately he is poor long term dialysis candidate and I would encourage a GOC discussion with palliative  4) monitor electrolytes and renal functions daily  5) no NSAIDs, nephrotoxins   6) renally dose med for egfr < 30     Zhang Thacker DO  [facility]    Subjective:     Patient none.. pt nonverbal to me this AM, will follow some verbal commands      Current Facility-Administered Medications:     acetaZOLAMIDE (DIAMOX) injection 500 mg, 500 mg, IntraVENous, Once, Zhang Thacker DO    albuterol (PROVENTIL) (2.5 MG/3ML) 0.083% nebulizer solution 2.5 mg, 2.5 mg, Nebulization, Q4H PRN, Shlomo Toribio MD, 2.5 mg at 02/03/25 1412    insulin glargine

## 2025-02-06 NOTE — PLAN OF CARE
Problem: Pain  Goal: Verbalizes/displays adequate comfort level or baseline comfort level  2/6/2025 0327 by Miguelina Christensen RN  Flowsheets (Taken 2/6/2025 0327)  Verbalizes/displays adequate comfort level or baseline comfort level:   Encourage patient to monitor pain and request assistance   Assess pain using appropriate pain scale   Administer analgesics based on type and severity of pain and evaluate response   Implement non-pharmacological measures as appropriate and evaluate response  2/6/2025 0327 by Miguelina Christensen RN  Outcome: Progressing  Flowsheets (Taken 2/6/2025 0327)  Verbalizes/displays adequate comfort level or baseline comfort level:   Encourage patient to monitor pain and request assistance   Assess pain using appropriate pain scale   Administer analgesics based on type and severity of pain and evaluate response   Implement non-pharmacological measures as appropriate and evaluate response     Problem: Pain  Goal: Verbalizes/displays adequate comfort level or baseline comfort level  2/6/2025 0327 by Miguelina Christensen RN  Flowsheets (Taken 2/6/2025 0327)  Verbalizes/displays adequate comfort level or baseline comfort level:   Encourage patient to monitor pain and request assistance   Assess pain using appropriate pain scale   Administer analgesics based on type and severity of pain and evaluate response   Implement non-pharmacological measures as appropriate and evaluate response  2/6/2025 0327 by Miguelina Christensen RN  Outcome: Progressing  Flowsheets (Taken 2/6/2025 0327)  Verbalizes/displays adequate comfort level or baseline comfort level:   Encourage patient to monitor pain and request assistance   Assess pain using appropriate pain scale   Administer analgesics based on type and severity of pain and evaluate response   Implement non-pharmacological measures as appropriate and evaluate response     Problem: Skin/Tissue Integrity  Goal: Skin integrity remains intact  Description: 1.  Monitor for  areas of redness and/or skin breakdown  2.  Assess vascular access sites hourly  3.  Every 4-6 hours minimum:  Change oxygen saturation probe site  4.  Every 4-6 hours:  If on nasal continuous positive airway pressure, respiratory therapy assess nares and determine need for appliance change or resting period  2/6/2025 0327 by Miguelina Christensen RN  Flowsheets (Taken 2/6/2025 0327)  Skin Integrity Remains Intact:   Monitor for areas of redness and/or skin breakdown   Assess vascular access sites hourly  2/6/2025 0327 by Miguelina Christensen RN  Outcome: Progressing  Flowsheets (Taken 2/6/2025 0327)  Skin Integrity Remains Intact:   Monitor for areas of redness and/or skin breakdown   Assess vascular access sites hourly

## 2025-02-06 NOTE — SIGNIFICANT EVENT
[facility]  Rapid/Medical Response Team Note      Patient:    Rahul Camarillo 84 y.o. male, : 1940  Patient MRN: [id number]    Admission Date: 2025   Admission Diagnosis: Acute on chronic respiratory failure with hypoxia [J96.21]    RAPID RESPONSE  Rapid response called for: Bradycardia.   Nurse states that patient's HR decreased to 40s. When team arrived, patient's HR increased to 60-70s. Vitals otherwise stable. Patient more alert now as opposed to this morning. CXR revealed left sided pleural effusion from previous rapid. Dr. Shlomo Toribio MD was present for the duration of RRT.     OBJECTIVE    RRT/MRT start time:               RRT/MRT end time:  Vitals:    25 1300   BP:    Pulse: 76   Resp:    Temp:    SpO2:       Physical Exam:  Gen: Mild distress. somewhat more A&O compared to y/d. Unable to articulate symptoms  HEENT: normocephalic, atraumatic  CV: RRR, S1/S2 present without M/R/G  Pulm: CTAB, rales on posterior lung fields, mild-moderate tachypnea  Abd: S/NT/ND, no rebound, no guarding  MSK: no clubbing, no edema  Skin: warm, dry, intact, no rash  Neuro: CN II-XII grossly intact, no focal deficits appreciated   Psych: unable to assess      ASSESSMENT/PLAN AND DISPOSITION  Rahul Camarillo is a 84 y.o. year old male admitted for Acute on chronic respiratory failure with hypoxia [J96.21]  Rapid Response Team/ Medical Response Team Called For: Bradycardia    In setting of bradycardia completed the below:    Medications Administered During Rapid:  None    EKG:None    Labs:None    Imaging:None    Other interventions: none       Bradycardia down to the 40s  -self recovered to 60-70s  -tachypnea persists  Plan:  -consider diuresis as tolerated, otherwise consider thoracentesis for resolution of pleural effusion  -remainder of plan per primary team    Disposition: Same  Patient condition: Guarded    Attending Dr. Shlomo Toribio MD notified of rapid response and is in agreement with plan.

## 2025-02-06 NOTE — PROGRESS NOTES
Advance Care Planning   Healthcare Decision Maker:    Primary Decision Maker: Margaret Camarillo - Spouse - [phone]    Click here to complete Healthcare Decision Makers including selection of the Healthcare Decision Maker Relationship (ie \"Primary\").     responded to Rapid Response for  Rahul SUJATHA Camarillo, who is a 84 y.o.,male,     The  provided the following Interventions:  Pt. Examined by RRT team. Test ordered for pt. Provided crisis spiritual care and support.   Offered prayers on behalf for the patient.   Chart reviewed.       Fili Diaz  Staff   Spiritual Health   [phone]

## 2025-02-06 NOTE — SIGNIFICANT EVENT
[facility]  Rapid/Medical Response Team Note      Patient:    Rahul Camarillo 84 y.o. male, : 1940  Patient MRN: [id number]    Admission Date: 2025   Admission Diagnosis: Acute on chronic respiratory failure with hypoxia [J96.21]      RAPID RESPONSE  Rapid response called for: Increased lethargy/AMS. Patient's last known normal was last night, was more interactive and conversational. This morning only  Patient noted to be tachycardic to 110s and tachypnea to high 20s. Axillary temp of 99.1. Crackles in lung fields noted on exam. Of note diuretics have been held due to CKD but decided on x1 push dose of lasix IV 80 mg due to concern for volume overload. Repeat CBC, BMP. CXR, RPP, ABG showed PcO2 70 with a compensated pH. Attending was notified and Rapid was ended.     OBJECTIVE    RRT/MRT start time: 7:37am              RRT/MRT end time: 8:00am  Vitals:    25 0529   BP: (!) 97/58   Pulse: 91   Resp: 27   Temp: 98.4 °F (36.9 °C)   SpO2: 96%        Physical Exam:  Gen: mild distress, uncomfortable appearing. Responsive to verbal stimuli/sternal rub although makes incomprehensible sounds  HEENT: normocephalic, atraumatic  CV: Tachycardic, S1/S2 present without M/R/G,  Pulm: crackles heard at bibasilar area  Abd: S/NT/ND  MSK: no clubbing, no edema  Skin: warm, dry, intact, no rash  Neuro: Unable to obtain due to patient's state  Psych: Lethargic, A&Ox0      ASSESSMENT/PLAN AND DISPOSITION  Rahul Camarillo is a 84 y.o. year old male admitted for Acute on chronic respiratory failure with hypoxia [J96.21]  Rapid Response Team/ Medical Response Team Called For: AMS    In setting of AMS completed the below:    Medications Administered During Rapid:  Lasix 80 mg IV once    EKG: Similar to prior EKG    Labs: CBC, BMP Cr of 2.91. ABG pH 7.36, pCO2 69, pO2 69, HCO3 39.7    Imaging:  CXR    Other interventions: None      AMS/ Increased Lethargy  Plan:  - Signed out with Attending and rapid was

## 2025-02-06 NOTE — PROGRESS NOTES
Physical Therapy  Patient with RRT called earlier due to lethargy. Will defer PT session this AM and re-attempt this afternoon pending medical stability.  11am: Patient with 2nd RRT called this morning. Will defer mobility today until patient more medically stable.  Yari Cross PT, DPT

## 2025-02-07 VITALS
HEIGHT: 72 IN | WEIGHT: 135.58 LBS | DIASTOLIC BLOOD PRESSURE: 35 MMHG | OXYGEN SATURATION: 99 % | TEMPERATURE: 97.4 F | BODY MASS INDEX: 18.36 KG/M2 | SYSTOLIC BLOOD PRESSURE: 57 MMHG

## 2025-02-07 PROCEDURE — 6360000002 HC RX W HCPCS: Performed by: PHYSICIAN ASSISTANT

## 2025-02-07 PROCEDURE — 99239 HOSP IP/OBS DSCHRG MGMT >30: CPT | Performed by: STUDENT IN AN ORGANIZED HEALTH CARE EDUCATION/TRAINING PROGRAM

## 2025-02-07 PROCEDURE — 0W9B3ZZ DRAINAGE OF LEFT PLEURAL CAVITY, PERCUTANEOUS APPROACH: ICD-10-PCS | Performed by: RADIOLOGY

## 2025-02-07 PROCEDURE — 6370000000 HC RX 637 (ALT 250 FOR IP): Performed by: PHYSICIAN ASSISTANT

## 2025-02-07 PROCEDURE — 94003 VENT MGMT INPAT SUBQ DAY: CPT

## 2025-02-07 RX ORDER — MORPHINE SULFATE 2 MG/ML
2 INJECTION, SOLUTION INTRAMUSCULAR; INTRAVENOUS EVERY 30 MIN PRN
Status: DISCONTINUED | OUTPATIENT
Start: 2025-02-07 | End: 2025-02-07 | Stop reason: HOSPADM

## 2025-02-07 RX ORDER — LORAZEPAM 2 MG/ML
1 CONCENTRATE ORAL EVERY 30 MIN PRN
Status: DISCONTINUED | OUTPATIENT
Start: 2025-02-07 | End: 2025-02-07 | Stop reason: HOSPADM

## 2025-02-07 RX ORDER — MORPHINE SULFATE 4 MG/ML
4 INJECTION, SOLUTION INTRAMUSCULAR; INTRAVENOUS
Status: DISCONTINUED | OUTPATIENT
Start: 2025-02-07 | End: 2025-02-07 | Stop reason: HOSPADM

## 2025-02-07 RX ADMIN — LORAZEPAM 1 MG: 2 LIQUID ORAL at 00:35

## 2025-02-07 RX ADMIN — MORPHINE SULFATE 2 MG: 2 INJECTION, SOLUTION INTRAMUSCULAR; INTRAVENOUS at 00:34

## 2025-02-07 NOTE — ANESTHESIA PROCEDURE NOTES
Airway  Date/Time: 2/6/2025 7:20 AM  Urgency: emergent    Airway not difficult    General Information and Staff    Patient location during procedure: patient floor  Resident/CRNA: Siomara Alcazar APRN - CRNA  Performed: resident/CRNA/JEFFREY   Performed by: Siomara Alcazar APRN - CRNA  Authorized by: Siomara Alcazar APRN - CRNA      Consent for Airway (if performed for an anesthetic, see related documentation for consents)  Patient identity confirmed: per hospital policy  Consent: The procedure was performed in an emergent situation. Verbal consent not obtained. Written consent not obtained.  Risks and benefits: risks, benefits and alternatives were not discussed      Code status verified:yes  Indications and Patient Condition  Indications for airway management: respiratory failure  Spontaneous ventilation: present  Preoxygenated: yes  Patient position: sniffing  Mask difficulty assessment: vent by bag mask + OA or adjuvant +/- NMBA    Final Airway Details  Final airway type: endotracheal airway      Successful airway: ETT     Successful intubation technique: video laryngoscopy  Endotracheal tube insertion site: oral  Blade: Johnna  Blade size: #4  ETT size (mm): 7.5  Cormack-Lehane Classification: grade I - full view of glottis  Placement verified by: chest auscultation and capnometry   Measured from: lips  ETT to lips (cm): 23  Number of attempts at approach: 1  Number of other approaches attempted: 0    Additional Comments  Responded to call for respiratory failure. Oxygen sats in 80s on arrival. Patient minimally responsive. Was able to mask ventilate with oral airway to sats of 100%. Patient medicated with Etomidate 12mg and Rocuronium 50mg. Intubated easily with glidescope and placement confirmed by CO2 detection and auscultation.

## 2025-02-07 NOTE — PLAN OF CARE
Problem: Chronic Conditions and Co-morbidities  Goal: Patient's chronic conditions and co-morbidity symptoms are monitored and maintained or improved  Outcome: Progressing  Flowsheets (Taken 2/6/2025 2000)  Care Plan - Patient's Chronic Conditions and Co-Morbidity Symptoms are Monitored and Maintained or Improved:   Monitor and assess patient's chronic conditions and comorbid symptoms for stability, deterioration, or improvement   Collaborate with multidisciplinary team to address chronic and comorbid conditions and prevent exacerbation or deterioration   Update acute care plan with appropriate goals if chronic or comorbid symptoms are exacerbated and prevent overall improvement and discharge     Problem: Discharge Planning  Goal: Discharge to home or other facility with appropriate resources  Outcome: Progressing  Flowsheets (Taken 2/6/2025 2000)  Discharge to home or other facility with appropriate resources:   Identify barriers to discharge with patient and caregiver   Arrange for needed discharge resources and transportation as appropriate   Identify discharge learning needs (meds, wound care, etc)   Arrange for interpreters to assist at discharge as needed   Refer to discharge planning if patient needs post-hospital services based on physician order or complex needs related to functional status, cognitive ability or social support system     Problem: Safety - Adult  Goal: Free from fall injury  2/6/2025 2318 by Doris Anand, MARGARITO  Note: Pt will not fall while in ICU by 2/10/2025  2/6/2025 1826 by Roman Nair, RN  Outcome: Progressing  Flowsheets (Taken 2/6/2025 0327 by Miguelina Christensen, RN)  Free From Fall Injury: Instruct family/caregiver on patient safety  Note: Patient wife was informed of the importance of keeping the patient airway clear       Problem: Pain  Goal: Verbalizes/displays adequate comfort level or baseline comfort level  2/6/2025 2318 by Doris Anand,

## 2025-02-07 NOTE — SIGNIFICANT EVENT
[facility] Medicine  Death Pronouncement Note    Patient: Rahul Camarillo MRN: [id number]   SSN: [id number]  YOB: 1940   Age: 84 y.o.  Sex: male    Admission Date: 1/28/2025 11:10 AM Time of Death: 0110          Called to patient's bedside for apnea and pulselessness.     Patient lying in bed, mouth open, eyes closed. Unresponsive to voice or painful stimuli. No spontaneous respirations and chest rise absent. No palpable carotid pulse bilaterally. No heart sounds or breath sounds. Pupils fixed and dilated bilaterally. Corneal reflexes absent. Family at bedside.    Death officially pronounced at 01:10am, 2/7/2025.  Attending physician, Dr. Shlomo Toribio MD to be notified by nursing.    Eugenio Paredes MD, PGY-2  [facility]  2/7/2025 1:32 AM

## 2025-02-07 NOTE — SIGNIFICANT EVENT
Rapid Response Note      Patient: Rahul Camarillo 84 y.o. male  [id number]  1940      Admit Date: 1/28/2025   Admission Diagnosis: Acute on chronic respiratory failure with hypoxia [J96.21]    RAPID RESPONSE     Rapid response called for hypoxia.    Patient notably here under my service for heart failure kidney failure aspiration pneumonia Parkinson's.    Patient did have 2 [place] earlier in the morning secondary to increased lethargy altered mentation along with an episode of bradycardia.    Rapid response called in the setting of about an hour prior having required initiation of high flow nasal cannula, increased from what had previously been 2 L nasal cannula which she had been on for several days at that time.    On arrival to room, patient on high flow nasal cannula maintaining saturations in mid to low 80s, no more distress than what I had previously seen earlier however given his overall presentation concern for worsening respiratory failure.  Family was at bedside including wife and son.  Went over to wife who I spoke with briefly stating that given overall presentation, and patient being unable to be started on BiPAP due to absolute contraindications, patient would either require intubation or consideration of comfort care.  Patient's wife stated that she did not feel comfortable deciding this at this time and to talk to his son who was outside.  Went over to son, who asked if patient had DNR or DNI orders which he responded that he was full code at this time.  Given such, son stated to do what is needed to be done based on those orders.  Patient had continued as previously described over the intervening 2 to 3 minutes and given such decision made to intubate patient.     Patient did have intermittent episode of bradycardia down to low 40s, associated with hypoxia.  Gave 1 dose of atropine 1 mg for this.  Intubation set up quickly started, anesthesia called overhead, RSI kit  poldine supplies brought at bedside.  Please see separate anesthesia note for intubation procedure.  Patient successfully intubated via RSI with video laryngoscopy 7.5 ET tube.  Medications included etomidate and rocuronium.     Patient did have some amount of hypotension associated with intubation.  For this patient did have phenylephrine push dose given and Levophed started at bedside by anesthesia provider.    During this time, further conversation with family regarding current intubation.  Brother-in-law known to family had been contacted by wife.  Gave brief overview of what currently happened and brother-in-law expressed to both myself and wife that patient was very unlikely to ever come off of mechanical ventilation given his multiple comorbidities.  Spoke with both wife brother-in-law and family present.  Expressed that this was true, that patient did have low likelihood of being able to successfully come off of mechanical ventilation given his multiple comorbidities including advanced Parkinson's disease.  Expressed that there were options including immediately transitioning to comfort care right now versus continuing full measures as currently versus transitioning to comfort measures in the near future.  Family stated that they still had other children that wanted to see patient prior to him passing.  Given such, family opted to have full measures at this time until family can arrive at which point they will transition over to comfort measures.  Given this, subsequently moved patient over to ICU.      Medications Reviewed    OBJECTIVE     Patient Vitals for the past 12 hrs:   Temp Pulse Resp BP SpO2   02/06/25 2022 -- 67 16 -- 100 %   02/06/25 1739 -- -- 25 -- --   02/06/25 1554 97.7 °F (36.5 °C) 71 27 (!) 117/54 99 %   02/06/25 1300 -- 76 -- -- --   02/06/25 1209 98.3 °F (36.8 °C) 75 24 (!) 141/48 96 %   02/06/25 1129 97.9 °F (36.6 °C) 73 30 (!) 108/47 96 %   02/06/25 1100 -- 71 -- -- --   02/06/25 1031

## 2025-02-07 NOTE — PROGRESS NOTES
Bereavement Note:     responded to the death of Rahul Camarillo, who is a 84 y.o., male, offering Spiritual Care to patient and family.    Date of Death: 2025    Extended Emergency Contact Information  Primary Emergency Contact: Margaret Camarillo  Home Phone: [phone]  Mobile Phone: [phone]  Relation: Spouse      Home: [place]  [facility] [place] [phone]    Chaplains will continue to follow family and will provide spiritual care as needed.       Chaplain Elmore     [phone] - Office

## 2025-02-07 NOTE — PROGRESS NOTES
[facility]  Pulmonary, Critical Care, and Sleep Medicine    Name: Rahul Camarillo MRN: [id number]   : 1940 Hospital: [facility]   Date: 2025        Critical Care Consult      IMPRESSION:   Hypoxic respiratory failure requiring intubation in the setting of fluid overload and aspiration pneumonia  Acute decompensated HFrEF  Aspiration pneumonia  L pleural effusion s/p thoracentesis   UTI  SADIQ on CKD  Hx of parkinson's disease  Hx of DM type 2     Patient Active Problem List   Diagnosis    GI bleeding    Post-void dribbling    Testicular cancer (HCC)    Urinary incontinence, urge    Nocturia    Atopic rhinitis    Type 2 diabetes mellitus with hyperglycemia, with long-term current use of insulin (HCC)    Malignant neoplasm of testis (HCC)    Hypertension    Hyperlipidemia    Acute blood loss anemia    UTI (urinary tract infection)    Acquired absence of genital organ    Crohn's disease (HCC)    Hypersomnia    Benign prostatic hyperplasia with urinary obstruction    Septic shock (HCC)    Pseudophakia    Dementia (HCC)    Sepsis (HCC)    Hearing loss    OAB (overactive bladder)    Urge incontinence    Pneumonia    Moderate protein-calorie malnutrition (HCC)    Acute on chronic heart failure with reduced ejection fraction and diastolic dysfunction (HCC)    S/P TAVR (transcatheter aortic valve replacement)    Paroxysmal A-fib (HCC)    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 If needed, Titrate sedation to RASS of 0 to -1. PRN for breakthrough sedation needs.   Pulm: Titrate  complexity decision making to assess, manipulate, and support vital system functions, to treat this degree vital organ system failure and to prevent further life threatening deterioration of the patient’s condition.        Anabel Graves PA-C  02/06/25  Pulmonary, Critical Care Medicine  [facility]

## 2025-02-07 NOTE — PROGRESS NOTES
0040 Transition to comfort care with wife at bedside. Extubated on 2L NC. Ativan 1mg PO and Morphine 2mg IVP given. Levophed turned off.

## 2025-02-09 NOTE — DISCHARGE SUMMARY
Discharge Summary    Patient: Rahul Camarillo MRN: [id number]  CSN: [id number]    YOB: 1940  Age: 84 y.o.  Sex: male    DOA: 2025 LOS:  LOS: 10 days   Discharge Date: 2025      Admission Diagnosis: Acute on chronic respiratory failure with hypoxia [J96.21]    Discharge Diagnosis:  Acute on chronic HFrEF  Acute on chronic CKD 4  Aspiration pneumonia  Acute UTI  Acute on chronic respiratory failure with hypoxia  Parkinson's disease  Hypothyroidism  Type 2 diabetes mellitus  Metabolic alkalosis  Hypernatremia  Hypokalemia  Hypomagnesemia  Anemia    Discharge Condition:     Discharge Disposition:     PHYSICAL EXAM    Pulseless apneic unresponsive to any stimuli    Hospital Course By Problem:   Patient presented to the emergency room secondary to altered mentation and worsening hypoxia.  Patient does have baseline 2 L nasal cannula and was found to be hypoxic on presentation.  Initial concern was multifactorial hypoxic respiratory failure likely secondary to aspiration pneumonia combined with acute on chronic heart failure, acute on chronic kidney failure, possible complicated UTI.  Patient was treated aggressively with appropriate consults placed as needed.  Patient did have difficulty both pulling off appropriate amount of fluid for heart failure and bilateral effusions while maintaining kidney function.  Patient was treated with antibiotics throughout the entirety of this hospital stay.  Nephrology assisted in maintaining fluid status however patient did continue to have waxing waning mentation at best until final day of admission at which point patient underwent thoracentesis for persistent and then enlarging left-sided pleural effusion earlier in the day, and then later in the day had sudden worsening respirations/oxygenation requiring initially increased to high flow nasal cannula and when this failed intubation.  Long discussion had with family regarding

## (undated) DEVICE — FLEX ADVANTAGE 1500CC: Brand: FLEX ADVANTAGE

## (undated) DEVICE — BASIN EMESIS 500CC ROSE 250/CS 60/PLT: Brand: MEDEGEN MEDICAL PRODUCTS, LLC

## (undated) DEVICE — Device: Brand: DEFENDO VALVE AND CONNECTOR KIT

## (undated) DEVICE — KENDALL 500 SERIES DIAPHORETIC FOAM MONITORING ELECTRODE - TEAR DROP SHAPE ( 30/PK): Brand: KENDALL

## (undated) DEVICE — MEDI-VAC NON-CONDUCTIVE SUCTION TUBING: Brand: CARDINAL HEALTH

## (undated) DEVICE — KIT COLON W/ 1.1OZ LUB AND 2 END

## (undated) DEVICE — SYR 50ML SLIP TIP NSAF LF STRL --

## (undated) DEVICE — BLOCK BITE BLOX W DENTAL RIM --

## (undated) DEVICE — BITE BLK ENDOSCP AD 54FR GRN POLYETH ENDOSCP W STRP SLD

## (undated) DEVICE — TUBING, SUCTION, 3/16" X 6', STRAIGHT: Brand: MEDLINE